# Patient Record
Sex: FEMALE | Race: WHITE | NOT HISPANIC OR LATINO | Employment: UNEMPLOYED | URBAN - METROPOLITAN AREA
[De-identification: names, ages, dates, MRNs, and addresses within clinical notes are randomized per-mention and may not be internally consistent; named-entity substitution may affect disease eponyms.]

---

## 2017-01-12 ENCOUNTER — INFUSION THERAPY VISIT (OUTPATIENT)
Dept: INFUSION THERAPY | Facility: CLINIC | Age: 47
End: 2017-01-12
Attending: ALLERGY & IMMUNOLOGY
Payer: COMMERCIAL

## 2017-01-12 VITALS
HEART RATE: 94 BPM | SYSTOLIC BLOOD PRESSURE: 143 MMHG | OXYGEN SATURATION: 98 % | DIASTOLIC BLOOD PRESSURE: 81 MMHG | RESPIRATION RATE: 16 BRPM | TEMPERATURE: 97.7 F

## 2017-01-12 DIAGNOSIS — J45.50 SEVERE PERSISTENT ASTHMA (H): Primary | ICD-10-CM

## 2017-01-12 PROCEDURE — 25000128 H RX IP 250 OP 636: Mod: JW | Performed by: ALLERGY & IMMUNOLOGY

## 2017-01-12 PROCEDURE — 96401 CHEMO ANTI-NEOPL SQ/IM: CPT

## 2017-01-12 RX ADMIN — OMALIZUMAB 375 MG: 202.5 INJECTION, SOLUTION SUBCUTANEOUS at 14:31

## 2017-01-12 NOTE — PROGRESS NOTES
Infusion Nursing Note:  Alba Varela presents today for a Xolair SQ injection (2 injections given into left arm, 1 given into right arm).    Patient seen by provider today: No    Note: N/A.    Intravenous Access:  No Intravenous access/labs at this visit.    Treatment Conditions:  Not Applicable.      Post Infusion Assessment:  Patient tolerated injection without incident.  Patient observed for 30 minutes post Xolair per protocol.    Discharge Plan:   Discharge instructions reviewed with: Patient.  Patient and/or family verbalized understanding of discharge instructions and all questions answered.  Copy of AVS reviewed with patient and/or family.  Patient will return 1/26/17 for next appointment.  Patient discharged in stable condition accompanied by: self.  Departure Mode: Ambulatory.    Viri Whiting RN

## 2017-01-26 ENCOUNTER — INFUSION THERAPY VISIT (OUTPATIENT)
Dept: INFUSION THERAPY | Facility: CLINIC | Age: 47
End: 2017-01-26
Attending: ALLERGY & IMMUNOLOGY
Payer: COMMERCIAL

## 2017-01-26 VITALS
HEART RATE: 74 BPM | TEMPERATURE: 98 F | OXYGEN SATURATION: 98 % | SYSTOLIC BLOOD PRESSURE: 123 MMHG | RESPIRATION RATE: 16 BRPM | DIASTOLIC BLOOD PRESSURE: 78 MMHG

## 2017-01-26 DIAGNOSIS — J45.50 SEVERE PERSISTENT ASTHMA (H): Primary | ICD-10-CM

## 2017-01-26 PROCEDURE — 96401 CHEMO ANTI-NEOPL SQ/IM: CPT

## 2017-01-26 PROCEDURE — 25000128 H RX IP 250 OP 636: Performed by: ALLERGY & IMMUNOLOGY

## 2017-01-26 RX ADMIN — OMALIZUMAB 375 MG: 202.5 INJECTION, SOLUTION SUBCUTANEOUS at 14:08

## 2017-01-26 NOTE — PROGRESS NOTES
Infusion Nursing Note:  Alba Varela presents today for xolair.    Patient seen by provider today: No   present during visit today: Not Applicable.    Note: N/A.    Intravenous Access:  N/A.    Treatment Conditions:  Not Applicable.      Post Infusion Assessment:  Patient tolerated injection without incident.  Patient observed for 30 minutes post xolair per protocol.    Discharge Plan:   Patient declined prescription refills.  Discharge instructions reviewed with: Patient.  Patient and/or family verbalized understanding of discharge instructions and all questions answered.  Copy of AVS reviewed with patient and/or family.  Patient will return 2/9/17 for next appointment.  Patient discharged in stable condition accompanied by: self.  Departure Mode: Ambulatory.    Lucía Yu RN

## 2017-02-09 ENCOUNTER — INFUSION THERAPY VISIT (OUTPATIENT)
Dept: INFUSION THERAPY | Facility: CLINIC | Age: 47
End: 2017-02-09
Attending: ALLERGY & IMMUNOLOGY
Payer: COMMERCIAL

## 2017-02-09 VITALS
RESPIRATION RATE: 16 BRPM | SYSTOLIC BLOOD PRESSURE: 110 MMHG | TEMPERATURE: 98.2 F | HEART RATE: 84 BPM | DIASTOLIC BLOOD PRESSURE: 60 MMHG

## 2017-02-09 DIAGNOSIS — J45.50 SEVERE PERSISTENT ASTHMA (H): Primary | ICD-10-CM

## 2017-02-09 PROCEDURE — 96401 CHEMO ANTI-NEOPL SQ/IM: CPT

## 2017-02-09 PROCEDURE — 25000128 H RX IP 250 OP 636: Mod: JW | Performed by: ALLERGY & IMMUNOLOGY

## 2017-02-09 RX ADMIN — OMALIZUMAB 375 MG: 202.5 INJECTION, SOLUTION SUBCUTANEOUS at 14:08

## 2017-02-09 NOTE — PROGRESS NOTES
Infusion Nursing Note:  Alba Varela presents today for Xolair.    Patient seen by provider today: No   present during visit today: Not Applicable.    Note: N/A.    Intravenous Access:  No Intravenous access/labs at this visit.    Treatment Conditions:  Not Applicable.      Post Infusion Assessment:  Patient tolerated injection without incident.  Patient observed for 30 minutes post Xolair per protocol.    Discharge Plan:   Discharge instructions reviewed with: Patient.  Patient and/or family verbalized understanding of discharge instructions and all questions answered.  AVS to patient via Hom[employer].  Patient will return 2/23/17 for next appointment.   Patient discharged in stable condition accompanied by: self.  Departure Mode: Ambulatory.    Toma Camargo RN

## 2017-02-23 ENCOUNTER — INFUSION THERAPY VISIT (OUTPATIENT)
Dept: INFUSION THERAPY | Facility: CLINIC | Age: 47
End: 2017-02-23
Attending: ALLERGY & IMMUNOLOGY
Payer: COMMERCIAL

## 2017-02-23 VITALS
DIASTOLIC BLOOD PRESSURE: 76 MMHG | HEART RATE: 82 BPM | SYSTOLIC BLOOD PRESSURE: 119 MMHG | BODY MASS INDEX: 27.62 KG/M2 | OXYGEN SATURATION: 97 % | WEIGHT: 160.9 LBS | TEMPERATURE: 96.7 F | RESPIRATION RATE: 16 BRPM

## 2017-02-23 DIAGNOSIS — J45.50 SEVERE PERSISTENT ASTHMA (H): Primary | ICD-10-CM

## 2017-02-23 PROCEDURE — 96401 CHEMO ANTI-NEOPL SQ/IM: CPT

## 2017-02-23 PROCEDURE — 25000128 H RX IP 250 OP 636: Mod: JW | Performed by: ALLERGY & IMMUNOLOGY

## 2017-02-23 RX ADMIN — OMALIZUMAB 375 MG: 202.5 INJECTION, SOLUTION SUBCUTANEOUS at 13:53

## 2017-02-23 NOTE — MR AVS SNAPSHOT
After Visit Summary   2/23/2017    Alba Varela    MRN: [id number]           Patient Information     Date Of Birth          1970        Visit Information        Provider Department      2/23/2017 1:30 PM RH INFUSION CHAIR 10 [facility]        Today's Diagnoses     Severe persistent asthma    -  1       Follow-ups after your visit        Your next 10 appointments already scheduled     Mar 09, 2017 10:15 AM CST   New Visit with Osmar Byrd MD   [facility] [place] ([email or web address])    [address]  [facility] [address] [phone]            Mar 09, 2017  1:30 PM CST   Level 1 with RH INFUSION CHAIR 12   [facility] ([facility])    [facility]  [address]  [facility] [id number] [phone]            Mar 23, 2017  1:30 PM CDT   Level 1 with RH INFUSION CHAIR 5   [facility] ([facility])    [facility]  [address]  [facility] [address] [phone]              Who to contact     If you have questions or need follow up information about today's clinic visit or your schedule please contact [facility] directly at [phone].  Normal or non-critical lab and imaging results will be communicated to you by MyChart, letter or phone within 4 business days after the clinic has received the results. If you do not hear from us within 7 days, please contact the clinic through MyChart or phone. If you have a critical or abnormal lab result, we will notify you by phone as soon as possible.  Submit refill requests through MedPlasts or call your pharmacy and they will forward the refill request to us. Please allow 3 business days for your refill to be completed.          Additional Information About Your  Visit        AllazoHealthhart Information     [employer] gives you secure access to your electronic health record. If you see a primary care provider, you can also send messages to your care team and make appointments. If you have questions, please call your primary care clinic.  If you do not have a primary care provider, please call [phone] and they will assist you.        Care EveryWhere ID     This is your Care EveryWhere ID. This could be used by other organizations to access your [place] medical records  [id number]        Your Vitals Were     Pulse Temperature Respirations Last Period Pulse Oximetry BMI (Body Mass Index)    82 96.7  F (35.9  C) (Tympanic) 16 07/11/2014 97% 27.62 kg/m2       Blood Pressure from Last 3 Encounters:   02/23/17 119/76   02/09/17 110/60   01/26/17 123/78    Weight from Last 3 Encounters:   02/23/17 73 kg (160 lb 14.4 oz)   06/22/16 56.7 kg (125 lb)   08/26/15 63.3 kg (139 lb 8 oz)              We Performed the Following     Nursing observation          Today's Medication Changes          These changes are accurate as of: 2/23/17  2:49 PM.  If you have any questions, ask your nurse or doctor.               These medicines have changed or have updated prescriptions.        Dose/Directions    gabapentin 100 MG capsule   Commonly known as:  NEURONTIN   This may have changed:  additional instructions   Used for:  Cervicalgia        Take 100 mg in the morning and 400 mg at bedtime for one week, then increase to 100 mg in the morning and 600 mg at bedtime.   Quantity:  210 capsule   Refills:  1                Primary Care Provider Office Phone # Fax #    Bright [facility]elo [phone]       [facility] [address] [place] [id number]        Thank you!     Thank you for choosing [facility]  for your care. Our goal is always to provide you with excellent care. Hearing back from our patients is one way we can continue to  improve our services. Please take a few minutes to complete the written survey that you may receive in the mail after your visit with us. Thank you!             Your Updated Medication List - Protect others around you: Learn how to safely use, store and throw away your medicines at www.disposemymeds.org.          This list is accurate as of: 2/23/17  2:49 PM.  Always use your most recent med list.                   Brand Name Dispense Instructions for use    diltiazem 120 MG 24 hr capsule    CARDIZEM CD    90 capsule    Take 1 capsule (120 mg) by mouth daily You are due to see the cardiologist- please call [phone] to schedule.       gabapentin 100 MG capsule    NEURONTIN    210 capsule    Take 100 mg in the morning and 400 mg at bedtime for one week, then increase to 100 mg in the morning and 600 mg at bedtime.       NORTRIPTYLINE HCL PO      Take 200 mg by mouth daily       PAROXETINE HCL PO      Take 25 mg by mouth daily

## 2017-02-23 NOTE — PROGRESS NOTES
Infusion Nursing Note:  Alba Varela presents today for Xolair.    Patient seen by provider today: No   present during visit today: Not Applicable.    Note: N/A.    Intravenous Access:  No Intravenous access/labs at this visit.    Treatment Conditions:  Not Applicable.      Post Infusion Assessment:  Patient tolerated injection without incident.  Patient observed for 30 minutes post Xolair per protocol.    Discharge Plan:   Discharge instructions reviewed with: Patient.  Patient and/or family verbalized understanding of discharge instructions and all questions answered.  AVS to patient via [employer].  Patient will return 3/9/2017 for next appointment.   Patient discharged in stable condition accompanied by: self.  Departure Mode: Ambulatory.    Lyn Bunn RN

## 2017-03-01 ENCOUNTER — MEDICAL CORRESPONDENCE (OUTPATIENT)
Dept: HEALTH INFORMATION MANAGEMENT | Facility: CLINIC | Age: 47
End: 2017-03-01

## 2017-03-03 ENCOUNTER — PRE VISIT (OUTPATIENT)
Dept: CARDIOLOGY | Facility: CLINIC | Age: 47
End: 2017-03-03

## 2017-03-09 ENCOUNTER — INFUSION THERAPY VISIT (OUTPATIENT)
Dept: INFUSION THERAPY | Facility: CLINIC | Age: 47
End: 2017-03-09
Attending: ALLERGY & IMMUNOLOGY
Payer: COMMERCIAL

## 2017-03-09 ENCOUNTER — OFFICE VISIT (OUTPATIENT)
Dept: CARDIOLOGY | Facility: CLINIC | Age: 47
End: 2017-03-09
Payer: COMMERCIAL

## 2017-03-09 VITALS
DIASTOLIC BLOOD PRESSURE: 70 MMHG | HEIGHT: 64 IN | HEART RATE: 82 BPM | WEIGHT: 161 LBS | BODY MASS INDEX: 27.49 KG/M2 | SYSTOLIC BLOOD PRESSURE: 118 MMHG

## 2017-03-09 VITALS
SYSTOLIC BLOOD PRESSURE: 124 MMHG | TEMPERATURE: 96.3 F | DIASTOLIC BLOOD PRESSURE: 77 MMHG | RESPIRATION RATE: 16 BRPM | OXYGEN SATURATION: 98 % | WEIGHT: 159 LBS | BODY MASS INDEX: 27.29 KG/M2 | HEART RATE: 78 BPM

## 2017-03-09 DIAGNOSIS — R07.9 CHEST PAIN: ICD-10-CM

## 2017-03-09 DIAGNOSIS — R07.89 ATYPICAL CHEST PAIN: ICD-10-CM

## 2017-03-09 DIAGNOSIS — E78.00 HIGH CHOLESTEROL: Primary | ICD-10-CM

## 2017-03-09 DIAGNOSIS — E78.00 HIGH CHOLESTEROL: ICD-10-CM

## 2017-03-09 DIAGNOSIS — J45.50 SEVERE PERSISTENT ASTHMA (H): Primary | ICD-10-CM

## 2017-03-09 DIAGNOSIS — R07.9 CHEST PAIN: Primary | ICD-10-CM

## 2017-03-09 LAB
ANION GAP SERPL CALCULATED.3IONS-SCNC: 9 MMOL/L (ref 3–14)
APTT PPP: 28 SEC (ref 22–37)
BUN SERPL-MCNC: 22 MG/DL (ref 7–30)
CALCIUM SERPL-MCNC: 8.4 MG/DL (ref 8.5–10.1)
CHLORIDE SERPL-SCNC: 112 MMOL/L (ref 94–109)
CHOLEST SERPL-MCNC: 236 MG/DL
CO2 SERPL-SCNC: 23 MMOL/L (ref 20–32)
CREAT SERPL-MCNC: 0.66 MG/DL (ref 0.52–1.04)
ERYTHROCYTE [DISTWIDTH] IN BLOOD BY AUTOMATED COUNT: 11.9 % (ref 10–15)
GFR SERPL CREATININE-BSD FRML MDRD: ABNORMAL ML/MIN/1.7M2
GLUCOSE SERPL-MCNC: 97 MG/DL (ref 70–99)
HCT VFR BLD AUTO: 39 % (ref 35–47)
HDLC SERPL-MCNC: 76 MG/DL
HGB BLD-MCNC: 13.1 G/DL (ref 11.7–15.7)
INR PPP: 0.88 (ref 0.86–1.14)
LDLC SERPL CALC-MCNC: 138 MG/DL
MCH RBC QN AUTO: 31.2 PG (ref 26.5–33)
MCHC RBC AUTO-ENTMCNC: 33.6 G/DL (ref 31.5–36.5)
MCV RBC AUTO: 93 FL (ref 78–100)
NONHDLC SERPL-MCNC: 160 MG/DL
PLATELET # BLD AUTO: 232 10E9/L (ref 150–450)
POTASSIUM SERPL-SCNC: 4.4 MMOL/L (ref 3.4–5.3)
RBC # BLD AUTO: 4.2 10E12/L (ref 3.8–5.2)
SODIUM SERPL-SCNC: 144 MMOL/L (ref 133–144)
TRIGL SERPL-MCNC: 111 MG/DL
WBC # BLD AUTO: 5.8 10E9/L (ref 4–11)

## 2017-03-09 PROCEDURE — 80061 LIPID PANEL: CPT | Performed by: INTERNAL MEDICINE

## 2017-03-09 PROCEDURE — 85730 THROMBOPLASTIN TIME PARTIAL: CPT | Performed by: INTERNAL MEDICINE

## 2017-03-09 PROCEDURE — 99203 OFFICE O/P NEW LOW 30 MIN: CPT | Mod: 25 | Performed by: INTERNAL MEDICINE

## 2017-03-09 PROCEDURE — 36415 COLL VENOUS BLD VENIPUNCTURE: CPT | Performed by: INTERNAL MEDICINE

## 2017-03-09 PROCEDURE — 80048 BASIC METABOLIC PNL TOTAL CA: CPT | Performed by: INTERNAL MEDICINE

## 2017-03-09 PROCEDURE — 93000 ELECTROCARDIOGRAM COMPLETE: CPT | Performed by: INTERNAL MEDICINE

## 2017-03-09 PROCEDURE — 96401 CHEMO ANTI-NEOPL SQ/IM: CPT

## 2017-03-09 PROCEDURE — 85610 PROTHROMBIN TIME: CPT | Performed by: INTERNAL MEDICINE

## 2017-03-09 PROCEDURE — 85027 COMPLETE CBC AUTOMATED: CPT | Performed by: INTERNAL MEDICINE

## 2017-03-09 PROCEDURE — 25000128 H RX IP 250 OP 636: Performed by: ALLERGY & IMMUNOLOGY

## 2017-03-09 RX ORDER — LORATADINE 10 MG/1
10 TABLET ORAL DAILY
COMMUNITY

## 2017-03-09 RX ADMIN — OMALIZUMAB 375 MG: 202.5 INJECTION, SOLUTION SUBCUTANEOUS at 13:52

## 2017-03-09 NOTE — MR AVS SNAPSHOT
After Visit Summary   3/9/2017    Alba Varela    MRN: [id number]           Patient Information     Date Of Birth          1970        Visit Information        Provider Department      3/9/2017 10:15 AM Ip, Osmar Lizarraga MD [facility] [place]        Today's Diagnoses     High cholesterol    -  1    Atypical chest pain           Follow-ups after your visit        Your next 10 appointments already scheduled     Mar 16, 2017 11:00 AM CDT   Cath 90 Minute with [id number], CATHIRTEAM   [facility] ([facility])    [address]  [facility] [address]   [phone]            Mar 23, 2017  1:30 PM CDT   Level 1 with RH INFUSION CHAIR 4   [facility] ([facility])    [facility]  [address] [place] [address] [address]  [facility] [id number] [phone]            Mar 24, 2017  8:10 AM CDT   Return Visit with WALESKA Tellez [facility]ART AT [place] ([facility] PSA Clin[facility])    [address]  [facility] [address] [phone]            Apr 06, 2017  1:30 PM CDT   Level 1 with RH INFUSION CHAIR 9   [facility] ([facility])    [facility]  [address]  [facility] [address] [phone]            Apr 20, 2017  1:30 PM CDT   Level 1 with RH INFUSION CHAIR 7   [facility] ([facility])    [facility]  [address]  [facility] [address] [phone]            May 04, 2017  1:30 PM CDT   Level 1 with RH INFUSION CHAIR 8   [facility] ([facility])    [facility]  [address]  [facility] [address] [phone]              Future tests that were  "ordered for you today     Open Future Orders        Priority Expected Expires Ordered    Cardiac Cath: Coronary Angiography Adult Order Routine 3/16/2017 3/4/2018 3/9/2017            Who to contact     If you have questions or need follow up information about today's clinic visit or your schedule please contact [facility] AT [place] directly at [phone].  Normal or non-critical lab and imaging results will be communicated to you by MyChart, letter or phone within 4 business days after the clinic has received the results. If you do not hear from us within 7 days, please contact the clinic through InstantLuxehart or phone. If you have a critical or abnormal lab result, we will notify you by phone as soon as possible.  Submit refill requests through Vanksen or call your pharmacy and they will forward the refill request to us. Please allow 3 business days for your refill to be completed.          Additional Information About Your Visit        [email or web address] Information     [employer]anksen gives you secure access to your electronic health record. If you see a primary care provider, you can also send messages to your care team and make appointments. If you have questions, please call your primary care clinic.  If you do not have a primary care provider, please call [phone] and they will assist you.        Care EveryWhere ID     This is your Care EveryWhere ID. This could be used by other organizations to access your [place] medical records  [id number]        Your Vitals Were     Pulse Height Last Period BMI (Body Mass Index)          82 1.626 m (5' 4\") 07/11/2014 27.64 kg/m2         Blood Pressure from Last 3 Encounters:   03/09/17 124/77   03/09/17 118/70   02/23/17 119/76    Weight from Last 3 Encounters:   03/09/17 72.1 kg (159 lb)   03/09/17 73 kg (161 lb)   02/23/17 73 kg (160 lb 14.4 oz)              We Performed the Following     EKG 12-lead complete w/read - Clinics (performed today)        "   Today's Medication Changes          These changes are accurate as of: 3/9/17  2:51 PM.  If you have any questions, ask your nurse or doctor.               These medicines have changed or have updated prescriptions.        Dose/Directions    gabapentin 100 MG capsule   Commonly known as:  NEURONTIN   This may have changed:  additional instructions   Used for:  Cervicalgia        Take 100 mg in the morning and 400 mg at bedtime for one week, then increase to 100 mg in the morning and 600 mg at bedtime.   Quantity:  210 capsule   Refills:  1                Primary Care Provider Office Phone # Fax #    Bright [facility]elo [phone]       [facility] [address] [address]        Thank you!     Thank you for choosing [facility] AT [place]  for your care. Our goal is always to provide you with excellent care. Hearing back from our patients is one way we can continue to improve our services. Please take a few minutes to complete the written survey that you may receive in the mail after your visit with us. Thank you!             Your Updated Medication List - Protect others around you: Learn how to safely use, store and throw away your medicines at www.disposemymeds.org.          This list is accurate as of: 3/9/17  2:51 PM.  Always use your most recent med list.                   Brand Name Dispense Instructions for use    BOTOX IJ          diltiazem 120 MG 24 hr capsule    CARDIZEM CD    90 capsule    Take 1 capsule (120 mg) by mouth daily You are due to see the cardiologist- please call [phone] to schedule.       gabapentin 100 MG capsule    NEURONTIN    210 capsule    Take 100 mg in the morning and 400 mg at bedtime for one week, then increase to 100 mg in the morning and 600 mg at bedtime.       loratadine 10 MG tablet    CLARITIN     Take 10 mg by mouth daily       NORTRIPTYLINE HCL PO      Take 200 mg by mouth daily       PAROXETINE  HCL PO      Take 25 mg by mouth daily

## 2017-03-09 NOTE — LETTER
3/9/2017    Bright Sotelo MD  [facility]   [address] [address]    RE: Alba Varela       Dear Colleague,    It is a pleasure for me to see Mrs. Varela today for evaluation of chest discomfort.      She is a very delightful 46-year-old lady who is originally from [place].  She was adopted at a young age and her family history for heart disease is unknown.  She has been seen by Sherman Wyatt and Harvey Lee previously.  They saw her in 2007 for evaluation of chest discomfort and an abnormal EKG.  It was felt that her EKG was unremarkable and her chest discomfort was noncardiac in origin.  When she saw Dr. Lee, the possibility of coronary artery spasm was raised, though this diagnosis has not been substantiated.  She has had a normal stress echocardiogram in 2015 and ST segment changes have never been documented.       This lady is a smoker but does not have diabetes.  She is not hypertensive.  She has been labeled as hyperlipidemic.  I do see levels of 273 in 2010.  However, the last level in 2011 was 182, and she denies ever having been on any cholesterol-lowering medications.      She does have a very interesting past medical history.  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  She also has severe allergies for which she receives Solera infusion.  She is allergic to contrast in that she gets facial swelling and severe itching.  However, with premedication she tolerates a contrast load just fine.      Her chest pains have been substernal in location.  She has been getting more frequent episodes.  She had 3 episodes within the past month.  One of these episodes occurred when she was in [facility].  She was feeling just fine, and within a minute she had severe crushing chest discomfort which dropped her to the floor.  It lasted for about an hour and was associated with dysphagia.  A second similar episode occurred at home, and she blacked out.  She does have a  history of migraines.  She tells me that she has had upper GI workup, and she says causes such as gastroesophageal reflux disease and esophageal spasm have been ruled out.      There is no history of drug use or excessive caffeine use.  She denies excessive use of alcohol.      PHYSICAL EXAMINATION:  Cardiovascular system examination is normal.  EKG today shows nonspecific ST segment changes and nonspecific intraventricular conduction delay only.      I should also add that back in 2000 when she was residing in [place], she received a diagnosis of possible mitral valve prolapse and she has been put on diltiazem.  This medication has been somewhat effective in preventing her chest discomfort but not recently.     Outpatient Encounter Prescriptions as of 3/9/2017   Medication Sig Dispense Refill     loratadine (CLARITIN) 10 MG tablet Take 10 mg by mouth daily       OnabotulinumtoxinA (BOTOX IJ)        NORTRIPTYLINE HCL PO Take 200 mg by mouth daily       PAROXETINE HCL PO Take 25 mg by mouth daily       diltiazem (CARDIZEM CD) 120 MG 24 hr CD capsule Take 1 capsule (120 mg) by mouth daily You are due to see the cardiologist- please call [phone] to schedule. 90 capsule 0     gabapentin (NEURONTIN) 100 MG capsule Take 100 mg in the morning and 400 mg at bedtime for one week, then increase to 100 mg in the morning and 600 mg at bedtime. (Patient taking differently: Take 400 mg in the morning and 600 mg at bedtime.) 210 capsule 1     No facility-administered encounter medications on file as of 3/9/2017.       IMPRESSION:   1.  Chest pain, uncertain etiology.  Previous normal stress test.  Typical and atypical features of coronary artery disease.  I think the only way we may be able to fully evaluate her would be to do invasive angiography with the appropriate technical challenges for coronary artery spasm.  Her history of severe allergy is noted, and we will premedicate her.   2.  Common variable immune deficiency.    3.  Severe allergies.   4.  History of migraine headaches.  There is an association between migraine headaches and coronary artery spasm.   5.  Hyperlipidemia.      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  We talked about possible risks of coronary angiography including but not limited to MI, CVA, death, peripheral vascular injury, allergic reaction.  The patient understands and is agreeable to proceed.  Further followup and recommendations will be made once we know the results of her coronary angiogram.     Again, thank you for allowing me to participate in the care of your patient.      Sincerely,    DR AYE SAM MD     [facility]

## 2017-03-09 NOTE — MR AVS SNAPSHOT
After Visit Summary   3/9/2017    Alba Varela    MRN: [id number]           Patient Information     Date Of Birth          1970        Visit Information        Provider Department      3/9/2017 1:30 PM RH INFUSION CHAIR 8 [facility] Infusion [facility]        Today's Diagnoses     Severe persistent asthma    -  1       Follow-ups after your visit        Your next 10 appointments already scheduled     Mar 16, 2017 11:00 AM CDT   Cath 90 Minute with [id number], CATHIRTEAM   [facility] Cardiac Cath Lab ([facility])    [address]  [facility] [address]   [phone]            Mar 23, 2017  1:30 PM CDT   Level 1 with RH INFUSION CHAIR 4   [facility] Infusion [facility] ([facility])    [facility]  [address] [place] [address] [address]  [facility] [address]   [phone]            Mar 24, 2017  8:10 AM CDT   Return Visit with WALESKA Tellez [facility]YSICIANS HE[facility] AT [place] ([facility])    [address]  [facility] [address]   [phone]            Apr 06, 2017  1:30 PM CDT   Level 1 with RH INFUSION CHAIR 9   [facility] Infusion [facility] ([facility])    [facility]  [address]  [facility] [address] [phone]            Apr 20, 2017  1:30 PM CDT   Level 1 with RH INFUSION CHAIR 7   [facility] Infusion [facility] ([facility])    [facility]  [address]  [facility] [address]   [phone]            May 04, 2017  1:30 PM CDT   Level 1 with RH INFUSION CHAIR 8   [facility] Infusion [facility] ([facility])    [facility]  [address]  [facility] [address] [phone]              Future tests that were ordered for you today     Open  Future Orders        Priority Expected Expires Ordered    Cardiac Cath: Coronary Angiography Adult Order Routine 3/16/2017 3/4/2018 3/9/2017            Who to contact     If you have questions or need follow up information about today's clinic visit or your schedule please contact [facility] INFUSION [facility] directly at [phone].  Normal or non-critical lab and imaging results will be communicated to you by MyChart, letter or phone within 4 business days after the clinic has received the results. If you do not hear from us within 7 days, please contact the clinic through Washiohart or phone. If you have a critical or abnormal lab result, we will notify you by phone as soon as possible.  Submit refill requests through Mark43 or call your pharmacy and they will forward the refill request to us. Please allow 3 business days for your refill to be completed.          Additional Information About Your Visit        MyChart Information     Mark43 gives you secure access to your electronic health record. If you see a primary care provider, you can also send messages to your care team and make appointments. If you have questions, please call your primary care clinic.  If you do not have a primary care provider, please call [phone] and they will assist you.        Care EveryWhere ID     This is your Care EveryWhere ID. This could be used by other organizations to access your [place] medical records  [id number]        Your Vitals Were     Pulse Temperature Respirations Last Period Pulse Oximetry BMI (Body Mass Index)    78 96.3  F (35.7  C) (Tympanic) 16 07/11/2014 98% 27.29 kg/m2       Blood Pressure from Last 3 Encounters:   03/09/17 124/77   03/09/17 118/70   02/23/17 119/76    Weight from Last 3 Encounters:   03/09/17 72.1 kg (159 lb)   03/09/17 73 kg (161 lb)   02/23/17 73 kg (160 lb 14.4 oz)              We Performed the Following     Nursing observation          Today's Medication Changes           These changes are accurate as of: 3/9/17  2:28 PM.  If you have any questions, ask your nurse or doctor.               These medicines have changed or have updated prescriptions.        Dose/Directions    gabapentin 100 MG capsule   Commonly known as:  NEURONTIN   This may have changed:  additional instructions   Used for:  Cervicalgia        Take 100 mg in the morning and 400 mg at bedtime for one week, then increase to 100 mg in the morning and 600 mg at bedtime.   Quantity:  210 capsule   Refills:  1                Primary Care Provider Office Phone # Fax #    Bright [facility]elo [phone]       [facility] [address] [facility] [address]        Thank you!     Thank you for choosing [facility] INFUSION [facility]  for your care. Our goal is always to provide you with excellent care. Hearing back from our patients is one way we can continue to improve our services. Please take a few minutes to complete the written survey that you may receive in the mail after your visit with us. Thank you!             Your Updated Medication List - Protect others around you: Learn how to safely use, store and throw away your medicines at www.disposemymeds.org.          This list is accurate as of: 3/9/17  2:28 PM.  Always use your most recent med list.                   Brand Name Dispense Instructions for use    BOTOX IJ          diltiazem 120 MG 24 hr capsule    CARDIZEM CD    90 capsule    Take 1 capsule (120 mg) by mouth daily You are due to see the cardiologist- please call [phone] to schedule.       gabapentin 100 MG capsule    NEURONTIN    210 capsule    Take 100 mg in the morning and 400 mg at bedtime for one week, then increase to 100 mg in the morning and 600 mg at bedtime.       loratadine 10 MG tablet    CLARITIN     Take 10 mg by mouth daily       NORTRIPTYLINE HCL PO      Take 200 mg by mouth daily       PAROXETINE HCL PO      Take 25 mg by mouth daily

## 2017-03-09 NOTE — PROGRESS NOTES
Infusion Nursing Note:  Alba Varela presents today for Xolair.    Patient seen by provider today: No   present during visit today: Not Applicable.    Note: N/A.    Intravenous Access:  No Intravenous access/labs at this visit.    Treatment Conditions:  Not Applicable.      Post Infusion Assessment:  Patient tolerated injection without incident.    Discharge Plan:   Discharge instructions reviewed with: Patient.  Patient and/or family verbalized understanding of discharge instructions and all questions answered.  AVS to patient via Interactive Project.  Patient will return 3/23/2017 for next appointment.   Patient discharged in stable condition accompanied by: self.  Departure Mode: Ambulatory.    Lyn Bunn RN

## 2017-03-10 NOTE — PROGRESS NOTES
HISTORY OF PRESENT ILLNESS:  It is a pleasure for me to see Mrs. Varela today for evaluation of chest discomfort.      She is a very delightful 46-year-old lady who is originally from [place].  She was adopted at a young age and her family history for heart disease is unknown.  She has been seen by Sherman Wyatt and Harvey Lee previously.  They saw her in 2007 for evaluation of chest discomfort and an abnormal EKG.  It was felt that her EKG was unremarkable and her chest discomfort was noncardiac in origin.  When she saw Dr. Lee, the possibility of coronary artery spasm was raised, though this diagnosis has not been substantiated.  She has had a normal stress echocardiogram in 2015 and ST segment changes have never been documented.       This lady is a smoker but does not have diabetes.  She is not hypertensive.  She has been labeled as hyperlipidemic.  I do see levels of 273 in 2010.  However, the last level in 2011 was 182, and she denies ever having been on any cholesterol-lowering medications.      She does have a very interesting past medical history.  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  She also has severe allergies for which she receives Solera infusion.  She is allergic to contrast in that she gets facial swelling and severe itching.  However, with premedication she tolerates a contrast load just fine.      Her chest pains have been substernal in location.  She has been getting more frequent episodes.  She had 3 episodes within the past month.  One of these episodes occurred when she was in [facility].  She was feeling just fine, and within a minute she had severe crushing chest discomfort which dropped her to the floor.  It lasted for about an hour and was associated with dysphagia.  A second similar episode occurred at home, and she blacked out.  She does have a history of migraines.  She tells me that she has had upper GI workup, and she says causes such as gastroesophageal  reflux disease and esophageal spasm have been ruled out.      There is no history of drug use or excessive caffeine use.  She denies excessive use of alcohol.      PHYSICAL EXAMINATION:  Cardiovascular system examination is normal.  EKG today shows nonspecific ST segment changes and nonspecific intraventricular conduction delay only.      I should also add that back in  when she was residing in [place], she received a diagnosis of possible mitral valve prolapse and she has been put on diltiazem.  This medication has been somewhat effective in preventing her chest discomfort but not recently.      IMPRESSION:   1.  Chest pain, uncertain etiology.  Previous normal stress test.  Typical and atypical features of coronary artery disease.  I think the only way we may be able to fully evaluate her would be to do invasive angiography with the appropriate technical challenges for coronary artery spasm.  Her history of severe allergy is noted, and we will premedicate her.   2.  Common variable immune deficiency.   3.  Severe allergies.   4.  History of migraine headaches.  There is an association between migraine headaches and coronary artery spasm.   5.  Hyperlipidemia.      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  We talked about possible risks of coronary angiography including but not limited to MI, CVA, death, peripheral vascular injury, allergic reaction.  The patient understands and is agreeable to proceed.  Further followup and recommendations will be made once we know the results of her coronary angiogram.         AYE SAM MD, [facility]             D: 2017 11:11   T: 2017 20:13   MT: KITA      Name:     KEISHA WEIR   MRN:      [id number]        Account:      [id number]   :      1970           Service Date: 2017      Document: [id number]

## 2017-03-14 ENCOUNTER — TELEPHONE (OUTPATIENT)
Dept: CARDIOLOGY | Facility: CLINIC | Age: 47
End: 2017-03-14

## 2017-03-14 DIAGNOSIS — R07.9 CHEST PAIN, UNSPECIFIED TYPE: Primary | ICD-10-CM

## 2017-03-14 RX ORDER — ASPIRIN 81 MG/1
325 TABLET ORAL DAILY
Status: CANCELLED | OUTPATIENT
Start: 2017-03-15

## 2017-03-14 RX ORDER — SODIUM CHLORIDE 9 MG/ML
INJECTION, SOLUTION INTRAVENOUS CONTINUOUS
Status: CANCELLED | OUTPATIENT
Start: 2017-03-16

## 2017-03-14 RX ORDER — LIDOCAINE 40 MG/G
CREAM TOPICAL
Status: CANCELLED | OUTPATIENT
Start: 2017-03-16

## 2017-03-14 RX ORDER — POTASSIUM CHLORIDE 750 MG/1
20 TABLET, EXTENDED RELEASE ORAL
Status: CANCELLED | OUTPATIENT
Start: 2017-03-15

## 2017-03-15 NOTE — TELEPHONE ENCOUNTER
Reviewed prep instructions with patient over the phone. Patient had no questions.    Temo SIDHU  [facility]

## 2017-03-16 ENCOUNTER — APPOINTMENT (OUTPATIENT)
Dept: CARDIOLOGY | Facility: CLINIC | Age: 47
End: 2017-03-16
Attending: INTERNAL MEDICINE
Payer: COMMERCIAL

## 2017-03-16 ENCOUNTER — HOSPITAL ENCOUNTER (OUTPATIENT)
Facility: CLINIC | Age: 47
Discharge: HOME | End: 2017-03-16
Attending: INTERNAL MEDICINE | Admitting: INTERNAL MEDICINE
Payer: COMMERCIAL

## 2017-03-16 VITALS
OXYGEN SATURATION: 94 % | TEMPERATURE: 97.8 F | RESPIRATION RATE: 18 BRPM | SYSTOLIC BLOOD PRESSURE: 116 MMHG | DIASTOLIC BLOOD PRESSURE: 79 MMHG | HEART RATE: 78 BPM

## 2017-03-16 DIAGNOSIS — Z98.890 STATUS POST CORONARY ANGIOGRAM: Primary | ICD-10-CM

## 2017-03-16 DIAGNOSIS — R07.89 ATYPICAL CHEST PAIN: ICD-10-CM

## 2017-03-16 PROCEDURE — 99153 MOD SED SAME PHYS/QHP EA: CPT | Performed by: INTERNAL MEDICINE

## 2017-03-16 PROCEDURE — 93458 L HRT ARTERY/VENTRICLE ANGIO: CPT

## 2017-03-16 PROCEDURE — 25000128 H RX IP 250 OP 636: Performed by: INTERNAL MEDICINE

## 2017-03-16 PROCEDURE — 25000128 H RX IP 250 OP 636

## 2017-03-16 PROCEDURE — 99152 MOD SED SAME PHYS/QHP 5/>YRS: CPT | Performed by: INTERNAL MEDICINE

## 2017-03-16 PROCEDURE — B2111ZZ FLUOROSCOPY OF MULTIPLE CORONARY ARTERIES USING LOW OSMOLAR CONTRAST: ICD-10-PCS | Performed by: INTERNAL MEDICINE

## 2017-03-16 PROCEDURE — 33210 INSERT ELECTRD/PM CATH SNGL: CPT

## 2017-03-16 PROCEDURE — 93463 DRUG ADMIN & HEMODYNMIC MEAS: CPT

## 2017-03-16 PROCEDURE — 27210757 ZZH CATH TEMP PACING HEART CR8

## 2017-03-16 PROCEDURE — 99153 MOD SED SAME PHYS/QHP EA: CPT

## 2017-03-16 PROCEDURE — 93463 DRUG ADMIN & HEMODYNMIC MEAS: CPT | Performed by: INTERNAL MEDICINE

## 2017-03-16 PROCEDURE — 27210946 ZZH KIT HC TOTES DISP CR8

## 2017-03-16 PROCEDURE — 02HK3JZ INSERTION OF PACEMAKER LEAD INTO RIGHT VENTRICLE, PERCUTANEOUS APPROACH: ICD-10-PCS | Performed by: INTERNAL MEDICINE

## 2017-03-16 PROCEDURE — B2151ZZ FLUOROSCOPY OF LEFT HEART USING LOW OSMOLAR CONTRAST: ICD-10-PCS | Performed by: INTERNAL MEDICINE

## 2017-03-16 PROCEDURE — 93458 L HRT ARTERY/VENTRICLE ANGIO: CPT | Mod: 26 | Performed by: INTERNAL MEDICINE

## 2017-03-16 PROCEDURE — 99152 MOD SED SAME PHYS/QHP 5/>YRS: CPT

## 2017-03-16 PROCEDURE — 4A023N7 MEASUREMENT OF CARDIAC SAMPLING AND PRESSURE, LEFT HEART, PERCUTANEOUS APPROACH: ICD-10-PCS | Performed by: INTERNAL MEDICINE

## 2017-03-16 PROCEDURE — 27210742 ZZH CATH CR1

## 2017-03-16 PROCEDURE — 25000125 ZZHC RX 250: Performed by: INTERNAL MEDICINE

## 2017-03-16 PROCEDURE — 27210827 ZZH KIT ACIST INJECTOR CR6

## 2017-03-16 RX ORDER — LIDOCAINE HYDROCHLORIDE 10 MG/ML
INJECTION, SOLUTION INFILTRATION; PERINEURAL
Status: DISCONTINUED
Start: 2017-03-16 | End: 2017-03-16 | Stop reason: HOSPADM

## 2017-03-16 RX ORDER — DIPHENHYDRAMINE HYDROCHLORIDE 50 MG/ML
25-50 INJECTION INTRAMUSCULAR; INTRAVENOUS
Status: DISCONTINUED | OUTPATIENT
Start: 2017-03-16 | End: 2017-03-16 | Stop reason: HOSPADM

## 2017-03-16 RX ORDER — PHENYLEPHRINE HCL IN 0.9% NACL 1 MG/10 ML
20-100 SYRINGE (ML) INTRAVENOUS
Status: DISCONTINUED | OUTPATIENT
Start: 2017-03-16 | End: 2017-03-16 | Stop reason: HOSPADM

## 2017-03-16 RX ORDER — DEXTROSE MONOHYDRATE 25 G/50ML
12.5-5 INJECTION, SOLUTION INTRAVENOUS EVERY 30 MIN PRN
Status: DISCONTINUED | OUTPATIENT
Start: 2017-03-16 | End: 2017-03-16 | Stop reason: HOSPADM

## 2017-03-16 RX ORDER — ADENOSINE 3 MG/ML
12-12000 INJECTION, SOLUTION INTRAVENOUS
Status: DISCONTINUED | OUTPATIENT
Start: 2017-03-16 | End: 2017-03-16 | Stop reason: HOSPADM

## 2017-03-16 RX ORDER — LIDOCAINE HYDROCHLORIDE 10 MG/ML
1-10 INJECTION, SOLUTION INFILTRATION; PERINEURAL
Status: DISCONTINUED | OUTPATIENT
Start: 2017-03-16 | End: 2017-03-16 | Stop reason: HOSPADM

## 2017-03-16 RX ORDER — NITROGLYCERIN 0.4 MG/1
0.4 TABLET SUBLINGUAL EVERY 5 MIN PRN
Status: DISCONTINUED | OUTPATIENT
Start: 2017-03-16 | End: 2017-03-16 | Stop reason: HOSPADM

## 2017-03-16 RX ORDER — EPTIFIBATIDE 2 MG/ML
2 INJECTION, SOLUTION INTRAVENOUS CONTINUOUS PRN
Status: DISCONTINUED | OUTPATIENT
Start: 2017-03-16 | End: 2017-03-16 | Stop reason: HOSPADM

## 2017-03-16 RX ORDER — DOPAMINE HYDROCHLORIDE 160 MG/100ML
2-20 INJECTION, SOLUTION INTRAVENOUS CONTINUOUS PRN
Status: DISCONTINUED | OUTPATIENT
Start: 2017-03-16 | End: 2017-03-16 | Stop reason: HOSPADM

## 2017-03-16 RX ORDER — BUPIVACAINE HYDROCHLORIDE 2.5 MG/ML
1-10 INJECTION, SOLUTION EPIDURAL; INFILTRATION; INTRACAUDAL
Status: DISCONTINUED | OUTPATIENT
Start: 2017-03-16 | End: 2017-03-16 | Stop reason: HOSPADM

## 2017-03-16 RX ORDER — HEPARIN SODIUM 1000 [USP'U]/ML
1000-10000 INJECTION, SOLUTION INTRAVENOUS; SUBCUTANEOUS EVERY 5 MIN PRN
Status: DISCONTINUED | OUTPATIENT
Start: 2017-03-16 | End: 2017-03-16 | Stop reason: HOSPADM

## 2017-03-16 RX ORDER — NICARDIPINE HYDROCHLORIDE 2.5 MG/ML
100 INJECTION INTRAVENOUS
Status: DISCONTINUED | OUTPATIENT
Start: 2017-03-16 | End: 2017-03-16 | Stop reason: HOSPADM

## 2017-03-16 RX ORDER — NIFEDIPINE 10 MG/1
10 CAPSULE ORAL
Status: DISCONTINUED | OUTPATIENT
Start: 2017-03-16 | End: 2017-03-16 | Stop reason: HOSPADM

## 2017-03-16 RX ORDER — PROMETHAZINE HYDROCHLORIDE 25 MG/ML
6.25-25 INJECTION, SOLUTION INTRAMUSCULAR; INTRAVENOUS EVERY 4 HOURS PRN
Status: DISCONTINUED | OUTPATIENT
Start: 2017-03-16 | End: 2017-03-16 | Stop reason: HOSPADM

## 2017-03-16 RX ORDER — FENTANYL CITRATE 50 UG/ML
INJECTION, SOLUTION INTRAMUSCULAR; INTRAVENOUS
Status: DISCONTINUED
Start: 2017-03-16 | End: 2017-03-16 | Stop reason: HOSPADM

## 2017-03-16 RX ORDER — DOBUTAMINE HYDROCHLORIDE 200 MG/100ML
2-20 INJECTION INTRAVENOUS CONTINUOUS PRN
Status: DISCONTINUED | OUTPATIENT
Start: 2017-03-16 | End: 2017-03-16 | Stop reason: HOSPADM

## 2017-03-16 RX ORDER — NITROGLYCERIN 5 MG/ML
100-500 VIAL (ML) INTRAVENOUS
Status: COMPLETED | OUTPATIENT
Start: 2017-03-16 | End: 2017-03-16

## 2017-03-16 RX ORDER — METHYLPREDNISOLONE SODIUM SUCCINATE 125 MG/2ML
INJECTION, POWDER, LYOPHILIZED, FOR SOLUTION INTRAMUSCULAR; INTRAVENOUS
Status: COMPLETED
Start: 2017-03-16 | End: 2017-03-16

## 2017-03-16 RX ORDER — LIDOCAINE 40 MG/G
CREAM TOPICAL
Status: DISCONTINUED | OUTPATIENT
Start: 2017-03-16 | End: 2017-03-16 | Stop reason: HOSPADM

## 2017-03-16 RX ORDER — DIPHENHYDRAMINE HYDROCHLORIDE 50 MG/ML
25 INJECTION INTRAMUSCULAR; INTRAVENOUS ONCE
Status: COMPLETED | OUTPATIENT
Start: 2017-03-16 | End: 2017-03-16

## 2017-03-16 RX ORDER — FUROSEMIDE 10 MG/ML
20-100 INJECTION INTRAMUSCULAR; INTRAVENOUS
Status: DISCONTINUED | OUTPATIENT
Start: 2017-03-16 | End: 2017-03-16 | Stop reason: HOSPADM

## 2017-03-16 RX ORDER — LORAZEPAM 2 MG/ML
.5-2 INJECTION INTRAMUSCULAR EVERY 4 HOURS PRN
Status: DISCONTINUED | OUTPATIENT
Start: 2017-03-16 | End: 2017-03-16 | Stop reason: HOSPADM

## 2017-03-16 RX ORDER — HYDROCODONE BITARTRATE AND ACETAMINOPHEN 5; 325 MG/1; MG/1
1-2 TABLET ORAL EVERY 4 HOURS PRN
Status: CANCELLED | OUTPATIENT
Start: 2017-03-16

## 2017-03-16 RX ORDER — LIDOCAINE HYDROCHLORIDE 10 MG/ML
30 INJECTION, SOLUTION EPIDURAL; INFILTRATION; INTRACAUDAL; PERINEURAL
Status: DISCONTINUED | OUTPATIENT
Start: 2017-03-16 | End: 2017-03-16 | Stop reason: HOSPADM

## 2017-03-16 RX ORDER — FLUMAZENIL 0.1 MG/ML
0.2 INJECTION, SOLUTION INTRAVENOUS
Status: CANCELLED | OUTPATIENT
Start: 2017-03-16 | End: 2017-03-17

## 2017-03-16 RX ORDER — PRASUGREL 10 MG/1
10-60 TABLET, FILM COATED ORAL
Status: DISCONTINUED | OUTPATIENT
Start: 2017-03-16 | End: 2017-03-16 | Stop reason: HOSPADM

## 2017-03-16 RX ORDER — SODIUM NITROPRUSSIDE 25 MG/ML
100-200 INJECTION INTRAVENOUS
Status: DISCONTINUED | OUTPATIENT
Start: 2017-03-16 | End: 2017-03-16 | Stop reason: HOSPADM

## 2017-03-16 RX ORDER — ONDANSETRON 2 MG/ML
4 INJECTION INTRAMUSCULAR; INTRAVENOUS EVERY 4 HOURS PRN
Status: DISCONTINUED | OUTPATIENT
Start: 2017-03-16 | End: 2017-03-16 | Stop reason: HOSPADM

## 2017-03-16 RX ORDER — HYDRALAZINE HYDROCHLORIDE 20 MG/ML
10-20 INJECTION INTRAMUSCULAR; INTRAVENOUS
Status: DISCONTINUED | OUTPATIENT
Start: 2017-03-16 | End: 2017-03-16 | Stop reason: HOSPADM

## 2017-03-16 RX ORDER — VERAPAMIL HYDROCHLORIDE 2.5 MG/ML
1-2.5 INJECTION, SOLUTION INTRAVENOUS
Status: COMPLETED | OUTPATIENT
Start: 2017-03-16 | End: 2017-03-16

## 2017-03-16 RX ORDER — DILTIAZEM HYDROCHLORIDE 120 MG/1
120 CAPSULE, COATED, EXTENDED RELEASE ORAL DAILY
Status: CANCELLED | OUTPATIENT
Start: 2017-03-16

## 2017-03-16 RX ORDER — ENALAPRILAT 1.25 MG/ML
1.25-2.5 INJECTION INTRAVENOUS
Status: DISCONTINUED | OUTPATIENT
Start: 2017-03-16 | End: 2017-03-16 | Stop reason: HOSPADM

## 2017-03-16 RX ORDER — NALOXONE HYDROCHLORIDE 0.4 MG/ML
.1-.4 INJECTION, SOLUTION INTRAMUSCULAR; INTRAVENOUS; SUBCUTANEOUS
Status: CANCELLED | OUTPATIENT
Start: 2017-03-16

## 2017-03-16 RX ORDER — EPTIFIBATIDE 2 MG/ML
180 INJECTION, SOLUTION INTRAVENOUS EVERY 10 MIN PRN
Status: DISCONTINUED | OUTPATIENT
Start: 2017-03-16 | End: 2017-03-16 | Stop reason: HOSPADM

## 2017-03-16 RX ORDER — HEPARIN SODIUM 1000 [USP'U]/ML
INJECTION, SOLUTION INTRAVENOUS; SUBCUTANEOUS
Status: COMPLETED
Start: 2017-03-16 | End: 2017-03-16

## 2017-03-16 RX ORDER — CLOPIDOGREL BISULFATE 75 MG/1
75 TABLET ORAL
Status: DISCONTINUED | OUTPATIENT
Start: 2017-03-16 | End: 2017-03-16 | Stop reason: HOSPADM

## 2017-03-16 RX ORDER — VERAPAMIL HYDROCHLORIDE 2.5 MG/ML
INJECTION, SOLUTION INTRAVENOUS
Status: DISCONTINUED
Start: 2017-03-16 | End: 2017-03-16 | Stop reason: HOSPADM

## 2017-03-16 RX ORDER — SODIUM CHLORIDE 9 MG/ML
INJECTION, SOLUTION INTRAVENOUS CONTINUOUS
Status: DISCONTINUED | OUTPATIENT
Start: 2017-03-16 | End: 2017-03-16 | Stop reason: HOSPADM

## 2017-03-16 RX ORDER — ACETAMINOPHEN 325 MG/1
325-650 TABLET ORAL EVERY 4 HOURS PRN
Status: CANCELLED | OUTPATIENT
Start: 2017-03-16

## 2017-03-16 RX ORDER — FENTANYL CITRATE 50 UG/ML
25-50 INJECTION, SOLUTION INTRAMUSCULAR; INTRAVENOUS
Status: DISCONTINUED | OUTPATIENT
Start: 2017-03-16 | End: 2017-03-16 | Stop reason: HOSPADM

## 2017-03-16 RX ORDER — METHYLPREDNISOLONE SODIUM SUCCINATE 125 MG/2ML
125 INJECTION, POWDER, LYOPHILIZED, FOR SOLUTION INTRAMUSCULAR; INTRAVENOUS
Status: DISCONTINUED | OUTPATIENT
Start: 2017-03-16 | End: 2017-03-16 | Stop reason: HOSPADM

## 2017-03-16 RX ORDER — IOPAMIDOL 755 MG/ML
100 INJECTION, SOLUTION INTRAVASCULAR ONCE
Status: COMPLETED | OUTPATIENT
Start: 2017-03-16 | End: 2017-03-16

## 2017-03-16 RX ORDER — FLUMAZENIL 0.1 MG/ML
0.2 INJECTION, SOLUTION INTRAVENOUS
Status: DISCONTINUED | OUTPATIENT
Start: 2017-03-16 | End: 2017-03-16 | Stop reason: HOSPADM

## 2017-03-16 RX ORDER — NALOXONE HYDROCHLORIDE 0.4 MG/ML
.2-.4 INJECTION, SOLUTION INTRAMUSCULAR; INTRAVENOUS; SUBCUTANEOUS
Status: CANCELLED | OUTPATIENT
Start: 2017-03-16 | End: 2017-03-17

## 2017-03-16 RX ORDER — DIPHENHYDRAMINE HYDROCHLORIDE 50 MG/ML
INJECTION INTRAMUSCULAR; INTRAVENOUS
Status: COMPLETED
Start: 2017-03-16 | End: 2017-03-16

## 2017-03-16 RX ORDER — PROTAMINE SULFATE 10 MG/ML
1-5 INJECTION, SOLUTION INTRAVENOUS
Status: DISCONTINUED | OUTPATIENT
Start: 2017-03-16 | End: 2017-03-16 | Stop reason: HOSPADM

## 2017-03-16 RX ORDER — POTASSIUM CHLORIDE 29.8 MG/ML
20 INJECTION INTRAVENOUS
Status: DISCONTINUED | OUTPATIENT
Start: 2017-03-16 | End: 2017-03-16 | Stop reason: HOSPADM

## 2017-03-16 RX ORDER — POTASSIUM CHLORIDE 7.45 MG/ML
10 INJECTION INTRAVENOUS
Status: DISCONTINUED | OUTPATIENT
Start: 2017-03-16 | End: 2017-03-16 | Stop reason: HOSPADM

## 2017-03-16 RX ORDER — ASPIRIN 325 MG
325 TABLET ORAL
Status: DISCONTINUED | OUTPATIENT
Start: 2017-03-16 | End: 2017-03-16 | Stop reason: HOSPADM

## 2017-03-16 RX ORDER — POTASSIUM CHLORIDE 1500 MG/1
20 TABLET, EXTENDED RELEASE ORAL
Status: DISCONTINUED | OUTPATIENT
Start: 2017-03-16 | End: 2017-03-16 | Stop reason: HOSPADM

## 2017-03-16 RX ORDER — NITROGLYCERIN 5 MG/ML
100-200 VIAL (ML) INTRAVENOUS
Status: DISCONTINUED | OUTPATIENT
Start: 2017-03-16 | End: 2017-03-16 | Stop reason: HOSPADM

## 2017-03-16 RX ORDER — ASPIRIN 81 MG/1
81-324 TABLET, CHEWABLE ORAL
Status: DISCONTINUED | OUTPATIENT
Start: 2017-03-16 | End: 2017-03-16 | Stop reason: HOSPADM

## 2017-03-16 RX ORDER — NITROGLYCERIN 20 MG/100ML
.07-2 INJECTION INTRAVENOUS CONTINUOUS PRN
Status: DISCONTINUED | OUTPATIENT
Start: 2017-03-16 | End: 2017-03-16 | Stop reason: HOSPADM

## 2017-03-16 RX ORDER — PROTAMINE SULFATE 10 MG/ML
25-100 INJECTION, SOLUTION INTRAVENOUS EVERY 5 MIN PRN
Status: DISCONTINUED | OUTPATIENT
Start: 2017-03-16 | End: 2017-03-16 | Stop reason: HOSPADM

## 2017-03-16 RX ORDER — METHYLPREDNISOLONE SODIUM SUCCINATE 125 MG/2ML
100 INJECTION, POWDER, LYOPHILIZED, FOR SOLUTION INTRAMUSCULAR; INTRAVENOUS ONCE
Status: COMPLETED | OUTPATIENT
Start: 2017-03-16 | End: 2017-03-16

## 2017-03-16 RX ORDER — FENTANYL CITRATE 50 UG/ML
25-50 INJECTION, SOLUTION INTRAMUSCULAR; INTRAVENOUS
Status: CANCELLED | OUTPATIENT
Start: 2017-03-16 | End: 2017-03-17

## 2017-03-16 RX ORDER — CLOPIDOGREL BISULFATE 75 MG/1
300-600 TABLET ORAL
Status: DISCONTINUED | OUTPATIENT
Start: 2017-03-16 | End: 2017-03-16 | Stop reason: HOSPADM

## 2017-03-16 RX ORDER — SODIUM CHLORIDE 9 MG/ML
INJECTION, SOLUTION INTRAVENOUS CONTINUOUS
Status: CANCELLED | OUTPATIENT
Start: 2017-03-16

## 2017-03-16 RX ORDER — LIDOCAINE 40 MG/G
CREAM TOPICAL
Status: CANCELLED | OUTPATIENT
Start: 2017-03-16

## 2017-03-16 RX ORDER — NALOXONE HYDROCHLORIDE 0.4 MG/ML
0.4 INJECTION, SOLUTION INTRAMUSCULAR; INTRAVENOUS; SUBCUTANEOUS EVERY 5 MIN PRN
Status: DISCONTINUED | OUTPATIENT
Start: 2017-03-16 | End: 2017-03-16 | Stop reason: HOSPADM

## 2017-03-16 RX ORDER — MORPHINE SULFATE 4 MG/ML
1-2 INJECTION, SOLUTION INTRAMUSCULAR; INTRAVENOUS EVERY 5 MIN PRN
Status: DISCONTINUED | OUTPATIENT
Start: 2017-03-16 | End: 2017-03-16 | Stop reason: HOSPADM

## 2017-03-16 RX ADMIN — VERAPAMIL HYDROCHLORIDE 2.5 MG: 2.5 INJECTION, SOLUTION INTRAVENOUS at 12:30

## 2017-03-16 RX ADMIN — HEPARIN SODIUM 10000 UNITS: 1000 INJECTION, SOLUTION INTRAVENOUS; SUBCUTANEOUS at 12:30

## 2017-03-16 RX ADMIN — MIDAZOLAM 1 MG: 1 INJECTION INTRAMUSCULAR; INTRAVENOUS at 13:10

## 2017-03-16 RX ADMIN — FENTANYL CITRATE 75 MCG: 50 INJECTION INTRAMUSCULAR; INTRAVENOUS at 13:11

## 2017-03-16 RX ADMIN — ACETYLCHOLINE CHLORIDE 190 MCG: KIT at 13:14

## 2017-03-16 RX ADMIN — IOPAMIDOL 90 ML: 755 INJECTION, SOLUTION INTRAVASCULAR at 10:45

## 2017-03-16 RX ADMIN — DIPHENHYDRAMINE HYDROCHLORIDE 25 MG: 50 INJECTION INTRAMUSCULAR; INTRAVENOUS at 11:12

## 2017-03-16 RX ADMIN — NITROGLYCERIN 2000 MCG: 10 INJECTION INTRAVENOUS at 12:29

## 2017-03-16 RX ADMIN — METHYLPREDNISOLONE SODIUM SUCCINATE 100 MG: 125 INJECTION, POWDER, FOR SOLUTION INTRAMUSCULAR; INTRAVENOUS at 11:13

## 2017-03-16 RX ADMIN — METHYLPREDNISOLONE SODIUM SUCCINATE 100 MG: 125 INJECTION INTRAMUSCULAR; INTRAVENOUS at 11:13

## 2017-03-16 NOTE — IP AVS SNAPSHOT
MRN:[id number]                      After Visit Summary   3/16/2017    Alba Varela    MRN: [id number]           Visit Information        Department      3/16/2017  9:38 AM [facility]          Review of your medicines      CONTINUE these medicines which may have CHANGED, or have new prescriptions. If we are uncertain of the size of tablets/capsules you have at home, strength may be listed as something that might have changed.        Dose / Directions    gabapentin 100 MG capsule   Commonly known as:  NEURONTIN   This may have changed:  additional instructions   Used for:  Cervicalgia        Take 100 mg in the morning and 400 mg at bedtime for one week, then increase to 100 mg in the morning and 600 mg at bedtime.   Quantity:  210 capsule   Refills:  1         CONTINUE these medicines which have NOT CHANGED        Dose / Directions    BOTOX IJ        Refills:  0       diltiazem 120 MG 24 hr capsule   Commonly known as:  CARDIZEM CD   Used for:  Chest pressure        Dose:  120 mg   Take 1 capsule (120 mg) by mouth daily You are due to see the cardiologist- please call [phone] to schedule.   Quantity:  90 capsule   Refills:  0       loratadine 10 MG tablet   Commonly known as:  CLARITIN        Dose:  10 mg   Take 10 mg by mouth daily   Refills:  0       NORTRIPTYLINE HCL PO        Dose:  200 mg   Take 200 mg by mouth daily   Refills:  0       PAROXETINE HCL PO        Dose:  25 mg   Take 25 mg by mouth daily   Refills:  0                Protect others around you: Learn how to safely use, store and throw away your medicines at www.disposemymeds.org.         Follow-ups after your visit        Your next 10 appointments already scheduled     Mar 23, 2017  1:30 PM CDT   Level 1 with  INFUSION CHAIR 4   [facility] Infusion [facility] ([facility])    [facility]  [address]  [facility] [address]   [phone]             Mar 24, 2017  8:10 AM CDT   Return Visit with WALESKA Tellez [facility] AT [place] ([facility])    [address]  [facility] [address]   [phone]            Apr 06, 2017  1:30 PM CDT   Level 1 with RH INFUSION CHAIR 9   [facility] Infusion [facility] ([facility])    [facility]  [address]  [facility] [address]   [phone]            Apr 20, 2017  1:30 PM CDT   Level 1 with RH INFUSION CHAIR 7   [facility] Infusion [facility] ([facility])    [facility]  [address]  [facility] [address]   [phone]            May 04, 2017  1:30 PM CDT   Level 1 with RH INFUSION CHAIR 8   [facility] Infusion [facility] ([facility])    [facility]  [address]  [facility] [address]   [phone]               Care Instructions        After Care Instructions     Discharge Instructions - IF on Metformin (Glucophage or Glucovance) or Metformin containing medications       IF on Metformin (Glucophage or Glucovance) or Metformin containing medications , schedule a Basic Metabolic Panel at [facility] or Primary Clinic in 48 - 72 hours post procedure and PRIOR TO resuming the Metformin or Metformin containing medications.  Hold Metformin (Glucophage or Glucovance) or Metformin containing medications until after the Basic Metabolic Panel on the 2nd or 3rd day following the procedure.  May resume after blood draw is complete.                  Further instructions from your care team         Discharge Instructions for Cardiac Catheterization  Cardiac catheterization is a procedure to look for blocked areas in the blood vessels that send blood to the heart. A thin, flexible tube (catheter) is put in a blood vessel in your groin or arm. Contrast fluid  is injected into your blood, which then flows to your heart. Finally, X-rays pictures are taken of your heart. Your doctor will review the results with you. Be sure to ask any questions you have before you leave. This sheet will help you take care of yourself at home.  Home care    Only do light and easy activities for the next 2 to 3 days. Ask for help with chores and errands while you recover. Have someone drive you to your appointments.    Avoid heavy lifting for a while. Your doctor will provide a specific time frame for you.    Ask your doctor when you can expect to return to work. Unless your job involves lifting, you may be able to return to your normal activities within a couple of days.    Take your medicines as directed. Do not skip doses.    Drink 6 to 8 glasses of water a day. This is to help flush the contrast dye out of your body.    Take your temperature each day for 7 days.    Check your incisions daily for signs of infection. These include redness, swelling, and drainage. It is normal to have a small bruise or bump where the catheter was inserted. A bruise that is getting larger is not normal and should be reported to your doctor.    Eat a healthy diet. Make sure it is low in fat, salt, and cholesterol. Ask your doctor for diet information.    Stop smoking. Enroll in a stop-smoking program or ask your doctor for help.    Exercise as advised by your doctor. Depending on your situation, your doctor may recommend you start a cardiac rehabilitation program.    Do not swim or take baths until the doctor says it s OK. You can shower the day after the procedure.    Follow-up care  Make a follow-up appointment as advised by our staff.  When to seek medical care  Call your doctor immediately if you have any of the following:    Chest pain    Constant or increasing pain or numbness in your leg    Fever of 100.4 F (38.0 C) or higher    Symptoms of infection (redness, swelling, drainage, or warmth at the  incision site)    Shortness of breath    A leg that feels cold or appears blue    Bleeding, bruising, or a lot of swelling where the catheter was inserted    Blood in your urine    Black or tarry stools    Any unusual bleeding     [id number] [employer]. [address] [place] [address], [place] [address]. All rights reserved. This information is not intended as a substitute for professional medical care. Always follow your healthcare professional's instructions.           Additional Information About Your Visit        Daegishart Information     [employer] gives you secure access to your electronic health record. If you see a primary care provider, you can also send messages to your care team and make appointments. If you have questions, please call your primary care clinic.  If you do not have a primary care provider, please call [phone] and they will assist you.        Care EveryWhere ID     This is your Care EveryWhere ID. This could be used by other organizations to access your [place] medical records  [id number]        Your Vitals Were     Blood Pressure Pulse Temperature Respirations Last Period Pulse Oximetry    105/71 78 97.8  F (36.6  C) (Temporal) 16 07/11/2014 94%       Primary Care Provider Office Phone # Fax #    Bright [facility]elo [phone]      Thank you!     Thank you for choosing [facility] for your care. Our goal is always to provide you with excellent care. Hearing back from our patients is one way we can continue to improve our services. Please take a few minutes to complete the written survey that you may receive in the mail after you visit. If you would like to speak to someone directly about your visit please contact Patient Relations at [phone]. Thank you!               Medication List: This is a list of all your medications and when to take them. Check marks below indicate your daily home schedule. Keep this list as a reference.      Medications            Morning Afternoon Evening Bedtime As Needed    BOTOX IJ                                diltiazem 120 MG 24 hr capsule   Commonly known as:  CARDIZEM CD   Take 1 capsule (120 mg) by mouth daily You are due to see the cardiologist- please call [phone] to schedule.                                gabapentin 100 MG capsule   Commonly known as:  NEURONTIN   Take 100 mg in the morning and 400 mg at bedtime for one week, then increase to 100 mg in the morning and 600 mg at bedtime.                                loratadine 10 MG tablet   Commonly known as:  CLARITIN   Take 10 mg by mouth daily                                NORTRIPTYLINE HCL PO   Take 200 mg by mouth daily                                PAROXETINE HCL PO   Take 25 mg by mouth daily

## 2017-03-16 NOTE — IP AVS SNAPSHOT
[facility]    [address]    [facility] [address]    Phone:  [phone]                                       After Visit Summary   3/16/2017    Alba Varela    MRN: [id number]           After Visit Summary Signature Page     I have received my discharge instructions, and my questions have been answered. I have discussed any challenges I see with this plan with the nurse or doctor.    ..........................................................................................................................................  Patient/Patient Representative Signature      ..........................................................................................................................................  Patient Representative Print Name and Relationship to Patient    ..................................................               ................................................  Date                                            Time    ..........................................................................................................................................  Reviewed by Signature/Title    ...................................................              ..............................................  Date                                                            Time

## 2017-03-16 NOTE — DISCHARGE INSTRUCTIONS
Discharge Instructions for Cardiac Catheterization  Cardiac catheterization is a procedure to look for blocked areas in the blood vessels that send blood to the heart. A thin, flexible tube (catheter) is put in a blood vessel in your groin or arm. Contrast fluid is injected into your blood, which then flows to your heart. Finally, X-rays pictures are taken of your heart. Your doctor will review the results with you. Be sure to ask any questions you have before you leave. This sheet will help you take care of yourself at home.  Home care    Only do light and easy activities for the next 2 to 3 days. Ask for help with chores and errands while you recover. Have someone drive you to your appointments.    Avoid heavy lifting for a while. Your doctor will provide a specific time frame for you.    Ask your doctor when you can expect to return to work. Unless your job involves lifting, you may be able to return to your normal activities within a couple of days.    Take your medicines as directed. Do not skip doses.    Drink 6 to 8 glasses of water a day. This is to help flush the contrast dye out of your body.    Take your temperature each day for 7 days.    Check your incisions daily for signs of infection. These include redness, swelling, and drainage. It is normal to have a small bruise or bump where the catheter was inserted. A bruise that is getting larger is not normal and should be reported to your doctor.    Eat a healthy diet. Make sure it is low in fat, salt, and cholesterol. Ask your doctor for diet information.    Stop smoking. Enroll in a stop-smoking program or ask your doctor for help.    Exercise as advised by your doctor. Depending on your situation, your doctor may recommend you start a cardiac rehabilitation program.    Do not swim or take baths until the doctor says it s OK. You can shower the day after the procedure.    Follow-up care  Make a follow-up appointment as advised by our staff.  When to  seek medical care  Call your doctor immediately if you have any of the following:    Chest pain    Constant or increasing pain or numbness in your leg    Fever of 100.4 F (38.0 C) or higher    Symptoms of infection (redness, swelling, drainage, or warmth at the incision site)    Shortness of breath    A leg that feels cold or appears blue    Bleeding, bruising, or a lot of swelling where the catheter was inserted    Blood in your urine    Black or tarry stools    Any unusual bleeding     2379-9320 [employer]. [address] [place], [place] [address]. All rights reserved. This information is not intended as a substitute for professional medical care. Always follow your healthcare professional's instructions.

## 2017-03-23 ENCOUNTER — INFUSION THERAPY VISIT (OUTPATIENT)
Dept: INFUSION THERAPY | Facility: CLINIC | Age: 47
End: 2017-03-23
Attending: ALLERGY & IMMUNOLOGY
Payer: COMMERCIAL

## 2017-03-23 VITALS
RESPIRATION RATE: 18 BRPM | DIASTOLIC BLOOD PRESSURE: 75 MMHG | TEMPERATURE: 97.1 F | OXYGEN SATURATION: 100 % | SYSTOLIC BLOOD PRESSURE: 132 MMHG

## 2017-03-23 DIAGNOSIS — J45.50 SEVERE PERSISTENT ASTHMA (H): Primary | ICD-10-CM

## 2017-03-23 PROCEDURE — 96401 CHEMO ANTI-NEOPL SQ/IM: CPT

## 2017-03-23 PROCEDURE — 25000128 H RX IP 250 OP 636: Mod: JW | Performed by: ALLERGY & IMMUNOLOGY

## 2017-03-23 RX ADMIN — OMALIZUMAB 375 MG: 202.5 INJECTION, SOLUTION SUBCUTANEOUS at 14:02

## 2017-03-23 NOTE — PROGRESS NOTES
Infusion Nursing Note:  Alba Varela presents today for xolair.    Patient seen by provider today: No   present during visit today: Not Applicable.    Note: N/A.    Intravenous Access:  No Intravenous access/labs at this visit.    Treatment Conditions:  Not Applicable.      Post Infusion Assessment:  Patient tolerated injection without incident.    Discharge Plan:   AVS to patient via MYCHART.  Patient will return 4/6 for xolair for next appointment.   Patient discharged in stable condition accompanied by: self.  Departure Mode: Ambulatory.    Sangita Edwards RN

## 2017-03-23 NOTE — MR AVS SNAPSHOT
After Visit Summary   3/23/2017    Alba Varela    MRN: [id number]           Patient Information     Date Of Birth          1970        Visit Information        Provider Department      3/23/2017 1:30 PM RH INFUSION CHAIR 4 [facility] Infusion [facility]        Today's Diagnoses     Severe persistent asthma    -  1       Follow-ups after your visit        Your next 10 appointments already scheduled     Mar 24, 2017  8:10 AM CDT   Return Visit with WALESKA Tellez [facility] [place] ([facility])    [address]  [facility] [address]   [phone]            Apr 06, 2017  1:30 PM CDT   Level 1 with RH INFUSION CHAIR 9   [facility] Infusion [facility] ([facility])    [facility]  [address]  [facility] [address]   [phone]            Apr 20, 2017  1:30 PM CDT   Level 1 with RH INFUSION CHAIR 7   [facility] Infusion [facility] ([facility])    [facility]  [address]  [facility] [id number] [phone]            May 04, 2017  1:30 PM CDT   Level 1 with RH INFUSION CHAIR 8   [facility] Infusion [facility] ([facility])    [facility]  [address]  [facility] [id number] [phone]              Who to contact     If you have questions or need follow up information about today's clinic visit or your schedule please contact [facility] INFUSION [facility] directly at [phone].  Normal or non-critical lab and imaging results will be communicated to you by MyChart, letter or phone within 4 business days after the clinic has received the results. If you do not hear from us within 7 days, please contact the clinic through MyChart or phone. If you have a critical or abnormal lab  result, we will notify you by phone as soon as possible.  Submit refill requests through [employer] or call your pharmacy and they will forward the refill request to us. Please allow 3 business days for your refill to be completed.          Additional Information About Your Visit        [employer] Information     [employer] gives you secure access to your electronic health record. If you see a primary care provider, you can also send messages to your care team and make appointments. If you have questions, please call your primary care clinic.  If you do not have a primary care provider, please call [phone] and they will assist you.        Care EveryWhere ID     This is your Care EveryWhere ID. This could be used by other organizations to access your [place] medical records  [id number]        Your Vitals Were     Temperature Respirations Last Period Pulse Oximetry          97.1  F (36.2  C) (Tympanic) 18 07/11/2014 100%         Blood Pressure from Last 3 Encounters:   03/23/17 132/75   03/16/17 116/79   03/09/17 124/77    Weight from Last 3 Encounters:   03/09/17 72.1 kg (159 lb)   03/09/17 73 kg (161 lb)   02/23/17 73 kg (160 lb 14.4 oz)              We Performed the Following     Nursing observation          Today's Medication Changes          These changes are accurate as of: 3/23/17  2:39 PM.  If you have any questions, ask your nurse or doctor.               These medicines have changed or have updated prescriptions.        Dose/Directions    gabapentin 100 MG capsule   Commonly known as:  NEURONTIN   This may have changed:  additional instructions   Used for:  Cervicalgia        Take 100 mg in the morning and 400 mg at bedtime for one week, then increase to 100 mg in the morning and 600 mg at bedtime.   Quantity:  210 capsule   Refills:  1                Primary Care Provider Office Phone # Fax #    Bright [facility]elo [phone]       [address]  [address]        Thank you!     Thank you for choosing [facility] INF[facility]  for your care. Our goal is always to provide you with excellent care. Hearing back from our patients is one way we can continue to improve our services. Please take a few minutes to complete the written survey that you may receive in the mail after your visit with us. Thank you!             Your Updated Medication List - Protect others around you: Learn how to safely use, store and throw away your medicines at www.disposemymeds.org.          This list is accurate as of: 3/23/17  2:39 PM.  Always use your most recent med list.                   Brand Name Dispense Instructions for use    BOTOX IJ          diltiazem 120 MG 24 hr capsule    CARDIZEM CD    90 capsule    Take 1 capsule (120 mg) by mouth daily You are due to see the cardiologist- please call [phone] to schedule.       gabapentin 100 MG capsule    NEURONTIN    210 capsule    Take 100 mg in the morning and 400 mg at bedtime for one week, then increase to 100 mg in the morning and 600 mg at bedtime.       loratadine 10 MG tablet    CLARITIN     Take 10 mg by mouth daily       NORTRIPTYLINE HCL PO      Take 200 mg by mouth daily       PAROXETINE HCL PO      Take 25 mg by mouth daily

## 2017-03-24 ENCOUNTER — OFFICE VISIT (OUTPATIENT)
Dept: CARDIOLOGY | Facility: CLINIC | Age: 47
End: 2017-03-24
Payer: COMMERCIAL

## 2017-03-24 VITALS
WEIGHT: 164.9 LBS | HEART RATE: 84 BPM | HEIGHT: 64 IN | SYSTOLIC BLOOD PRESSURE: 110 MMHG | DIASTOLIC BLOOD PRESSURE: 74 MMHG | BODY MASS INDEX: 28.15 KG/M2

## 2017-03-24 DIAGNOSIS — R55 SYNCOPE, UNSPECIFIED SYNCOPE TYPE: Primary | ICD-10-CM

## 2017-03-24 PROCEDURE — 99214 OFFICE O/P EST MOD 30 MIN: CPT | Performed by: NURSE PRACTITIONER

## 2017-03-24 NOTE — MR AVS SNAPSHOT
After Visit Summary   3/24/2017    Alba Varela    MRN: [id number]           Patient Information     Date Of Birth          1970        Visit Information        Provider Department      3/24/2017 8:10 AM Juliette Carrasco APRN CNP [facility] AT [place]        Today's Diagnoses     Syncope, unspecified syncope type    -  1       Follow-ups after your visit        Additional [facility]     Follow-Up with Cardiac Advanced Practice Provider                 Your next 10 appointments already scheduled     Apr 06, 2017  1:30 PM CDT   Level 1 with RH INFUSION CHAIR 9   [facility] Infusion [facility] ([facility])    [facility]  [address] [place] [address] [address]  [facility] [address]   [phone]            Apr 06, 2017  3:15 PM CDT   Event Monitor with RSCC DEVICE [facility] ([facility])    [address]  [facility] [address]   [phone]           LOCATION - [address] [facility], [address] [place] [address] **Please check-in at the [place] Registration Office, [address], in the [facility] building. When you are finished registering, please go to [address] and have a seat.            Apr 20, 2017  1:30 PM CDT   Level 1 with RH INFUSION CHAIR 7   [facility] Infusion [facility] ([facility])    [facility]  [address] [place] [address] [address]  [facility] [address]   [phone]            Apr 28, 2017  7:30 AM CDT   Return Visit with WALESKA Tellez CNP   [facility] [place] ([facility])    [address] [place] [address]  [facility] [address]   [phone]            May 04, 2017  1:30 PM CDT   Level 1 with RH INFUSION CHAIR 8   [facility] Infusion [facility] ([facility])    [facility]  "[place]  [address] [place]  [address]  [facility] [id number] [phone]              Future tests that were ordered for you today     Open Future Orders        Priority Expected Expires Ordered    Cardiac Event Monitor - Peds/Adult Routine 3/31/2017 3/24/2018 3/24/2017    Follow-Up with Cardiac Advanced Practice Provider Routine 3/31/2017 3/24/2018 3/24/2017            Who to contact     If you have questions or need follow up information about today's clinic visit or your schedule please contact [facility]YS[facility] AT [place] directly at [phone].  Normal or non-critical lab and imaging results will be communicated to you by [facility], letter or phone within 4 business days after the clinic has received the results. If you do not hear from us within 7 days, please contact the clinic through [employer] or phone. If you have a critical or abnormal lab result, we will notify you by phone as soon as possible.  Submit refill requests through [employer] or call your pharmacy and they will forward the refill request to us. Please allow 3 business days for your refill to be completed.          Additional Information About Your Visit        [facility] Information     [employer] gives you secure access to your electronic health record. If you see a primary care provider, you can also send messages to your care team and make appointments. If you have questions, please call your primary care clinic.  If you do not have a primary care provider, please call [phone] and they will assist you.        Care EveryWhere ID     This is your Care EveryWhere ID. This could be used by other organizations to access your [place] medical records  [id number]        Your Vitals Were     Pulse Height Last Period BMI (Body Mass Index)          84 1.626 m (5' 4\") 07/11/2014 28.31 kg/m2         Blood Pressure from Last 3 Encounters:   03/24/17 110/74   03/23/17 132/75   03/16/17 116/79    Weight from Last 3 Encounters: "   03/24/17 74.8 kg (164 lb 14.4 oz)   03/09/17 72.1 kg (159 lb)   03/09/17 73 kg (161 lb)                 Today's Medication Changes          These changes are accurate as of: 3/24/17  8:41 AM.  If you have any questions, ask your nurse or doctor.               These medicines have changed or have updated prescriptions.        Dose/Directions    gabapentin 100 MG capsule   Commonly known as:  NEURONTIN   This may have changed:  additional instructions   Used for:  Cervicalgia        Take 100 mg in the morning and 400 mg at bedtime for one week, then increase to 100 mg in the morning and 600 mg at bedtime.   Quantity:  210 capsule   Refills:  1                Primary Care Provider Office Phone # Fax #    Bright [facility]elo [phone]       [address] [address]        Thank you!     Thank you for choosing [facility] PHYSIC[facility] AT [place]  for your care. Our goal is always to provide you with excellent care. Hearing back from our patients is one way we can continue to improve our services. Please take a few minutes to complete the written survey that you may receive in the mail after your visit with us. Thank you!             Your Updated Medication List - Protect others around you: Learn how to safely use, store and throw away your medicines at www.disposemymeds.org.          This list is accurate as of: 3/24/17  8:41 AM.  Always use your most recent med list.                   Brand Name Dispense Instructions for use    BOTOX IJ          diltiazem 120 MG 24 hr capsule    CARDIZEM CD    90 capsule    Take 1 capsule (120 mg) by mouth daily You are due to see the cardiologist- please call [phone] to schedule.       gabapentin 100 MG capsule    NEURONTIN    210 capsule    Take 100 mg in the morning and 400 mg at bedtime for one week, then increase to 100 mg in the morning and 600 mg at bedtime.       loratadine 10 MG tablet    CLARITIN      Take 10 mg by mouth daily       NORTRIPTYLINE HCL PO      Take 200 mg by mouth daily       PAROXETINE HCL PO      Take 25 mg by mouth daily

## 2017-03-24 NOTE — PROGRESS NOTES
HPI and Plan:   See dictation    Orders Placed This Encounter   Procedures     Follow-Up with Cardiac Advanced Practice Provider     Cardiac Event Monitor - Peds/Adult       No orders of the defined types were placed in this encounter.      There are no discontinued medications.      Encounter Diagnosis   Name Primary?     Syncope, unspecified syncope type Yes       CURRENT MEDICATIONS:  Current Outpatient Prescriptions   Medication Sig Dispense Refill     loratadine (CLARITIN) 10 MG tablet Take 10 mg by mouth daily       OnabotulinumtoxinA (BOTOX IJ)        NORTRIPTYLINE HCL PO Take 200 mg by mouth daily       PAROXETINE HCL PO Take 25 mg by mouth daily       diltiazem (CARDIZEM CD) 120 MG 24 hr CD capsule Take 1 capsule (120 mg) by mouth daily You are due to see the cardiologist- please call [phone] to schedule. 90 capsule 0     gabapentin (NEURONTIN) 100 MG capsule Take 100 mg in the morning and 400 mg at bedtime for one week, then increase to 100 mg in the morning and 600 mg at bedtime. (Patient taking differently: Take 400 mg in the morning and 600 mg at bedtime.) 210 capsule 1       ALLERGIES     Allergies   Allergen Reactions     Codeine Sulfate Nausea and Vomiting     Contrast Dye Hives     Patient is allergic to CT iodine contrast.       Scopolamine Visual Disturbance     Imitrex [Sumatriptan] Rash     Naproxen Rash     Penicillins Rash     Per patient     Sulfa Drugs Rash       PAST MEDICAL HISTORY:  Past Medical History:   Diagnosis Date     Abdominal pain      Anxiety      Asthma      C. difficile colitis      Headache(784.0) 12/10/2014     High cholesterol      Hyperlipidemia      Liver disease     Being evalted for fatty liver disease. ABnormal LFT.     Migraines      PONV (postoperative nausea and vomiting)      Sleep apnea     CPAP will bring.     Tremor        PAST SURGICAL HISTORY:  Past Surgical History:   Procedure Laterality Date      SECTION      pablo NINO     ENDOSCOPIC ULTRASOUND,  ESOPHAGOSCOPY, GASTROSCOPY, DUODENOSCOPY (EGD), COMBINED  11/21/13     ESOPHAGOSCOPY, GASTROSCOPY, DUODENOSCOPY (EGD), COMBINED  10/21/2013    Procedure: COMBINED ESOPHAGOSCOPY, GASTROSCOPY, DUODENOSCOPY (EGD), BIOPSY SINGLE OR MULTIPLE;;  Surgeon: Rito Gupta MD;  Location:  GI     FOOT SURGERY      Bilateral foot reconstruction.     [facility] UGI ENDOSCOPY W EUS  11/21/2013    Procedure: COMBINED ENDOSCOPIC ULTRASOUND, ESOPHAGOSCOPY, GASTROSCOPY, DUODENOSCOPY (EGD);  Surgeon: Emre Campbell MD;  Location:  GI     HYSTERECTOMY       LAPAROSCOPIC HYSTERECTOMY SUPRACERVICAL, BILATERAL SALPINGO-OOPHORECTOMY, COMBINED  3/6/2013    Procedure: COMBINED LAPAROSCOPIC HYSTERECTOMY SUPRACERVICAL, SALPINGO-OOPHORECTOMY;  Laparoscopic Supracervical Hysterectomy, Right salpingo-oopherectomy;  Surgeon: Tanika Jones MD;  Location: [facility] OR     UVULOPALATOPHARYNGOPLASTY      Plus Septoplasy.       FAMILY HISTORY:  Family History   Problem Relation Age of Onset     Family history unknown: Yes       SOCIAL HISTORY:  Social History     Social History     Marital status:      Spouse name: N/A     Number of children: N/A     Years of education: N/A     Social History Main Topics     Smoking status: Current Every Day Smoker     Packs/day: 0.50     Years: 20.00     Types: Cigarettes     Smokeless tobacco: Never Used     Alcohol use 0.0 oz/week     0 Standard drinks or equivalent per week      Comment:  3 drinks weekly     Drug use: No     Sexual activity: Yes     Partners: Male     Other Topics Concern     None     Social History Narrative       Review of Systems:  Skin:  Negative       Eyes:    glasses;visual blurring    ENT:  Positive for sinus trouble allergies  Respiratory:  Positive for shortness of breath;cough;wheezing;sleep apnea asthma; not using CPAP   Cardiovascular:    Positive for;palpitations;chest pain;fatigue;edema    Gastroenterology: Positive for nausea with chest pain  Genitourinary:  not assessed     "  Musculoskeletal:  Positive for neck pain;joint pain;muscular weakness    Neurologic:  Positive for headaches    Psychiatric:  Positive for anxiety;depression;excessive stress    Heme/Lymph/Imm:  Positive for allergies;hay fever    Endocrine:  Negative        Physical Exam:  Vitals: /74 (BP Location: Right arm, Patient Position: Chair, Cuff Size: Adult Regular)  Pulse 84  Ht 1.626 m (5' 4\")  Wt 74.8 kg (164 lb 14.4 oz)  LMP 07/11/2014  BMI 28.31 kg/m2    Constitutional:           Skin:           Head:           Eyes:           ENT:           Neck:           Chest:             Cardiac:                    Abdomen:           Vascular:                                          Extremities and Back:                 Neurological:                 CC  Osmar Byrd MD   [facility]  [address]  [place] [address]              "

## 2017-03-24 NOTE — PROGRESS NOTES
HISTORY OF PRESENT ILLNESS:  I had the pleasure of meeting Alba Varela today in Cardiology Clinic following her recent coronary angiogram looking for spasm as a cause of her symptoms of chest discomfort despite a normal stress echocardiogram.  She has a history of smoking and hyperlipidemia.  She is not a diabetic and denies excessive use of caffeine.  In 2000 when she lived in [place], she was diagnosed with a possible mitral valve prolapse and it was therefore put on diltiazem, which helped in some ways with her chest discomfort until more recently.      Today Alba tells me that she has had this chest discomfort ongoing, but in the last month or so it has been more severe.  Once she was standing in her kitchen and it came on.  She describes it as a sharp chest pain which caused her to pass out.  She had no symptoms of presyncope.  She did not feel that she was going to pass out, but she says that her fiance came around the corner and found her on the floor.  The second time it happened more recently was when she was at [facility], she had a sudden onset of stabbing-type chest pain that brought her to the floor, she does not think she passed out.  Her fiance and her daughter wanted her to go to the ER, but she refused at that time and eventually the discomfort after at least an hour subsided.  She denies any symptoms of palpitations.  She did have a hysterectomy a year ago and has been having menopausal symptoms including lightheadedness and diaphoresis since then.  Her last EKG showed nonspecific ST segment changes and nonspecific intraventricular conduction delay.      Her coronary angiogram with acetylcholine challenge was negative for spasm, though she did take her diltiazem the evening before.  She had no significant coronary disease and no stenosis greater than 25% and a right dominant circulation.  She did with acetylcholine go into a high-grade heart block, successfully treated with temporary pacemaker.      Today,  Alba is here for followup.  She has not had any recurrent symptoms.  She says her groin site is a little tender, but otherwise nothing has changed significantly.      PHYSICAL EXAMINATION:   GENERAL:  A 47-year-old woman appears comfortable.   VITAL SIGNS:  Blood pressure 110/74, heart rate 84, weight 164 pounds, BMI of 28.   LUNGS:  Clear throughout auscultation.   CARDIOVASCULAR:  Rate regular, normal S1, S2.   EXTREMITIES:  No lower extremity edema.  Her right femoral site appears healed.  There is a small pea-sized hematoma and circumferential ecchymosis, no bruit.      ASSESSMENT AND PLAN:   1.  Atypical chest pain, at least once associated with syncope.  Dr. Byrd wondered if this was due to vasospasm, and she did have a coronary angiogram with acetylcholine challenge.  Unfortunately, she had taken diltiazem the night prior, and so no spasm was induced but the interventionalist wondered if she should increase her diltiazem regardless.  Today I recommended a 30-day event monitor to look for an arrhythmia causing syncope and chest discomfort.  I will have her wear a 30-day event monitor, though I am not sure that in 30 days she will have symptoms again if they can happen further apart than that.   I will follow up with her after that, at which time we may increase her diltiazem if she has had symptoms but no significant arrhythmia; however, if she has a significant arrhythmia, then that would be addressed in a different manner.      Thank you for allowing me to see Alba today.         WALESKA DE OLIVEIRA, CHANCE             D: 2017 08:48   T: 2017 15:34   MT: KITA      Name:     ALBA WEIR   MRN:      [id number]        Account:      [id number]   :      1970           Service Date: 2017      Document: [id number]

## 2017-03-24 NOTE — LETTER
3/24/2017    Bright Sotelo MD  [facility] [address]   [address] [address]      RE: Alba Varela       Dear Colleague,     I had the pleasure of meeting Alba Varela today in Cardiology Clinic following her recent coronary angiogram looking for spasm as a cause of her symptoms of chest discomfort despite a normal stress echocardiogram.  She has a history of smoking and hyperlipidemia.  She is not a diabetic and denies excessive use of caffeine.  In 2000 when she lived in [place], she was diagnosed with a possible mitral valve prolapse and it was therefore put on diltiazem, which helped in some ways with her chest discomfort until more recently.      Today Alba tells me that she has had this chest discomfort ongoing, but in the last month or so it has been more severe.  Once she was standing in her kitchen and it came on.  She describes it as a sharp chest pain which caused her to pass out.  She had no symptoms of presyncope.  She did not feel that she was going to pass out, but she says that her fiance came around the corner and found her on the floor.  The second time it happened more recently was when she was at [facility], she had a sudden onset of stabbing-type chest pain that brought her to the floor, she does not think she passed out.  Her fiance and her daughter wanted her to go to the ER, but she refused at that time and eventually the discomfort after at least an hour subsided.  She denies any symptoms of palpitations.  She did have a hysterectomy a year ago and has been having menopausal symptoms including lightheadedness and diaphoresis since then.  Her last EKG showed nonspecific ST segment changes and nonspecific intraventricular conduction delay.      Her coronary angiogram with acetylcholine challenge was negative for spasm, though she did take her diltiazem the evening before.  She had no significant coronary disease and no stenosis greater than 25% and a right dominant circulation.   She did with acetylcholine go into a high-grade heart block, successfully treated with temporary pacemaker.      Today, Alba is here for followup.  She has not had any recurrent symptoms.  She says her groin site is a little tender, but otherwise nothing has changed significantly.      PHYSICAL EXAMINATION:   GENERAL:  A 47-year-old woman appears comfortable.   VITAL SIGNS:  Blood pressure 110/74, heart rate 84, weight 164 pounds, BMI of 28.   LUNGS:  Clear throughout auscultation.   CARDIOVASCULAR:  Rate regular, normal S1, S2.   EXTREMITIES:  No lower extremity edema.  Her right femoral site appears healed.  There is a small pea-sized hematoma and circumferential ecchymosis, no bruit.      Outpatient Encounter Prescriptions as of 3/24/2017   Medication Sig Dispense Refill     loratadine (CLARITIN) 10 MG tablet Take 10 mg by mouth daily       OnabotulinumtoxinA (BOTOX IJ)        NORTRIPTYLINE HCL PO Take 200 mg by mouth daily       PAROXETINE HCL PO Take 25 mg by mouth daily       diltiazem (CARDIZEM CD) 120 MG 24 hr CD capsule Take 1 capsule (120 mg) by mouth daily You are due to see the cardiologist- please call [phone] to schedule. 90 capsule 0     gabapentin (NEURONTIN) 100 MG capsule Take 100 mg in the morning and 400 mg at bedtime for one week, then increase to 100 mg in the morning and 600 mg at bedtime. (Patient taking differently: Take 400 mg in the morning and 600 mg at bedtime.) 210 capsule 1     No facility-administered encounter medications on file as of 3/24/2017.        ASSESSMENT AND PLAN:   1.  Atypical chest pain, at least once associated with syncope.  Dr. Byrd wondered if this was due to vasospasm, and she did have a coronary angiogram with acetylcholine challenge.  Unfortunately, she had taken diltiazem the night prior, and so no spasm was induced but the interventionalist wondered if she should increase her diltiazem regardless.  Today I recommended a 30-day event monitor to look for an  arrhythmia causing syncope and chest discomfort.  I will have her wear a 30-day event monitor, though I am not sure that in 30 days she will have symptoms again if they can happen further apart than that.   I will follow up with her after that, at which time we may increase her diltiazem if she has had symptoms but no significant arrhythmia; however, if she has a significant arrhythmia, then that would be addressed in a different manner.      Thank you for allowing me to see Alba today.     Again, thank you for allowing me to participate in the care of your patient.      Sincerely,    WALESKA Sifuentes [facility]

## 2017-04-06 ENCOUNTER — HOSPITAL ENCOUNTER (OUTPATIENT)
Dept: CARDIOLOGY | Facility: CLINIC | Age: 47
Discharge: HOME | End: 2017-04-06
Attending: NURSE PRACTITIONER | Admitting: NURSE PRACTITIONER
Payer: COMMERCIAL

## 2017-04-06 ENCOUNTER — INFUSION THERAPY VISIT (OUTPATIENT)
Dept: INFUSION THERAPY | Facility: CLINIC | Age: 47
End: 2017-04-06
Attending: ALLERGY & IMMUNOLOGY
Payer: COMMERCIAL

## 2017-04-06 VITALS
OXYGEN SATURATION: 100 % | DIASTOLIC BLOOD PRESSURE: 72 MMHG | RESPIRATION RATE: 16 BRPM | TEMPERATURE: 97.2 F | HEART RATE: 82 BPM | SYSTOLIC BLOOD PRESSURE: 113 MMHG

## 2017-04-06 DIAGNOSIS — R55 SYNCOPE, UNSPECIFIED SYNCOPE TYPE: ICD-10-CM

## 2017-04-06 DIAGNOSIS — J45.50 SEVERE PERSISTENT ASTHMA (H): Primary | ICD-10-CM

## 2017-04-06 PROCEDURE — 25000128 H RX IP 250 OP 636: Mod: JW | Performed by: ALLERGY & IMMUNOLOGY

## 2017-04-06 PROCEDURE — 96401 CHEMO ANTI-NEOPL SQ/IM: CPT

## 2017-04-06 PROCEDURE — 93272 ECG/REVIEW INTERPRET ONLY: CPT | Performed by: INTERNAL MEDICINE

## 2017-04-06 PROCEDURE — 93270 REMOTE 30 DAY ECG REV/REPORT: CPT

## 2017-04-06 RX ADMIN — OMALIZUMAB 375 MG: 202.5 INJECTION, SOLUTION SUBCUTANEOUS at 13:53

## 2017-04-06 NOTE — MR AVS SNAPSHOT
After Visit Summary   4/6/2017    Alba Varela    MRN: [id number]           Patient Information     Date Of Birth          1970        Visit Information        Provider Department      4/6/2017 1:30 PM RH INFUSION CHAIR 1 [facility] Infusion [facility]        Today's Diagnoses     Severe persistent asthma    -  1       Follow-ups after your visit        Your next 10 appointments already scheduled     Apr 06, 2017  3:15 PM CDT   Event Monitor with [facility] ([facility])    [address]  [facility] [id number] [phone]           LOCATION - [address] [place] [address] **Please check-in at the [place] Registration Office, [address], in the [facility] building. When you are finished registering, please go to [address] and have a seat.            Apr 20, 2017  1:30 PM CDT   Level 1 with RH INFUSION CHAIR 11   [facility] Infusion [facility] ([facility])    [facility]  [address]  [facility] [id number] [phone]            Apr 28, 2017  7:30 AM CDT   Return Visit with WALESKA Tellez [facility] [place] ([facility])    [address]  [facility] [id number] [phone]            May 04, 2017  1:30 PM CDT   Level 1 with RH INFUSION CHAIR 8   [facility] Infusion [facility] ([facility])    [facility]  [address]  [facility] [id number] [phone]              Who to contact     If you have questions or need follow up information about today's clinic visit or your schedule please contact [facility] INFUSION [facility] directly at [phone].  Normal or non-critical lab and imaging results will be communicated to you by  MyChart, letter or phone within 4 business days after the clinic has received the results. If you do not hear from us within 7 days, please contact the clinic through [employer]lynx or phone. If you have a critical or abnormal lab result, we will notify you by phone as soon as possible.  Submit refill requests through Ablynx or call your pharmacy and they will forward the refill request to us. Please allow 3 business days for your refill to be completed.          Additional Information About Your Visit        [email or web address] Information     Ab[employer]x gives you secure access to your electronic health record. If you see a primary care provider, you can also send messages to your care team and make appointments. If you have questions, please call your primary care clinic.  If you do not have a primary care provider, please call [phone] and they will assist you.        Care EveryWhere ID     This is your Care EveryWhere ID. This could be used by other organizations to access your [place] medical records  [id number]        Your Vitals Were     Pulse Temperature Respirations Last Period Pulse Oximetry       82 97.2  F (36.2  C) (Tympanic) 16 07/11/2014 100%        Blood Pressure from Last 3 Encounters:   04/06/17 113/72   03/24/17 110/74   03/23/17 132/75    Weight from Last 3 Encounters:   03/24/17 74.8 kg (164 lb 14.4 oz)   03/09/17 72.1 kg (159 lb)   03/09/17 73 kg (161 lb)              Today, you had the following     No orders found for display         Today's Medication Changes          These changes are accurate as of: 4/6/17  3:07 PM.  If you have any questions, ask your nurse or doctor.               These medicines have changed or have updated prescriptions.        Dose/Directions    gabapentin 100 MG capsule   Commonly known as:  NEURONTIN   This may have changed:  additional instructions   Used for:  Cervicalgia        Take 100 mg in the morning and 400 mg at bedtime for one week, then increase to 100 mg in the  morning and 600 mg at bedtime.   Quantity:  210 capsule   Refills:  1                Primary Care Provider Office Phone # Fax #    Bright [facility]elo [phone]       [address] [address]        Thank you!     Thank you for choosing [facility] INFUSION [facility]  for your care. Our goal is always to provide you with excellent care. Hearing back from our patients is one way we can continue to improve our services. Please take a few minutes to complete the written survey that you may receive in the mail after your visit with us. Thank you!             Your Updated Medication List - Protect others around you: Learn how to safely use, store and throw away your medicines at www.disposemymeds.org.          This list is accurate as of: 4/6/17  3:07 PM.  Always use your most recent med list.                   Brand Name Dispense Instructions for use    BOTOX IJ          diltiazem 120 MG 24 hr capsule    CARDIZEM CD    90 capsule    Take 1 capsule (120 mg) by mouth daily You are due to see the cardiologist- please call [phone] to schedule.       gabapentin 100 MG capsule    NEURONTIN    210 capsule    Take 100 mg in the morning and 400 mg at bedtime for one week, then increase to 100 mg in the morning and 600 mg at bedtime.       loratadine 10 MG tablet    CLARITIN     Take 10 mg by mouth daily       NORTRIPTYLINE HCL PO      Take 200 mg by mouth daily       PAROXETINE HCL PO      Take 25 mg by mouth daily

## 2017-04-06 NOTE — PROGRESS NOTES
Infusion Nursing Note:  Alba Varela presents today for xolair.    Patient seen by provider today: No   present during visit today: Not Applicable.    Note: N/A.    Intravenous Access:  No Intravenous access/labs at this visit.    Treatment Conditions:  Not Applicable.      Post Infusion Assessment:  Patient tolerated injection without incident.  Patient observed for 30 minutes post xolair per protocol.    Discharge Plan:   Patient declined prescription refills.  Discharge instructions reviewed with: Patient.  Patient and/or family verbalized understanding of discharge instructions and all questions answered.  Copy of AVS reviewed with patient and/or family.  Patient will return 4/20/17 for next appointment.  Patient discharged in stable condition accompanied by: self.  Departure Mode: Ambulatory.    Lucía Yu RN

## 2017-04-10 ENCOUNTER — TELEPHONE (OUTPATIENT)
Dept: CARDIOLOGY | Facility: CLINIC | Age: 47
End: 2017-04-10

## 2017-04-10 NOTE — TELEPHONE ENCOUNTER
ROSEMARIE 3/24/17 with Candace TORRES   ASSESSMENT AND PLAN:   1. Atypical chest pain, at least once associated with syncope. Dr. Byrd wondered if this was due to vasospasm, and she did have a coronary angiogram with acetylcholine challenge. Unfortunately, she had taken diltiazem the night prior, and so no spasm was induced but the interventionalist wondered if she should increase her diltiazem regardless. Today I recommended a 30-day event monitor to look for an arrhythmia causing syncope and chest discomfort. I will have her wear a 30-day event monitor, though I am not sure that in 30 days she will have symptoms again if they can happen further apart than that. I will follow up with her after that, at which time we may increase her diltiazem if she has had symptoms but no significant arrhythmia; however, if she has a significant arrhythmia, then that would be addressed in a different manner.         Received Cardionet strips from 4/6/17 - 4/7/17 4/6/17  Symptomatic event, Baseline- SR with PVC, HR 90, Activties: baseline recording, Symptom other than listed     4/7/17 Automatic event, SR with blocked PAC, HR 77    Filed in cabinet, continue to monitor.

## 2017-04-20 ENCOUNTER — INFUSION THERAPY VISIT (OUTPATIENT)
Dept: INFUSION THERAPY | Facility: CLINIC | Age: 47
End: 2017-04-20
Attending: ALLERGY & IMMUNOLOGY
Payer: COMMERCIAL

## 2017-04-20 VITALS
HEART RATE: 92 BPM | TEMPERATURE: 98.2 F | SYSTOLIC BLOOD PRESSURE: 128 MMHG | RESPIRATION RATE: 16 BRPM | DIASTOLIC BLOOD PRESSURE: 73 MMHG | OXYGEN SATURATION: 99 %

## 2017-04-20 DIAGNOSIS — J45.50 SEVERE PERSISTENT ASTHMA (H): Primary | ICD-10-CM

## 2017-04-20 PROCEDURE — 25000128 H RX IP 250 OP 636: Performed by: ALLERGY & IMMUNOLOGY

## 2017-04-20 PROCEDURE — 96401 CHEMO ANTI-NEOPL SQ/IM: CPT

## 2017-04-20 RX ADMIN — OMALIZUMAB 375 MG: 202.5 INJECTION, SOLUTION SUBCUTANEOUS at 15:04

## 2017-04-20 NOTE — MR AVS SNAPSHOT
After Visit Summary   4/20/2017    Alba Varela    MRN: [id number]           Patient Information     Date Of Birth          1970        Visit Information        Provider Department      4/20/2017 2:30 PM RH INFUSION CHAIR 1 [facility] Infusion [facility]        Today's Diagnoses     Severe persistent asthma    -  1       Follow-ups after your visit        Your next 10 appointments already scheduled     Apr 28, 2017  7:30 AM CDT   Return Visit with WALESKA Tellez CNP   [facility]YSICIANS [facility] ([facility])    [address]  [facility] [id number] [phone]            May 04, 2017  1:30 PM CDT   Level 1 with RH INFUSION CHAIR 8   [facility] Infusion [facility] ([facility])    [facility]  [address]  [facility] [address] [phone]              Who to contact     If you have questions or need follow up information about today's clinic visit or your schedule please contact [facility] INFUSION [facility] directly at [phone].  Normal or non-critical lab and imaging results will be communicated to you by Conkwesthart, letter or phone within 4 business days after the clinic has received the results. If you do not hear from us within 7 days, please contact the clinic through Conkwesthart or phone. If you have a critical or abnormal lab result, we will notify you by phone as soon as possible.  Submit refill requests through [email or web address] or call your pharmacy and they will forward the refill request to us. Please allow 3 business days for your refill to be completed.          Additional Information About Your Visit        MyChart Information     [email or web address] gives you secure access to your electronic health record. If you see a primary care provider, you can also send messages to your care team and make appointments. If you have questions,  please call your primary care clinic.  If you do not have a primary care provider, please call [phone] and they will assist you.        Care EveryWhere ID     This is your Care EveryWhere ID. This could be used by other organizations to access your [place] medical records  [id number]        Your Vitals Were     Pulse Temperature Respirations Last Period Pulse Oximetry       92 98.2  F (36.8  C) (Tympanic) 16 07/11/2014 99%        Blood Pressure from Last 3 Encounters:   04/20/17 128/73   04/06/17 113/72   03/24/17 110/74    Weight from Last 3 Encounters:   03/24/17 74.8 kg (164 lb 14.4 oz)   03/09/17 72.1 kg (159 lb)   03/09/17 73 kg (161 lb)              Today, you had the following     No orders found for display         Today's Medication Changes          These changes are accurate as of: 4/20/17  3:41 PM.  If you have any questions, ask your nurse or doctor.               These medicines have changed or have updated prescriptions.        Dose/Directions    gabapentin 100 MG capsule   Commonly known as:  NEURONTIN   This may have changed:  additional instructions   Used for:  Cervicalgia        Take 100 mg in the morning and 400 mg at bedtime for one week, then increase to 100 mg in the morning and 600 mg at bedtime.   Quantity:  210 capsule   Refills:  1                Primary Care Provider Office Phone # Fax #    Bright [facility]elo [phone]       [address] [address]        Thank you!     Thank you for choosing [facility] INFUSION [facility]  for your care. Our goal is always to provide you with excellent care. Hearing back from our patients is one way we can continue to improve our services. Please take a few minutes to complete the written survey that you may receive in the mail after your visit with us. Thank you!             Your Updated Medication List - Protect others around you: Learn how to safely use, store and throw  away your medicines at www.disposemymeds.org.          This list is accurate as of: 4/20/17  3:41 PM.  Always use your most recent med list.                   Brand Name Dispense Instructions for use    BOTOX IJ          diltiazem 120 MG 24 hr capsule    CARDIZEM CD    90 capsule    Take 1 capsule (120 mg) by mouth daily You are due to see the cardiologist- please call [phone] to schedule.       gabapentin 100 MG capsule    NEURONTIN    210 capsule    Take 100 mg in the morning and 400 mg at bedtime for one week, then increase to 100 mg in the morning and 600 mg at bedtime.       loratadine 10 MG tablet    CLARITIN     Take 10 mg by mouth daily       NORTRIPTYLINE HCL PO      Take 200 mg by mouth daily       PAROXETINE HCL PO      Take 25 mg by mouth daily

## 2017-04-20 NOTE — PROGRESS NOTES
Infusion Nursing Note:  Alba Varela presents today for xolair.    Patient seen by provider today: No   present during visit today: Not Applicable.    Note: N/A.    Intravenous Access:  No Intravenous access/labs at this visit.    Treatment Conditions:  Not Applicable.      Post Infusion Assessment:  Patient tolerated injection without incident.    Discharge Plan:   Copy of AVS reviewed with patient and/or family.  Patient will return 5/4 for xolair for next appointment.  Patient discharged in stable condition accompanied by: self.  Departure Mode: Ambulatory.    Sangita Edwards RN

## 2017-04-20 NOTE — TELEPHONE ENCOUNTER
Received strip from 4/19/17   08:55 Symptomatic event, SR, HR 85, activities: baseline recording, no symptoms noted     Filed in cabinet, continue to monitor.

## 2017-04-25 ENCOUNTER — TELEPHONE (OUTPATIENT)
Dept: CARDIOLOGY | Facility: CLINIC | Age: 47
End: 2017-04-25

## 2017-04-25 NOTE — TELEPHONE ENCOUNTER
Received a vm from pt stating that she has a follow up scheduled with Candace on April 28 at 730. Pt was wondering if she should postpone this appt until after her holter monitor is complete.     Chart reviewed and pt is currently wearing holter monitor until 5/5/17. Writer called pt back. No answer left non urgent vm to call team 4 back directly.     Chart reviewed: ROSEMARIE Maria NP 3/24/17:    ASSESSMENT AND PLAN:   1. Atypical chest pain, at least once associated with syncope. Dr. Byrd wondered if this was due to vasospasm, and she did have a coronary angiogram with acetylcholine challenge. Unfortunately, she had taken diltiazem the night prior, and so no spasm was induced but the interventionalist wondered if she should increase her diltiazem regardless. Today I recommended a 30-day event monitor to look for an arrhythmia causing syncope and chest discomfort. I will have her wear a 30-day event monitor, though I am not sure that in 30 days she will have symptoms again if they can happen further apart than that. I will follow up with her after that, at which time we may increase her diltiazem if she has had symptoms but no significant arrhythmia; however, if she has a significant arrhythmia, then that would be addressed in a different manner.

## 2017-04-25 NOTE — TELEPHONE ENCOUNTER
Pt called back and left a vm to call back.     Writer attempted to call pt back. No answer left non urgent vm to call team 4 back directly.

## 2017-04-26 NOTE — TELEPHONE ENCOUNTER
Writer spoke to patient about rescheduling OV with Candace NP until after the event monitor is completed. Pt agreed. Pt rescheduled to OV 5/12/17. Pt aware it is in [place]

## 2017-04-28 NOTE — TELEPHONE ENCOUNTER
Strip received from 4/26/17     15:56 Automatic event, SR with PVC, HR 78    Filed in cabinet, continue to monitor

## 2017-05-04 ENCOUNTER — INFUSION THERAPY VISIT (OUTPATIENT)
Dept: INFUSION THERAPY | Facility: CLINIC | Age: 47
End: 2017-05-04
Attending: ALLERGY & IMMUNOLOGY
Payer: COMMERCIAL

## 2017-05-04 VITALS
TEMPERATURE: 97.6 F | WEIGHT: 171 LBS | BODY MASS INDEX: 29.35 KG/M2 | DIASTOLIC BLOOD PRESSURE: 77 MMHG | SYSTOLIC BLOOD PRESSURE: 123 MMHG | HEART RATE: 110 BPM | OXYGEN SATURATION: 98 % | RESPIRATION RATE: 16 BRPM

## 2017-05-04 DIAGNOSIS — J45.50 SEVERE PERSISTENT ASTHMA (H): Primary | ICD-10-CM

## 2017-05-04 PROCEDURE — 25000128 H RX IP 250 OP 636: Performed by: ALLERGY & IMMUNOLOGY

## 2017-05-04 PROCEDURE — 96401 CHEMO ANTI-NEOPL SQ/IM: CPT

## 2017-05-04 RX ADMIN — OMALIZUMAB 375 MG: 202.5 INJECTION, SOLUTION SUBCUTANEOUS at 14:18

## 2017-05-04 NOTE — MR AVS SNAPSHOT
After Visit Summary   5/4/2017    Alba Varela    MRN: [id number]           Patient Information     Date Of Birth          1970        Visit Information        Provider Department      5/4/2017 1:30 PM [facility] INFUSION CHAIR 8 [facility]        Today's Diagnoses     Severe persistent asthma    -  1       Follow-ups after your visit        Your next 10 appointments already scheduled     May 12, 2017  7:30 AM CDT   Return Visit with WALESKA Tellez CNP   [facility])    [address] [place] [address] [phone]              Who to contact     If you have questions or need follow up information about today's clinic visit or your schedule please contact [facility] directly at [phone].  Normal or non-critical lab and imaging results will be communicated to you by KargoCardhart, letter or phone within 4 business days after the clinic has received the results. If you do not hear from us within 7 days, please contact the clinic through KargoCardhart or phone. If you have a critical or abnormal lab result, we will notify you by phone as soon as possible.  Submit refill requests through Humouno or call your pharmacy and they will forward the refill request to us. Please allow 3 business days for your refill to be completed.          Additional Information About Your Visit        MyChart Information     Humouno gives you secure access to your electronic health record. If you see a primary care provider, you can also send messages to your care team and make appointments. If you have questions, please call your primary care clinic.  If you do not have a primary care provider, please call [phone] and they will assist you.        Care EveryWhere ID     This is your Care EveryWhere ID. This could be used by other organizations to access your  [place] medical records  [id number]        Your Vitals Were     Pulse Temperature Respirations Last Period Pulse Oximetry BMI (Body Mass Index)    110 97.6  F (36.4  C) (Tympanic) 16 07/11/2014 98% 29.35 kg/m2       Blood Pressure from Last 3 Encounters:   05/04/17 123/77   04/20/17 128/73   04/06/17 113/72    Weight from Last 3 Encounters:   05/04/17 77.6 kg (171 lb)   03/24/17 74.8 kg (164 lb 14.4 oz)   03/09/17 72.1 kg (159 lb)              Today, you had the following     No orders found for display         Today's Medication Changes          These changes are accurate as of: 5/4/17  2:56 PM.  If you have any questions, ask your nurse or doctor.               These medicines have changed or have updated prescriptions.        Dose/Directions    gabapentin 100 MG capsule   Commonly known as:  NEURONTIN   This may have changed:  additional instructions   Used for:  Cervicalgia        Take 100 mg in the morning and 400 mg at bedtime for one week, then increase to 100 mg in the morning and 600 mg at bedtime.   Quantity:  210 capsule   Refills:  1                Primary Care Provider Office Phone # Fax #    Bright [facility]elo [phone]       [address] [address]        Thank you!     Thank you for choosing [facility] INFUSION [facility]  for your care. Our goal is always to provide you with excellent care. Hearing back from our patients is one way we can continue to improve our services. Please take a few minutes to complete the written survey that you may receive in the mail after your visit with us. Thank you!             Your Updated Medication List - Protect others around you: Learn how to safely use, store and throw away your medicines at www.disposemymeds.org.          This list is accurate as of: 5/4/17  2:56 PM.  Always use your most recent med list.                   Brand Name Dispense Instructions for use    BOTOX IJ           diltiazem 120 MG 24 hr capsule    CARDIZEM CD    90 capsule    Take 1 capsule (120 mg) by mouth daily You are due to see the cardiologist- please call [phone] to schedule.       gabapentin 100 MG capsule    NEURONTIN    210 capsule    Take 100 mg in the morning and 400 mg at bedtime for one week, then increase to 100 mg in the morning and 600 mg at bedtime.       loratadine 10 MG tablet    CLARITIN     Take 10 mg by mouth daily       NORTRIPTYLINE HCL PO      Take 200 mg by mouth daily       PAROXETINE HCL PO      Take 25 mg by mouth daily

## 2017-05-04 NOTE — PROGRESS NOTES
Infusion Nursing Note:  Alba Varela presents today for Xolair.    Patient seen by provider today: No   present during visit today: Not Applicable.    Note: N/A.    Intravenous Access:  No Intravenous access/labs at this visit.    Treatment Conditions:  Not Applicable.      Post Infusion Assessment:  Patient tolerated injection without incident.  Patient observed for 30 minutes post Xolair per protocol.    Discharge Plan:   Patient will return stop by scheduling office to make next appointment due in 2 weeks.    Mame Pruitt RN

## 2017-05-04 NOTE — TELEPHONE ENCOUNTER
Strip received from 5/1/17     9:15 Symptomatic event, SR, HR 82, No activities or symptoms noted  9:20 Symptomatic event, SR with PAC, HR 77, no activities or symptoms noted   11:35 Symptomatic event, SR, HR 81, Activities: Baseline recording, no symptoms noted     Filed in cabinet, continue to monitor.

## 2017-05-08 NOTE — TELEPHONE ENCOUNTER
Strip received from 5/3/17     20:29 Automatic event, ST,      Filed in cabinet, continue to monitor

## 2017-05-12 ENCOUNTER — OFFICE VISIT (OUTPATIENT)
Dept: CARDIOLOGY | Facility: CLINIC | Age: 47
End: 2017-05-12
Payer: COMMERCIAL

## 2017-05-12 VITALS
HEART RATE: 80 BPM | WEIGHT: 166 LBS | HEIGHT: 64 IN | BODY MASS INDEX: 28.34 KG/M2 | SYSTOLIC BLOOD PRESSURE: 118 MMHG | DIASTOLIC BLOOD PRESSURE: 72 MMHG

## 2017-05-12 DIAGNOSIS — R07.89 CHEST PRESSURE: ICD-10-CM

## 2017-05-12 PROCEDURE — 99213 OFFICE O/P EST LOW 20 MIN: CPT | Performed by: NURSE PRACTITIONER

## 2017-05-12 RX ORDER — DILTIAZEM HYDROCHLORIDE 120 MG/1
120 CAPSULE, COATED, EXTENDED RELEASE ORAL DAILY
Qty: 30 CAPSULE | Refills: 11 | Status: SHIPPED | OUTPATIENT
Start: 2017-05-12 | End: 2024-05-20

## 2017-05-12 NOTE — LETTER
5/12/2017    Bright Sotelo MD  [facility]   [address] [address]    RE: Alba Varela       Dear Colleague,    I had the pleasure of seeing Alba Varela and her  today in Cardiology Clinic to follow up the results of her CardioNet, which I placed for symptoms of syncope and presyncope and during which she had none of these symptoms and her CardioNet was pretty unremarkable.      She is a pleasant 47-year-old patient Dr. Byrd recently evaluated with coronary angiography, looking for spasm as a cause of her symptoms of chest discomfort despite her normal stress echo.  She has a history of smoking and hyperlipidemia.  Her angiogram with acetylcholine challenge was negative for spasm, though she had taken her diltiazem the previous evening.  She had no lesions greater than 25%.  With acetylcholine challenge she did go into high-grade heart block successfully treated with a temporary pacemaker.      She describes in the last year 3 episodes of presyncope or syncope.  Once, she was driving her daughter to school, after she dropped off her daughter she was driving and she had to pull over to the side of the road because she felt like she might pass out.  Another time, she was at [employer] and had a stabbing-type chest pain that brought her to the floor to her knees, but she did not pass out, according to her fiance, who is with her now and saw her.  Finally, she had an unwitnessed episode of syncope where she was in the kitchen standing at the sink.  Her fiance or  came around the corner and found her passed out on the floor.  After about an hour, her symptoms subsided, but she was quite weak during that time.  She does have a history of hysterectomy a year ago and is having menopausal symptoms including lightheadedness and diaphoresis since then.  As I said above, her CardioNet was unremarkable.  She was in sinus rhythm on all of the documented strips that I have.  She had 1  episode where her heart rate was 111; otherwise, there was nothing remarkable about it.      PHYSICAL EXAMINATION:   GENERAL:  This is a 47-year-old woman who appears comfortable.   VITAL SIGNS:  Blood pressure 118/72, heart rate 80, weight 166 pounds, BMI 28.   LUNGS:  Clear to auscultation.   CARDIAC:  Rate regular, normal S1, S2.   EXTREMITIES:  No lower extremity edema.     Outpatient Encounter Prescriptions as of 5/12/2017   Medication Sig Dispense Refill     diltiazem (CARDIZEM CD) 120 MG 24 hr capsule Take 1 capsule (120 mg) by mouth daily You are due to see the cardiologist- please call [phone] to schedule. 30 capsule 11     loratadine (CLARITIN) 10 MG tablet Take 10 mg by mouth daily       OnabotulinumtoxinA (BOTOX IJ)        NORTRIPTYLINE HCL PO Take 200 mg by mouth daily       PAROXETINE HCL PO Take 25 mg by mouth daily       gabapentin (NEURONTIN) 100 MG capsule Take 100 mg in the morning and 400 mg at bedtime for one week, then increase to 100 mg in the morning and 600 mg at bedtime. (Patient taking differently: Take 400 mg in the morning and 600 mg at bedtime.) 210 capsule 1     [DISCONTINUED] diltiazem (CARDIZEM CD) 120 MG 24 hr CD capsule Take 1 capsule (120 mg) by mouth daily You are due to see the cardiologist- please call [phone] to schedule. 90 capsule 0     No facility-administered encounter medications on file as of 5/12/2017.       ASSESSMENT AND PLAN:   1.  Atypical chest pain.  The acetylcholine challenge was negative, but she had taken diltiazem the night before.  She did have a high-grade block during the challenge requiring temporary pacing.   2.  Three episodes in the last year of presyncope or syncope.  We discussed today that her CardioNet was unremarkable.  Other things that could be causing her symptoms could be dehydration or vasovagal.  At this point, we do not plan to follow up.  I did discuss that if she has more frequent episodes down the road, perhaps an implantable  loop recorder would be something that she would benefit from, but at this point, I am not convinced that it would be beneficial.      Thank you for allowing me to see Alba today.     Sincerely,    WALESKA Sifuentes [facility]

## 2017-05-12 NOTE — PROGRESS NOTES
HPI and Plan:   See dictation    No orders of the defined types were placed in this encounter.      Orders Placed This Encounter   Medications     diltiazem (CARDIZEM CD) 120 MG 24 hr capsule     Sig: Take 1 capsule (120 mg) by mouth daily You are due to see the cardiologist- please call [phone] to schedule.     Dispense:  30 capsule     Refill:  11       Medications Discontinued During This Encounter   Medication Reason     diltiazem (CARDIZEM CD) 120 MG 24 hr CD capsule Reorder         Encounter Diagnosis   Name Primary?     Chest pressure        CURRENT MEDICATIONS:  Current Outpatient Prescriptions   Medication Sig Dispense Refill     diltiazem (CARDIZEM CD) 120 MG 24 hr capsule Take 1 capsule (120 mg) by mouth daily You are due to see the cardiologist- please call [phone] to schedule. 30 capsule 11     loratadine (CLARITIN) 10 MG tablet Take 10 mg by mouth daily       OnabotulinumtoxinA (BOTOX IJ)        NORTRIPTYLINE HCL PO Take 200 mg by mouth daily       PAROXETINE HCL PO Take 25 mg by mouth daily       gabapentin (NEURONTIN) 100 MG capsule Take 100 mg in the morning and 400 mg at bedtime for one week, then increase to 100 mg in the morning and 600 mg at bedtime. (Patient taking differently: Take 400 mg in the morning and 600 mg at bedtime.) 210 capsule 1     [DISCONTINUED] diltiazem (CARDIZEM CD) 120 MG 24 hr CD capsule Take 1 capsule (120 mg) by mouth daily You are due to see the cardiologist- please call [phone] to schedule. 90 capsule 0       ALLERGIES     Allergies   Allergen Reactions     Codeine Sulfate Nausea and Vomiting     Contrast Dye Hives     Patient is allergic to CT iodine contrast.       Scopolamine Visual Disturbance     Imitrex [Sumatriptan] Rash     Naproxen Rash     Penicillins Rash     Per patient     Sulfa Drugs Rash       PAST MEDICAL HISTORY:  Past Medical History:   Diagnosis Date     Abdominal pain      Anxiety      Asthma      C. difficile colitis       Headache(784.0) 12/10/2014     High cholesterol      Hyperlipidemia      Liver disease     Being evalted for fatty liver disease. ABnormal LFT.     Migraines      PONV (postoperative nausea and vomiting)      Sleep apnea     CPAP will bring.     Tremor        PAST SURGICAL HISTORY:  Past Surgical History:   Procedure Laterality Date      SECTION      witth TL     ENDOSCOPIC ULTRASOUND, ESOPHAGOSCOPY, GASTROSCOPY, DUODENOSCOPY (EGD), COMBINED  13     ESOPHAGOSCOPY, GASTROSCOPY, DUODENOSCOPY (EGD), COMBINED  10/21/2013    Procedure: COMBINED ESOPHAGOSCOPY, GASTROSCOPY, DUODENOSCOPY (EGD), BIOPSY SINGLE OR MULTIPLE;;  Surgeon: Rito Gupta MD;  Location:  GI     FOOT SURGERY      Bilateral foot reconstruction.     [facility] UGI ENDOSCOPY W EUS  2013    Procedure: COMBINED ENDOSCOPIC ULTRASOUND, ESOPHAGOSCOPY, GASTROSCOPY, DUODENOSCOPY (EGD);  Surgeon: Emre Campbell MD;  Location:  GI     HYSTERECTOMY       LAPAROSCOPIC HYSTERECTOMY SUPRACERVICAL, BILATERAL SALPINGO-OOPHORECTOMY, COMBINED  3/6/2013    Procedure: COMBINED LAPAROSCOPIC HYSTERECTOMY SUPRACERVICAL, SALPINGO-OOPHORECTOMY;  Laparoscopic Supracervical Hysterectomy, Right salpingo-oopherectomy;  Surgeon: Tanika Jones MD;  Location: [facility] OR     UVULOPALATOPHARYNGOPLASTY      Plus Septoplasy.       FAMILY HISTORY:  Family History   Problem Relation Age of Onset     Unknown/Adopted Mother      Unknown/Adopted Father        SOCIAL HISTORY:  Social History     Social History     Marital status:      Spouse name: N/A     Number of children: N/A     Years of education: N/A     Social History Main Topics     Smoking status: Current Every Day Smoker     Packs/day: 0.50     Years: 20.00     Types: Cigarettes     Smokeless tobacco: Never Used     Alcohol use 0.0 oz/week     0 Standard drinks or equivalent per week      Comment:  3 drinks weekly     Drug use: No     Sexual activity: Yes     Partners: Male     Other Topics Concern     Not  "on file     Social History Narrative       Review of Systems:  Skin:  Negative       Eyes:    glasses;visual blurring    ENT:  Positive for sinus trouble allergies  Respiratory:  Positive for shortness of breath;cough;wheezing;sleep apnea asthma; not using CPAP   Cardiovascular:    Positive for;palpitations;chest pain;fatigue    Gastroenterology: Positive for nausea with chest pain  Genitourinary:  not assessed      Musculoskeletal:  Positive for neck pain;joint pain;muscular weakness    Neurologic:  Positive for headaches    Psychiatric:  Positive for anxiety;depression;excessive stress    Heme/Lymph/Imm:  Positive for allergies;hay fever    Endocrine:  Negative        Physical Exam:  Vitals: /72  Pulse 80  Ht 1.626 m (5' 4\")  Wt 75.3 kg (166 lb)  LMP 07/11/2014  BMI 28.49 kg/m2    Constitutional:  cooperative, alert and oriented, well developed, well nourished, in no acute distress        Skin:  warm and dry to the touch, no apparent skin lesions or masses noted        Head:      atraumatic    Eyes:  sclera white        ENT:  no pallor or cyanosis        Neck:  JVP normal        Chest:  normal breath sounds, clear to auscultation, normal A-P diameter, normal symmetry, normal respiratory excursion, no use of accessory muscles          Cardiac: regular rhythm;normal S1 and S2                  Abdomen:           Vascular: pulses full and equal                                        Extremities and Back:  no edema              Neurological:  affect appropriate, oriented to time, person and place              CC  No referring provider defined for this encounter.              "

## 2017-05-12 NOTE — MR AVS SNAPSHOT
After Visit Summary   5/12/2017    Alba Varela    MRN: [id number]           Patient Information     Date Of Birth          1970        Visit Information        Provider Department      5/12/2017 7:30 AM Juliette Carrasco APRN CNP [facility] [place]        Today's Diagnoses     Chest pressure           Follow-ups after your visit        Your next 10 appointments already scheduled     May 18, 2017  2:00 PM CDT   Level 1 with RH INFUSION CHAIR 7   [facility] ([facility])    [facility] [place] [id number]  [facility] [address]   [phone]            Jun 01, 2017  2:00 PM CDT   Level 1 with RH INFUSION CHAIR 1   [facility] ([facility])    [facility]  [address]  [facility] [address]   [phone]            Jun 13, 2017  8:00 AM CDT   Level 1 with RH INFUSION CHAIR 10   [facility]fusion [facility] ([facility])    [facility]  [address]  [facility] [address]   [phone]            Jun 27, 2017  8:00 AM CDT   Level 1 with RH INFUSION CHAIR 9   [facility] ([facility])    [facility]  [address]  [facility] [address]   [phone]              Who to contact     If you have questions or need follow up information about today's clinic visit or your schedule please contact [facility] [place] directly at [phone].  Normal or non-critical lab and imaging results will be communicated to you by MyChart, letter or phone within 4 business days after the clinic has received the results. If you do not hear from us within 7 days, please contact the clinic through MyChart or phone. If you have a  "critical or abnormal lab result, we will notify you by phone as soon as possible.  Submit refill requests through RocketBolt or call your pharmacy and they will forward the refill request to us. Please allow 3 business days for your refill to be completed.          Additional Information About Your Visit        HuoBihart Information     RocketBolt gives you secure access to your electronic health record. If you see a primary care provider, you can also send messages to your care team and make appointments. If you have questions, please call your primary care clinic.  If you do not have a primary care provider, please call [phone] and they will assist you.        Care EveryWhere ID     This is your Care EveryWhere ID. This could be used by other organizations to access your [place] medical records  [id number]        Your Vitals Were     Pulse Height Last Period BMI (Body Mass Index)          80 1.626 m (5' 4\") 07/11/2014 28.49 kg/m2         Blood Pressure from Last 3 Encounters:   05/12/17 118/72   05/04/17 123/77   04/20/17 128/73    Weight from Last 3 Encounters:   05/12/17 75.3 kg (166 lb)   05/04/17 77.6 kg (171 lb)   03/24/17 74.8 kg (164 lb 14.4 oz)              Today, you had the following     No orders found for display         Today's Medication Changes          These changes are accurate as of: 5/12/17  8:06 AM.  If you have any questions, ask your nurse or doctor.               These medicines have changed or have updated prescriptions.        Dose/Directions    gabapentin 100 MG capsule   Commonly known as:  NEURONTIN   This may have changed:  additional instructions   Used for:  Cervicalgia        Take 100 mg in the morning and 400 mg at bedtime for one week, then increase to 100 mg in the morning and 600 mg at bedtime.   Quantity:  210 capsule   Refills:  1            Where to get your medicines      These medications were sent to [id number] - [place], [place] - [address]  [address]  " [address] [place], [facility] [address]     Phone:  [phone]     diltiazem 120 MG 24 hr capsule                Primary Care Provider Office Phone # Fax #    Bright [facility]elo [phone]       [facility] [address] [address]        Thank you!     Thank you for choosing [facility] [place]  for your care. Our goal is always to provide you with excellent care. Hearing back from our patients is one way we can continue to improve our services. Please take a few minutes to complete the written survey that you may receive in the mail after your visit with us. Thank you!             Your Updated Medication List - Protect others around you: Learn how to safely use, store and throw away your medicines at www.disposemymeds.org.          This list is accurate as of: 5/12/17  8:06 AM.  Always use your most recent med list.                   Brand Name Dispense Instructions for use    BOTOX IJ          diltiazem 120 MG 24 hr capsule    CARDIZEM CD    30 capsule    Take 1 capsule (120 mg) by mouth daily You are due to see the cardiologist- please call [phone] to schedule.       gabapentin 100 MG capsule    NEURONTIN    210 capsule    Take 100 mg in the morning and 400 mg at bedtime for one week, then increase to 100 mg in the morning and 600 mg at bedtime.       loratadine 10 MG tablet    CLARITIN     Take 10 mg by mouth daily       NORTRIPTYLINE HCL PO      Take 200 mg by mouth daily       PAROXETINE HCL PO      Take 25 mg by mouth daily

## 2017-05-13 NOTE — PROGRESS NOTES
HISTORY OF PRESENT ILLNESS:  I had the pleasure of seeing Alba Varela and her  today in [facility]iology Clinic to follow up the results of her CardioNet, which I placed for symptoms of syncope and presyncope and during which she had none of these symptoms and her CardioNet was pretty unremarkable.      She is a pleasant 47-year-old patient Dr. Byrd recently evaluated with coronary angiography, looking for spasm as a cause of her symptoms of chest discomfort despite her normal stress echo.  She has a history of smoking and hyperlipidemia.  Her angiogram with acetylcholine challenge was negative for spasm, though she had taken her diltiazem the previous evening.  She had no lesions greater than 25%.  With acetylcholine challenge she did go into high-grade heart block successfully treated with a temporary pacemaker.      She describes in the last year 3 episodes of presyncope or syncope.  Once, she was driving her daughter to school, after she dropped off her daughter she was driving and she had to pull over to the side of the road because she felt like she might pass out.  Another time, she was at [employer] and had a stabbing-type chest pain that brought her to the floor to her knees, but she did not pass out, according to her fiance, who is with her now and saw her.  Finally, she had an unwitnessed episode of syncope where she was in the kitchen standing at the sink.  Her fiance or  came around the corner and found her passed out on the floor.  After about an hour, her symptoms subsided, but she was quite weak during that time.  She does have a history of hysterectomy a year ago and is having menopausal symptoms including lightheadedness and diaphoresis since then.  As I said above, her CardioNet was unremarkable.  She was in sinus rhythm on all of the documented strips that I have.  She had 1 episode where her heart rate was 111; otherwise, there was nothing remarkable about it.      PHYSICAL EXAMINATION:    GENERAL:  This is a 47-year-old woman who appears comfortable.   VITAL SIGNS:  Blood pressure 118/72, heart rate 80, weight 166 pounds, BMI 28.   LUNGS:  Clear to auscultation.   CARDIAC:  Rate regular, normal S1, S2.   EXTREMITIES:  No lower extremity edema.      ASSESSMENT AND PLAN:   1.  Atypical chest pain.  The acetylcholine challenge was negative, but she had taken diltiazem the night before.  She did have a high-grade block during the challenge requiring temporary pacing.   2.  Three episodes in the last year of presyncope or syncope.  We discussed today that her CardioNet was unremarkable.  Other things that could be causing her symptoms could be dehydration or vasovagal.  At this point, we do not plan to follow up.  I did discuss that if she has more frequent episodes down the road, perhaps an implantable loop recorder would be something that she would benefit from, but at this point, I am not convinced that it would be beneficial.      Thank you for allowing me to see Alba today.      WALESKA DE OLIVEIRA, CHANCE             D: 2017 08:19   T: 2017 19:18   MT: KITA      Name:     ALBA WEIR   MRN:      [id number]        Account:      [id number]   :      1970           Service Date: 2017      Document: [id number]

## 2017-05-18 ENCOUNTER — INFUSION THERAPY VISIT (OUTPATIENT)
Dept: INFUSION THERAPY | Facility: CLINIC | Age: 47
End: 2017-05-18
Attending: ALLERGY & IMMUNOLOGY
Payer: COMMERCIAL

## 2017-05-18 VITALS
SYSTOLIC BLOOD PRESSURE: 138 MMHG | TEMPERATURE: 97.6 F | HEART RATE: 80 BPM | DIASTOLIC BLOOD PRESSURE: 86 MMHG | RESPIRATION RATE: 16 BRPM

## 2017-05-18 DIAGNOSIS — J45.50 SEVERE PERSISTENT ASTHMA (H): Primary | ICD-10-CM

## 2017-05-18 PROCEDURE — 25000128 H RX IP 250 OP 636: Performed by: ALLERGY & IMMUNOLOGY

## 2017-05-18 PROCEDURE — 96401 CHEMO ANTI-NEOPL SQ/IM: CPT

## 2017-05-18 RX ADMIN — OMALIZUMAB 375 MG: 202.5 INJECTION, SOLUTION SUBCUTANEOUS at 14:01

## 2017-05-18 NOTE — PROGRESS NOTES
Infusion Nursing Note:  Alba Varela presents today for Xolair.    Patient seen by provider today: No   present during visit today: Not Applicable.    Note: N/A.    Intravenous Access:  No Intravenous access/labs at this visit.    Treatment Conditions:  Not Applicable.    Post Infusion Assessment:  Patient tolerated injection without incident.  Patient observed for 30 minutes post xolair per protocol.    Discharge Plan:   Discharge instructions reviewed with: Patient.  Patient and/or family verbalized understanding of discharge instructions and all questions answered.  AVS to patient via FightMe.  Patient will return 6/1/17 for next appointment.   Patient discharged in stable condition accompanied by: self.  Departure Mode: Ambulatory.    Toma Camargo RN

## 2017-05-18 NOTE — MR AVS SNAPSHOT
After Visit Summary   5/18/2017    Alba Varela    MRN: [id number]           Patient Information     Date Of Birth          1970        Visit Information        Provider Department      5/18/2017 2:00 PM RH INFUSION CHAIR 7 [facility]        Today's Diagnoses     Severe persistent asthma    -  1       Follow-ups after your visit        Your next 10 appointments already scheduled     Jun 01, 2017  2:00 PM CDT   Level 1 with RH INFUSION CHAIR 1   [facility] ([facility])    [facility]  [address]  [facility] [address]   [phone]            Jun 13, 2017  8:00 AM CDT   Level 1 with RH INFUSION CHAIR 10   [facility] ([facility])    [facility]  [address]  [facility] [address]   [phone]            Jun 27, 2017  8:00 AM CDT   Level 1 with RH INFUSION CHAIR 9   [facility] ([facility])    [facility]  [address]  [facility] [address]   [phone]              Who to contact     If you have questions or need follow up information about today's clinic visit or your schedule please contact [facility] directly at [phone].  Normal or non-critical lab and imaging results will be communicated to you by MyChart, letter or phone within 4 business days after the clinic has received the results. If you do not hear from us within 7 days, please contact the clinic through MyChart or phone. If you have a critical or abnormal lab result, we will notify you by phone as soon as possible.  Submit refill requests through [employer] or call your pharmacy and they will forward the refill request to us. Please allow 3 business days for your refill to be completed.           Additional Information About Your Visit        MyChart Information     [employer] gives you secure access to your electronic health record. If you see a primary care provider, you can also send messages to your care team and make appointments. If you have questions, please call your primary care clinic.  If you do not have a primary care provider, please call [phone] and they will assist you.        Care EveryWhere ID     This is your Care EveryWhere ID. This could be used by other organizations to access your [place] medical records  [id number]        Your Vitals Were     Pulse Temperature Respirations Last Period          80 97.6  F (36.4  C) (Tympanic) 16 07/11/2014         Blood Pressure from Last 3 Encounters:   05/18/17 138/86   05/12/17 118/72   05/04/17 123/77    Weight from Last 3 Encounters:   05/12/17 75.3 kg (166 lb)   05/04/17 77.6 kg (171 lb)   03/24/17 74.8 kg (164 lb 14.4 oz)              Today, you had the following     No orders found for display         Today's Medication Changes          These changes are accurate as of: 5/18/17  2:38 PM.  If you have any questions, ask your nurse or doctor.               These medicines have changed or have updated prescriptions.        Dose/Directions    gabapentin 100 MG capsule   Commonly known as:  NEURONTIN   This may have changed:  additional instructions   Used for:  Cervicalgia        Take 100 mg in the morning and 400 mg at bedtime for one week, then increase to 100 mg in the morning and 600 mg at bedtime.   Quantity:  210 capsule   Refills:  1                Primary Care Provider Office Phone # Fax #    Bright [facility]elo [phone]       [address] [address]        Thank you!     Thank you for choosing [facility]  for your care. Our goal is always to provide you with excellent care. Hearing back from our patients is one way we can continue to improve  our services. Please take a few minutes to complete the written survey that you may receive in the mail after your visit with us. Thank you!             Your Updated Medication List - Protect others around you: Learn how to safely use, store and throw away your medicines at www.disposemymeds.org.          This list is accurate as of: 5/18/17  2:38 PM.  Always use your most recent med list.                   Brand Name Dispense Instructions for use    BOTOX IJ          diltiazem 120 MG 24 hr capsule    CARDIZEM CD    30 capsule    Take 1 capsule (120 mg) by mouth daily You are due to see the cardiologist- please call [phone] to schedule.       gabapentin 100 MG capsule    NEURONTIN    210 capsule    Take 100 mg in the morning and 400 mg at bedtime for one week, then increase to 100 mg in the morning and 600 mg at bedtime.       loratadine 10 MG tablet    CLARITIN     Take 10 mg by mouth daily       NORTRIPTYLINE HCL PO      Take 200 mg by mouth daily       PAROXETINE HCL PO      Take 25 mg by mouth daily

## 2017-05-30 ENCOUNTER — INFUSION THERAPY VISIT (OUTPATIENT)
Dept: INFUSION THERAPY | Facility: CLINIC | Age: 47
End: 2017-05-30
Attending: ALLERGY & IMMUNOLOGY
Payer: COMMERCIAL

## 2017-05-30 VITALS
TEMPERATURE: 97.3 F | SYSTOLIC BLOOD PRESSURE: 103 MMHG | RESPIRATION RATE: 16 BRPM | HEART RATE: 82 BPM | DIASTOLIC BLOOD PRESSURE: 78 MMHG

## 2017-05-30 DIAGNOSIS — J45.50 SEVERE PERSISTENT ASTHMA (H): Primary | ICD-10-CM

## 2017-05-30 PROCEDURE — 25000128 H RX IP 250 OP 636: Mod: JW | Performed by: ALLERGY & IMMUNOLOGY

## 2017-05-30 PROCEDURE — 96401 CHEMO ANTI-NEOPL SQ/IM: CPT

## 2017-05-30 RX ADMIN — OMALIZUMAB 375 MG: 202.5 INJECTION, SOLUTION SUBCUTANEOUS at 08:41

## 2017-05-30 NOTE — PROGRESS NOTES
Infusion Nursing Note:  Alba Varela presents today for Xolair.    Patient seen by provider today: No   present during visit today: Not Applicable.    Note: N/A.    Intravenous Access:  No Intravenous access/labs at this visit.    Treatment Conditions:  Not Applicable.      Post Infusion Assessment:  Patient tolerated injection without incident.  Patient observed for 30 minutes post Xolair per protocol.    Discharge Plan:   Discharge instructions reviewed with: Patient.  Patient and/or family verbalized understanding of discharge instructions and all questions answered.  AVS to patient via [employer].  Patient will return 6/13/17 for next appointment.   Patient discharged in stable condition accompanied by: self.  Departure Mode: Ambulatory.    Toma Camargo RN

## 2017-05-30 NOTE — MR AVS SNAPSHOT
After Visit Summary   5/30/2017    Alba Varela    MRN: [id number]           Patient Information     Date Of Birth          1970        Visit Information        Provider Department      5/30/2017 9:30 AM RH INFUSION CHAIR 2 [facility]        Today's Diagnoses     Severe persistent asthma    -  1       Follow-ups after your visit        Your next 10 appointments already scheduled     May 30, 2017  9:30 AM CDT   Level 1 with RH INFUSION CHAIR 2   [facility] ([facility])    [place] [id number]  [facility] [address]   [phone]            Jun 13, 2017  8:00 AM CDT   Level 1 with RH INFUSION CHAIR 10   [facility] ([facility])    [facility]  [address]  [facility] [address]   [phone]            Jun 27, 2017  8:00 AM CDT   Level 1 with RH INFUSION CHAIR 9   [facility] ([facility])    [facility]  [address]  [facility] [address]   [phone]              Who to contact     If you have questions or need follow up information about today's clinic visit or your schedule please contact [facility] directly at [phone].  Normal or non-critical lab and imaging results will be communicated to you by MyChart, letter or phone within 4 business days after the clinic has received the results. If you do not hear from us within 7 days, please contact the clinic through MyChart or phone. If you have a critical or abnormal lab result, we will notify you by phone as soon as possible.  Submit refill requests through [employer] or call your pharmacy and they will forward the refill request to us. Please allow 3 business days for your refill to be completed.           Additional Information About Your Visit        MyChart Information     [employer] gives you secure access to your electronic health record. If you see a primary care provider, you can also send messages to your care team and make appointments. If you have questions, please call your primary care clinic.  If you do not have a primary care provider, please call [phone] and they will assist you.        Care EveryWhere ID     This is your Care EveryWhere ID. This could be used by other organizations to access your [place] medical records  [id number]        Your Vitals Were     Pulse Temperature Respirations Last Period          82 97.3  F (36.3  C) (Tympanic) 16 07/11/2014         Blood Pressure from Last 3 Encounters:   05/30/17 103/78   05/18/17 138/86   05/12/17 118/72    Weight from Last 3 Encounters:   05/12/17 75.3 kg (166 lb)   05/04/17 77.6 kg (171 lb)   03/24/17 74.8 kg (164 lb 14.4 oz)              Today, you had the following     No orders found for display         Today's Medication Changes          These changes are accurate as of: 5/30/17  9:27 AM.  If you have any questions, ask your nurse or doctor.               These medicines have changed or have updated prescriptions.        Dose/Directions    gabapentin 100 MG capsule   Commonly known as:  NEURONTIN   This may have changed:  additional instructions   Used for:  Cervicalgia        Take 100 mg in the morning and 400 mg at bedtime for one week, then increase to 100 mg in the morning and 600 mg at bedtime.   Quantity:  210 capsule   Refills:  1                Primary Care Provider Office Phone # Fax #    Bright [facility]elo [phone]       [address] [address]        Thank you!     Thank you for choosing [facility] INF[facility]  for your care. Our goal is always to provide you with excellent care. Hearing back from our patients is one way we can continue to improve  our services. Please take a few minutes to complete the written survey that you may receive in the mail after your visit with us. Thank you!             Your Updated Medication List - Protect others around you: Learn how to safely use, store and throw away your medicines at www.disposemymeds.org.          This list is accurate as of: 5/30/17  9:27 AM.  Always use your most recent med list.                   Brand Name Dispense Instructions for use    BOTOX IJ          diltiazem 120 MG 24 hr capsule    CARDIZEM CD    30 capsule    Take 1 capsule (120 mg) by mouth daily You are due to see the cardiologist- please call [phone] to schedule.       gabapentin 100 MG capsule    NEURONTIN    210 capsule    Take 100 mg in the morning and 400 mg at bedtime for one week, then increase to 100 mg in the morning and 600 mg at bedtime.       loratadine 10 MG tablet    CLARITIN     Take 10 mg by mouth daily       NORTRIPTYLINE HCL PO      Take 200 mg by mouth daily       PAROXETINE HCL PO      Take 25 mg by mouth daily

## 2017-06-13 ENCOUNTER — INFUSION THERAPY VISIT (OUTPATIENT)
Dept: INFUSION THERAPY | Facility: CLINIC | Age: 47
End: 2017-06-13
Attending: ALLERGY & IMMUNOLOGY
Payer: COMMERCIAL

## 2017-06-13 VITALS
RESPIRATION RATE: 16 BRPM | DIASTOLIC BLOOD PRESSURE: 78 MMHG | OXYGEN SATURATION: 98 % | TEMPERATURE: 98.4 F | SYSTOLIC BLOOD PRESSURE: 114 MMHG | HEART RATE: 79 BPM

## 2017-06-13 DIAGNOSIS — J45.50 SEVERE PERSISTENT ASTHMA (H): Primary | ICD-10-CM

## 2017-06-13 PROCEDURE — 96401 CHEMO ANTI-NEOPL SQ/IM: CPT

## 2017-06-13 PROCEDURE — 25000128 H RX IP 250 OP 636: Mod: JW | Performed by: ALLERGY & IMMUNOLOGY

## 2017-06-13 RX ADMIN — OMALIZUMAB 375 MG: 202.5 INJECTION, SOLUTION SUBCUTANEOUS at 08:43

## 2017-06-13 NOTE — PROGRESS NOTES
Infusion Nursing Note:  Alba Varela presents today for Xolair.    Patient seen by provider today: No   present during visit today: Not Applicable.    Note: Continues to state good effect.    Intravenous Access:  No Intravenous access/labs at this visit.    Treatment Conditions:  Not Applicable.      Post Infusion Assessment:  Patient tolerated injection without incident.  Patient observed for 30 minutes post Xolair per protocol.  Site patent and intact, free from redness, edema or discomfort.    Discharge Plan:   Discharge instructions reviewed with: Patient.  Patient and/or family verbalized understanding of discharge instructions and all questions answered.  Patient will return 2 weeks June 27th, pt gets MY CHART for next appointment.  Patient discharged in stable condition accompanied by: self.  Departure Mode: Ambulatory.    Ashley Yen RN

## 2017-06-13 NOTE — MR AVS SNAPSHOT
After Visit Summary   6/13/2017    Alba Varela    MRN: [id number]           Patient Information     Date Of Birth          1970        Visit Information        Provider Department      6/13/2017 8:00 AM RH INFUSION CHAIR 10 [facility]        Today's Diagnoses     Severe persistent asthma    -  1       Follow-ups after your visit        Your next 10 appointments already scheduled     Jun 27, 2017  9:00 AM CDT   Level 1 with RH INFUSION CHAIR 10   [facility] ([facility])    [place] [id number]  [facility] [address]   [phone]            Jul 11, 2017  9:00 AM CDT   Level 1 with RH INFUSION CHAIR 12   [facility] ([facility])    [facility]  [address]  [facility] [address]   [phone]            Jul 25, 2017  9:00 AM CDT   Level 1 with RH INFUSION CHAIR 11   [facility] ([facility])    [facility]  [address]  [facility] [address]   [phone]              Who to contact     If you have questions or need follow up information about today's clinic visit or your schedule please contact [facility] IN[facility] directly at [phone].  Normal or non-critical lab and imaging results will be communicated to you by MyChart, letter or phone within 4 business days after the clinic has received the results. If you do not hear from us within 7 days, please contact the clinic through MyChart or phone. If you have a critical or abnormal lab result, we will notify you by phone as soon as possible.  Submit refill requests through [employer] or call your pharmacy and they will forward the refill request to us. Please allow 3 business days for your refill to be completed.           Additional Information About Your Visit        MyChart Information     [employer] gives you secure access to your electronic health record. If you see a primary care provider, you can also send messages to your care team and make appointments. If you have questions, please call your primary care clinic.  If you do not have a primary care provider, please call [phone] and they will assist you.        Care EveryWhere ID     This is your Care EveryWhere ID. This could be used by other organizations to access your [place] medical records  [id number]        Your Vitals Were     Pulse Temperature Respirations Last Period Pulse Oximetry       79 98.4  F (36.9  C) (Tympanic) 16 07/11/2014 98%        Blood Pressure from Last 3 Encounters:   06/13/17 114/78   05/30/17 103/78   05/18/17 138/86    Weight from Last 3 Encounters:   05/12/17 75.3 kg (166 lb)   05/04/17 77.6 kg (171 lb)   03/24/17 74.8 kg (164 lb 14.4 oz)              Today, you had the following     No orders found for display         Today's Medication Changes          These changes are accurate as of: 6/13/17 10:25 AM.  If you have any questions, ask your nurse or doctor.               These medicines have changed or have updated prescriptions.        Dose/Directions    gabapentin 100 MG capsule   Commonly known as:  NEURONTIN   This may have changed:  additional instructions   Used for:  Cervicalgia        Take 100 mg in the morning and 400 mg at bedtime for one week, then increase to 100 mg in the morning and 600 mg at bedtime.   Quantity:  210 capsule   Refills:  1                Primary Care Provider Office Phone # Fax #    Bright [facility]elo [phone]       [facility] [address] [place] [id number]        Thank you!     Thank you for choosing [facility]  for your care. Our goal is always to provide you with excellent care. Hearing back from our patients is one way we can  continue to improve our services. Please take a few minutes to complete the written survey that you may receive in the mail after your visit with us. Thank you!             Your Updated Medication List - Protect others around you: Learn how to safely use, store and throw away your medicines at www.disposemymeds.org.          This list is accurate as of: 6/13/17 10:25 AM.  Always use your most recent med list.                   Brand Name Dispense Instructions for use    BOTOX IJ          diltiazem 120 MG 24 hr capsule    CARDIZEM CD    30 capsule    Take 1 capsule (120 mg) by mouth daily You are due to see the cardiologist- please call [phone] to schedule.       gabapentin 100 MG capsule    NEURONTIN    210 capsule    Take 100 mg in the morning and 400 mg at bedtime for one week, then increase to 100 mg in the morning and 600 mg at bedtime.       loratadine 10 MG tablet    CLARITIN     Take 10 mg by mouth daily       NORTRIPTYLINE HCL PO      Take 200 mg by mouth daily       PAROXETINE HCL PO      Take 25 mg by mouth daily

## 2017-06-27 ENCOUNTER — INFUSION THERAPY VISIT (OUTPATIENT)
Dept: INFUSION THERAPY | Facility: CLINIC | Age: 47
End: 2017-06-27
Attending: ALLERGY & IMMUNOLOGY
Payer: COMMERCIAL

## 2017-06-27 VITALS
TEMPERATURE: 98.1 F | DIASTOLIC BLOOD PRESSURE: 71 MMHG | RESPIRATION RATE: 16 BRPM | SYSTOLIC BLOOD PRESSURE: 119 MMHG | HEART RATE: 100 BPM

## 2017-06-27 DIAGNOSIS — J45.50 SEVERE PERSISTENT ASTHMA (H): Primary | ICD-10-CM

## 2017-06-27 PROCEDURE — 25000128 H RX IP 250 OP 636: Mod: JW | Performed by: ALLERGY & IMMUNOLOGY

## 2017-06-27 PROCEDURE — 96401 CHEMO ANTI-NEOPL SQ/IM: CPT

## 2017-06-27 RX ADMIN — OMALIZUMAB 375 MG: 202.5 INJECTION, SOLUTION SUBCUTANEOUS at 09:20

## 2017-06-27 NOTE — MR AVS SNAPSHOT
After Visit Summary   6/27/2017    Alba Varela    MRN: [id number]           Patient Information     Date Of Birth          1970        Visit Information        Provider Department      6/27/2017 9:00 AM RH INFUSION CHAIR 2 [facility] Infusion [facility]        Today's Diagnoses     Severe persistent asthma    -  1       Follow-ups after your visit        Your next 10 appointments already scheduled     Jul 11, 2017  9:00 AM CDT   Level 1 with RH INFUSION CHAIR 12   [facility] Infusion [facility] ([facility])    [facility]  [address]  [facility] [address]   [phone]            Jul 25, 2017  9:00 AM CDT   Level 1 with RH INFUSION CHAIR 11   [facility] Infusion [facility] ([facility])    [facility]  [address]  [facility] [id number] [phone]              Who to contact     If you have questions or need follow up information about today's clinic visit or your schedule please contact [facility] INFUSION [facility] directly at [phone].  Normal or non-critical lab and imaging results will be communicated to you by Tivixhart, letter or phone within 4 business days after the clinic has received the results. If you do not hear from us within 7 days, please contact the clinic through Tivixhart or phone. If you have a critical or abnormal lab result, we will notify you by phone as soon as possible.  Submit refill requests through Mobypark or call your pharmacy and they will forward the refill request to us. Please allow 3 business days for your refill to be completed.          Additional Information About Your Visit        MyChart Information     Mobypark gives you secure access to your electronic health record. If you see a primary care provider, you can also send messages to your care team and make appointments. If you have  questions, please call your primary care clinic.  If you do not have a primary care provider, please call [phone] and they will assist you.        Care EveryWhere ID     This is your Care EveryWhere ID. This could be used by other organizations to access your [place] medical records  [id number]        Your Vitals Were     Pulse Temperature Respirations Last Period          100 98.1  F (36.7  C) (Tympanic) 16 07/11/2014         Blood Pressure from Last 3 Encounters:   06/27/17 119/71   06/13/17 114/78   05/30/17 103/78    Weight from Last 3 Encounters:   05/12/17 75.3 kg (166 lb)   05/04/17 77.6 kg (171 lb)   03/24/17 74.8 kg (164 lb 14.4 oz)              Today, you had the following     No orders found for display         Today's Medication Changes          These changes are accurate as of: 6/27/17  9:57 AM.  If you have any questions, ask your nurse or doctor.               These medicines have changed or have updated prescriptions.        Dose/Directions    gabapentin 100 MG capsule   Commonly known as:  NEURONTIN   This may have changed:  additional instructions   Used for:  Cervicalgia        Take 100 mg in the morning and 400 mg at bedtime for one week, then increase to 100 mg in the morning and 600 mg at bedtime.   Quantity:  210 capsule   Refills:  1                Primary Care Provider Office Phone # Fax #    Bright [facility]elo [phone]       [place] [address] [address] [address]        Equal Access to [facility]     REINA VILLA : Hadii vale gamboao Sogian, waaxda luqadaha, qaybta kaalmada jace, dominic estrella. So [facility] [phone].    ATENCIÓN: Si habla español, tiene a rojas disposición servicios gratuitos de asistencia lingüística. Llame al [phone].    We comply with applicable federal civil rights laws and [place] laws. We do not discriminate on the basis of race, color, national origin, age, disability sex, sexual  orientation or gender identity.            Thank you!     Thank you for choosing [facility] INFUSION [facility]  for your care. Our goal is always to provide you with excellent care. Hearing back from our patients is one way we can continue to improve our services. Please take a few minutes to complete the written survey that you may receive in the mail after your visit with us. Thank you!             Your Updated Medication List - Protect others around you: Learn how to safely use, store and throw away your medicines at www.disposemymeds.org.          This list is accurate as of: 6/27/17  9:57 AM.  Always use your most recent med list.                   Brand Name Dispense Instructions for use Diagnosis    BOTOX IJ           diltiazem 120 MG 24 hr capsule    CARDIZEM CD    30 capsule    Take 1 capsule (120 mg) by mouth daily You are due to see the cardiologist- please call [phone] to schedule.    Chest pressure       gabapentin 100 MG capsule    NEURONTIN    210 capsule    Take 100 mg in the morning and 400 mg at bedtime for one week, then increase to 100 mg in the morning and 600 mg at bedtime.    Cervicalgia       loratadine 10 MG tablet    CLARITIN     Take 10 mg by mouth daily        NORTRIPTYLINE HCL PO      Take 200 mg by mouth daily        PAROXETINE HCL PO      Take 25 mg by mouth daily

## 2017-06-27 NOTE — PROGRESS NOTES
Infusion Nursing Note:  Alba Varela presents today for Xolair.    Patient seen by provider today: No   present during visit today: Not Applicable.    Note: N/A.    Intravenous Access:  No Intravenous access/labs at this visit.    Treatment Conditions:  Not Applicable.      Post Infusion Assessment:  Patient tolerated injection without incident.  Patient observed for 30 minutes post Xolair per protocol.    Discharge Plan:   Discharge instructions reviewed with: Patient.  Patient and/or family verbalized understanding of discharge instructions and all questions answered.  AVS to patient via Aquicore.  Patient will return 7/11/17 for next appointment.   Patient discharged in stable condition accompanied by: self.  Departure Mode: Ambulatory.    Toma Camargo RN

## 2017-07-11 ENCOUNTER — INFUSION THERAPY VISIT (OUTPATIENT)
Dept: INFUSION THERAPY | Facility: CLINIC | Age: 47
End: 2017-07-11
Attending: ALLERGY & IMMUNOLOGY
Payer: COMMERCIAL

## 2017-07-11 VITALS — HEART RATE: 87 BPM | SYSTOLIC BLOOD PRESSURE: 117 MMHG | RESPIRATION RATE: 16 BRPM | DIASTOLIC BLOOD PRESSURE: 72 MMHG

## 2017-07-11 DIAGNOSIS — J45.50 SEVERE PERSISTENT ASTHMA (H): Primary | ICD-10-CM

## 2017-07-11 PROCEDURE — 96401 CHEMO ANTI-NEOPL SQ/IM: CPT

## 2017-07-11 PROCEDURE — 25000128 H RX IP 250 OP 636: Performed by: ALLERGY & IMMUNOLOGY

## 2017-07-11 RX ADMIN — OMALIZUMAB 375 MG: 202.5 INJECTION, SOLUTION SUBCUTANEOUS at 09:05

## 2017-07-11 NOTE — MR AVS SNAPSHOT
After Visit Summary   7/11/2017    Alba Varela    MRN: [id number]           Patient Information     Date Of Birth          1970        Visit Information        Provider Department      7/11/2017 9:00 AM RH INFUSION CHAIR 12 [facility] Infusion [facility]        Today's Diagnoses     Severe persistent asthma    -  1       Follow-ups after your visit        Your next 10 appointments already scheduled     Jul 25, 2017  9:00 AM CDT   Level 1 with RH INFUSION CHAIR 11   [facility] Infusion [facility] ([facility])    [facility]  [address]  [place] [address]   [phone]            Aug 08, 2017  9:00 AM CDT   Level 1 with RH INFUSION CHAIR 6   [facility] Infusion [facility] ([facility])    [facility]  [address]  [place] [address]   [phone]            Aug 22, 2017  9:00 AM CDT   Level 1 with RH INFUSION CHAIR 6   [facility] Infusion [facility] ([facility])    [facility]  [address]  [place] [address]   [phone]            Sep 05, 2017  9:00 AM CDT   Level 1 with RH INFUSION CHAIR 8   [facility] Infusion [facility] ([facility])    [facility]  [address]  [place] [address]   [phone]            Sep 19, 2017  9:00 AM CDT   Level 1 with RH INFUSION CHAIR 8   [facility] Infusion [facility] ([facility])    [facility]  [address]  [place] [address]   [phone]              Who to contact     If you have questions or need follow up information about today's clinic visit or your schedule please contact  INFUSION [facility] directly at [phone].  Normal or non-critical lab and imaging  results will be communicated to you by [employer] Material[employer]art, letter or phone within 4 business days after the clinic has received the results. If you do not hear from us within 7 days, please contact the clinic through Proxly or phone. If you have a critical or abnormal lab result, we will notify you by phone as soon as possible.  Submit refill requests through Proxly or call your pharmacy and they will forward the refill request to us. Please allow 3 business days for your refill to be completed.          Additional Information About Your Visit        Proxly Information     Proxly gives you secure access to your electronic health record. If you see a primary care provider, you can also send messages to your care team and make appointments. If you have questions, please call your primary care clinic.  If you do not have a primary care provider, please call [phone] and they will assist you.        Care EveryWhere ID     This is your Care EveryWhere ID. This could be used by other organizations to access your [place] medical records  [id number]        Your Vitals Were     Pulse Respirations Last Period             87 16 07/11/2014          Blood Pressure from Last 3 Encounters:   07/11/17 117/72   06/27/17 119/71   06/13/17 114/78    Weight from Last 3 Encounters:   05/12/17 75.3 kg (166 lb)   05/04/17 77.6 kg (171 lb)   03/24/17 74.8 kg (164 lb 14.4 oz)              Today, you had the following     No orders found for display         Today's Medication Changes          These changes are accurate as of: 7/11/17  9:34 AM.  If you have any questions, ask your nurse or doctor.               These medicines have changed or have updated prescriptions.        Dose/Directions    gabapentin 100 MG capsule   Commonly known as:  NEURONTIN   This may have changed:  additional instructions   Used for:  Cervicalgia        Take 100 mg in the morning and 400 mg at bedtime for one week, then increase to 100 mg in the morning and 600  mg at bedtime.   Quantity:  210 capsule   Refills:  1                Primary Care Provider Office Phone # Fax #    Bright [facility]elo [phone]       [address] [address]        Equal Access to [facility]     SUJEYCHARLENE DAISY : Hadii aad ku hadasho Soomaali, waaxda luqadaha, qaybta kaalmada adeegyada, waxay patin hayjakobn adematty shipley la'jakobdee estrella. So [facility] [phone].    ATENCIÓN: Si habla español, tiene a rojas disposición servicios gratuitos de asistencia lingüística. Llame al [phone].    We comply with applicable federal civil rights laws and [place] laws. We do not discriminate on the basis of race, color, national origin, age, disability sex, sexual orientation or gender identity.            Thank you!     Thank you for choosing [facility] INFUSION [facility]  for your care. Our goal is always to provide you with excellent care. Hearing back from our patients is one way we can continue to improve our services. Please take a few minutes to complete the written survey that you may receive in the mail after your visit with us. Thank you!             Your Updated Medication List - Protect others around you: Learn how to safely use, store and throw away your medicines at www.disposemymeds.org.          This list is accurate as of: 7/11/17  9:34 AM.  Always use your most recent med list.                   Brand Name Dispense Instructions for use Diagnosis    BOTOX IJ           diltiazem 120 MG 24 hr capsule    CARDIZEM CD    30 capsule    Take 1 capsule (120 mg) by mouth daily You are due to see the cardiologist- please call [phone] to schedule.    Chest pressure       gabapentin 100 MG capsule    NEURONTIN    210 capsule    Take 100 mg in the morning and 400 mg at bedtime for one week, then increase to 100 mg in the morning and 600 mg at bedtime.    Cervicalgia       loratadine 10 MG tablet    CLARITIN     Take 10 mg by mouth daily         NORTRIPTYLINE HCL PO      Take 200 mg by mouth daily        PAROXETINE HCL PO      Take 25 mg by mouth daily

## 2017-07-11 NOTE — PROGRESS NOTES
Infusion Nursing Note:  Alba Varela presents today for Xolair.    Patient seen by provider today: No   present during visit today: Not Applicable.    Note: N/A.    Intravenous Access:  No Intravenous access/labs at this visit.    Treatment Conditions:  Not Applicable.    Post Infusion Assessment:  Patient observed for 30 minutes post Xolair per protocol.    Discharge Plan:   Discharge instructions reviewed with: Patient.  Patient and/or family verbalized understanding of discharge instructions and all questions answered.  AVS to patient via FlavoursT.  Patient will return 7/25/17 for next appointment.   Patient discharged in stable condition accompanied by: self.  Departure Mode: Ambulatory.    Toma Camargo RN

## 2017-07-12 ENCOUNTER — TRANSFERRED RECORDS (OUTPATIENT)
Dept: HEALTH INFORMATION MANAGEMENT | Facility: CLINIC | Age: 47
End: 2017-07-12

## 2017-07-25 ENCOUNTER — INFUSION THERAPY VISIT (OUTPATIENT)
Dept: INFUSION THERAPY | Facility: CLINIC | Age: 47
End: 2017-07-25
Attending: ALLERGY & IMMUNOLOGY
Payer: COMMERCIAL

## 2017-07-25 VITALS
SYSTOLIC BLOOD PRESSURE: 108 MMHG | DIASTOLIC BLOOD PRESSURE: 73 MMHG | TEMPERATURE: 97.7 F | HEART RATE: 87 BPM | RESPIRATION RATE: 16 BRPM

## 2017-07-25 DIAGNOSIS — J45.50 SEVERE PERSISTENT ASTHMA (H): Primary | ICD-10-CM

## 2017-07-25 PROCEDURE — 25000128 H RX IP 250 OP 636: Mod: JW | Performed by: ALLERGY & IMMUNOLOGY

## 2017-07-25 PROCEDURE — 96401 CHEMO ANTI-NEOPL SQ/IM: CPT

## 2017-07-25 RX ADMIN — OMALIZUMAB 375 MG: 202.5 INJECTION, SOLUTION SUBCUTANEOUS at 09:18

## 2017-07-25 NOTE — MR AVS SNAPSHOT
After Visit Summary   7/25/2017    Alba Varela    MRN: [id number]           Patient Information     Date Of Birth          1970        Visit Information        Provider Department      7/25/2017 9:00 AM RH INFUSION CHAIR 11 [facility] Infusion [facility]        Today's Diagnoses     Severe persistent asthma    -  1       Follow-ups after your visit        Your next 10 appointments already scheduled     Aug 08, 2017  9:00 AM CDT   Level 1 with RH INFUSION CHAIR 6   [facility] Infusion [facility] ([facility])    [facility]  [address]  [facility] [address]   [phone]            Aug 22, 2017  9:00 AM CDT   Level 1 with RH INFUSION CHAIR 6   [facility] Infusion [facility] ([facility])    [facility]  [address]  [facility] [address]   [phone]            Sep 05, 2017  9:00 AM CDT   Level 1 with RH INFUSION CHAIR 8   [facility] Infusion [facility] ([facility])    [facility]  [address]  [facility] [address]   [phone]            Sep 19, 2017  9:00 AM CDT   Level 1 with RH INFUSION CHAIR 8   [facility] Infusion [facility] ([facility])    [facility]  [address]  [facility] [address]   [phone]              Who to contact     If you have questions or need follow up information about today's clinic visit or your schedule please contact [facility] INFUSION [facility] directly at [phone].  Normal or non-critical lab and imaging results will be communicated to you by MyChart, letter or phone within 4 business days after the clinic has received the results. If you do not hear from us within 7 days, please contact the clinic through MyChart or phone. If you have a critical or abnormal  lab result, we will notify you by phone as soon as possible.  Submit refill requests through Nettle or call your pharmacy and they will forward the refill request to us. Please allow 3 business days for your refill to be completed.          Additional Information About Your Visit        Visiarchart Information     Nettle gives you secure access to your electronic health record. If you see a primary care provider, you can also send messages to your care team and make appointments. If you have questions, please call your primary care clinic.  If you do not have a primary care provider, please call [phone] and they will assist you.        Care EveryWhere ID     This is your Care EveryWhere ID. This could be used by other organizations to access your [place] medical records  [id number]        Your Vitals Were     Pulse Temperature Respirations Last Period          87 97.7  F (36.5  C) (Tympanic) 16 07/11/2014         Blood Pressure from Last 3 Encounters:   07/25/17 108/73   07/11/17 117/72   06/27/17 119/71    Weight from Last 3 Encounters:   05/12/17 75.3 kg (166 lb)   05/04/17 77.6 kg (171 lb)   03/24/17 74.8 kg (164 lb 14.4 oz)              Today, you had the following     No orders found for display         Today's Medication Changes          These changes are accurate as of: 7/25/17 10:29 AM.  If you have any questions, ask your nurse or doctor.               These medicines have changed or have updated prescriptions.        Dose/Directions    gabapentin 100 MG capsule   Commonly known as:  NEURONTIN   This may have changed:  additional instructions   Used for:  Cervicalgia        Take 100 mg in the morning and 400 mg at bedtime for one week, then increase to 100 mg in the morning and 600 mg at bedtime.   Quantity:  210 capsule   Refills:  1                Primary Care Provider Office Phone # Fax #    Bright [facility]elo [phone]       [address]  [place] [address]        Equal Access to [facility]     [facility] : Hadii vale miguel maru Pacheco, waelliottda luqjus, qakarineta karemaelena mccormick, dominic castrourvashioly estrella. So [facility] [phone].    ATENCIÓN: Si habla español, tiene a rojas disposición servicios gratuitos de asistencia lingüística. Llame al [phone].    We comply with applicable federal civil rights laws and [place] laws. We do not discriminate on the basis of race, color, national origin, age, disability sex, sexual orientation or gender identity.            Thank you!     Thank you for choosing [facility] INFUSION [facility]  for your care. Our goal is always to provide you with excellent care. Hearing back from our patients is one way we can continue to improve our services. Please take a few minutes to complete the written survey that you may receive in the mail after your visit with us. Thank you!             Your Updated Medication List - Protect others around you: Learn how to safely use, store and throw away your medicines at www.disposemymeds.org.          This list is accurate as of: 7/25/17 10:29 AM.  Always use your most recent med list.                   Brand Name Dispense Instructions for use Diagnosis    BOTOX IJ           diltiazem 120 MG 24 hr capsule    CARDIZEM CD    30 capsule    Take 1 capsule (120 mg) by mouth daily You are due to see the cardiologist- please call [phone] to schedule.    Chest pressure       gabapentin 100 MG capsule    NEURONTIN    210 capsule    Take 100 mg in the morning and 400 mg at bedtime for one week, then increase to 100 mg in the morning and 600 mg at bedtime.    Cervicalgia       loratadine 10 MG tablet    CLARITIN     Take 10 mg by mouth daily        NORTRIPTYLINE HCL PO      Take 200 mg by mouth daily        PAROXETINE HCL PO      Take 25 mg by mouth daily

## 2017-07-25 NOTE — PROGRESS NOTES
Infusion Nursing Note:  Alba Varela presents today for xolair.    Patient seen by provider today: No   present during visit today: Not Applicable.    Note: N/A.    Intravenous Access:  No Intravenous access/labs at this visit.    Treatment Conditions:  Not Applicable.      Post Infusion Assessment:  Patient tolerated injection without incident.  Patient observed for 30 minutes post xolair per protocol.    Discharge Plan:   AVS to patient via MYCHART.  Patient will return 8/8/17 for xolair for next appointment.   Patient discharged in stable condition accompanied by: self.  Departure Mode: Ambulatory.    Sangita Edwards RN

## 2017-08-08 ENCOUNTER — INFUSION THERAPY VISIT (OUTPATIENT)
Dept: INFUSION THERAPY | Facility: CLINIC | Age: 47
End: 2017-08-08
Attending: ALLERGY & IMMUNOLOGY
Payer: COMMERCIAL

## 2017-08-08 VITALS
OXYGEN SATURATION: 98 % | HEART RATE: 87 BPM | DIASTOLIC BLOOD PRESSURE: 74 MMHG | RESPIRATION RATE: 16 BRPM | SYSTOLIC BLOOD PRESSURE: 127 MMHG | TEMPERATURE: 98.2 F

## 2017-08-08 DIAGNOSIS — J45.50 SEVERE PERSISTENT ASTHMA (H): Primary | ICD-10-CM

## 2017-08-08 PROCEDURE — 25000128 H RX IP 250 OP 636: Mod: JW | Performed by: ALLERGY & IMMUNOLOGY

## 2017-08-08 PROCEDURE — 96401 CHEMO ANTI-NEOPL SQ/IM: CPT

## 2017-08-08 RX ADMIN — OMALIZUMAB 375 MG: 202.5 INJECTION, SOLUTION SUBCUTANEOUS at 09:49

## 2017-08-08 NOTE — MR AVS SNAPSHOT
After Visit Summary   8/8/2017    Alba Varela    MRN: [id number]           Patient Information     Date Of Birth          1970        Visit Information        Provider Department      8/8/2017 9:00 AM RH INFUSION CHAIR 6 [facility]        Today's Diagnoses     Severe persistent asthma    -  1       Follow-ups after your visit        Your next 10 appointments already scheduled     Aug 22, 2017  9:00 AM CDT   Level 1 with RH INFUSION CHAIR 6   [facility] ([facility])    [place] [id number]  [facility] [address]   [phone]            Sep 05, 2017  9:00 AM CDT   Level 1 with RH INFUSION CHAIR 8   [facility] ([facility])    [facility]  [address]  [facility] [address]   [phone]            Sep 19, 2017  9:00 AM CDT   Level 1 with RH INFUSION CHAIR 8   [facility] ([facility])    [facility]  [address]  [facility] [address]   [phone]              Who to contact     If you have questions or need follow up information about today's clinic visit or your schedule please contact [facility] directly at [phone].  Normal or non-critical lab and imaging results will be communicated to you by MyChart, letter or phone within 4 business days after the clinic has received the results. If you do not hear from us within 7 days, please contact the clinic through MyChart or phone. If you have a critical or abnormal lab result, we will notify you by phone as soon as possible.  Submit refill requests through [employer] or call your pharmacy and they will forward the refill request to us. Please allow 3 business days for your refill to be completed.          Additional  Information About Your Visit        Triple[employer] Information     [employer] gives you secure access to your electronic health record. If you see a primary care provider, you can also send messages to your care team and make appointments. If you have questions, please call your primary care clinic.  If you do not have a primary care provider, please call [phone] and they will assist you.        Care EveryWhere ID     This is your Care EveryWhere ID. This could be used by other organizations to access your [facility] medical records  [id number]        Your Vitals Were     Pulse Temperature Respirations Last Period Pulse Oximetry       87 98.2  F (36.8  C) (Tympanic) 16 07/11/2014 98%        Blood Pressure from Last 3 Encounters:   08/08/17 127/74   07/25/17 108/73   07/11/17 117/72    Weight from Last 3 Encounters:   05/12/17 75.3 kg (166 lb)   05/04/17 77.6 kg (171 lb)   03/24/17 74.8 kg (164 lb 14.4 oz)              Today, you had the following     No orders found for display         Today's Medication Changes          These changes are accurate as of: 8/8/17 10:16 AM.  If you have any questions, ask your nurse or doctor.               These medicines have changed or have updated prescriptions.        Dose/Directions    gabapentin 100 MG capsule   Commonly known as:  NEURONTIN   This may have changed:  additional instructions   Used for:  Cervicalgia        Take 100 mg in the morning and 400 mg at bedtime for one week, then increase to 100 mg in the morning and 600 mg at bedtime.   Quantity:  210 capsule   Refills:  1                Primary Care Provider Office Phone # Fax #    Bright [facility]elo [phone]       [address] [address]        Equal Access to [facility]     [facility] : Yonny Pacheco, bailee nassar, dominic weeks. So [facility] [phone].    ATENCIÓN: Si alec crawford  rojas disposición servicios gratuitos de asistencia lingüística. Brissa sanabria [phone].    We comply with applicable federal civil rights laws and [place] laws. We do not discriminate on the basis of race, color, national origin, age, disability sex, sexual orientation or gender identity.            Thank you!     Thank you for choosing [facility]  for your care. Our goal is always to provide you with excellent care. Hearing back from our patients is one way we can continue to improve our services. Please take a few minutes to complete the written survey that you may receive in the mail after your visit with us. Thank you!             Your Updated Medication List - Protect others around you: Learn how to safely use, store and throw away your medicines at www.disposemymeds.org.          This list is accurate as of: 8/8/17 10:16 AM.  Always use your most recent med list.                   Brand Name Dispense Instructions for use Diagnosis    BOTOX IJ           diltiazem 120 MG 24 hr capsule    CARDIZEM CD    30 capsule    Take 1 capsule (120 mg) by mouth daily You are due to see the cardiologist- please call [phone] to schedule.    Chest pressure       gabapentin 100 MG capsule    NEURONTIN    210 capsule    Take 100 mg in the morning and 400 mg at bedtime for one week, then increase to 100 mg in the morning and 600 mg at bedtime.    Cervicalgia       loratadine 10 MG tablet    CLARITIN     Take 10 mg by mouth daily        NORTRIPTYLINE HCL PO      Take 200 mg by mouth daily        PAROXETINE HCL PO      Take 25 mg by mouth daily

## 2017-08-08 NOTE — PROGRESS NOTES
Infusion Nursing Note:  Alba Varela presents today for Xolair.    Patient seen by provider today: No   present during visit today: Not Applicable.    Note: N/A.    Intravenous Access:  No Intravenous access/labs at this visit.    Treatment Conditions:  Not Applicable.      Post Infusion Assessment:  Patient tolerated injection without incident.  Patient observed for 30 minutes post Xolair per protocol.    Discharge Plan:   Discharge instructions reviewed with: Patient.  Patient and/or family verbalized understanding of discharge instructions and all questions answered.  AVS to patient via Novel.  Patient will return 8/22/2017 for next appointment.   Patient discharged in stable condition accompanied by: self.  Departure Mode: Ambulatory.    Lyn Bunn RN

## 2017-08-22 ENCOUNTER — INFUSION THERAPY VISIT (OUTPATIENT)
Dept: INFUSION THERAPY | Facility: CLINIC | Age: 47
End: 2017-08-22
Attending: ALLERGY & IMMUNOLOGY
Payer: COMMERCIAL

## 2017-08-22 VITALS
DIASTOLIC BLOOD PRESSURE: 83 MMHG | SYSTOLIC BLOOD PRESSURE: 129 MMHG | RESPIRATION RATE: 16 BRPM | TEMPERATURE: 97.2 F | HEART RATE: 86 BPM

## 2017-08-22 DIAGNOSIS — J45.50 SEVERE PERSISTENT ASTHMA (H): Primary | ICD-10-CM

## 2017-08-22 PROCEDURE — 25000128 H RX IP 250 OP 636: Performed by: ALLERGY & IMMUNOLOGY

## 2017-08-22 PROCEDURE — 96401 CHEMO ANTI-NEOPL SQ/IM: CPT

## 2017-08-22 RX ADMIN — OMALIZUMAB 375 MG: 202.5 INJECTION, SOLUTION SUBCUTANEOUS at 09:32

## 2017-08-22 NOTE — PROGRESS NOTES
Infusion Nursing Note:  Alba Varela presents today for Xolair.    Patient seen by provider today: No   present during visit today: Not Applicable.    Note: N/A.    Intravenous Access:  No Intravenous access/labs at this visit.    Treatment Conditions:  Not Applicable.    Post Infusion Assessment:  Patient tolerated injection without incident.  Patient observed for 30 minutes post Xolair per protocol.    Discharge Plan:   Discharge instructions reviewed with: Patient.  Patient and/or family verbalized understanding of discharge instructions and all questions answered.  AVS to patient via iSpecimen.  Patient will return 9/5/17 for next appointment.   Patient discharged in stable condition accompanied by: self.  Departure Mode: Ambulatory.    Toma Camargo RN                      \[id number]\

## 2017-08-22 NOTE — MR AVS SNAPSHOT
After Visit Summary   8/22/2017    Alba Varela    MRN: [id number]           Patient Information     Date Of Birth          1970        Visit Information        Provider Department      8/22/2017 9:00 AM RH INFUSION CHAIR 6 [facility]        Today's Diagnoses     Severe persistent asthma    -  1       Follow-ups after your visit        Your next 10 appointments already scheduled     Sep 05, 2017  9:00 AM CDT   Level 1 with RH INFUSION CHAIR 8   [facility] ([facility])    [facility]  [address]  [facility] [address]   [phone]            Sep 19, 2017  9:00 AM CDT   Level 1 with RH INFUSION CHAIR 8   [facility] ([facility])    [facility]  [address]  [facility] [address] [phone]              Who to contact     If you have questions or need follow up information about today's clinic visit or your schedule please contact [facility] directly at [phone].  Normal or non-critical lab and imaging results will be communicated to you by Rena[employer] Biologicshart, letter or phone within 4 business days after the clinic has received the results. If you do not hear from us within 7 days, please contact the clinic through [employer]olog[employer] or phone. If you have a critical or abnormal lab result, we will notify you by phone as soon as possible.  Submit refill requests through [employer] or call your pharmacy and they will forward the refill request to us. Please allow 3 business days for your refill to be completed.          Additional Information About Your Visit        MyChart Information     [employer] gives you secure access to your electronic health record. If you see a primary care provider, you can also send messages to your care team and make appointments. If you have  questions, please call your primary care clinic.  If you do not have a primary care provider, please call [phone] and they will assist you.        Care EveryWhere ID     This is your Care EveryWhere ID. This could be used by other organizations to access your [facility] medical records  [id number]        Your Vitals Were     Pulse Temperature Respirations Last Period          86 97.2  F (36.2  C) (Tympanic) 16 07/11/2014         Blood Pressure from Last 3 Encounters:   08/22/17 129/83   08/08/17 127/74   07/25/17 108/73    Weight from Last 3 Encounters:   05/12/17 75.3 kg (166 lb)   05/04/17 77.6 kg (171 lb)   03/24/17 74.8 kg (164 lb 14.4 oz)              Today, you had the following     No orders found for display         Today's Medication Changes          These changes are accurate as of: 8/22/17 10:07 AM.  If you have any questions, ask your nurse or doctor.               These medicines have changed or have updated prescriptions.        Dose/Directions    gabapentin 100 MG capsule   Commonly known as:  NEURONTIN   This may have changed:  additional instructions   Used for:  Cervicalgia        Take 100 mg in the morning and 400 mg at bedtime for one week, then increase to 100 mg in the morning and 600 mg at bedtime.   Quantity:  210 capsule   Refills:  1                Primary Care Provider Office Phone # Fax #    Bright [facility]elo [phone]       [address] [address]        Equal Access to [facility]     [place] : Yonny gamboao Sogian, waaxda luqadaha, qaybta kaalmada dominic mccormick. So [facility] [phone].    ATENCIÓN: Si habla español, tiene a rojas disposición servicios gratuitos de asistencia lingüística. Llame al [phone].    We comply with applicable federal civil rights laws and [place] laws. We do not discriminate on the basis of race, color, national origin, age, disability sex, sexual orientation or  gender identity.            Thank you!     Thank you for choosing [facility]  for your care. Our goal is always to provide you with excellent care. Hearing back from our patients is one way we can continue to improve our services. Please take a few minutes to complete the written survey that you may receive in the mail after your visit with us. Thank you!             Your Updated Medication List - Protect others around you: Learn how to safely use, store and throw away your medicines at www.disposemymeds.org.          This list is accurate as of: 8/22/17 10:07 AM.  Always use your most recent med list.                   Brand Name Dispense Instructions for use Diagnosis    BOTOX IJ           diltiazem 120 MG 24 hr capsule    CARDIZEM CD    30 capsule    Take 1 capsule (120 mg) by mouth daily You are due to see the cardiologist- please call [phone] to schedule.    Chest pressure       gabapentin 100 MG capsule    NEURONTIN    210 capsule    Take 100 mg in the morning and 400 mg at bedtime for one week, then increase to 100 mg in the morning and 600 mg at bedtime.    Cervicalgia       loratadine 10 MG tablet    CLARITIN     Take 10 mg by mouth daily        NORTRIPTYLINE HCL PO      Take 200 mg by mouth daily        PAROXETINE HCL PO      Take 25 mg by mouth daily

## 2017-09-08 ENCOUNTER — INFUSION THERAPY VISIT (OUTPATIENT)
Dept: INFUSION THERAPY | Facility: CLINIC | Age: 47
End: 2017-09-08
Attending: ALLERGY & IMMUNOLOGY
Payer: COMMERCIAL

## 2017-09-08 VITALS
TEMPERATURE: 97.1 F | HEART RATE: 85 BPM | DIASTOLIC BLOOD PRESSURE: 79 MMHG | OXYGEN SATURATION: 98 % | RESPIRATION RATE: 16 BRPM | SYSTOLIC BLOOD PRESSURE: 120 MMHG

## 2017-09-08 DIAGNOSIS — J45.50 SEVERE PERSISTENT ASTHMA (H): Primary | ICD-10-CM

## 2017-09-08 PROCEDURE — 25000128 H RX IP 250 OP 636: Performed by: ALLERGY & IMMUNOLOGY

## 2017-09-08 PROCEDURE — 96401 CHEMO ANTI-NEOPL SQ/IM: CPT

## 2017-09-08 RX ADMIN — OMALIZUMAB 375 MG: 202.5 INJECTION, SOLUTION SUBCUTANEOUS at 08:45

## 2017-09-08 NOTE — MR AVS SNAPSHOT
After Visit Summary   9/8/2017    Alba Varela    MRN: [id number]           Patient Information     Date Of Birth          1970        Visit Information        Provider Department      9/8/2017 8:30 AM RH INFUSION CHAIR 10 [facility]        Today's Diagnoses     Severe persistent asthma    -  1       Follow-ups after your visit        Your next 10 appointments already scheduled     Sep 19, 2017  9:00 AM CDT   Level 1 with RH INFUSION CHAIR 10   [facility] ([facility])    [facility]  [address]  [place] [address]   [phone]            Oct 03, 2017  9:00 AM CDT   Level 1 with RH INFUSION CHAIR 10   [facility] ([facility])    [facility]  [address]  [place] [address]   [phone]            Oct 17, 2017  9:00 AM CDT   Level 1 with RH INFUSION CHAIR 11   [facility] ([facility])    [facility] [place]  [address]  [place] [address]   [phone]            Oct 31, 2017  9:00 AM CDT   Level 1 with RH INFUSION CHAIR 4   [facility] ([facility])    [facility]  [address]  [place] [address]   [phone]            Nov 14, 2017  9:00 AM CST   Level 1 with RH INFUSION CHAIR 5   [facility] ([facility])    [facility]  [address]  [place] [address]   [phone]            Nov 28, 2017  9:00 AM CST   Level 1 with RH INFUSION CHAIR 1   [facility] ([facility])    [facility] [facility]  [address]  [place] [address]   [phone]             Dec 12, 2017  9:00 AM CST   Level 1 with RH INFUSION CHAIR 1   [facility] ([facility])    [facility]  [address]  [facility] [id number] [phone]            Dec 26, 2017  9:00 AM CST   Level 1 with RH INFUSION CHAIR 1   [facility] ([facility])    [facility]  [address]  [facility] [address] [phone]              Who to contact     If you have questions or need follow up information about today's clinic visit or your schedule please contact [facility] directly at [phone].  Normal or non-critical lab and imaging results will be communicated to you by Wann[employer], letter or phone within 4 business days after the clinic has received the results. If you do not hear from us within 7 days, please contact the clinic through Wann[employer] or phone. If you have a critical or abnormal lab result, we will notify you by phone as soon as possible.  Submit refill requests through [employer] or call your pharmacy and they will forward the refill request to us. Please allow 3 business days for your refill to be completed.          Additional Information About Your Visit        [employer] Information     [employer] gives you secure access to your electronic health record. If you see a primary care provider, you can also send messages to your care team and make appointments. If you have questions, please call your primary care clinic.  If you do not have a primary care provider, please call [phone] and they will assist you.        Care EveryWhere ID     This is your Care EveryWhere ID. This could be used by other organizations to access your [place] medical records  [id number]        Your Vitals Were     Pulse Temperature Respirations Last Period Pulse Oximetry       85 97.1  F (36.2  C) (Tympanic) 16 07/11/2014 98%         Blood Pressure from Last 3 Encounters:   09/08/17 120/79   08/22/17 129/83   08/08/17 127/74    Weight from Last 3 Encounters:   05/12/17 75.3 kg (166 lb)   05/04/17 77.6 kg (171 lb)   03/24/17 74.8 kg (164 lb 14.4 oz)              Today, you had the following     No orders found for display         Today's Medication Changes          These changes are accurate as of: 9/8/17  9:17 AM.  If you have any questions, ask your nurse or doctor.               These medicines have changed or have updated prescriptions.        Dose/Directions    gabapentin 100 MG capsule   Commonly known as:  NEURONTIN   This may have changed:  additional instructions   Used for:  Cervicalgia        Take 100 mg in the morning and 400 mg at bedtime for one week, then increase to 100 mg in the morning and 600 mg at bedtime.   Quantity:  210 capsule   Refills:  1                Primary Care Provider Office Phone # Fax #    Bright [facility]elo [phone]        [address] [address]        Equal Access to [facility]     [facility]: Hadii aad ku hadasho Sogain, waaxda luqadaha, qaybta kaalmada adeegyaelena, dominic sullivan . So [facility] [phone].    ATENCIÓN: Si habla español, tiene a rojas disposición servicios gratuitos de asistencia lingüística. Llame al [phone].    We comply with applicable federal civil rights laws and [place] laws. We do not discriminate on the basis of race, color, national origin, age, disability sex, sexual orientation or gender identity.            Thank you!     Thank you for choosing [facility]  for your care. Our goal is always to provide you with excellent care. Hearing back from our patients is one way we can continue to improve our services. Please take a few minutes to complete the written survey that you may receive in the mail after your visit with us. Thank you!             Your Updated Medication List  - Protect others around you: Learn how to safely use, store and throw away your medicines at www.disposemymeds.org.          This list is accurate as of: 9/8/17  9:17 AM.  Always use your most recent med list.                   Brand Name Dispense Instructions for use Diagnosis    BOTOX IJ           diltiazem 120 MG 24 hr capsule    CARDIZEM CD    30 capsule    Take 1 capsule (120 mg) by mouth daily You are due to see the cardiologist- please call [phone] to schedule.    Chest pressure       gabapentin 100 MG capsule    NEURONTIN    210 capsule    Take 100 mg in the morning and 400 mg at bedtime for one week, then increase to 100 mg in the morning and 600 mg at bedtime.    Cervicalgia       loratadine 10 MG tablet    CLARITIN     Take 10 mg by mouth daily        NORTRIPTYLINE HCL PO      Take 200 mg by mouth daily        PAROXETINE HCL PO      Take 25 mg by mouth daily

## 2017-09-08 NOTE — PROGRESS NOTES
Infusion Nursing Note:  Alba Varela presents today for Xolair.    Patient seen by provider today: No   present during visit today: Not Applicable.    Note: N/A.    Intravenous Access:  No Intravenous access/labs at this visit.    Treatment Conditions:  Not Applicable.    Post Infusion Assessment:  Patient tolerated injection without incident.  Patient observed for 30 minutes post Xolair per protocol.    Discharge Plan:   Patient declined prescription refills.  Discharge instructions reviewed with: Patient.  Patient and/or family verbalized understanding of discharge instructions and all questions answered.  AVS to patient via Letyano.  Patient will return 9/19/17 for next appointment.   Patient discharged in stable condition accompanied by: self.  Departure Mode: Ambulatory.    Toma Camargo RN

## 2017-09-10 ENCOUNTER — HEALTH MAINTENANCE LETTER (OUTPATIENT)
Age: 47
End: 2017-09-10

## 2017-09-19 ENCOUNTER — INFUSION THERAPY VISIT (OUTPATIENT)
Dept: INFUSION THERAPY | Facility: CLINIC | Age: 47
End: 2017-09-19
Attending: ALLERGY & IMMUNOLOGY
Payer: COMMERCIAL

## 2017-09-19 VITALS
RESPIRATION RATE: 16 BRPM | DIASTOLIC BLOOD PRESSURE: 75 MMHG | HEART RATE: 88 BPM | TEMPERATURE: 99 F | SYSTOLIC BLOOD PRESSURE: 131 MMHG

## 2017-09-19 DIAGNOSIS — J45.50 SEVERE PERSISTENT ASTHMA (H): Primary | ICD-10-CM

## 2017-09-19 PROCEDURE — 96372 THER/PROPH/DIAG INJ SC/IM: CPT

## 2017-09-19 PROCEDURE — 25000128 H RX IP 250 OP 636: Performed by: ALLERGY & IMMUNOLOGY

## 2017-09-19 RX ADMIN — OMALIZUMAB 375 MG: 202.5 INJECTION, SOLUTION SUBCUTANEOUS at 09:22

## 2017-09-19 NOTE — MR AVS SNAPSHOT
After Visit Summary   9/19/2017    Alba Varela    MRN: [id number]           Patient Information     Date Of Birth          1970        Visit Information        Provider Department      9/19/2017 9:00 AM RH INFUSION CHAIR 9 [facility]        Today's Diagnoses     Severe persistent asthma    -  1       Follow-ups after your visit        Your next 10 appointments already scheduled     Oct 03, 2017  9:00 AM CDT   Level 1 with RH INFUSION CHAIR 10   [facility] ([facility])    [facility]  [address]  [place] [address]   [phone]            Oct 17, 2017  9:00 AM CDT   Level 1 with RH INFUSION CHAIR 11   [facility] ([facility])    [facility]  [address]  [place] [address]   [phone]            Oct 31, 2017  9:00 AM CDT   Level 1 with RH INFUSION CHAIR 4   [facility] ([facility])    [facility] [facility]  [address]  [place] [address]   [phone]            Nov 14, 2017  9:00 AM CST   Level 1 with RH INFUSION CHAIR 5   [facility] ([facility])    [facility] [place] [place]  [address]  [place] [address]   [phone]            Nov 28, 2017  9:00 AM CST   Level 1 with RH INFUSION CHAIR 1   [facility] ([facility])    [facility] [facility]  [address]  [place] [address]   [phone]            Dec 12, 2017  9:00 AM CST   Level 1 with RH INFUSION CHAIR 1   [facility] ([facility])    [facility] [facility]  [address]  [place] [address]   [phone]             Dec 26, 2017  9:00 AM CST   Level 1 with RH INFUSION CHAIR 1   [facility] ([facility])    [facility]  [address] [place]  [facility] [id number] [phone]              Who to contact     If you have questions or need follow up information about today's clinic visit or your schedule please contact [facility] directly at [phone].  Normal or non-critical lab and imaging results will be communicated to you by Wheelrighthart, letter or phone within 4 business days after the clinic has received the results. If you do not hear from us within 7 days, please contact the clinic through Penn[employer] or phone. If you have a critical or abnormal lab result, we will notify you by phone as soon as possible.  Submit refill requests through [employer] or call your pharmacy and they will forward the refill request to us. Please allow 3 business days for your refill to be completed.          Additional Information About Your Visit        [employer] Information     [employer] gives you secure access to your electronic health record. If you see a primary care provider, you can also send messages to your care team and make appointments. If you have questions, please call your primary care clinic.  If you do not have a primary care provider, please call [phone] and they will assist you.        Care EveryWhere ID     This is your Care EveryWhere ID. This could be used by other organizations to access your [place] medical records  [id number]        Your Vitals Were     Pulse Temperature Respirations Last Period          88 99  F (37.2  C) (Tympanic) 16 07/11/2014         Blood Pressure from Last 3 Encounters:   09/19/17 131/75   09/08/17 120/79   08/22/17 129/83    Weight from Last 3 Encounters:   05/12/17 75.3 kg (166 lb)   05/04/17 77.6 kg (171 lb)   03/24/17 74.8 kg (164 lb 14.4 oz)              Today, you had the following      No orders found for display         Today's Medication Changes          These changes are accurate as of: 9/19/17 10:00 AM.  If you have any questions, ask your nurse or doctor.               These medicines have changed or have updated prescriptions.        Dose/Directions    gabapentin 100 MG capsule   Commonly known as:  NEURONTIN   This may have changed:  additional instructions   Used for:  Cervicalgia        Take 100 mg in the morning and 400 mg at bedtime for one week, then increase to 100 mg in the morning and 600 mg at bedtime.   Quantity:  210 capsule   Refills:  1                Primary Care Provider Office Phone # Fax #    Bright [facility]elo [phone]       [address] [address]        Equal Access to [facility]     REINA VILLA : Yonny Pacheco, bailee nassar, conner mabryalmaelena mccormick, dominic estrella. So [facility] [phone].    ATENCIÓN: Si habla español, tiene a rojas disposición servicios gratuitos de asistencia lingüística. Llame al [phone].    We comply with applicable federal civil rights laws and [place] laws. We do not discriminate on the basis of race, color, national origin, age, disability sex, sexual orientation or gender identity.            Thank you!     Thank you for choosing [facility]  for your care. Our goal is always to provide you with excellent care. Hearing back from our patients is one way we can continue to improve our services. Please take a few minutes to complete the written survey that you may receive in the mail after your visit with us. Thank you!             Your Updated Medication List - Protect others around you: Learn how to safely use, store and throw away your medicines at www.disposemymeds.org.          This list is accurate as of: 9/19/17 10:00 AM.  Always use your most recent med list.                   Brand Name Dispense Instructions for  use Diagnosis    BOTOX IJ           diltiazem 120 MG 24 hr capsule    CARDIZEM CD    30 capsule    Take 1 capsule (120 mg) by mouth daily You are due to see the cardiologist- please call [phone] to schedule.    Chest pressure       gabapentin 100 MG capsule    NEURONTIN    210 capsule    Take 100 mg in the morning and 400 mg at bedtime for one week, then increase to 100 mg in the morning and 600 mg at bedtime.    Cervicalgia       loratadine 10 MG tablet    CLARITIN     Take 10 mg by mouth daily        NORTRIPTYLINE HCL PO      Take 200 mg by mouth daily        PAROXETINE HCL PO      Take 25 mg by mouth daily

## 2017-09-19 NOTE — PROGRESS NOTES
Infusion Nursing Note:  Alba Varela presents today for Xolair.    Patient seen by provider today: No   present during visit today: Not Applicable.    Note: N/A.    Intravenous Access:  No Intravenous access/labs at this visit.    Treatment Conditions:  Not Applicable.      Post Infusion Assessment:  Patient tolerated injection without incident.  Patient observed for 30 minutes post Xolair per protocol.    Discharge Plan:   Discharge instructions reviewed with: Patient.  Patient and/or family verbalized understanding of discharge instructions and all questions answered.  AVS to patient via [employer].  Patient will return 10/3/17 for next appointment.   Patient discharged in stable condition accompanied by: self.  Departure Mode: Ambulatory.    Toma Camargo RN

## 2017-09-24 ENCOUNTER — TRANSFERRED RECORDS (OUTPATIENT)
Dept: HEALTH INFORMATION MANAGEMENT | Facility: CLINIC | Age: 47
End: 2017-09-24

## 2017-10-03 ENCOUNTER — INFUSION THERAPY VISIT (OUTPATIENT)
Dept: INFUSION THERAPY | Facility: CLINIC | Age: 47
End: 2017-10-03
Attending: ALLERGY & IMMUNOLOGY
Payer: COMMERCIAL

## 2017-10-03 VITALS
DIASTOLIC BLOOD PRESSURE: 74 MMHG | HEART RATE: 85 BPM | TEMPERATURE: 97.7 F | RESPIRATION RATE: 16 BRPM | OXYGEN SATURATION: 97 % | SYSTOLIC BLOOD PRESSURE: 125 MMHG

## 2017-10-03 DIAGNOSIS — J45.50 SEVERE PERSISTENT ASTHMA (H): Primary | ICD-10-CM

## 2017-10-03 PROCEDURE — 25000128 H RX IP 250 OP 636: Performed by: ALLERGY & IMMUNOLOGY

## 2017-10-03 PROCEDURE — 96372 THER/PROPH/DIAG INJ SC/IM: CPT

## 2017-10-03 RX ADMIN — OMALIZUMAB 375 MG: 202.5 INJECTION, SOLUTION SUBCUTANEOUS at 09:52

## 2017-10-03 NOTE — PROGRESS NOTES
Infusion Nursing Note:  Alba Varela presents today for Xolair.    Patient seen by provider today: No   present during visit today: Not Applicable.    Note: N/A.    Intravenous Access:  No Intravenous access/labs at this visit.    Treatment Conditions:  Not Applicable.      Post Infusion Assessment:  Patient tolerated injection without incident.  Patient observed for 30 minutes post Xolair per protocol.    Discharge Plan:   Discharge instructions reviewed with: Patient.  Patient and/or family verbalized understanding of discharge instructions and all questions answered.  AVS to patient via [employer].  Patient will return 10/17/2017 for next appointment.   Patient discharged in stable condition accompanied by: self.  Departure Mode: Ambulatory.    Lyn Bunn RN

## 2017-10-03 NOTE — MR AVS SNAPSHOT
After Visit Summary   10/3/2017    Alba Varela    MRN: [id number]           Patient Information     Date Of Birth          1970        Visit Information        Provider Department      10/3/2017 9:00 AM RH INFUSION CHAIR 2 [facility]        Today's Diagnoses     Severe persistent asthma    -  1       Follow-ups after your visit        Your next 10 appointments already scheduled     Oct 17, 2017  9:00 AM CDT   Level 1 with RH INFUSION CHAIR 10   [facility] ([facility])    [facility] [facility]  [address]  [place] [address]   [phone]            Oct 31, 2017  9:00 AM CDT   Level 1 with RH INFUSION CHAIR 4   [facility] ([facility])    [facility] [facility]  [address]  [place] [address]   [phone]            Nov 14, 2017  9:00 AM CST   Level 1 with RH INFUSION CHAIR 5   [facility] ([facility])    [facility] [facility]  [address]  [place] [address]   [phone]            Nov 28, 2017  9:00 AM CST   Level 1 with RH INFUSION CHAIR 1   [facility] ([facility])    [facility] [facility]  [address]  [place] [address]   [phone]            Dec 12, 2017  9:00 AM CST   Level 1 with RH INFUSION CHAIR 1   [facility] ([facility])    [facility] [facility]  [address]  [place] [address]   [phone]            Dec 26, 2017  9:00 AM CST   Level 1 with RH INFUSION CHAIR 1   [facility] ([facility])    [facility] [facility]  [address]  [place] [address]   [phone]               Who to contact     If you have questions or need follow up information about today's clinic visit or your schedule please contact [facility] INFUSION [facility] directly at [phone].  Normal or non-critical lab and imaging results will be communicated to you by Univisionhart, letter or phone within 4 business days after the clinic has received the results. If you do not hear from us within 7 days, please contact the clinic through Univision[employer] or phone. If you have a critical or abnormal lab result, we will notify you by phone as soon as possible.  Submit refill requests through [employer] or call your pharmacy and they will forward the refill request to us. Please allow 3 business days for your refill to be completed.          Additional Information About Your Visit        [employer] Information     [employer] gives you secure access to your electronic health record. If you see a primary care provider, you can also send messages to your care team and make appointments. If you have questions, please call your primary care clinic.  If you do not have a primary care provider, please call [phone] and they will assist you.        Care EveryWhere ID     This is your Care EveryWhere ID. This could be used by other organizations to access your [place] medical records  [id number]        Your Vitals Were     Pulse Temperature Respirations Last Period Pulse Oximetry       85 97.7  F (36.5  C) (Tympanic) 16 07/11/2014 97%        Blood Pressure from Last 3 Encounters:   10/03/17 125/74   09/19/17 131/75   09/08/17 120/79    Weight from Last 3 Encounters:   05/12/17 75.3 kg (166 lb)   05/04/17 77.6 kg (171 lb)   03/24/17 74.8 kg (164 lb 14.4 oz)              Today, you had the following     No orders found for display         Today's Medication Changes          These changes are accurate as of: 10/3/17 11:44 AM.  If you have any questions, ask your nurse or doctor.               These medicines have changed or have  updated prescriptions.        Dose/Directions    gabapentin 100 MG capsule   Commonly known as:  NEURONTIN   This may have changed:  additional instructions   Used for:  Cervicalgia        Take 100 mg in the morning and 400 mg at bedtime for one week, then increase to 100 mg in the morning and 600 mg at bedtime.   Quantity:  210 capsule   Refills:  1                Primary Care Provider Office Phone # Fax #    Bright [facility]elo [phone]       [address] [address]        Equal Access to [facility]     REINA VILLA : Hadii aad ku hadasho Soomaali, waaxda luqadaha, qaybta kaalmada adeegyada, waxay idiin hayaan adeeg gloriaaraoly sullivan . So [facility] [phone].    ATENCIÓN: Si habla español, tiene a rojas disposición servicios gratuitos de asistencia lingüística. Llame al [phone].    We comply with applicable federal civil rights laws and [place] laws. We do not discriminate on the basis of race, color, national origin, age, disability, sex, sexual orientation, or gender identity.            Thank you!     Thank you for choosing [facility] INFUSION [facility]  for your care. Our goal is always to provide you with excellent care. Hearing back from our patients is one way we can continue to improve our services. Please take a few minutes to complete the written survey that you may receive in the mail after your visit with us. Thank you!             Your Updated Medication List - Protect others around you: Learn how to safely use, store and throw away your medicines at www.disposemymeds.org.          This list is accurate as of: 10/3/17 11:44 AM.  Always use your most recent med list.                   Brand Name Dispense Instructions for use Diagnosis    BOTOX IJ           diltiazem 120 MG 24 hr capsule    CARDIZEM CD    30 capsule    Take 1 capsule (120 mg) by mouth daily You are due to see the cardiologist- please call [phone] to schedule.    Chest pressure        gabapentin 100 MG capsule    NEURONTIN    210 capsule    Take 100 mg in the morning and 400 mg at bedtime for one week, then increase to 100 mg in the morning and 600 mg at bedtime.    Cervicalgia       loratadine 10 MG tablet    CLARITIN     Take 10 mg by mouth daily        NORTRIPTYLINE HCL PO      Take 200 mg by mouth daily        PAROXETINE HCL PO      Take 25 mg by mouth daily

## 2017-10-12 ENCOUNTER — TRANSFERRED RECORDS (OUTPATIENT)
Dept: HEALTH INFORMATION MANAGEMENT | Facility: CLINIC | Age: 47
End: 2017-10-12

## 2017-10-17 ENCOUNTER — INFUSION THERAPY VISIT (OUTPATIENT)
Dept: INFUSION THERAPY | Facility: CLINIC | Age: 47
End: 2017-10-17
Attending: ALLERGY & IMMUNOLOGY
Payer: COMMERCIAL

## 2017-10-17 VITALS
SYSTOLIC BLOOD PRESSURE: 125 MMHG | RESPIRATION RATE: 16 BRPM | HEART RATE: 98 BPM | TEMPERATURE: 98.5 F | DIASTOLIC BLOOD PRESSURE: 76 MMHG | OXYGEN SATURATION: 98 %

## 2017-10-17 DIAGNOSIS — J45.50 SEVERE PERSISTENT ASTHMA (H): Primary | ICD-10-CM

## 2017-10-17 PROCEDURE — 96372 THER/PROPH/DIAG INJ SC/IM: CPT

## 2017-10-17 PROCEDURE — 25000128 H RX IP 250 OP 636: Performed by: ALLERGY & IMMUNOLOGY

## 2017-10-17 RX ADMIN — OMALIZUMAB 375 MG: 202.5 INJECTION, SOLUTION SUBCUTANEOUS at 09:44

## 2017-10-17 NOTE — MR AVS SNAPSHOT
After Visit Summary   10/17/2017    Alba Varela    MRN: [id number]           Patient Information     Date Of Birth          1970        Visit Information        Provider Department      10/17/2017 9:00 AM RH INFUSION CHAIR 5 [facility]        Today's Diagnoses     Severe persistent asthma    -  1       Follow-ups after your visit        Your next 10 appointments already scheduled     Oct 31, 2017  9:00 AM CDT   Level 1 with RH INFUSION CHAIR 4   [facility] ([facility])    [facility]  [address]  [place] [address]   [phone]            Nov 14, 2017  9:00 AM CST   Level 1 with RH INFUSION CHAIR 5   [facility] ([facility])    [facility] [place]  [address]  [place] [address]   [phone]            Nov 28, 2017  9:00 AM CST   Level 1 with RH INFUSION CHAIR 1   [facility] ([facility])    [facility] [place]  [address]  [place] [address]   [phone]            Dec 12, 2017  9:00 AM CST   Level 1 with RH INFUSION CHAIR 1   [facility] ([facility])    [facility] [place]  [address]  [place] [address]   [phone]            Dec 26, 2017  9:00 AM CST   Level 1 with RH INFUSION CHAIR 1   [facility] ([facility])    [facility]  [address]  [place] [address]   [phone]              Who to contact     If you have questions or need follow up information about today's clinic visit or your schedule please contact [facility] INFUSION [facility] directly at [phone].  Normal or non-critical lab and imaging  results will be communicated to you by [employer], letter or phone within 4 business days after the clinic has received the results. If you do not hear from us within 7 days, please contact the clinic through [employer] or phone. If you have a critical or abnormal lab result, we will notify you by phone as soon as possible.  Submit refill requests through [employer] or call your pharmacy and they will forward the refill request to us. Please allow 3 business days for your refill to be completed.          Additional Information About Your Visit        [employer] Information     [employer] gives you secure access to your electronic health record. If you see a primary care provider, you can also send messages to your care team and make appointments. If you have questions, please call your primary care clinic.  If you do not have a primary care provider, please call [phone] and they will assist you.        Care EveryWhere ID     This is your Care EveryWhere ID. This could be used by other organizations to access your [place] medical records  [id number]        Your Vitals Were     Pulse Temperature Respirations Last Period Pulse Oximetry       98 98.5  F (36.9  C) (Tympanic) 16 07/11/2014 98%        Blood Pressure from Last 3 Encounters:   10/17/17 125/76   10/03/17 125/74   09/19/17 131/75    Weight from Last 3 Encounters:   05/12/17 75.3 kg (166 lb)   05/04/17 77.6 kg (171 lb)   03/24/17 74.8 kg (164 lb 14.4 oz)              Today, you had the following     No orders found for display         Today's Medication Changes          These changes are accurate as of: 10/17/17 10:15 AM.  If you have any questions, ask your nurse or doctor.               These medicines have changed or have updated prescriptions.        Dose/Directions    gabapentin 100 MG capsule   Commonly known as:  NEURONTIN   This may have changed:  additional instructions   Used for:  Cervicalgia        Take 100 mg in the morning and 400 mg at bedtime for one  week, then increase to 100 mg in the morning and 600 mg at bedtime.   Quantity:  210 capsule   Refills:  1                Primary Care Provider Office Phone # Fax #    Bright [facility]elo [phone] [address] [address]        Equal Access to [facility]     SUJEYCHARLENE DAISY : Hadii aad ku hadasho Soomaali, waaxda luqadaha, qaybta kaalmada adeegyada, waxay idiin hayaan adeeg quinten larhett . So [facility] [phone].    ATENCIÓN: Si habla español, tiene a rojas disposición servicios gratuitos de asistencia lingüística. Llame al [phone].    We comply with applicable federal civil rights laws and [place] laws. We do not discriminate on the basis of race, color, national origin, age, disability, sex, sexual orientation, or gender identity.            Thank you!     Thank you for choosing [facility] INFUSION [facility]  for your care. Our goal is always to provide you with excellent care. Hearing back from our patients is one way we can continue to improve our services. Please take a few minutes to complete the written survey that you may receive in the mail after your visit with us. Thank you!             Your Updated Medication List - Protect others around you: Learn how to safely use, store and throw away your medicines at www.disposemymeds.org.          This list is accurate as of: 10/17/17 10:15 AM.  Always use your most recent med list.                   Brand Name Dispense Instructions for use Diagnosis    BOTOX IJ           diltiazem 120 MG 24 hr capsule    CARDIZEM CD    30 capsule    Take 1 capsule (120 mg) by mouth daily You are due to see the cardiologist- please call [phone] to schedule.    Chest pressure       gabapentin 100 MG capsule    NEURONTIN    210 capsule    Take 100 mg in the morning and 400 mg at bedtime for one week, then increase to 100 mg in the morning and 600 mg at bedtime.    Cervicalgia       loratadine 10 MG tablet    CLARITIN      Take 10 mg by mouth daily        NORTRIPTYLINE HCL PO      Take 200 mg by mouth daily        PAROXETINE HCL PO      Take 25 mg by mouth daily

## 2017-10-17 NOTE — PROGRESS NOTES
Infusion Nursing Note:  Alba Varela presents today for Xolair.    Patient seen by provider today: No   present during visit today: Not Applicable.    Note: N/A.    Intravenous Access:  No Intravenous access/labs at this visit.    Treatment Conditions:  Not Applicable.      Post Infusion Assessment:  Patient tolerated injection without incident.  Patient observed for 30 minutes post Xolair per protocol.    Discharge Plan:   Discharge instructions reviewed with: Patient.  Patient and/or family verbalized understanding of discharge instructions and all questions answered.  AVS to patient via [employer].  Patient will return 10/31/2017 for next appointment.   Patient discharged in stable condition accompanied by: self.  Departure Mode: Ambulatory.    Lyn Bunn RN

## 2017-10-18 ENCOUNTER — TRANSFERRED RECORDS (OUTPATIENT)
Dept: HEALTH INFORMATION MANAGEMENT | Facility: CLINIC | Age: 47
End: 2017-10-18

## 2017-10-24 ENCOUNTER — OFFICE VISIT (OUTPATIENT)
Dept: INTERNAL MEDICINE | Facility: CLINIC | Age: 47
End: 2017-10-24
Payer: COMMERCIAL

## 2017-10-24 VITALS
HEIGHT: 64 IN | TEMPERATURE: 98.6 F | OXYGEN SATURATION: 98 % | BODY MASS INDEX: 28.38 KG/M2 | DIASTOLIC BLOOD PRESSURE: 76 MMHG | SYSTOLIC BLOOD PRESSURE: 111 MMHG | HEART RATE: 91 BPM | WEIGHT: 166.2 LBS

## 2017-10-24 DIAGNOSIS — R07.89 CHEST PRESSURE: ICD-10-CM

## 2017-10-24 DIAGNOSIS — Z01.818 PREOP GENERAL PHYSICAL EXAM: Primary | ICD-10-CM

## 2017-10-24 DIAGNOSIS — N95.1 MENOPAUSAL SYNDROME (HOT FLASHES): ICD-10-CM

## 2017-10-24 DIAGNOSIS — J45.50 SEVERE PERSISTENT ASTHMA WITHOUT COMPLICATION (H): ICD-10-CM

## 2017-10-24 DIAGNOSIS — F41.9 ANXIETY: ICD-10-CM

## 2017-10-24 DIAGNOSIS — M25.551 HIP PAIN, RIGHT: ICD-10-CM

## 2017-10-24 DIAGNOSIS — R25.1 TREMOR: ICD-10-CM

## 2017-10-24 LAB
ANION GAP SERPL CALCULATED.3IONS-SCNC: 8 MMOL/L (ref 3–14)
BASOPHILS # BLD AUTO: 0 10E9/L (ref 0–0.2)
BASOPHILS NFR BLD AUTO: 0.4 %
BUN SERPL-MCNC: 13 MG/DL (ref 7–30)
CALCIUM SERPL-MCNC: 9.3 MG/DL (ref 8.5–10.1)
CHLORIDE SERPL-SCNC: 110 MMOL/L (ref 94–109)
CO2 SERPL-SCNC: 24 MMOL/L (ref 20–32)
CREAT SERPL-MCNC: 0.77 MG/DL (ref 0.52–1.04)
DIFFERENTIAL METHOD BLD: NORMAL
EOSINOPHIL # BLD AUTO: 0.2 10E9/L (ref 0–0.7)
EOSINOPHIL NFR BLD AUTO: 3.3 %
ERYTHROCYTE [DISTWIDTH] IN BLOOD BY AUTOMATED COUNT: 12.6 % (ref 10–15)
GFR SERPL CREATININE-BSD FRML MDRD: 81 ML/MIN/1.7M2
GLUCOSE SERPL-MCNC: 85 MG/DL (ref 70–99)
HCT VFR BLD AUTO: 39.7 % (ref 35–47)
HGB BLD-MCNC: 13.2 G/DL (ref 11.7–15.7)
IRON SATN MFR SERPL: 53 % (ref 15–46)
IRON SERPL-MCNC: 147 UG/DL (ref 35–180)
LYMPHOCYTES # BLD AUTO: 1.5 10E9/L (ref 0.8–5.3)
LYMPHOCYTES NFR BLD AUTO: 33.9 %
MCH RBC QN AUTO: 31 PG (ref 26.5–33)
MCHC RBC AUTO-ENTMCNC: 33.2 G/DL (ref 31.5–36.5)
MCV RBC AUTO: 93 FL (ref 78–100)
MONOCYTES # BLD AUTO: 0.3 10E9/L (ref 0–1.3)
MONOCYTES NFR BLD AUTO: 7.1 %
MRSA DNA SPEC QL NAA+PROBE: NEGATIVE
NEUTROPHILS # BLD AUTO: 2.5 10E9/L (ref 1.6–8.3)
NEUTROPHILS NFR BLD AUTO: 55.3 %
PLATELET # BLD AUTO: 263 10E9/L (ref 150–450)
POTASSIUM SERPL-SCNC: 3.7 MMOL/L (ref 3.4–5.3)
RBC # BLD AUTO: 4.26 10E12/L (ref 3.8–5.2)
RETICS # AUTO: 97.2 10E9/L (ref 25–95)
RETICS/RBC NFR AUTO: 2.2 % (ref 0.5–2)
SODIUM SERPL-SCNC: 142 MMOL/L (ref 133–144)
SPECIMEN SOURCE: NORMAL
TIBC SERPL-MCNC: 277 UG/DL (ref 240–430)
WBC # BLD AUTO: 4.5 10E9/L (ref 4–11)

## 2017-10-24 PROCEDURE — 93000 ELECTROCARDIOGRAM COMPLETE: CPT | Performed by: NURSE PRACTITIONER

## 2017-10-24 PROCEDURE — 87640 STAPH A DNA AMP PROBE: CPT | Performed by: NURSE PRACTITIONER

## 2017-10-24 PROCEDURE — 80048 BASIC METABOLIC PNL TOTAL CA: CPT | Performed by: NURSE PRACTITIONER

## 2017-10-24 PROCEDURE — 85025 COMPLETE CBC W/AUTO DIFF WBC: CPT | Performed by: NURSE PRACTITIONER

## 2017-10-24 PROCEDURE — 99214 OFFICE O/P EST MOD 30 MIN: CPT | Performed by: NURSE PRACTITIONER

## 2017-10-24 PROCEDURE — 85045 AUTOMATED RETICULOCYTE COUNT: CPT | Performed by: NURSE PRACTITIONER

## 2017-10-24 PROCEDURE — 83550 IRON BINDING TEST: CPT | Performed by: NURSE PRACTITIONER

## 2017-10-24 PROCEDURE — 83540 ASSAY OF IRON: CPT | Performed by: NURSE PRACTITIONER

## 2017-10-24 PROCEDURE — 36415 COLL VENOUS BLD VENIPUNCTURE: CPT | Performed by: NURSE PRACTITIONER

## 2017-10-24 PROCEDURE — 87641 MR-STAPH DNA AMP PROBE: CPT | Performed by: NURSE PRACTITIONER

## 2017-10-24 NOTE — PATIENT INSTRUCTIONS
Lab in [address]    May take your morning medication prior to surgery     Consider EFFEXOR / venlafaxine for hot flashes in place of paxil       Before Your Surgery      Call your surgeon if there is any change in your health. This includes signs of a cold or flu (such as a sore throat, runny nose, cough, rash or fever).    Do not smoke, drink alcohol or take over the counter medicine (unless your surgeon or primary care doctor tells you to) for the 24 hours before and after surgery.    If you take prescribed drugs: Follow your doctor s orders about which medicines to take and which to stop until after surgery.    Eating and drinking prior to surgery: follow the instructions from your surgeon    Take a shower or bath the night before surgery. Use the soap your surgeon gave you to gently clean your skin. If you do not have soap from your surgeon, use your regular soap. Do not shave or scrub the surgery site.  Wear clean pajamas and have clean sheets on your bed.

## 2017-10-24 NOTE — NURSING NOTE
"Chief Complaint   Patient presents with     Pre-Op Exam       Initial /76 (BP Location: Left arm, Patient Position: Sitting, Cuff Size: Adult Large)  Pulse 91  Temp 98.6  F (37  C) (Oral)  Ht 5' 4\" (1.626 m)  Wt 166 lb 3.2 oz (75.4 kg)  LMP 07/11/2014  SpO2 98%  BMI 28.53 kg/m2 Estimated body mass index is 28.53 kg/(m^2) as calculated from the following:    Height as of this encounter: 5' 4\" (1.626 m).    Weight as of this encounter: 166 lb 3.2 oz (75.4 kg).  Medication Reconciliation: complete    "

## 2017-10-24 NOTE — MR AVS SNAPSHOT
After Visit Summary   10/24/2017    Alba Varela    MRN: [id number]           Patient Information     Date Of Birth          1970        Visit Information        Provider Department      10/24/2017 7:20 AM Selene Barnes APRN [facility]        Today's Diagnoses     Preop general physical exam    -  1    Hip pain, right        Anxiety        Severe persistent asthma without complication        Tremor        Menopausal syndrome (hot flashes)        Chest pressure          Care Instructions    Lab in [address]    May take your morning medication prior to surgery     Consider EFFEXOR / venlafaxine for hot flashes in place of paxil       Before Your Surgery      Call your surgeon if there is any change in your health. This includes signs of a cold or flu (such as a sore throat, runny nose, cough, rash or fever).    Do not smoke, drink alcohol or take over the counter medicine (unless your surgeon or primary care doctor tells you to) for the 24 hours before and after surgery.    If you take prescribed drugs: Follow your doctor s orders about which medicines to take and which to stop until after surgery.    Eating and drinking prior to surgery: follow the instructions from your surgeon    Take a shower or bath the night before surgery. Use the soap your surgeon gave you to gently clean your skin. If you do not have soap from your surgeon, use your regular soap. Do not shave or scrub the surgery site.  Wear clean pajamas and have clean sheets on your bed.           Follow-ups after your visit        Your next 10 appointments already scheduled     Oct 31, 2017  9:00 AM CDT   Level 1 with  INFUSION CHAIR 12   [facility] ([facility])    [facility]  [address]  [facility] [address]   [phone]            Nov 03, 2017   Procedure with Shahriar Gamble MD   [facility]iOp [facility]  (--)    [address]  [facility] [address]   [phone]            Nov 14, 2017  9:00 AM CST   Level 1 with RH INFUSION CHAIR 5   [facility] ([facility])    [facility]  [address]  [facility] [address]   [phone]            Nov 28, 2017  9:00 AM CST   Level 1 with RH INFUSION CHAIR 1   [facility] ([facility])    [facility]  [address]  [facility] [address]   [phone]            Dec 12, 2017  9:00 AM CST   Level 1 with RH INFUSION CHAIR 1   [facility] ([facility])    [facility]  [address]  [facility] [address]   [phone]            Dec 26, 2017  9:00 AM CST   Level 1 with RH INFUSION CHAIR 1   [facility] ([facility])    [facility]  [address]  [facility] [address]   [phone]              Who to contact     If you have questions or need follow up information about today's clinic visit or your schedule please contact [facility] directly at [phone].  Normal or non-critical lab and imaging results will be communicated to you by [employer], letter or phone within 4 business days after the clinic has received the results. If you do not hear from us within 7 days, please contact the clinic through [employer] or phone. If you have a critical or abnormal lab result, we will notify you by phone as soon as possible.  Submit refill requests through [employer] or call your pharmacy and they will forward the refill request to us. Please allow 3 business days for your refill to be completed.          Additional Information About Your Visit        [employer] Information     [employer] gives you secure access to your electronic health record.  "If you see a primary care provider, you can also send messages to your care team and make appointments. If you have questions, please call your primary care clinic.  If you do not have a primary care provider, please call [phone] and they will assist you.        Care EveryWhere ID     This is your Care EveryWhere ID. This could be used by other organizations to access your [place] medical records  [id number]        Your Vitals Were     Pulse Temperature Height Last Period Pulse Oximetry BMI (Body Mass Index)    91 98.6  F (37  C) (Oral) 5' 4\" (1.626 m) 07/11/2014 98% 28.53 kg/m2       Blood Pressure from Last 3 Encounters:   10/24/17 111/76   10/17/17 125/76   10/03/17 125/74    Weight from Last 3 Encounters:   10/24/17 166 lb 3.2 oz (75.4 kg)   05/12/17 166 lb (75.3 kg)   05/04/17 171 lb (77.6 kg)              We Performed the Following     Basic metabolic panel     EKG 12-lead complete w/read - Clinics          Today's Medication Changes          These changes are accurate as of: 10/24/17  8:16 AM.  If you have any questions, ask your nurse or doctor.               These medicines have changed or have updated prescriptions.        Dose/Directions    gabapentin 100 MG capsule   Commonly known as:  NEURONTIN   This may have changed:  additional instructions   Used for:  Cervicalgia        Take 100 mg in the morning and 400 mg at bedtime for one week, then increase to 100 mg in the morning and 600 mg at bedtime.   Quantity:  210 capsule   Refills:  1                Primary Care Provider Office Phone # Fax #    Bright [facility]elo [phone] [address] [address]        Equal Access to [facility]     [facility] : Yonny Pacheco, bailee nassar, conner mabryalmadominic bateman. So [facility] [phone].    ATENCIÓN: Si habla español, tiene a rojas disposición servicios gratuitos de asistencia lingüística. Llame al " [phone].    We comply with applicable federal civil rights laws and [place] laws. We do not discriminate on the basis of race, color, national origin, age, disability, sex, sexual orientation, or gender identity.            Thank you!     Thank you for choosing [facility]  for your care. Our goal is always to provide you with excellent care. Hearing back from our patients is one way we can continue to improve our services. Please take a few minutes to complete the written survey that you may receive in the mail after your visit with us. Thank you!             Your Updated Medication List - Protect others around you: Learn how to safely use, store and throw away your medicines at www.disposemymeds.org.          This list is accurate as of: 10/24/17  8:16 AM.  Always use your most recent med list.                   Brand Name Dispense Instructions for use Diagnosis    BOTOX IJ           diltiazem 120 MG 24 hr capsule    CARDIZEM CD    30 capsule    Take 1 capsule (120 mg) by mouth daily You are due to see the cardiologist- please call [phone] to schedule.    Chest pressure       gabapentin 100 MG capsule    NEURONTIN    210 capsule    Take 100 mg in the morning and 400 mg at bedtime for one week, then increase to 100 mg in the morning and 600 mg at bedtime.    Cervicalgia       loratadine 10 MG tablet    CLARITIN     Take 10 mg by mouth daily        NORTRIPTYLINE HCL PO      Take 200 mg by mouth daily        PAROXETINE HCL PO      Take 25 mg by mouth daily        XOLAIR 150 MG injection   Generic drug:  omalizumab      Inject Subcutaneous every 14 days    Severe persistent asthma without complication

## 2017-10-24 NOTE — PROGRESS NOTES
[place] [address]  [facility] [address]  [phone]  Dept: [phone]    PRE-OP EVALUATION:  Today's date: 10/24/2017    Alba Varela (: 1970) presents for pre-operative evaluation assessment as requested by Shahriar Mckeon.  She requires evaluation and anesthesia risk assessment prior to undergoing surgery/procedure for treatment of right hip .  Proposed procedure: Right total hip arthroplasty     Date of Surgery/ Procedure: 17  Time of Surgery/ Procedure:   Hospital/Surgical Facility: [facility]    Primary Physician: Bright Sotelo  Type of Anesthesia Anticipated: General    Patient has a Health Care Directive or Living Will:  NO    Preop Questions 10/24/2017   1.  Do you have a history of heart attack, stroke, stent, bypass or surgery on an artery in the head, neck, heart or legs? No   2.  Do you ever have any pain or discomfort in your chest? YES - chest pain not related to heart - comes and goes ; has seen cardiology and had stress test and angiogram and also has had stomach testing    3.  Do you have a history of  Heart Failure? No   4.   Are you troubled by shortness of breath when:  walking on a level surface, or up a slight hill, or at night? No   5.  Do you currently have a cold, bronchitis or other respiratory infection? No   6.  Do you have a cough, shortness of breath, or wheezing? No   7.  Do you sometimes get pains in the calves of your legs when you walk? No   8. Do you or anyone in your family have previous history of blood clots? No   9.  Do you or does anyone in your family have a serious bleeding problem such as prolonged bleeding following surgeries or cuts? No   10. Have you ever had problems with anemia or been told to take iron pills? YES - did in past - not currently    11. Have you had any abnormal blood loss such as black, tarry or bloody stools, or abnormal vaginal bleeding? No   12. Have you ever had a blood transfusion? No   13.  Have you or any of your relatives ever had problems with anesthesia? YES - makes her very sick and throws up    14. Do you have sleep apnea, excessive snoring or daytime drowsiness? YES - CPAP - does not use    15. Do you have any prosthetic heart valves? No   16. Do you have prosthetic joints? No   17. Is there any chance that you may be pregnant? No           HPI:                                                      Brief HPI related to upcoming procedure: need to have right hip replacement   Hip pain        See problem list for active medical problems.  Problems all longstanding and stable, except as noted/documented.  See ROS for pertinent symptoms related to these conditions.                                                                                                  .    MEDICAL HISTORY:                                                    Patient Active Problem List    Diagnosis Date Noted     Chest pressure 10/24/2017     Priority: Medium     High cholesterol      Priority: Medium     Severe persistent asthma 10/21/2016     Priority: Medium     Anxiety      Priority: Medium     C. difficile colitis      Priority: Medium     Migraines      Priority: Medium     Headache 12/10/2014     Priority: Medium     Class: Chronic     Problem list name updated by automated process. Provider to review       Colitis 2013     Priority: Medium     Asthma 2011     Priority: Medium     Problem list name updated by automated process. Provider to review        Past Medical History:   Diagnosis Date     Abdominal pain      Anxiety      Asthma      C. difficile colitis      Headache(784.0) 12/10/2014     High cholesterol      Hyperlipidemia      Liver disease     Being evalted for fatty liver disease. ABnormal LFT.     Migraines      PONV (postoperative nausea and vomiting)      Sleep apnea     CPAP will bring.     Tremor      Past Surgical History:   Procedure Laterality Date      SECTION      pablo NINO      ENDOSCOPIC ULTRASOUND, ESOPHAGOSCOPY, GASTROSCOPY, DUODENOSCOPY (EGD), COMBINED  11/21/13     ESOPHAGOSCOPY, GASTROSCOPY, DUODENOSCOPY (EGD), COMBINED  10/21/2013    Procedure: COMBINED ESOPHAGOSCOPY, GASTROSCOPY, DUODENOSCOPY (EGD), BIOPSY SINGLE OR MULTIPLE;;  Surgeon: Rito Gupta MD;  Location:  GI     FOOT SURGERY      Bilateral foot reconstruction.     [facility] UGI ENDOSCOPY W EUS  11/21/2013    Procedure: COMBINED ENDOSCOPIC ULTRASOUND, ESOPHAGOSCOPY, GASTROSCOPY, DUODENOSCOPY (EGD);  Surgeon: Emre Campbell MD;  Location:  GI     HYSTERECTOMY       LAPAROSCOPIC HYSTERECTOMY SUPRACERVICAL, BILATERAL SALPINGO-OOPHORECTOMY, COMBINED  3/6/2013    Procedure: COMBINED LAPAROSCOPIC HYSTERECTOMY SUPRACERVICAL, SALPINGO-OOPHORECTOMY;  Laparoscopic Supracervical Hysterectomy, Right salpingo-oopherectomy;  Surgeon: Tanika Jones MD;  Location: [facility] OR     UVULOPALATOPHARYNGOPLASTY      Plus Septoplasy.     Current Outpatient Prescriptions   Medication Sig Dispense Refill     omalizumab (XOLAIR) 150 MG injection Inject Subcutaneous every 14 days       diltiazem (CARDIZEM CD) 120 MG 24 hr capsule Take 1 capsule (120 mg) by mouth daily You are due to see the cardiologist- please call [phone] to schedule. 30 capsule 11     loratadine (CLARITIN) 10 MG tablet Take 10 mg by mouth daily       OnabotulinumtoxinA (BOTOX IJ)        NORTRIPTYLINE HCL PO Take 200 mg by mouth daily       PAROXETINE HCL PO Take 25 mg by mouth daily       gabapentin (NEURONTIN) 100 MG capsule Take 100 mg in the morning and 400 mg at bedtime for one week, then increase to 100 mg in the morning and 600 mg at bedtime. (Patient taking differently: Take 400 mg in the morning and 600 mg at bedtime.) 210 capsule 1     OTC products: None, except as noted above    Allergies   Allergen Reactions     Codeine Sulfate Nausea and Vomiting     Contrast Dye Hives     Patient is allergic to CT iodine contrast.       Scopolamine Visual Disturbance     Imitrex  "[Sumatriptan] Rash     Naproxen Rash     Penicillins Rash     Per patient     Sulfa Drugs Rash      Latex Allergy: NO    Social History   Substance Use Topics     Smoking status: Current Every Day Smoker     Packs/day: 0.50     Years: 20.00     Types: Cigarettes     Smokeless tobacco: Never Used     Alcohol use 0.0 oz/week     0 Standard drinks or equivalent per week      Comment:  3 drinks weekly     History   Drug Use No       REVIEW OF SYSTEMS:                                                    C: NEGATIVE for fever, chills, change in weight  I: NEGATIVE for worrisome rashes, moles or lesions  E: NEGATIVE for vision changes or irritation  E/M: NEGATIVE for ear, mouth and throat problems  R: NEGATIVE for significant cough or SOB  CV: NEGATIVE for chest pain, palpitations or peripheral edema  GI: NEGATIVE for nausea, abdominal pain, heartburn, or change in bowel habits  : NEGATIVE for frequency, dysuria, or hematuria  M:right hip pain   N: NEGATIVE for weakness, dizziness or paresthesias  E: NEGATIVE for temperature intolerance, skin/hair changes  H: NEGATIVE for bleeding problems  P: NEGATIVE for changes in mood or affect    EXAM:                                                    /76 (BP Location: Left arm, Patient Position: Sitting, Cuff Size: Adult Large)  Pulse 91  Temp 98.6  F (37  C) (Oral)  Ht 5' 4\" (1.626 m)  Wt 166 lb 3.2 oz (75.4 kg)  LMP 07/11/2014  SpO2 98%  BMI 28.53 kg/m2    GENERAL APPEARANCE:  alert and no distress     HENT: ear canals and TM's normal and nose and mouth without ulcers or lesions     RESP: lungs clear to auscultation - no rales, rhonchi or wheezes     CV: regular rates and rhythm, normal S1 S2, no S3 or S4 and no murmur, click or rub     ABDOMEN:  soft, nontender, no HSM or masses and bowel sounds normal     MS: extremities normal- no gross deformities noted, no evidence of inflammation in joints, pain in right hip       SKIN: no suspicious lesions or rashes     " NEURO: Normal strength and tone, sensory exam grossly normal, mentation intact and speech normal     PSYCH: mentation appears normal. and affect normal/bright    DIAGNOSTICS:                                                    EKG: appears normal, NSR, unchanged from previous tracings  Lab pending   Recent Labs   Lab Test  03/09/17   1111  10/05/16   1440   03/22/13   1830   HGB  13.1  14.4   < >  11.5*   PLT  232  308   < >  196   INR  0.88   --    --   1.13   NA  144  138   < >  137   POTASSIUM  4.4  4.2   < >  3.6   CR  0.66  0.98   < >  0.63    < > = values in this interval not displayed.        IMPRESSION:                                                    Reason for surgery/procedure: needs to have hip replacement right hip       Diagnosis/reason for consult: pre op     The proposed surgical procedure is considered INTERMEDIATE risk.    REVISED CARDIAC RISK INDEX  The patient has the following serious cardiovascular risks for perioperative complications such as (MI, PE, VFib and 3  AV Block):  No serious cardiac risks  INTERPRETATION: 2 risks: Class III (moderate risk - 6.6% complication rate)    The patient has the following additional risks for perioperative complications:  History of intermittent self limiting chest pain with unknown cause - all cardiac testing has been fairly normal to this point       ICD-10-CM    1. Preop general physical exam Z01.818 EKG 12-lead complete w/read - Clinics     Basic metabolic panel     Methicillin Resistant Staph Aureus PCR     CBC with platelets differential     Reticulocyte count     Iron and iron binding capacity   2. Hip pain, right M25.551 EKG 12-lead complete w/read - Clinics     Basic metabolic panel     Methicillin Resistant Staph Aureus PCR     CBC with platelets differential     Reticulocyte count     Iron and iron binding capacity   3. Anxiety F41.9    4. Severe persistent asthma without complication J45.50 omalizumab (XOLAIR) 150 MG injection   5. Tremor R25.1     6. Menopausal syndrome (hot flashes) N95.1    7. Chest pressure R07.89 Basic metabolic panel       RECOMMENDATIONS:                                                      --Consult hospital rounder / IM to assist post-op medical management    --Patient is to take all scheduled medications on the day of surgery EXCEPT for modifications listed below.    APPROVAL GIVEN to proceed with proposed procedure, without further diagnostic evaluation as long as labs are in reasonable ranges        Signed Electronically by: WALESKA Kendall CNP    Copy of this evaluation report is provided to requesting physician.    [facility] Preop Guidelines

## 2017-10-25 ASSESSMENT — ASTHMA QUESTIONNAIRES: ACT_TOTALSCORE: 24

## 2017-10-31 ENCOUNTER — INFUSION THERAPY VISIT (OUTPATIENT)
Dept: INFUSION THERAPY | Facility: CLINIC | Age: 47
End: 2017-10-31
Attending: ALLERGY & IMMUNOLOGY
Payer: COMMERCIAL

## 2017-10-31 VITALS — HEART RATE: 90 BPM | SYSTOLIC BLOOD PRESSURE: 127 MMHG | DIASTOLIC BLOOD PRESSURE: 80 MMHG

## 2017-10-31 DIAGNOSIS — J45.50 SEVERE PERSISTENT ASTHMA WITHOUT COMPLICATION (H): Primary | ICD-10-CM

## 2017-10-31 PROCEDURE — 25000128 H RX IP 250 OP 636: Performed by: ALLERGY & IMMUNOLOGY

## 2017-10-31 PROCEDURE — 96372 THER/PROPH/DIAG INJ SC/IM: CPT

## 2017-10-31 RX ADMIN — OMALIZUMAB 375 MG: 202.5 INJECTION, SOLUTION SUBCUTANEOUS at 09:23

## 2017-10-31 NOTE — H&P (VIEW-ONLY)
[place] [address]  [facility] [address]  [phone]  Dept: [phone]    PRE-OP EVALUATION:  Today's date: 10/24/2017    Alba Varela (: 1970) presents for pre-operative evaluation assessment as requested by Shahriar Mckeon.  She requires evaluation and anesthesia risk assessment prior to undergoing surgery/procedure for treatment of right hip .  Proposed procedure: Right total hip arthroplasty     Date of Surgery/ Procedure: 17  Time of Surgery/ Procedure:   Hospital/Surgical Facility: [facility]    Primary Physician: Bright Sotelo  Type of Anesthesia Anticipated: General    Patient has a Health Care Directive or Living Will:  NO    Preop Questions 10/24/2017   1.  Do you have a history of heart attack, stroke, stent, bypass or surgery on an artery in the head, neck, heart or legs? No   2.  Do you ever have any pain or discomfort in your chest? YES - chest pain not related to heart - comes and goes ; has seen cardiology and had stress test and angiogram and also has had stomach testing    3.  Do you have a history of  Heart Failure? No   4.   Are you troubled by shortness of breath when:  walking on a level surface, or up a slight hill, or at night? No   5.  Do you currently have a cold, bronchitis or other respiratory infection? No   6.  Do you have a cough, shortness of breath, or wheezing? No   7.  Do you sometimes get pains in the calves of your legs when you walk? No   8. Do you or anyone in your family have previous history of blood clots? No   9.  Do you or does anyone in your family have a serious bleeding problem such as prolonged bleeding following surgeries or cuts? No   10. Have you ever had problems with anemia or been told to take iron pills? YES - did in past - not currently    11. Have you had any abnormal blood loss such as black, tarry or bloody stools, or abnormal vaginal bleeding? No   12. Have you ever had a blood transfusion? No   13.  Have you or any of your relatives ever had problems with anesthesia? YES - makes her very sick and throws up    14. Do you have sleep apnea, excessive snoring or daytime drowsiness? YES - CPAP - does not use    15. Do you have any prosthetic heart valves? No   16. Do you have prosthetic joints? No   17. Is there any chance that you may be pregnant? No           HPI:                                                      Brief HPI related to upcoming procedure: need to have right hip replacement   Hip pain        See problem list for active medical problems.  Problems all longstanding and stable, except as noted/documented.  See ROS for pertinent symptoms related to these conditions.                                                                                                  .    MEDICAL HISTORY:                                                    Patient Active Problem List    Diagnosis Date Noted     Chest pressure 10/24/2017     Priority: Medium     High cholesterol      Priority: Medium     Severe persistent asthma 10/21/2016     Priority: Medium     Anxiety      Priority: Medium     C. difficile colitis      Priority: Medium     Migraines      Priority: Medium     Headache 12/10/2014     Priority: Medium     Class: Chronic     Problem list name updated by automated process. Provider to review       Colitis 2013     Priority: Medium     Asthma 2011     Priority: Medium     Problem list name updated by automated process. Provider to review        Past Medical History:   Diagnosis Date     Abdominal pain      Anxiety      Asthma      C. difficile colitis      Headache(784.0) 12/10/2014     High cholesterol      Hyperlipidemia      Liver disease     Being evalted for fatty liver disease. ABnormal LFT.     Migraines      PONV (postoperative nausea and vomiting)      Sleep apnea     CPAP will bring.     Tremor      Past Surgical History:   Procedure Laterality Date      SECTION      pablo NINO      ENDOSCOPIC ULTRASOUND, ESOPHAGOSCOPY, GASTROSCOPY, DUODENOSCOPY (EGD), COMBINED  11/21/13     ESOPHAGOSCOPY, GASTROSCOPY, DUODENOSCOPY (EGD), COMBINED  10/21/2013    Procedure: COMBINED ESOPHAGOSCOPY, GASTROSCOPY, DUODENOSCOPY (EGD), BIOPSY SINGLE OR MULTIPLE;;  Surgeon: Rito Gupta MD;  Location:  GI     FOOT SURGERY      Bilateral foot reconstruction.     [facility] UGI ENDOSCOPY W EUS  11/21/2013    Procedure: COMBINED ENDOSCOPIC ULTRASOUND, ESOPHAGOSCOPY, GASTROSCOPY, DUODENOSCOPY (EGD);  Surgeon: Emre Campbell MD;  Location:  GI     HYSTERECTOMY       LAPAROSCOPIC HYSTERECTOMY SUPRACERVICAL, BILATERAL SALPINGO-OOPHORECTOMY, COMBINED  3/6/2013    Procedure: COMBINED LAPAROSCOPIC HYSTERECTOMY SUPRACERVICAL, SALPINGO-OOPHORECTOMY;  Laparoscopic Supracervical Hysterectomy, Right salpingo-oopherectomy;  Surgeon: Taniak Jones MD;  Location: [facility] OR     UVULOPALATOPHARYNGOPLASTY      Plus Septoplasy.     Current Outpatient Prescriptions   Medication Sig Dispense Refill     omalizumab (XOLAIR) 150 MG injection Inject Subcutaneous every 14 days       diltiazem (CARDIZEM CD) 120 MG 24 hr capsule Take 1 capsule (120 mg) by mouth daily You are due to see the cardiologist- please call [phone] to schedule. 30 capsule 11     loratadine (CLARITIN) 10 MG tablet Take 10 mg by mouth daily       OnabotulinumtoxinA (BOTOX IJ)        NORTRIPTYLINE HCL PO Take 200 mg by mouth daily       PAROXETINE HCL PO Take 25 mg by mouth daily       gabapentin (NEURONTIN) 100 MG capsule Take 100 mg in the morning and 400 mg at bedtime for one week, then increase to 100 mg in the morning and 600 mg at bedtime. (Patient taking differently: Take 400 mg in the morning and 600 mg at bedtime.) 210 capsule 1     OTC products: None, except as noted above    Allergies   Allergen Reactions     Codeine Sulfate Nausea and Vomiting     Contrast Dye Hives     Patient is allergic to CT iodine contrast.       Scopolamine Visual Disturbance     Imitrex  "[Sumatriptan] Rash     Naproxen Rash     Penicillins Rash     Per patient     Sulfa Drugs Rash      Latex Allergy: NO    Social History   Substance Use Topics     Smoking status: Current Every Day Smoker     Packs/day: 0.50     Years: 20.00     Types: Cigarettes     Smokeless tobacco: Never Used     Alcohol use 0.0 oz/week     0 Standard drinks or equivalent per week      Comment:  3 drinks weekly     History   Drug Use No       REVIEW OF SYSTEMS:                                                    C: NEGATIVE for fever, chills, change in weight  I: NEGATIVE for worrisome rashes, moles or lesions  E: NEGATIVE for vision changes or irritation  E/M: NEGATIVE for ear, mouth and throat problems  R: NEGATIVE for significant cough or SOB  CV: NEGATIVE for chest pain, palpitations or peripheral edema  GI: NEGATIVE for nausea, abdominal pain, heartburn, or change in bowel habits  : NEGATIVE for frequency, dysuria, or hematuria  M:right hip pain   N: NEGATIVE for weakness, dizziness or paresthesias  E: NEGATIVE for temperature intolerance, skin/hair changes  H: NEGATIVE for bleeding problems  P: NEGATIVE for changes in mood or affect    EXAM:                                                    /76 (BP Location: Left arm, Patient Position: Sitting, Cuff Size: Adult Large)  Pulse 91  Temp 98.6  F (37  C) (Oral)  Ht 5' 4\" (1.626 m)  Wt 166 lb 3.2 oz (75.4 kg)  LMP 07/11/2014  SpO2 98%  BMI 28.53 kg/m2    GENERAL APPEARANCE:  alert and no distress     HENT: ear canals and TM's normal and nose and mouth without ulcers or lesions     RESP: lungs clear to auscultation - no rales, rhonchi or wheezes     CV: regular rates and rhythm, normal S1 S2, no S3 or S4 and no murmur, click or rub     ABDOMEN:  soft, nontender, no HSM or masses and bowel sounds normal     MS: extremities normal- no gross deformities noted, no evidence of inflammation in joints, pain in right hip       SKIN: no suspicious lesions or rashes     " NEURO: Normal strength and tone, sensory exam grossly normal, mentation intact and speech normal     PSYCH: mentation appears normal. and affect normal/bright    DIAGNOSTICS:                                                    EKG: appears normal, NSR, unchanged from previous tracings  Lab pending   Recent Labs   Lab Test  03/09/17   1111  10/05/16   1440   03/22/13   1830   HGB  13.1  14.4   < >  11.5*   PLT  232  308   < >  196   INR  0.88   --    --   1.13   NA  144  138   < >  137   POTASSIUM  4.4  4.2   < >  3.6   CR  0.66  0.98   < >  0.63    < > = values in this interval not displayed.        IMPRESSION:                                                    Reason for surgery/procedure: needs to have hip replacement right hip       Diagnosis/reason for consult: pre op     The proposed surgical procedure is considered INTERMEDIATE risk.    REVISED CARDIAC RISK INDEX  The patient has the following serious cardiovascular risks for perioperative complications such as (MI, PE, VFib and 3  AV Block):  No serious cardiac risks  INTERPRETATION: 2 risks: Class III (moderate risk - 6.6% complication rate)    The patient has the following additional risks for perioperative complications:  History of intermittent self limiting chest pain with unknown cause - all cardiac testing has been fairly normal to this point       ICD-10-CM    1. Preop general physical exam Z01.818 EKG 12-lead complete w/read - Clinics     Basic metabolic panel     Methicillin Resistant Staph Aureus PCR     CBC with platelets differential     Reticulocyte count     Iron and iron binding capacity   2. Hip pain, right M25.551 EKG 12-lead complete w/read - Clinics     Basic metabolic panel     Methicillin Resistant Staph Aureus PCR     CBC with platelets differential     Reticulocyte count     Iron and iron binding capacity   3. Anxiety F41.9    4. Severe persistent asthma without complication J45.50 omalizumab (XOLAIR) 150 MG injection   5. Tremor R25.1     6. Menopausal syndrome (hot flashes) N95.1    7. Chest pressure R07.89 Basic metabolic panel       RECOMMENDATIONS:                                                      --Consult hospital rounder / IM to assist post-op medical management    --Patient is to take all scheduled medications on the day of surgery EXCEPT for modifications listed below.    APPROVAL GIVEN to proceed with proposed procedure, without further diagnostic evaluation as long as labs are in reasonable ranges        Signed Electronically by: WALESKA Kendall CNP    Copy of this evaluation report is provided to requesting physician.    [facility] Preop Guidelines

## 2017-10-31 NOTE — MR AVS SNAPSHOT
After Visit Summary   10/31/2017    Alba Varela    MRN: [id number]           Patient Information     Date Of Birth          1970        Visit Information        Provider Department      10/31/2017 9:00 AM [facility] INFUSION CHAIR 6 [facility]        Today's Diagnoses     Severe persistent asthma without complication    -  1       Follow-ups after your visit        Your next 10 appointments already scheduled     Nov 03, 2017   Procedure with Shahriar Gamble MD   [facility] (--)    [address]  [facility] [address]   [phone]            Nov 14, 2017  9:00 AM CST   Level 1 with [facility] INFUSION CHAIR 5   [facility] ([facility])    [facility]  [address]  [facility] [address] [phone]            Nov 28, 2017  9:00 AM CST   Level 1 with [facility] INFUSION CHAIR 1   [facility] ([facility])    [facility]  [address]  [facility] [address] [phone]            Dec 12, 2017  9:00 AM CST   Level 1 with [facility] INFUSION CHAIR 1   [facility] ([facility])    [facility]  [address]  [facility] [id number] [phone]            Dec 26, 2017  9:00 AM CST   Level 1 with [facility] INFUSION CHAIR 1   [facility] ([facility])    [facility]  [address]  [facility] [id number] [phone]              Who to contact     If you have questions or need follow up information about today's clinic visit or your schedule please contact [facility] directly at [phone].  Normal or non-critical lab and imaging results will be communicated to you by MyChart, letter or phone within  4 business days after the clinic has received the results. If you do not hear from us within 7 days, please contact the clinic through [employer] or phone. If you have a critical or abnormal lab result, we will notify you by phone as soon as possible.  Submit refill requests through [employer] or call your pharmacy and they will forward the refill request to us. Please allow 3 business days for your refill to be completed.          Additional Information About Your Visit        [employer] Information     [employer] gives you secure access to your electronic health record. If you see a primary care provider, you can also send messages to your care team and make appointments. If you have questions, please call your primary care clinic.  If you do not have a primary care provider, please call [phone] and they will assist you.        Care EveryWhere ID     This is your Care EveryWhere ID. This could be used by other organizations to access your [facility] medical records  [id number]        Your Vitals Were     Pulse Last Period                90 07/11/2014           Blood Pressure from Last 3 Encounters:   10/31/17 127/80   10/24/17 111/76   10/17/17 125/76    Weight from Last 3 Encounters:   10/24/17 75.4 kg (166 lb 3.2 oz)   05/12/17 75.3 kg (166 lb)   05/04/17 77.6 kg (171 lb)              Today, you had the following     No orders found for display         Today's Medication Changes          These changes are accurate as of: 10/31/17 10:01 AM.  If you have any questions, ask your nurse or doctor.               These medicines have changed or have updated prescriptions.        Dose/Directions    gabapentin 100 MG capsule   Commonly known as:  NEURONTIN   This may have changed:  additional instructions   Used for:  Cervicalgia        Take 100 mg in the morning and 400 mg at bedtime for one week, then increase to 100 mg in the morning and 600 mg at bedtime.   Quantity:  210 capsule   Refills:  1                Primary Care  Provider Office Phone # Fax #    Bright [facility]elo [phone] [address] [address]        Equal Access to [facility]     SUJEYCHARLENE DAISY : Hadaktlyn vale ku maru Pacheco, waaxda luqadaha, qaybta kaalmada jace, dominic shipley laJoseaddie delores. So [facility] [phone].    ATENCIÓN: Si habla español, tiene a rojas disposición servicios gratuitos de asistencia lingüística. Llame al [phone].    We comply with applicable federal civil rights laws and [place] laws. We do not discriminate on the basis of race, color, national origin, age, disability, sex, sexual orientation, or gender identity.            Thank you!     Thank you for choosing [facility]  for your care. Our goal is always to provide you with excellent care. Hearing back from our patients is one way we can continue to improve our services. Please take a few minutes to complete the written survey that you may receive in the mail after your visit with us. Thank you!             Your Updated Medication List - Protect others around you: Learn how to safely use, store and throw away your medicines at www.disposemymeds.org.          This list is accurate as of: 10/31/17 10:01 AM.  Always use your most recent med list.                   Brand Name Dispense Instructions for use Diagnosis    BOTOX IJ           diltiazem 120 MG 24 hr capsule    CARDIZEM CD    30 capsule    Take 1 capsule (120 mg) by mouth daily You are due to see the cardiologist- please call [phone] to schedule.    Chest pressure       gabapentin 100 MG capsule    NEURONTIN    210 capsule    Take 100 mg in the morning and 400 mg at bedtime for one week, then increase to 100 mg in the morning and 600 mg at bedtime.    Cervicalgia       loratadine 10 MG tablet    CLARITIN     Take 10 mg by mouth daily        NORTRIPTYLINE HCL PO      Take 200 mg by mouth daily        PAROXETINE HCL PO      Take 25 mg by  mouth daily        XOLAIR 150 MG injection   Generic drug:  omalizumab      Inject Subcutaneous every 14 days    Severe persistent asthma without complication

## 2017-10-31 NOTE — PROGRESS NOTES
"Infusion Nursing Note:  Alba Varela presents today for Xolair.    Patient seen by provider today: No   present during visit today: Not Applicable.    Note: Pt states no asthma exacerbations since starting Xolair and only uses inhalers \"very minimally.\"    Upcoming right hip replacement on 11/3/17 for dysplasia..    Intravenous Access:  No Intravenous access/labs at this visit.    Treatment Conditions:  Not Applicable.      Post Infusion Assessment:  Patient tolerated injection without incident.  Patient observed for 30 minutes post Xolair per protocol.  Site patent and intact, free from redness, edema or discomfort.    Discharge Plan:   Discharge instructions reviewed with: Patient.  Patient and/or family verbalized understanding of discharge instructions and all questions answered.  AVS to patient via [employer].  Patient will return 11/14/17 for Xolair for next appointment.   Patient discharged in stable condition accompanied by: self.  Departure Mode: Ambulatory.    Mame Condon RN                    '  "

## 2017-11-02 NOTE — PHARMACY-ADMISSION MEDICATION HISTORY
Medication reconciliation interview completed by pre-admitting nurse, reviewed by pharmacy. No further clarifications needed.     Prior to Admission medications    Medication Sig Last Dose Taking? Auth Provider   omalizumab (XOLAIR) 150 MG injection Inject Subcutaneous every 14 days  Yes Reported, Patient   diltiazem (CARDIZEM CD) 120 MG 24 hr capsule Take 1 capsule (120 mg) by mouth daily You are due to see the cardiologist- please call [phone] to schedule.  Yes Juliette Carrasco APRN CNP   loratadine (CLARITIN) 10 MG tablet Take 10 mg by mouth daily  Yes Reported, Patient   OnabotulinumtoxinA (BOTOX IJ) As directed  Yes Reported, Patient   NORTRIPTYLINE HCL PO Take 200 mg by mouth daily  Yes Reported, Patient   PAROXETINE HCL PO Take 25 mg by mouth daily  Yes Reported, Patient   gabapentin (NEURONTIN) 100 MG capsule Take 100 mg in the morning and 400 mg at bedtime for one week, then increase to 100 mg in the morning and 600 mg at bedtime.  Patient taking differently: Take 400 mg in the morning and 600 mg at bedtime.  Yes Rito Dooley MD

## 2017-11-03 ENCOUNTER — HOSPITAL ENCOUNTER (INPATIENT)
Facility: CLINIC | Age: 47
LOS: 2 days | Discharge: HOME | End: 2017-11-05
Attending: ORTHOPAEDIC SURGERY | Admitting: ORTHOPAEDIC SURGERY
Payer: COMMERCIAL

## 2017-11-03 ENCOUNTER — ANESTHESIA EVENT (OUTPATIENT)
Dept: SURGERY | Facility: CLINIC | Age: 47
End: 2017-11-03
Payer: COMMERCIAL

## 2017-11-03 ENCOUNTER — ANESTHESIA (OUTPATIENT)
Dept: SURGERY | Facility: CLINIC | Age: 47
End: 2017-11-03
Payer: COMMERCIAL

## 2017-11-03 ENCOUNTER — APPOINTMENT (OUTPATIENT)
Dept: GENERAL RADIOLOGY | Facility: CLINIC | Age: 47
End: 2017-11-03
Attending: ORTHOPAEDIC SURGERY
Payer: COMMERCIAL

## 2017-11-03 DIAGNOSIS — Z96.641 STATUS POST TOTAL REPLACEMENT OF RIGHT HIP: Primary | ICD-10-CM

## 2017-11-03 LAB — GLUCOSE BLDC GLUCOMTR-MCNC: 139 MG/DL (ref 70–99)

## 2017-11-03 PROCEDURE — 71000014 ZZH RECOVERY PHASE 1 LEVEL 2 FIRST HR: Performed by: ORTHOPAEDIC SURGERY

## 2017-11-03 PROCEDURE — 12000000 ZZH R&B MED SURG/OB

## 2017-11-03 PROCEDURE — 25800025 ZZH RX 258: Performed by: ORTHOPAEDIC SURGERY

## 2017-11-03 PROCEDURE — 0SR904A REPLACEMENT OF RIGHT HIP JOINT WITH CERAMIC ON POLYETHYLENE SYNTHETIC SUBSTITUTE, UNCEMENTED, OPEN APPROACH: ICD-10-PCS | Performed by: ORTHOPAEDIC SURGERY

## 2017-11-03 PROCEDURE — 25000128 H RX IP 250 OP 636: Performed by: NURSE ANESTHETIST, CERTIFIED REGISTERED

## 2017-11-03 PROCEDURE — 00000146 ZZHCL STATISTIC GLUCOSE BY METER IP

## 2017-11-03 PROCEDURE — 37000008 ZZH ANESTHESIA TECHNICAL FEE, 1ST 30 MIN: Performed by: ORTHOPAEDIC SURGERY

## 2017-11-03 PROCEDURE — 25000128 H RX IP 250 OP 636: Performed by: ORTHOPAEDIC SURGERY

## 2017-11-03 PROCEDURE — S0020 INJECTION, BUPIVICAINE HYDRO: HCPCS | Performed by: ORTHOPAEDIC SURGERY

## 2017-11-03 PROCEDURE — 25000128 H RX IP 250 OP 636: Performed by: ANESTHESIOLOGY

## 2017-11-03 PROCEDURE — 40000306 ZZH STATISTIC PRE PROC ASSESS II: Performed by: ORTHOPAEDIC SURGERY

## 2017-11-03 PROCEDURE — 25000132 ZZH RX MED GY IP 250 OP 250 PS 637: Performed by: ORTHOPAEDIC SURGERY

## 2017-11-03 PROCEDURE — 40000985 XR PELVIS AD HIP PORTABLE RIGHT 1 VIEW

## 2017-11-03 PROCEDURE — 25000125 ZZHC RX 250: Performed by: ANESTHESIOLOGY

## 2017-11-03 PROCEDURE — 37000009 ZZH ANESTHESIA TECHNICAL FEE, EACH ADDTL 15 MIN: Performed by: ORTHOPAEDIC SURGERY

## 2017-11-03 PROCEDURE — 25000125 ZZHC RX 250: Performed by: ORTHOPAEDIC SURGERY

## 2017-11-03 PROCEDURE — 71000015 ZZH RECOVERY PHASE 1 LEVEL 2 EA ADDTL HR: Performed by: ORTHOPAEDIC SURGERY

## 2017-11-03 PROCEDURE — C1776 JOINT DEVICE (IMPLANTABLE): HCPCS | Performed by: ORTHOPAEDIC SURGERY

## 2017-11-03 PROCEDURE — 36000093 ZZH SURGERY LEVEL 4 1ST 30 MIN: Performed by: ORTHOPAEDIC SURGERY

## 2017-11-03 PROCEDURE — 25000125 ZZHC RX 250: Performed by: NURSE ANESTHETIST, CERTIFIED REGISTERED

## 2017-11-03 PROCEDURE — 25000566 ZZH SEVOFLURANE, EA 15 MIN: Performed by: ORTHOPAEDIC SURGERY

## 2017-11-03 PROCEDURE — 36000063 ZZH SURGERY LEVEL 4 EA 15 ADDTL MIN: Performed by: ORTHOPAEDIC SURGERY

## 2017-11-03 PROCEDURE — 27210794 ZZH OR GENERAL SUPPLY STERILE: Performed by: ORTHOPAEDIC SURGERY

## 2017-11-03 PROCEDURE — 27210995 ZZH RX 272: Performed by: ORTHOPAEDIC SURGERY

## 2017-11-03 PROCEDURE — 40000985 XR PELVIS PORT 1/2 VW: Mod: TC

## 2017-11-03 DEVICE — IMP HEAD FEMORAL STRK BIOLOX DELTA CERAMIC 36MM +2.5MM: Type: IMPLANTABLE DEVICE | Site: HIP | Status: FUNCTIONAL

## 2017-11-03 DEVICE — IMP INSERT STRK TRIDENT X3 POLY 36MM 0DEG SZ D 623-00-36D: Type: IMPLANTABLE DEVICE | Site: HIP | Status: FUNCTIONAL

## 2017-11-03 DEVICE — IMP SCR BONE STRK TORX 6.5X30MM CAN 2030-6530-1: Type: IMPLANTABLE DEVICE | Site: HIP | Status: FUNCTIONAL

## 2017-11-03 DEVICE — IMP SHELL ACET STRK CLUSTER HOLE 52MM SZ D 502-03-52D: Type: IMPLANTABLE DEVICE | Site: HIP | Status: FUNCTIONAL

## 2017-11-03 DEVICE — IMP PLUG HIP SECUR-FIT APICAL 2060-0000-1: Type: IMPLANTABLE DEVICE | Site: HIP | Status: FUNCTIONAL

## 2017-11-03 DEVICE — IMPLANTABLE DEVICE: Type: IMPLANTABLE DEVICE | Site: HIP | Status: FUNCTIONAL

## 2017-11-03 RX ORDER — OXYCODONE HYDROCHLORIDE 5 MG/1
5-10 TABLET ORAL
Status: DISCONTINUED | OUTPATIENT
Start: 2017-11-03 | End: 2017-11-05 | Stop reason: HOSPADM

## 2017-11-03 RX ORDER — ONDANSETRON 2 MG/ML
INJECTION INTRAMUSCULAR; INTRAVENOUS PRN
Status: DISCONTINUED | OUTPATIENT
Start: 2017-11-03 | End: 2017-11-03

## 2017-11-03 RX ORDER — LORATADINE 10 MG/1
10 TABLET ORAL DAILY
Status: DISCONTINUED | OUTPATIENT
Start: 2017-11-04 | End: 2017-11-05 | Stop reason: HOSPADM

## 2017-11-03 RX ORDER — LIDOCAINE 40 MG/G
CREAM TOPICAL
Status: DISCONTINUED | OUTPATIENT
Start: 2017-11-03 | End: 2017-11-03 | Stop reason: HOSPADM

## 2017-11-03 RX ORDER — NORTRIPTYLINE HYDROCHLORIDE 50 MG/1
200 CAPSULE ORAL AT BEDTIME
Status: DISCONTINUED | OUTPATIENT
Start: 2017-11-03 | End: 2017-11-05 | Stop reason: HOSPADM

## 2017-11-03 RX ORDER — ASPIRIN 81 MG/1
162 TABLET ORAL 2 TIMES DAILY WITH MEALS
Status: DISCONTINUED | OUTPATIENT
Start: 2017-11-04 | End: 2017-11-05 | Stop reason: HOSPADM

## 2017-11-03 RX ORDER — LIDOCAINE HYDROCHLORIDE ANHYDROUS AND EPINEPHRINE 10; 10 MG/ML; UG/ML
30 INJECTION, SOLUTION INFILTRATION ONCE
Status: DISCONTINUED | OUTPATIENT
Start: 2017-11-03 | End: 2017-11-03 | Stop reason: HOSPADM

## 2017-11-03 RX ORDER — NALOXONE HYDROCHLORIDE 0.4 MG/ML
.1-.4 INJECTION, SOLUTION INTRAMUSCULAR; INTRAVENOUS; SUBCUTANEOUS
Status: DISCONTINUED | OUTPATIENT
Start: 2017-11-03 | End: 2017-11-05 | Stop reason: HOSPADM

## 2017-11-03 RX ORDER — ONDANSETRON 4 MG/1
4 TABLET, ORALLY DISINTEGRATING ORAL EVERY 6 HOURS PRN
Status: DISCONTINUED | OUTPATIENT
Start: 2017-11-03 | End: 2017-11-05 | Stop reason: HOSPADM

## 2017-11-03 RX ORDER — CEFAZOLIN SODIUM 1 G/3ML
1 INJECTION, POWDER, FOR SOLUTION INTRAMUSCULAR; INTRAVENOUS SEE ADMIN INSTRUCTIONS
Status: DISCONTINUED | OUTPATIENT
Start: 2017-11-03 | End: 2017-11-03 | Stop reason: HOSPADM

## 2017-11-03 RX ORDER — OXYCODONE HCL 10 MG/1
10 TABLET, FILM COATED, EXTENDED RELEASE ORAL ONCE
Status: COMPLETED | OUTPATIENT
Start: 2017-11-03 | End: 2017-11-03

## 2017-11-03 RX ORDER — KETOROLAC TROMETHAMINE 30 MG/ML
30 INJECTION, SOLUTION INTRAMUSCULAR; INTRAVENOUS EVERY 6 HOURS PRN
Status: DISCONTINUED | OUTPATIENT
Start: 2017-11-03 | End: 2017-11-03 | Stop reason: HOSPADM

## 2017-11-03 RX ORDER — PROCHLORPERAZINE MALEATE 5 MG
5-10 TABLET ORAL EVERY 6 HOURS PRN
Status: DISCONTINUED | OUTPATIENT
Start: 2017-11-03 | End: 2017-11-05 | Stop reason: HOSPADM

## 2017-11-03 RX ORDER — SODIUM CHLORIDE, SODIUM LACTATE, POTASSIUM CHLORIDE, CALCIUM CHLORIDE 600; 310; 30; 20 MG/100ML; MG/100ML; MG/100ML; MG/100ML
INJECTION, SOLUTION INTRAVENOUS CONTINUOUS
Status: DISCONTINUED | OUTPATIENT
Start: 2017-11-03 | End: 2017-11-03 | Stop reason: HOSPADM

## 2017-11-03 RX ORDER — PAROXETINE 20 MG/1
20 TABLET, FILM COATED ORAL AT BEDTIME
Status: DISCONTINUED | OUTPATIENT
Start: 2017-11-03 | End: 2017-11-05 | Stop reason: HOSPADM

## 2017-11-03 RX ORDER — DEXTROSE MONOHYDRATE, SODIUM CHLORIDE, AND POTASSIUM CHLORIDE 50; 1.49; 4.5 G/1000ML; G/1000ML; G/1000ML
INJECTION, SOLUTION INTRAVENOUS CONTINUOUS
Status: DISCONTINUED | OUTPATIENT
Start: 2017-11-03 | End: 2017-11-04

## 2017-11-03 RX ORDER — ONDANSETRON 2 MG/ML
4 INJECTION INTRAMUSCULAR; INTRAVENOUS EVERY 6 HOURS PRN
Status: DISCONTINUED | OUTPATIENT
Start: 2017-11-03 | End: 2017-11-05 | Stop reason: HOSPADM

## 2017-11-03 RX ORDER — OXYCODONE HCL 10 MG/1
10 TABLET, FILM COATED, EXTENDED RELEASE ORAL EVERY 12 HOURS
Status: DISPENSED | OUTPATIENT
Start: 2017-11-03 | End: 2017-11-05

## 2017-11-03 RX ORDER — DILTIAZEM HYDROCHLORIDE 120 MG/1
120 CAPSULE, COATED, EXTENDED RELEASE ORAL DAILY
Status: DISCONTINUED | OUTPATIENT
Start: 2017-11-04 | End: 2017-11-05 | Stop reason: HOSPADM

## 2017-11-03 RX ORDER — CEFAZOLIN SODIUM 2 G/100ML
2 INJECTION, SOLUTION INTRAVENOUS
Status: COMPLETED | OUTPATIENT
Start: 2017-11-03 | End: 2017-11-03

## 2017-11-03 RX ORDER — HYDROMORPHONE HYDROCHLORIDE 1 MG/ML
.3-.5 INJECTION, SOLUTION INTRAMUSCULAR; INTRAVENOUS; SUBCUTANEOUS EVERY 10 MIN PRN
Status: DISCONTINUED | OUTPATIENT
Start: 2017-11-03 | End: 2017-11-03 | Stop reason: HOSPADM

## 2017-11-03 RX ORDER — NEOSTIGMINE METHYLSULFATE 1 MG/ML
VIAL (ML) INJECTION PRN
Status: DISCONTINUED | OUTPATIENT
Start: 2017-11-03 | End: 2017-11-03

## 2017-11-03 RX ORDER — GLYCOPYRROLATE 0.2 MG/ML
INJECTION, SOLUTION INTRAMUSCULAR; INTRAVENOUS PRN
Status: DISCONTINUED | OUTPATIENT
Start: 2017-11-03 | End: 2017-11-03

## 2017-11-03 RX ORDER — ONDANSETRON 4 MG/1
4 TABLET, ORALLY DISINTEGRATING ORAL EVERY 30 MIN PRN
Status: DISCONTINUED | OUTPATIENT
Start: 2017-11-03 | End: 2017-11-03 | Stop reason: HOSPADM

## 2017-11-03 RX ORDER — HYDRALAZINE HYDROCHLORIDE 20 MG/ML
2.5-5 INJECTION INTRAMUSCULAR; INTRAVENOUS EVERY 10 MIN PRN
Status: DISCONTINUED | OUTPATIENT
Start: 2017-11-03 | End: 2017-11-03 | Stop reason: HOSPADM

## 2017-11-03 RX ORDER — KETOROLAC TROMETHAMINE 30 MG/ML
30 INJECTION, SOLUTION INTRAMUSCULAR; INTRAVENOUS EVERY 6 HOURS
Status: DISPENSED | OUTPATIENT
Start: 2017-11-03 | End: 2017-11-04

## 2017-11-03 RX ORDER — GABAPENTIN 400 MG/1
400 CAPSULE ORAL 2 TIMES DAILY
Status: DISCONTINUED | OUTPATIENT
Start: 2017-11-03 | End: 2017-11-05 | Stop reason: HOSPADM

## 2017-11-03 RX ORDER — ACETAMINOPHEN 325 MG/1
975 TABLET ORAL EVERY 8 HOURS
Status: DISCONTINUED | OUTPATIENT
Start: 2017-11-03 | End: 2017-11-05 | Stop reason: HOSPADM

## 2017-11-03 RX ORDER — ALBUTEROL SULFATE 0.83 MG/ML
2.5 SOLUTION RESPIRATORY (INHALATION) EVERY 4 HOURS PRN
Status: DISCONTINUED | OUTPATIENT
Start: 2017-11-03 | End: 2017-11-03 | Stop reason: HOSPADM

## 2017-11-03 RX ORDER — LIDOCAINE HYDROCHLORIDE 10 MG/ML
INJECTION, SOLUTION INFILTRATION; PERINEURAL PRN
Status: DISCONTINUED | OUTPATIENT
Start: 2017-11-03 | End: 2017-11-03

## 2017-11-03 RX ORDER — PROPOFOL 10 MG/ML
INJECTION, EMULSION INTRAVENOUS PRN
Status: DISCONTINUED | OUTPATIENT
Start: 2017-11-03 | End: 2017-11-03

## 2017-11-03 RX ORDER — FENTANYL CITRATE 50 UG/ML
25-50 INJECTION, SOLUTION INTRAMUSCULAR; INTRAVENOUS EVERY 5 MIN PRN
Status: DISCONTINUED | OUTPATIENT
Start: 2017-11-03 | End: 2017-11-03 | Stop reason: HOSPADM

## 2017-11-03 RX ORDER — HYDROXYZINE HYDROCHLORIDE 25 MG/1
25 TABLET, FILM COATED ORAL EVERY 6 HOURS PRN
Status: DISCONTINUED | OUTPATIENT
Start: 2017-11-03 | End: 2017-11-05 | Stop reason: HOSPADM

## 2017-11-03 RX ORDER — FENTANYL CITRATE 50 UG/ML
25-50 INJECTION, SOLUTION INTRAMUSCULAR; INTRAVENOUS
Status: DISCONTINUED | OUTPATIENT
Start: 2017-11-03 | End: 2017-11-03 | Stop reason: HOSPADM

## 2017-11-03 RX ORDER — ACETAMINOPHEN 325 MG/1
650 TABLET ORAL EVERY 4 HOURS PRN
Status: DISCONTINUED | OUTPATIENT
Start: 2017-11-06 | End: 2017-11-05 | Stop reason: HOSPADM

## 2017-11-03 RX ORDER — FENTANYL CITRATE 50 UG/ML
INJECTION, SOLUTION INTRAMUSCULAR; INTRAVENOUS PRN
Status: DISCONTINUED | OUTPATIENT
Start: 2017-11-03 | End: 2017-11-03

## 2017-11-03 RX ORDER — MEPERIDINE HYDROCHLORIDE 25 MG/ML
12.5 INJECTION INTRAMUSCULAR; INTRAVENOUS; SUBCUTANEOUS
Status: DISCONTINUED | OUTPATIENT
Start: 2017-11-03 | End: 2017-11-03 | Stop reason: HOSPADM

## 2017-11-03 RX ORDER — SCOLOPAMINE TRANSDERMAL SYSTEM 1 MG/1
1 PATCH, EXTENDED RELEASE TRANSDERMAL ONCE
Status: COMPLETED | OUTPATIENT
Start: 2017-11-03 | End: 2017-11-03

## 2017-11-03 RX ORDER — LIDOCAINE 40 MG/G
CREAM TOPICAL
Status: DISCONTINUED | OUTPATIENT
Start: 2017-11-03 | End: 2017-11-05 | Stop reason: HOSPADM

## 2017-11-03 RX ORDER — ONDANSETRON 2 MG/ML
4 INJECTION INTRAMUSCULAR; INTRAVENOUS EVERY 30 MIN PRN
Status: DISCONTINUED | OUTPATIENT
Start: 2017-11-03 | End: 2017-11-03 | Stop reason: HOSPADM

## 2017-11-03 RX ORDER — ACETAMINOPHEN 500 MG
1000 TABLET ORAL ONCE
Status: COMPLETED | OUTPATIENT
Start: 2017-11-03 | End: 2017-11-03

## 2017-11-03 RX ORDER — PROPOFOL 10 MG/ML
INJECTION, EMULSION INTRAVENOUS CONTINUOUS PRN
Status: DISCONTINUED | OUTPATIENT
Start: 2017-11-03 | End: 2017-11-03

## 2017-11-03 RX ORDER — METOPROLOL TARTRATE 1 MG/ML
1-2 INJECTION, SOLUTION INTRAVENOUS EVERY 5 MIN PRN
Status: DISCONTINUED | OUTPATIENT
Start: 2017-11-03 | End: 2017-11-03 | Stop reason: HOSPADM

## 2017-11-03 RX ORDER — AMOXICILLIN 250 MG
1-2 CAPSULE ORAL 2 TIMES DAILY
Status: DISCONTINUED | OUTPATIENT
Start: 2017-11-03 | End: 2017-11-05 | Stop reason: HOSPADM

## 2017-11-03 RX ORDER — HYDROMORPHONE HYDROCHLORIDE 1 MG/ML
.4-.8 INJECTION, SOLUTION INTRAMUSCULAR; INTRAVENOUS; SUBCUTANEOUS
Status: DISCONTINUED | OUTPATIENT
Start: 2017-11-03 | End: 2017-11-05 | Stop reason: HOSPADM

## 2017-11-03 RX ORDER — NALOXONE HYDROCHLORIDE 0.4 MG/ML
.1-.4 INJECTION, SOLUTION INTRAMUSCULAR; INTRAVENOUS; SUBCUTANEOUS
Status: DISCONTINUED | OUTPATIENT
Start: 2017-11-03 | End: 2017-11-03 | Stop reason: HOSPADM

## 2017-11-03 RX ORDER — DEXAMETHASONE SODIUM PHOSPHATE 4 MG/ML
INJECTION, SOLUTION INTRA-ARTICULAR; INTRALESIONAL; INTRAMUSCULAR; INTRAVENOUS; SOFT TISSUE PRN
Status: DISCONTINUED | OUTPATIENT
Start: 2017-11-03 | End: 2017-11-03

## 2017-11-03 RX ADMIN — POTASSIUM CHLORIDE, DEXTROSE MONOHYDRATE AND SODIUM CHLORIDE: 150; 5; 450 INJECTION, SOLUTION INTRAVENOUS at 19:33

## 2017-11-03 RX ADMIN — PAROXETINE HYDROCHLORIDE 20 MG: 20 TABLET, FILM COATED ORAL at 21:12

## 2017-11-03 RX ADMIN — OXYCODONE HYDROCHLORIDE 5 MG: 5 TABLET ORAL at 22:21

## 2017-11-03 RX ADMIN — Medication 2 MG: at 11:47

## 2017-11-03 RX ADMIN — CEFAZOLIN SODIUM 2 G: 2 INJECTION, SOLUTION INTRAVENOUS at 10:22

## 2017-11-03 RX ADMIN — SODIUM CHLORIDE, POTASSIUM CHLORIDE, SODIUM LACTATE AND CALCIUM CHLORIDE: 600; 310; 30; 20 INJECTION, SOLUTION INTRAVENOUS at 10:22

## 2017-11-03 RX ADMIN — FENTANYL CITRATE 100 MCG: 50 INJECTION, SOLUTION INTRAMUSCULAR; INTRAVENOUS at 10:28

## 2017-11-03 RX ADMIN — Medication 1 G: at 11:30

## 2017-11-03 RX ADMIN — SODIUM CHLORIDE, POTASSIUM CHLORIDE, SODIUM LACTATE AND CALCIUM CHLORIDE: 600; 310; 30; 20 INJECTION, SOLUTION INTRAVENOUS at 11:25

## 2017-11-03 RX ADMIN — ACETAMINOPHEN 1000 MG: 500 TABLET, FILM COATED ORAL at 09:08

## 2017-11-03 RX ADMIN — GABAPENTIN 400 MG: 400 CAPSULE ORAL at 20:23

## 2017-11-03 RX ADMIN — GLYCOPYRROLATE 0.4 MG: 0.2 INJECTION, SOLUTION INTRAMUSCULAR; INTRAVENOUS at 11:47

## 2017-11-03 RX ADMIN — CEFAZOLIN SODIUM 1 G: 1 INJECTION, SOLUTION INTRAVENOUS at 18:50

## 2017-11-03 RX ADMIN — KETOROLAC TROMETHAMINE 30 MG: 30 INJECTION, SOLUTION INTRAMUSCULAR at 20:23

## 2017-11-03 RX ADMIN — HYDROMORPHONE HYDROCHLORIDE 1 MG: 1 INJECTION, SOLUTION INTRAMUSCULAR; INTRAVENOUS; SUBCUTANEOUS at 10:28

## 2017-11-03 RX ADMIN — SCOLOPAMINE TRANSDERMAL SYSTEM 1 PATCH: 1 PATCH, EXTENDED RELEASE TRANSDERMAL at 09:45

## 2017-11-03 RX ADMIN — PROPOFOL 200 MG: 10 INJECTION, EMULSION INTRAVENOUS at 10:28

## 2017-11-03 RX ADMIN — OXYCODONE HYDROCHLORIDE 10 MG: 10 TABLET, FILM COATED, EXTENDED RELEASE ORAL at 09:08

## 2017-11-03 RX ADMIN — DEXAMETHASONE SODIUM PHOSPHATE 4 MG: 4 INJECTION, SOLUTION INTRA-ARTICULAR; INTRALESIONAL; INTRAMUSCULAR; INTRAVENOUS; SOFT TISSUE at 10:28

## 2017-11-03 RX ADMIN — NORTRIPTYLINE HYDROCHLORIDE 200 MG: 50 CAPSULE ORAL at 21:12

## 2017-11-03 RX ADMIN — HYDROMORPHONE HYDROCHLORIDE 1 MG: 1 INJECTION, SOLUTION INTRAMUSCULAR; INTRAVENOUS; SUBCUTANEOUS at 10:55

## 2017-11-03 RX ADMIN — ONDANSETRON 4 MG: 2 INJECTION INTRAMUSCULAR; INTRAVENOUS at 10:45

## 2017-11-03 RX ADMIN — MIDAZOLAM HYDROCHLORIDE 2 MG: 1 INJECTION, SOLUTION INTRAMUSCULAR; INTRAVENOUS at 10:22

## 2017-11-03 RX ADMIN — DEXAMETHASONE SODIUM PHOSPHATE 6 MG: 4 INJECTION, SOLUTION INTRA-ARTICULAR; INTRALESIONAL; INTRAMUSCULAR; INTRAVENOUS; SOFT TISSUE at 10:45

## 2017-11-03 RX ADMIN — PROPOFOL 150 MCG/KG/MIN: 10 INJECTION, EMULSION INTRAVENOUS at 10:35

## 2017-11-03 RX ADMIN — ACETAMINOPHEN 975 MG: 325 TABLET, FILM COATED ORAL at 20:23

## 2017-11-03 RX ADMIN — ROCURONIUM BROMIDE 40 MG: 10 INJECTION INTRAVENOUS at 10:28

## 2017-11-03 RX ADMIN — Medication 1 G: at 10:30

## 2017-11-03 RX ADMIN — LIDOCAINE HYDROCHLORIDE 50 MG: 10 INJECTION, SOLUTION INFILTRATION; PERINEURAL at 10:28

## 2017-11-03 ASSESSMENT — LIFESTYLE VARIABLES: TOBACCO_USE: 1

## 2017-11-03 NOTE — BRIEF OP NOTE
[facility] Brief Operative Note    Pre-operative diagnosis: Right hip osteoarthritis and in the setting of perthes   Post-operative diagnosis Same   Procedure: Procedure(s):  Right total hip arthroplasty     - Wound Class: I-Clean   Surgeon(s): Surgeon(s) and Role:     * Shahriar Gamble MD - Primary     * Segun Jacobson PA-C - Assisting   Estimated blood loss: 250 mL    Specimens: * No specimens in log *   Findings: See dictated op note    Shahriar Gamble MD  [phone]

## 2017-11-03 NOTE — ANESTHESIA CARE TRANSFER NOTE
Patient: Alba Varela    Procedure(s):  Right total hip arthroplasty     - Wound Class: I-Clean    Diagnosis: Right hip osteoarthritis and in the setting of dysplasia  Diagnosis Additional Information: No value filed.    Anesthesia Type:   Spinal     Note:  Airway :ETT  Patient transferred to:PACU  Handoff Report: Identifed the Patient, Identified the Reponsible Provider, Reviewed the pertinent medical history, Discussed the surgical course, Reviewed Intra-OP anesthesia mangement and issues during anesthesia, Set expectations for post-procedure period and Allowed opportunity for questions and acknowledgement of understanding      Vitals: (Last set prior to Anesthesia Care Transfer)    CRNA VITALS  11/3/2017 1137 - 11/3/2017 1213      11/3/2017             NIBP: 115/65    NIBP Mean: 82                Electronically Signed By: WALESKA Jaeger CRNA  November 3, 2017  12:13 PM

## 2017-11-03 NOTE — IP AVS SNAPSHOT
[facility]    [address]    [facility] [address]    Phone:  [phone]    Fax:  [phone]                                       After Visit Summary   11/3/2017    Alba Varela    MRN: [id number]           After Visit Summary Signature Page     I have received my discharge instructions, and my questions have been answered. I have discussed any challenges I see with this plan with the nurse or doctor.    ..........................................................................................................................................  Patient/Patient Representative Signature      ..........................................................................................................................................  Patient Representative Print Name and Relationship to Patient    ..................................................               ................................................  Date                                            Time    ..........................................................................................................................................  Reviewed by Signature/Title    ...................................................              ..............................................  Date                                                            Time

## 2017-11-03 NOTE — ANESTHESIA POSTPROCEDURE EVALUATION
Patient: Alba Varela    Procedure(s):  Right total hip arthroplasty     - Wound Class: I-Clean    Diagnosis:Right hip osteoarthritis and in the setting of dysplasia  Diagnosis Additional Information: Pre-operative diagnosis: Right hip osteoarthritis and in the setting of perthes  Post-operative diagnosis Same  Procedure: Procedure(s):  Right total hip arthroplasty        Anesthesia Type:  General, LMA    Note:  Anesthesia Post Evaluation    Patient location during evaluation: PACU  Patient participation: Able to fully participate in evaluation  Level of consciousness: awake  Pain management: adequate  Airway patency: patent  Cardiovascular status: acceptable  Respiratory status: acceptable  Hydration status: acceptable  PONV: controlled     Anesthetic complications: None    Comments: Extubated in pacu due to narcotization        Last vitals:  Vitals:    11/03/17 1325 11/03/17 1330 11/03/17 1340   BP: 114/75 105/74 115/82   Pulse:      Resp: (!) 6 8 8   Temp:      SpO2: 100% 100% 100%         Electronically Signed By: Cotl Wu MD  November 3, 2017  1:47 PM

## 2017-11-03 NOTE — IP AVS SNAPSHOT
MRN:[id number]                      After Visit Summary   11/3/2017    Alba Varela    MRN: [id number]           Thank you!     Thank you for choosing [facility] for your care. Our goal is always to provide you with excellent care. Hearing back from our patients is one way we can continue to improve our services. Please take a few minutes to complete the written survey that you may receive in the mail after you visit. If you would like to speak to someone directly about your visit please contact Patient Relations at [phone]. Thank you!          Patient Information     Date Of Birth          1970        Designated Caregiver       Most Recent Value    Caregiver    Will someone help with your care after discharge? yes    Name of designated caregiver Yanci guevara)    Phone number of caregiver [phone] cell    Caregiver address same as pt      About your hospital stay     You were admitted on:  November 3, 2017 You last received care in the:  [facility]    You were discharged on:  November 5, 2017       Who to Call     For medical emergencies, please call 911.  For non-urgent questions about your medical care, please call your primary care provider or clinic, [phone]  For questions related to your surgery, please call your surgery clinic        Attending Provider     Provider Specialty    Shahriar Gamble MD Orthopedics       Primary Care Provider Office Phone # Fax #    Bright [facility]elo [phone]       When to contact your care team       Call if uncontrolled pain, fevers>102, increasing redness/drainage from incision                  After Care Instructions     Activity       Your activity upon discharge: progressive further daily walking with assistive devices as needed.            Wound care and dressings       Instructions to care for your wound at home: Leave aquacel dressing in place.  Okay to get this wet in the shower.   No bathing/soaking for 3 weeks.                  Follow-up Appointments     Follow-up and recommended labs and tests        Follow-up as scheduled                  Your next 10 appointments already scheduled     Nov 14, 2017  9:00 AM CST   Level 1 with RH INFUSION CHAIR 5   [facility] ([facility])    [facility]  [address]  [place] [address]   [phone]            Nov 28, 2017  9:00 AM CST   Level 1 with RH INFUSION CHAIR 1   [facility] ([facility])    [facility]  [address]  [facility] [address]   [phone]            Dec 12, 2017  9:00 AM CST   Level 1 with RH INFUSION CHAIR 1   [facility] ([facility])    [facility]  [address]  [facility] [address]   [phone]            Dec 26, 2017  9:00 AM CST   Level 1 with RH INFUSION CHAIR 1   [facility] ([facility])    [facility]  [address]  [facility] [address]   [phone]              Further instructions from your care team                 While on narcotic pain medication:  You have been drowsy in the hospital. Take oxy minimally after you try tylenol. OK per Dr Funk with [facility] to use over the counter ibuprofen in small amounts but monitor dressing for bleeding and stop ibuprofen and call surgeon if bleeding noted.   Monitor for sedation stop taking narcotics if drowsiness continues.     To prevent constipation  1. Drink plenty of water to keep well hydrated   2. May take over the counter Colace or Senna (follow instructions on label)          Transderm Scop (Scopolamine) Patch    The Transderm Scop patch placed behind your ear helps to prevent nausea and vomiting associated with the use of anesthesia  and certain analgesics used during or after many types surgery.  You will need to remove this patch after 3 days.  However, it may be removed sooner if you are no longer concerned about nausea or vomiting.      The most common side effects:  ?  dryness of the mouth  ?  drowsiness  ?  blurred vision  ?  dilation of pupils  ?  disorientation, memory disturbances and/or confusion  ?  dizziness  ?  restlessness  ?  hallucinations  ?  difficulty urinating  ?  skin rash  ?  red or painful eyes    Avoid drinking alcohol while using Transderm Scop patch.  Be careful about driving or operating any machinery while using this medication since it may make you drowsy.  In the unlikely event that you experience pain in the eye, which may be accompanied by widening of the pupil, eye redness and blurred vision, remove the Transderm Scop Patch immediately and consult your doctor.    Removal of Transderm Scop Patch:  To remove the patch simply peel it off your skin.  Be sure to wash your hands and the area behind your ear thoroughly with soap and water.  The Transderm Scop Patch will continue to have some active ingredient after use.  Therefore, to avoid accidental contact or ingestion by children or pets, fold the used patch in half with the sticky side together and dispose in the trash out of reach of children and pets.    Call surgeon or primary care md before or after your follow appointment if any of these issues occur:  1.Uncontrolled/persistant temperature, chills, sweats. Temp >101  2. Uncontrolled pain despite pain medication  3. Numbness or tingling in operative leg, weakness, falls  4. Increased/persistant bleeding,redness,swelling, bruising, drainage (yellow/odourous), or bleeding from or around incision or incision pulling apart.  5. Dizziness, lightheaded (could be pain meds)  6. Calf pain, hard/swollen/warm area, chest pain or shortness of breath   7. Constipation despite taking over the counter stool softner and  laxative for constipation   8. Trouble voiding or signs or symptoms of urinary tract infection  9. Uncontrolled bleeding (nose bleed, incision)  10.  if unable to wake call 911    Please have all family and caregivers review this information.    Other instructions:  See general discharge instruction sheet for hips.  1. Do exercises as instructed by therapist-slowly increased activity as pain tolerates  2. Observe your hip precautions.  3. Walk daily increasing distance and time each day by a little.  4. Ice hip for swelling and discomfort 3-4 times daily for 20 minutes  5.Notify your dentist of your implant so you can get antibiotics before any dental work or for any other invasive procedure.  6. Take over the counter stool softener (mirilax, senna, colace) while on narcotics to prevent constipation so bowel movements are regular., take laxative (milk of magnesia) or a suppository if no bm in 2-3 days (take as directed)  7.  8.Keep track of when you take all medication, especially pain medication. See bottles for instructions and side effects  9. Incentive spirometer hourly to help prevent pneumonia  10. See medication handouts for medication information and side effects  11. DO NOT DRINK alcohol or DRIVE on narcotic pain medication.    Friends and family please read and review all discharge information and medication sheet    IF unable to wake call 911    Dressing information:Do not change dressing  Can shower with aquacell dressing, it is waterproof-do not remove the dressing will be changed at the follow up apt. with your suregon  Inspect surrounding skin daily for s/s of infections.   Do not soak in tub or pool.   If dressing  Loosens wash hands and tape edge down then call surgeon for direction-do not touch incision   If dressing is 1/2 or more full of drainage or leaking call your suregon  If dressing is half full of drainage call your surgeon      Follow up Appointment:  Make follow up apt. 10-14 days post  "surgery,call to make apt. [phone]. Call the same number with any questions or issues prior to or after apt.        Pending Results     No orders found from 11/1/2017 to 11/4/2017.            Statement of Approval     Ordered          11/04/17 1355  I have reviewed and agree with all the recommendations and orders detailed in this document.  EFFECTIVE NOW     Approved and electronically signed by:  Shahriar Gamble MD             Admission Information     Date & Time Provider Department Dept. Phone    11/3/2017 Shahriar Gamble MD [facility] [phone]      Your Vitals Were     Blood Pressure Pulse Temperature Respirations Height Weight    102/70 (BP Location: Right arm) 88 98.1  F (36.7  C) (Oral) 14 1.626 m (5' 4\") 75.8 kg (167 lb)    Last Period Pulse Oximetry BMI (Body Mass Index)             07/11/2014 92% 28.67 kg/m2         Grid2020 Information     [employer]2020 gives you secure access to your electronic health record. If you see a primary care provider, you can also send messages to your care team and make appointments. If you have questions, please call your primary care clinic.  If you do not have a primary care provider, please call [phone] and they will assist you.        Care EveryWhere ID     This is your Care EveryWhere ID. This could be used by other organizations to access your [place] medical records  [id number]        Equal Access to [facility]     REINA VILLA : Hadii vale Pacheco, bailee nassar, qaybta dominic samson. So [facility] [phone].    ATENCIÓN: Si habla español, tiene a rojas disposición servicios gratuitos de asistencia lingüística. Llame al [phone].    We comply with applicable federal civil rights laws and [place] laws. We do not discriminate on the basis of race, color, national origin, age, disability, sex, sexual orientation, or gender identity.               Review of your medicines    "   START taking        Dose / Directions    acetaminophen 325 MG tablet   Commonly known as:  TYLENOL        Dose:  650 mg   Start taking on:  11/6/2017   Take 2 tablets (650 mg) by mouth every 4 hours as needed for other (surgical pain)   Quantity:  100 tablet   Refills:  0       aspirin 81 MG EC tablet        Dose:  162 mg   Take 2 tablets (162 mg) by mouth 2 times daily (with meals)   Quantity:  120 tablet   Refills:  0       hydrOXYzine 25 MG tablet   Commonly known as:  ATARAX        Dose:  25 mg   Take 1 tablet (25 mg) by mouth every 6 hours as needed for itching (pain)   Quantity:  60 tablet   Refills:  0       oxyCODONE IR 5 MG tablet   Commonly known as:  ROXICODONE   Notes to Patient:  Take minimally to prevent sleepiness. Monitor for sedation. You have only been taking 5 mg every 4-6 hours.         Dose:  5-10 mg   Take 1-2 tablets (5-10 mg) by mouth every 3 hours as needed for moderate to severe pain   Quantity:  80 tablet   Refills:  0       senna-docusate 8.6-50 MG per tablet   Commonly known as:  SENOKOT-S;PERICOLACE   Notes to Patient:  Take so that you are regular.  Hold for more than one bm in 24 hours or loose stools        Dose:  1-2 tablet   Take 1-2 tablets by mouth 2 times daily   Quantity:  30 tablet   Refills:  1         CONTINUE these medicines which may have CHANGED, or have new prescriptions. If we are uncertain of the size of tablets/capsules you have at home, strength may be listed as something that might have changed.        Dose / Directions    gabapentin 100 MG capsule   Commonly known as:  NEURONTIN   This may have changed:  additional instructions   Used for:  Cervicalgia        Take 100 mg in the morning and 400 mg at bedtime for one week, then increase to 100 mg in the morning and 600 mg at bedtime.   Quantity:  210 capsule   Refills:  1         CONTINUE these medicines which have NOT CHANGED        Dose / Directions    BOTOX IJ        As directed   Refills:  0       diltiazem 120  MG 24 hr capsule   Commonly known as:  CARDIZEM CD   Used for:  Chest pressure        Dose:  120 mg   Take 1 capsule (120 mg) by mouth daily You are due to see the cardiologist- please call [phone] to schedule.   Quantity:  30 capsule   Refills:  11       loratadine 10 MG tablet   Commonly known as:  CLARITIN        Dose:  10 mg   Take 10 mg by mouth daily   Refills:  0       NORTRIPTYLINE HCL PO        Dose:  200 mg   Take 200 mg by mouth At Bedtime   Refills:  0       PAROXETINE HCL PO        Dose:  20 mg   Take 20 mg by mouth At Bedtime   Refills:  0       XOLAIR 150 MG injection   Used for:  Severe persistent asthma without complication   Generic drug:  omalizumab        Inject Subcutaneous every 14 days   Refills:  0            Where to get your medicines      [place] [facility] [id number] [facility] [address]     Phone:  [phone]     acetaminophen 325 MG tablet    aspirin 81 MG EC tablet    hydrOXYzine 25 MG tablet    senna-docusate 8.6-50 MG per tablet         Some of these will need a paper prescription and others can be bought over the counter. Ask your nurse if you have questions.     Bring a paper prescription for each of these medications     oxyCODONE IR 5 MG tablet                Protect others around you: Learn how to safely use, store and throw away your medicines at www.disposemymeds.org.             Medication List: This is a list of all your medications and when to take them. Check marks below indicate your daily home schedule. Keep this list as a reference.      Medications           Morning Afternoon Evening Bedtime As Needed    acetaminophen 325 MG tablet   Commonly known as:  TYLENOL   Take 2 tablets (650 mg) by mouth every 4 hours as needed for other (surgical pain)   Start taking on:  11/6/2017   Last time this was given:  975 mg on 11/5/2017  6:17 AM   Next Dose Due:  Available now if  needed. If you do use over counter ibuprofen do not take  At same time as tylenol.                                    aspirin 81 MG EC tablet   Take 2 tablets (162 mg) by mouth 2 times daily (with meals)   Last time this was given:  162 mg on 11/5/2017  9:45 AM   Next Dose Due:  Sunday keely  Take 2 times daily 8am and 8pm                                        BOTOX IJ   As directed                                diltiazem 120 MG 24 hr capsule   Commonly known as:  CARDIZEM CD   Take 1 capsule (120 mg) by mouth daily You are due to see the cardiologist- please call [phone] to schedule.                                   gabapentin 100 MG capsule   Commonly known as:  NEURONTIN   Take 100 mg in the morning and 400 mg at bedtime for one week, then increase to 100 mg in the morning and 600 mg at bedtime.   Last time this was given:  400 mg on 11/5/2017  9:48 AM   Next Dose Due:  11/5/17 bedtime                                      hydrOXYzine 25 MG tablet   Commonly known as:  ATARAX   Take 1 tablet (25 mg) by mouth every 6 hours as needed for itching (pain)                                   loratadine 10 MG tablet   Commonly known as:  CLARITIN   Take 10 mg by mouth daily   Last time this was given:  10 mg on 11/4/2017 10:39 AM                                   NORTRIPTYLINE HCL PO   Take 200 mg by mouth At Bedtime   Last time this was given:  200 mg on 11/4/2017  9:36 PM   Next Dose Due:  11/5/17 bedtime                                   oxyCODONE IR 5 MG tablet   Commonly known as:  ROXICODONE   Take 1-2 tablets (5-10 mg) by mouth every 3 hours as needed for moderate to severe pain   Last time this was given:  5 mg on 11/5/2017  9:45 AM   Notes to Patient:  Take minimally to prevent sleepiness. Monitor for sedation. You have only been taking 5 mg every 4-6 hours.                                    PAROXETINE HCL PO   Take 20 mg by mouth At Bedtime   Last time this was given:  20 mg on 11/4/2017  9:36 PM                                    senna-docusate 8.6-50 MG per tablet   Commonly known as:  SENOKOT-S;PERICOLACE   Take 1-2 tablets by mouth 2 times daily   Last time this was given:  2 tablets on 11/5/2017  9:44 AM   Next Dose Due:  11/5/17 pm   Notes to Patient:  Take so that you are regular.  Hold for more than one bm in 24 hours or loose stools                                      XOLAIR 150 MG injection   Inject Subcutaneous every 14 days   Generic drug:  omalizumab

## 2017-11-03 NOTE — PLAN OF CARE
Problem: Patient Care Overview  Goal: Plan of Care/Patient Progress Review  PT: Pt not up to floor at scheduled PT time. Will initiate tomorrow AM as appropriate.

## 2017-11-03 NOTE — ANESTHESIA PREPROCEDURE EVALUATION
Anesthesia Evaluation     . Pt has had prior anesthetic. Type: General    History of anesthetic complications   - PONV        ROS/MED HX    ENT/Pulmonary:     (+)sleep apnea, tobacco use, Current use asthma uses CPAP , . .    Neurologic:       Cardiovascular:     (+) Dyslipidemia, ----. : . . . :. .       METS/Exercise Tolerance:     Hematologic:         Musculoskeletal:         GI/Hepatic:     (+) liver disease,       Renal/Genitourinary:         Endo:         Psychiatric:     (+) psychiatric history anxiety      Infectious Disease:         Malignancy:         Other:                     Physical Exam      Airway   Mallampati: II  TM distance: >3 FB  Neck ROM: full    Dental     Cardiovascular   Rhythm and rate: regular and normal      Pulmonary    breath sounds clear to auscultation                    Anesthesia Plan      History & Physical Review  History and physical reviewed and following examination; no interval change.    ASA Status:  3 .    NPO Status:  > 8 hours    Plan for Spinal   PONV prophylaxis:  Ondansetron (or other 5HT-3) and Dexamethasone or Solumedrol       Postoperative Care  Postoperative pain management:  IV analgesics, Oral pain medications, Neuraxial analgesia and Multi-modal analgesia.      Consents  Anesthetic plan, risks, benefits and alternatives discussed with:  Patient..                          .

## 2017-11-04 ENCOUNTER — APPOINTMENT (OUTPATIENT)
Dept: PHYSICAL THERAPY | Facility: CLINIC | Age: 47
End: 2017-11-04
Attending: ORTHOPAEDIC SURGERY
Payer: COMMERCIAL

## 2017-11-04 ENCOUNTER — APPOINTMENT (OUTPATIENT)
Dept: OCCUPATIONAL THERAPY | Facility: CLINIC | Age: 47
End: 2017-11-04
Attending: ORTHOPAEDIC SURGERY
Payer: COMMERCIAL

## 2017-11-04 LAB — HGB BLD-MCNC: 10.4 G/DL (ref 11.7–15.7)

## 2017-11-04 PROCEDURE — 40000133 ZZH STATISTIC OT WARD VISIT: Performed by: REHABILITATION PRACTITIONER

## 2017-11-04 PROCEDURE — 97530 THERAPEUTIC ACTIVITIES: CPT | Mod: GP | Performed by: PHYSICAL THERAPIST

## 2017-11-04 PROCEDURE — 97116 GAIT TRAINING THERAPY: CPT | Mod: GP | Performed by: PHYSICAL THERAPIST

## 2017-11-04 PROCEDURE — 25000128 H RX IP 250 OP 636: Performed by: ORTHOPAEDIC SURGERY

## 2017-11-04 PROCEDURE — 97165 OT EVAL LOW COMPLEX 30 MIN: CPT | Mod: GO | Performed by: REHABILITATION PRACTITIONER

## 2017-11-04 PROCEDURE — 36415 COLL VENOUS BLD VENIPUNCTURE: CPT | Performed by: ORTHOPAEDIC SURGERY

## 2017-11-04 PROCEDURE — 97110 THERAPEUTIC EXERCISES: CPT | Mod: GP | Performed by: PHYSICAL THERAPIST

## 2017-11-04 PROCEDURE — 85018 HEMOGLOBIN: CPT | Performed by: ORTHOPAEDIC SURGERY

## 2017-11-04 PROCEDURE — 97535 SELF CARE MNGMENT TRAINING: CPT | Mod: GO | Performed by: REHABILITATION PRACTITIONER

## 2017-11-04 PROCEDURE — 25000132 ZZH RX MED GY IP 250 OP 250 PS 637: Performed by: ORTHOPAEDIC SURGERY

## 2017-11-04 PROCEDURE — 40000193 ZZH STATISTIC PT WARD VISIT: Performed by: PHYSICAL THERAPIST

## 2017-11-04 PROCEDURE — 12000000 ZZH R&B MED SURG/OB

## 2017-11-04 RX ORDER — HYDROXYZINE HYDROCHLORIDE 25 MG/1
25 TABLET, FILM COATED ORAL EVERY 6 HOURS PRN
Qty: 60 TABLET | Refills: 0 | Status: SHIPPED | OUTPATIENT
Start: 2017-11-04 | End: 2018-08-06

## 2017-11-04 RX ORDER — OXYCODONE HYDROCHLORIDE 5 MG/1
5-10 TABLET ORAL
Qty: 80 TABLET | Refills: 0 | Status: SHIPPED | OUTPATIENT
Start: 2017-11-04 | End: 2018-08-06

## 2017-11-04 RX ORDER — ACETAMINOPHEN 325 MG/1
650 TABLET ORAL EVERY 4 HOURS PRN
Qty: 100 TABLET | Refills: 0 | Status: SHIPPED | OUTPATIENT
Start: 2017-11-06 | End: 2018-08-06

## 2017-11-04 RX ORDER — AMOXICILLIN 250 MG
1-2 CAPSULE ORAL 2 TIMES DAILY
Qty: 30 TABLET | Refills: 1 | Status: SHIPPED | OUTPATIENT
Start: 2017-11-04 | End: 2018-08-06

## 2017-11-04 RX ADMIN — OXYCODONE HYDROCHLORIDE 5 MG: 5 TABLET ORAL at 05:45

## 2017-11-04 RX ADMIN — OXYCODONE HYDROCHLORIDE 10 MG: 10 TABLET, FILM COATED, EXTENDED RELEASE ORAL at 18:15

## 2017-11-04 RX ADMIN — GABAPENTIN 400 MG: 400 CAPSULE ORAL at 20:09

## 2017-11-04 RX ADMIN — KETOROLAC TROMETHAMINE 30 MG: 30 INJECTION, SOLUTION INTRAMUSCULAR at 02:36

## 2017-11-04 RX ADMIN — GABAPENTIN 400 MG: 400 CAPSULE ORAL at 10:38

## 2017-11-04 RX ADMIN — ACETAMINOPHEN 975 MG: 325 TABLET, FILM COATED ORAL at 04:05

## 2017-11-04 RX ADMIN — PAROXETINE HYDROCHLORIDE 20 MG: 20 TABLET, FILM COATED ORAL at 21:36

## 2017-11-04 RX ADMIN — ASPIRIN 162 MG: 81 TABLET, COATED ORAL at 18:15

## 2017-11-04 RX ADMIN — ACETAMINOPHEN 975 MG: 325 TABLET, FILM COATED ORAL at 13:12

## 2017-11-04 RX ADMIN — NORTRIPTYLINE HYDROCHLORIDE 200 MG: 50 CAPSULE ORAL at 21:36

## 2017-11-04 RX ADMIN — OXYCODONE HYDROCHLORIDE 5 MG: 5 TABLET ORAL at 01:35

## 2017-11-04 RX ADMIN — LORATADINE 10 MG: 10 TABLET ORAL at 10:39

## 2017-11-04 RX ADMIN — SENNOSIDES AND DOCUSATE SODIUM 1 TABLET: 8.6; 5 TABLET ORAL at 10:38

## 2017-11-04 RX ADMIN — OXYCODONE HYDROCHLORIDE 5 MG: 5 TABLET ORAL at 20:10

## 2017-11-04 RX ADMIN — ASPIRIN 162 MG: 81 TABLET, COATED ORAL at 10:38

## 2017-11-04 RX ADMIN — CEFAZOLIN SODIUM 1 G: 1 INJECTION, SOLUTION INTRAVENOUS at 02:36

## 2017-11-04 RX ADMIN — SENNOSIDES AND DOCUSATE SODIUM 1 TABLET: 8.6; 5 TABLET ORAL at 20:09

## 2017-11-04 RX ADMIN — ACETAMINOPHEN 975 MG: 325 TABLET, FILM COATED ORAL at 20:09

## 2017-11-04 RX ADMIN — OXYCODONE HYDROCHLORIDE 10 MG: 10 TABLET, FILM COATED, EXTENDED RELEASE ORAL at 06:00

## 2017-11-04 RX ADMIN — KETOROLAC TROMETHAMINE 30 MG: 30 INJECTION, SOLUTION INTRAMUSCULAR at 09:39

## 2017-11-04 NOTE — PROGRESS NOTES
"[facility]  Orthopedics Progress Note          Assessment and Plan:   S/P r LIAT  --PT per Dr Gamble  -DVT prophyl per Tye             Interval History:   No specific complaint--slept well                  Physical Exam:   Vitals were reviewed  Blood pressure 99/61, pulse 88, temperature 96.6  F (35.9  C), temperature source Oral, resp. rate 16, height 1.626 m (5' 4\"), weight 75.8 kg (167 lb), last menstrual period 07/11/2014, SpO2 93 %, not currently breastfeeding.  Dressing CDI  CMS Gi           Data:     Hemoglobin   Date Value Ref Range Status   10/24/2017 13.2 11.7 - 15.7 g/dL Final   03/09/2017 13.1 11.7 - 15.7 g/dL Final     Lab Results   Component Value Date    INR 0.88 03/09/2017    INR 1.13 03/22/2013       Bright Funk MD, MD  11/4/2017        "

## 2017-11-04 NOTE — OP NOTE
DATE OF PROCEDURE:  11/03/2017      PREOPERATIVE DIAGNOSIS:  Right hip osteoarthritis with underlying Perthes disease.      POSTOPERATIVE DIAGNOSIS:  Right hip osteoarthritis with underlying Perthes disease.      PROCEDURE:  Right total hip arthroplasty.      SURGEON:  Shahriar Gamble MD      ASSISTANT:  Segun Jacobson PA-C      ANESTHESIA:  General.      ESTIMATED BLOOD LOSS:  250 mL.      INTRAOPERATIVE COMPLICATIONS:  None apparent.      OPERATIVE INDICATIONS:  Alba Varela is a 47-year-old female with a history of progressive right hip pain in the setting of underlying Perthes disease.  An MRI confirmed advanced chondral wear.  She failed a prolonged trial of conservative management including therapy, injections and therefore elected to proceed with a total hip arthroplasty.      IMPLANTS USED:  [place] Accolade II size 2 stem.  Tritanium hemispherical cluster hole shell with a 52 diameter.  A 36 mm Biolox Delta ceramic head.      DESCRIPTION OF PROCEDURE:  Patient was identified in the preoperative holding area and the operative site was marked.  Consent was again reviewed and all questions were answered.  She was taken to the operating room, placed under general anesthesia and then positioned in the left lateral decubitus position with all bony prominences well padded with the right lower extremity prepped and draped in the usual sterile fashion.  Preoperative antibiotics administered and a timeout was called to ensure the correct operative site and procedure.  A longitudinal incision was made centered along the posterior border of the greater trochanter with sharp dissection down through the skin and subcutaneous tissues and blunt dissection onto the iliotibial band.  This was split sharply in line with its fibers and the gluteal fascia was split bluntly.  An [place] retractor was placed with care made to protect the sciatic nerve.  The short external rotators and posterior capsule were taken down as a flap and  tagged for later repair.  The hip was dislocated.  The neck cut was marked and the cut was made based on preoperative templating.  The head was removed.  There was noted to be full thickness chondral wear along the posterior aspect of the femoral head.  Acetabular retractors were then placed and the remnant labrum was excised.  Sequential reaming commenced up to size 51 at which time there was excellent bleeding cancellous bone throughout.  The final Tritanium hemispheric cluster hole shell was placed with appropriate abduction and anteversion, referencing off the transverse acetabular ligament.  This had good purchase and was fixed with 1 screw.  The final polyethylene liner was placed.  The proximal femur was then exposed and the medial wall of the greater trochanter was debrided of soft tissue.  The cookie cutter guide and canal finder were used to enter the canal.  Sequential broaching commenced up to size 2 at which time there was excellent fit along the calcar.  With trial implant in place, an AP view of the pelvis was obtained.  This showed appropriate position of the cup and stem.  Leg lengths were felt to be appropriate.  The size 2 stem was opened and impacted in place.  After again trialing between the 2.5 and 5 mm length stem the 2.5 was felt to be most appropriate.  She was just over a cm short on this right side preoperatively based on her Perthes.  The +2.5 was felt to lengthen her slightly without necessarily bringing her out to full length where she would likely have tight tissues.  The hip was very stable in the sleeper position and internally rotated to 65 degrees prior to subluxation with the hip flexed at 90 degrees.  The final 2.5 mm ceramic head was then impacted on the trunnion and the hip was reduced.  The short external rotators and capsule were repaired through drill holes in the greater trochanter.  The wound was soaked in a diluted Betadine solution followed by irrigation with pulsatile  lavage.  A pericapsular anesthetic cocktail was infiltrated throughout.  The iliotibial band was closed with interrupted #1 Vicryl and the gluteal fascia with a running 0 Vicryl suture.  The remainder of the wound was closed in layers.  Sterile dressings and an abduction pillow were applied.  The patient was then awoken from general anesthesia and transferred to the postanesthesia care unit in stable condition.      Segun Jacobson PA-C was present and scrubbed throughout the case and his assistance was critical for patient positioning, assistance with prepping and draping soft tissue retraction, leg manipulation, wound closure, dressing and abduction pillow application.         JOANN BARTLETT MD             D: 2017 12:00   T: 2017 20:24   MT: #[id number]      Name:     KEISHA WEIR   MRN:      [id number]        Account:        [id number]   :      1970           Procedure Date: 2017      Document: [id number]

## 2017-11-04 NOTE — PLAN OF CARE
Problem: Patient Care Overview  Goal: Plan of Care/Patient Progress Review  PT: Orders received. PT evaluation completed and treatment initiated. Pt is POD1 R LIAT. Pt reports living in a house with 3 steps to enter and 2 flights of stairs within. Pt lives with significant other and dependent child. Significant other will be available to assist for a couple days following discharge. Pt has access to a walker, cane, and crutches if needed.        Discharge Planner PT   Patient plan for discharge: Home, possibly today.   Current status: Pt educated in precautions. Pt completes sit<>supine with SBA and cuing for precautions. Pt completes sit<>stand with CGA and cues for safety. Pt ambulates a short distance in hallway with FWW and CGA.   Barriers to return to prior living situation: Stairs to enter and within.   Recommendations for discharge: TBD POD2  Rationale for recommendations: TBD POD2       Entered by: Sophia Orantes 11/04/2017 11:05 AM           PM: Pt able to complete up/down stairs with CGA and single handrail.

## 2017-11-04 NOTE — PLAN OF CARE
"Problem: Patient Care Overview  Goal: Plan of Care/Patient Progress Review  VS BP 99/61  Pulse 88  Temp 96.6  F (35.9  C) (Oral)  Resp 16  Ht 1.626 m (5' 4\")  Wt 75.8 kg (167 lb)  LMP 07/11/2014  SpO2 93%  BMI 28.67 kg/m2  Lung sounds Clear  O2 Room air  GI/ Passing flatus, voiding  Tolerating clear liquid diet, will advance  IVF Infusing 75ml/hr  Activity up with assist x1 and walker. Up to commode x1 overnight.   Pain PRN oxycodone for pain.  Patient/family centered care Plan of care discussed with patient.  Discharge plan TBD      "

## 2017-11-04 NOTE — PLAN OF CARE
"Problem: Hip Arthroplasty (Total, Partial) (Adult)  Goal: Signs and Symptoms of Listed Potential Problems Will be Absent, Minimized or Managed (Hip Arthroplasty)  Signs and symptoms of listed potential problems will be absent, minimized or managed by discharge/transition of care (reference Hip Arthroplasty (Total, Partial) (Adult) CPG).   Outcome: Improving  From PACU at 1710, very lethargic upon arrival, now more alert.  Mild pain managed with PRN pain meds + cold.  No nausea.  LS clear bilaterally, RA, encouraged CDB hourly.  BS faint, gas-, voided x1 via BSC.  CMS intact except now c/o mild numbness incisional area.  Drsg CDI.  Dangled at bedside, A1 belt + walker.  Oriented to room and call system, denies questions.      Consulted with pharm. via phone, pt has allergy to penicillins, Allergy Cautioned popped up due to scheduled ancef cross-over drug reaction.  PharmDiony suggs RN to give scheduled ancef since pt received 1st dose preop and no allergic reaction, stated \"very small chance\" of allergic reaction .    "

## 2017-11-04 NOTE — PROGRESS NOTES
11/04/17 1010   Quick Adds   Type of Visit Initial PT Evaluation   Living Environment   Lives With significant other;child(uche), dependent   Living Arrangements house   Number of Stairs to Enter Home 3   Number of Stairs Within Home 13  (2 flights)   Stair Railings at Home inside, present on left side   Transportation Available family or friend will provide   Self-Care   Dominant Hand right   Usual Activity Tolerance good   Current Activity Tolerance moderate   Regular Exercise no   Equipment Currently Used at Home none   Activity/Exercise/Self-Care Comment Has access to cane, walker, crutches   Functional Level Prior   Ambulation 0-->independent   Transferring 0-->independent   Toileting 0-->independent   Bathing 0-->independent   Dressing 0-->independent   Eating 0-->independent   Communication 0-->understands/communicates without difficulty   Swallowing 0-->swallows foods/liquids without difficulty   Cognition 0 - no cognition issues reported   Fall history within last six months yes   Number of times patient has fallen within last six months 2   Which of the above functional risks had a recent onset or change? ambulation;transferring;toileting;bathing;dressing   General Information   Onset of Illness/Injury or Date of Surgery - Date 11/03/17   Referring Physician Shahriar Gamble MD   Patient/Family Goals Statement Return home   Pertinent History of Current Problem (include personal factors and/or comorbidities that impact the POC) Pt is POD 1 R LIAT. See chart for PMH.    Precautions/Limitations fall precautions;right hip precautions  (post precautions)   Weight-Bearing Status - LLE full weight-bearing   Weight-Bearing Status - RLE weight-bearing as tolerated   General Observations Pt supine upon initiation. Agreeable to session.    Cognitive Status Examination   Orientation orientation to person, place and time   Level of Consciousness alert   Follows Commands and Answers Questions 100% of the time  "  Personal Safety and Judgment intact   Memory intact   Pain Assessment   Patient Currently in Pain Yes, see Vital Sign flowsheet   Integumentary/Edema   Integumentary/Edema Comments Dressing intact to R hip   Posture    Posture Forward head position;Protracted shoulders   Range of Motion (ROM)   ROM Comment Decreased R LE ROM secondary to pain   Strength   Strength Comments Good quad set, able to SLR   Bed Mobility   Bed Mobility Comments sit<>supine SBA   Transfer Skills   Transfer Comments sit<>stand CGA   Gait   Gait Comments CGA with FWW   Sensory Examination   Sensory Perception no deficits were identified   Coordination   Coordination no deficits were identified   Muscle Tone   Muscle Tone no deficits were identified   Modality Interventions   Planned Modality Interventions Cryotherapy   General Therapy Interventions   Planned Therapy Interventions bed mobility training;gait training;ROM;strengthening;stretching;transfer training;home program guidelines;progressive activity/exercise   Clinical Impression   Criteria for Skilled Therapeutic Intervention yes, treatment indicated   PT Diagnosis impaired mobility   Influenced by the following impairments pain, weakness, decreased ROM   Functional limitations due to impairments Difficulty with bed mobility, transfers, ambulation, stairs   Clinical Presentation Stable/Uncomplicated   Clinical Presentation Rationale medically stable   Clinical Decision Making (Complexity) Low complexity   Therapy Frequency` 2 times/day   Predicted Duration of Therapy Intervention (days/wks) 3 days   Anticipated Discharge Disposition Home with Assist   Risk & Benefits of therapy have been explained Yes   Patient, Family & other staff in agreement with plan of care Yes   [facility] AM-PAC TM \"6 Clicks\"   2016, Trustees of [facility], under license to [employer].  All rights reserved.   6 Clicks Short Forms Basic Mobility Inpatient Short Form   [facility] AM-PAC  " "\"6 Clicks\" V.2 Basic Mobility Inpatient Short Form   1. Turning from your back to your side while in a flat bed without using bedrails? 4 - None   2. Moving from lying on your back to sitting on the side of a flat bed without using bedrails? 3 - A Little   3. Moving to and from a bed to a chair (including a wheelchair)? 3 - A Little   4. Standing up from a chair using your arms (e.g., wheelchair, or bedside chair)? 3 - A Little   5. To walk in hospital room? 3 - A Little   6. Climbing 3-5 steps with a railing? 3 - A Little   Basic Mobility Raw Score (Score out of 24.Lower scores equate to lower levels of function) 19   Total Evaluation Time   Total Evaluation Time (Minutes) 8     "

## 2017-11-04 NOTE — PROGRESS NOTES
Alert but falls asleep when talking with her. Arouses to voice. Had mild nausea this AM but declined any interventions, she stated Zofran and compazine do not help. She has Scope patch in place and she believes that helps. Took AM meds but did not give BP med as BP was 106/59. Did not give oxy yet this shift . Took scheduled tylenol for pain management.

## 2017-11-04 NOTE — PLAN OF CARE
Problem: Patient Care Overview  Goal: Plan of Care/Patient Progress Review  OT- eval completed and treatment initiated. Marcela is POD1 R LIAT. Pt reports living in a house with SO and 12 yr old daughter, has 3 steps to enter and 2 flights of stairs within. Patient has a roll in shower on lower level and all toilets are elevated. Significant other will be available to assist for a couple days following discharge. Pt has access to a walker, cane, and crutches if needed. 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     Discharge Planner OT   Patient plan for discharge: home  Current status: Educated in walker safety, EC/WS techniques, advancement of activity following surgery and driving readiness, patient was engaged in instruction and verbalized understanding, however reinforcement is recommended. After instruction, patient was mod I with donning/doffing pants and socks with reacher and sockaid. Educated in community resources and options and ADL h/o was provided. Educated in other AE patient may find useful. After instruction in hand placement for optimal support, patient was SBA for toilet transfers. Increased education and practice provided with turning with ambulation to maintain hip precautions and avoid pivoting, patient demonstrated increased awareness and I with techniques afterwards. Educated in bed mobility and afterwards, patient was SBA and vc's for sit to supine. Throughout session, patient was observed to be SBA to CGA, fww for sit<>stand and with functional mobility in room.   Patient was lethargic at start of session, reinforcement of education is highly recommended. Initially, patient was difficult to fully awake, however afterwards, patient was able to participate in OT session, however question patient's full retention as she was very fatigued and  needed cues to follow through with tasks and education. Patient reports feeling fizzy and cloudy in the head. Throughout session, cassandra was on 3L of O2 with O2 sats ar 99%. Removed O2 for toilet transfers and functional mobility in room, O2 on RA was 93%. O2 was replaced at end of session.      Barriers to return to prior living situation: unsure of full retention of all education provided during session, increased fatigue and O2 needs  Recommendations for discharge: home with SO to A as needed. Recommended AE; reacher, sockaid, long handle sponge. Patient to consider elastic shoelaces and long handle shoehorn  Rationale for recommendations: anticipate patient will meet goals for safe discharge home, will continue with OT       Entered by: Alba Yousif 11/04/2017 12:28 PM

## 2017-11-04 NOTE — PROGRESS NOTES
11/04/17 1212   Quick Adds   Type of Visit Initial Occupational Therapy Evaluation   Living Environment   Lives With significant other;child(uche), dependent  (12 yr old daughter)   Living Arrangements house   Home Accessibility stairs to enter home;stairs within home   Number of Stairs to Enter Home 3   Number of Stairs Within Home 13  (2 flights)   Transportation Available car;family or friend will provide   Living Environment Comment Patient has roll in shower and all toilets are elevated   Self-Care   Dominant Hand right   Usual Activity Tolerance good   Current Activity Tolerance moderate  (fatigued, very sleepy at start of session)   Regular Exercise no   Equipment Currently Used at Home none  (borrowed a fww and cane, all toilets are elevated)   Activity/Exercise/Self-Care Comment 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 ambulation;transferring;toileting;bathing;dressing   General Information   Onset of Illness/Injury or Date of Surgery - Date 11/03/17   Referring Physician Dr. Gamble   Patient/Family Goals Statement to return home, possibly today   Additional Occupational Profile Info/Pertinent History of Current Problem Patient is POD #1 for R LIAT   Precautions/Limitations right hip precautions  (posterior hip precautions)    Weight-Bearing Status - RLE weight-bearing as tolerated   General Observations patient was very sleepy at start of session, but able to participate   Cognitive Status Examination   Orientation orientation to person, place and time   Level of Consciousness lethargic/somnolent   Able to Follow Commands mild impairment  (at times needed reinforcement of ed, suspect related to med )   Personal Safety (Cognitive) WNL/WFL   Memory intact   Cognitive Comment patient was lethargic at start of session, reinforcement of education is highly recommended. Patient reports she hopes to dc home later today   Visual Perception   Visual Perception Wears glasses  (reading glasses)   Sensory Examination   Sensory Quick Adds No deficits were identified   Pain Assessment   Patient Currently in Pain Yes, see Vital Sign flowsheet  (rating 5/10)   Integumentary/Edema   Integumentary/Edema no deficits were identifed   Posture   Posture not impaired   Range of Motion (ROM)   ROM Quick Adds No deficits were identified   Strength   Manual Muscle Testing Quick Adds No deficits were identified   Hand Strength   Hand Strength Comments intact   Muscle Tone Assessment   Muscle Tone Quick Adds No deficits were identified   Coordination   Upper Extremity Coordination No deficits were identified   Mobility   Bed Mobility Comments treatment initiated-defer to Shyam joaquin note for details   Transfer Skill: Sit to Stand   Level of [place]: Sit/Stand stand-by assist   Physical Assist/Nonphysical Assist: Sit/Stand supervision   Transfer Skill: Sit to Stand weight-bearing as tolerated   Assistive Device for Transfer: Sit/Stand rolling walker   Toilet Transfer   Toilet Transfer Comments treatment initiated-defer to Shyam joaquin note for details   Balance   Balance Comments LOB as not observed, general unstreadiness to be expected d/t surgery   Lower Body Dressing   Level of [place]: Dress Lower Body (treatment initiated-defer to Shyam joaquin note for  "details)   Eating/Self Feeding   Level of [place]: Eating independent   Instrumental Activities of Daily Living (IADL)   IADL Comments SO to complete as needed, patient would like to return to work shortly   Activities of Daily Living Analysis   Impairments Contributing to Impaired Activities of Daily Living balance impaired;pain;post surgical precautions;ROM decreased;strength decreased   General Therapy Interventions   Planned Therapy Interventions ADL retraining;progressive activity/exercise;transfer training   Clinical Impression   Criteria for Skilled Therapeutic Interventions Met yes, treatment indicated   OT Diagnosis decreased ADL's   Influenced by the following impairments balance impaired;pain;post surgical precautions;ROM decreased;strength decreased   Assessment of Occupational Performance 5 or more Performance Deficits   Identified Performance Deficits decreased ADL's and IADL's- dsg. toileting, bathing, cooking, driving, household chores and work dutities, community mobility   Clinical Decision Making (Complexity) Low complexity   Therapy Frequency daily   Predicted Duration of Therapy Intervention (days/wks) 1-2 days   Anticipated Equipment Needs at Discharge bath sponge;reacher;sock aide   Anticipated Discharge Disposition Home   Risks and Benefits of Treatment have been explained. Yes   Patient, Family & other staff in agreement with plan of care Yes   [facility] TM \"6 Clicks\"   2016, Trustees of [facility], under license to [employer].  All rights reserved.   6 Clicks Short Forms Daily Activity Inpatient Short Form   [facility]  \"6 Clicks\" Daily Activity Inpatient Short Form   1. Putting on and taking off regular lower body clothing? 3 - A Little   2. Bathing (including washing, rinsing, drying)? 3 - A Little   3. Toileting, which includes using toilet, bedpan or urinal? 3 - A Little   4. Putting on and taking off regular upper body clothing? 4 - None   5. " Taking care of personal grooming such as brushing teeth? 4 - None   6. Eating meals? 4 - None   Daily Activity Raw Score (Score out of 24.Lower scores equate to lower levels of function) 21   Total Evaluation Time   Total Evaluation Time (Minutes) 10

## 2017-11-05 ENCOUNTER — APPOINTMENT (OUTPATIENT)
Dept: OCCUPATIONAL THERAPY | Facility: CLINIC | Age: 47
End: 2017-11-05
Attending: ORTHOPAEDIC SURGERY
Payer: COMMERCIAL

## 2017-11-05 ENCOUNTER — APPOINTMENT (OUTPATIENT)
Dept: PHYSICAL THERAPY | Facility: CLINIC | Age: 47
End: 2017-11-05
Attending: ORTHOPAEDIC SURGERY
Payer: COMMERCIAL

## 2017-11-05 VITALS
SYSTOLIC BLOOD PRESSURE: 117 MMHG | BODY MASS INDEX: 28.51 KG/M2 | HEIGHT: 64 IN | DIASTOLIC BLOOD PRESSURE: 77 MMHG | TEMPERATURE: 98.1 F | OXYGEN SATURATION: 94 % | WEIGHT: 167 LBS | HEART RATE: 88 BPM | RESPIRATION RATE: 14 BRPM

## 2017-11-05 LAB
GLUCOSE SERPL-MCNC: 100 MG/DL (ref 70–99)
HGB BLD-MCNC: 10 G/DL (ref 11.7–15.7)

## 2017-11-05 PROCEDURE — 36415 COLL VENOUS BLD VENIPUNCTURE: CPT | Performed by: ORTHOPAEDIC SURGERY

## 2017-11-05 PROCEDURE — 97535 SELF CARE MNGMENT TRAINING: CPT | Mod: GO | Performed by: REHABILITATION PRACTITIONER

## 2017-11-05 PROCEDURE — 40000193 ZZH STATISTIC PT WARD VISIT

## 2017-11-05 PROCEDURE — 25000128 H RX IP 250 OP 636: Performed by: ORTHOPAEDIC SURGERY

## 2017-11-05 PROCEDURE — 82947 ASSAY GLUCOSE BLOOD QUANT: CPT | Performed by: ORTHOPAEDIC SURGERY

## 2017-11-05 PROCEDURE — 97530 THERAPEUTIC ACTIVITIES: CPT | Mod: GP

## 2017-11-05 PROCEDURE — 85018 HEMOGLOBIN: CPT | Performed by: ORTHOPAEDIC SURGERY

## 2017-11-05 PROCEDURE — 97116 GAIT TRAINING THERAPY: CPT | Mod: GP

## 2017-11-05 PROCEDURE — 90686 IIV4 VACC NO PRSV 0.5 ML IM: CPT | Performed by: ORTHOPAEDIC SURGERY

## 2017-11-05 PROCEDURE — 25000132 ZZH RX MED GY IP 250 OP 250 PS 637: Performed by: ORTHOPAEDIC SURGERY

## 2017-11-05 PROCEDURE — 40000133 ZZH STATISTIC OT WARD VISIT: Performed by: REHABILITATION PRACTITIONER

## 2017-11-05 PROCEDURE — 97110 THERAPEUTIC EXERCISES: CPT | Mod: GP

## 2017-11-05 RX ORDER — IBUPROFEN 200 MG
200-400 TABLET ORAL EVERY 6 HOURS PRN
Status: DISCONTINUED | OUTPATIENT
Start: 2017-11-05 | End: 2017-11-05 | Stop reason: HOSPADM

## 2017-11-05 RX ADMIN — IBUPROFEN 400 MG: 200 TABLET, FILM COATED ORAL at 14:48

## 2017-11-05 RX ADMIN — GABAPENTIN 400 MG: 400 CAPSULE ORAL at 09:48

## 2017-11-05 RX ADMIN — OXYCODONE HYDROCHLORIDE 5 MG: 5 TABLET ORAL at 06:17

## 2017-11-05 RX ADMIN — ASPIRIN 162 MG: 81 TABLET, COATED ORAL at 18:06

## 2017-11-05 RX ADMIN — ASPIRIN 162 MG: 81 TABLET, COATED ORAL at 09:45

## 2017-11-05 RX ADMIN — SENNOSIDES AND DOCUSATE SODIUM 2 TABLET: 8.6; 5 TABLET ORAL at 09:44

## 2017-11-05 RX ADMIN — OXYCODONE HYDROCHLORIDE 5 MG: 5 TABLET ORAL at 18:08

## 2017-11-05 RX ADMIN — OXYCODONE HYDROCHLORIDE 5 MG: 5 TABLET ORAL at 01:03

## 2017-11-05 RX ADMIN — INFLUENZA A VIRUS A/MICHIGAN/45/2015 X-275 (H1N1) ANTIGEN (FORMALDEHYDE INACTIVATED), INFLUENZA A VIRUS A/HONG KONG/4801/2014 X-263B (H3N2) ANTIGEN (FORMALDEHYDE INACTIVATED), INFLUENZA B VIRUS B/PHUKET/3073/2013 ANTIGEN (FORMALDEHYDE INACTIVATED), AND INFLUENZA B VIRUS B/BRISBANE/60/2008 ANTIGEN (FORMALDEHYDE INACTIVATED) 0.5 ML: 15; 15; 15; 15 INJECTION, SUSPENSION INTRAMUSCULAR at 12:51

## 2017-11-05 RX ADMIN — OXYCODONE HYDROCHLORIDE 5 MG: 5 TABLET ORAL at 09:45

## 2017-11-05 RX ADMIN — ACETAMINOPHEN 975 MG: 325 TABLET, FILM COATED ORAL at 06:17

## 2017-11-05 NOTE — PLAN OF CARE
Problem: Patient Care Overview  Goal: Plan of Care/Patient Progress Review  Outcome: Adequate for Discharge Date Met:  11/05/17  1453:  A/O. Sleepy today. See previous note regarding notification to Dr Funk. Up SBA w/ walker moving OK. PT this afternoon said safe to D/C and pt seems slightly more alert, and adamant on going home, oxy has been minimized to once this shift to try to minimize sedation, although pt still sleeps between cares and safety checks. Voiding. Dressing CDI. CMS intact. Flatus +. Pt will D/C home with phil this afternoon if they decide to go and it is safe.     1600: Phil came up to this RN expressed that he also is concerned about drowsiness and discharge, he stated he walked into find her talking to herself in the room(this has not been observed by staff), he will try to talk with pt about staying night. RN will defer continued discussion to next shift.

## 2017-11-05 NOTE — PROGRESS NOTES
"[facility]  Orthopedics Progress Note          Assessment and Plan:   S/p R LIAT   -doing well dc to home today per Dr Gamble orders if passes PT               Interval History:   No complaints pain  Controlled                  Physical Exam:   Vitals were reviewed  Blood pressure 100/62, pulse 95, temperature 98.3  F (36.8  C), temperature source Oral, resp. rate 16, height 1.626 m (5' 4\"), weight 75.8 kg (167 lb), last menstrual period 07/11/2014, SpO2 93 %, not currently breastfeeding.  Dressing intact  Calf soft           Data:     Hemoglobin   Date Value Ref Range Status   11/05/2017 10.0 (L) 11.7 - 15.7 g/dL Final   11/04/2017 10.4 (L) 11.7 - 15.7 g/dL Final     Lab Results   Component Value Date    INR 0.88 03/09/2017    INR 1.13 03/22/2013       Bright Funk MD, MD  11/5/2017          "

## 2017-11-05 NOTE — PLAN OF CARE
Problem: Hip Arthroplasty (Total, Partial) (Adult)  Goal: Signs and Symptoms of Listed Potential Problems Will be Absent, Minimized or Managed (Hip Arthroplasty)  Signs and symptoms of listed potential problems will be absent, minimized or managed by discharge/transition of care (reference Hip Arthroplasty (Total, Partial) (Adult) CPG).   Outcome: Improving  VSS, ex soft BPs. Pt very sedated all day and in beginning of shift; woke up and became more alert around 1800. Ambulated in hallway and to bathroom with Ax1, gait belt, and walker.  Tolerating regular diet. Dressing CDI, CMS intact. Voiding in adequate amounts. Pain managed with scheduled Tylenol and prn Oxycodone. Went out this evening with significant other to have a cigarette x1, refused nicotine alternatives. Plans d/c to home.

## 2017-11-05 NOTE — DISCHARGE INSTRUCTIONS
While on narcotic pain medication:  You have been drowsy in the hospital. Take oxy minimally after you try tylenol. OK per Dr Funk with [facility] to use over the counter ibuprofen in small amounts but monitor dressing for bleeding and stop ibuprofen and call surgeon if bleeding noted.   Monitor for sedation stop taking narcotics if drowsiness continues.     To prevent constipation  1. Drink plenty of water to keep well hydrated   2. May take over the counter Colace or Senna (follow instructions on label)          Transderm Scop (Scopolamine) Patch    The Transderm Scop patch placed behind your ear helps to prevent nausea and vomiting associated with the use of anesthesia and certain analgesics used during or after many types surgery.  You will need to remove this patch after 3 days.  However, it may be removed sooner if you are no longer concerned about nausea or vomiting.      The most common side effects:  ?  dryness of the mouth  ?  drowsiness  ?  blurred vision  ?  dilation of pupils  ?  disorientation, memory disturbances and/or confusion  ?  dizziness  ?  restlessness  ?  hallucinations  ?  difficulty urinating  ?  skin rash  ?  red or painful eyes    Avoid drinking alcohol while using Transderm Scop patch.  Be careful about driving or operating any machinery while using this medication since it may make you drowsy.  In the unlikely event that you experience pain in the eye, which may be accompanied by widening of the pupil, eye redness and blurred vision, remove the Transderm Scop Patch immediately and consult your doctor.    Removal of Transderm Scop Patch:  To remove the patch simply peel it off your skin.  Be sure to wash your hands and the area behind your ear thoroughly with soap and water.  The Transderm Scop Patch will continue to have some active ingredient after use.  Therefore, to avoid accidental contact or ingestion by children or pets, fold the used patch in half with the sticky side together  and dispose in the trash out of reach of children and pets.    Call surgeon or primary care md before or after your follow appointment if any of these issues occur:  1.Uncontrolled/persistant temperature, chills, sweats. Temp >101  2. Uncontrolled pain despite pain medication  3. Numbness or tingling in operative leg, weakness, falls  4. Increased/persistant bleeding,redness,swelling, bruising, drainage (yellow/odourous), or bleeding from or around incision or incision pulling apart.  5. Dizziness, lightheaded (could be pain meds)  6. Calf pain, hard/swollen/warm area, chest pain or shortness of breath   7. Constipation despite taking over the counter stool softner and laxative for constipation   8. Trouble voiding or signs or symptoms of urinary tract infection  9. Uncontrolled bleeding (nose bleed, incision)  10.  if unable to wake call 911    Please have all family and caregivers review this information.    Other instructions:  See general discharge instruction sheet for hips.  1. Do exercises as instructed by therapist-slowly increased activity as pain tolerates  2. Observe your hip precautions.  3. Walk daily increasing distance and time each day by a little.  4. Ice hip for swelling and discomfort 3-4 times daily for 20 minutes  5.Notify your dentist of your implant so you can get antibiotics before any dental work or for any other invasive procedure.  6. Take over the counter stool softener (mirilax, senna, colace) while on narcotics to prevent constipation so bowel movements are regular., take laxative (milk of magnesia) or a suppository if no bm in 2-3 days (take as directed)  7.  8.Keep track of when you take all medication, especially pain medication. See bottles for instructions and side effects  9. Incentive spirometer hourly to help prevent pneumonia  10. See medication handouts for medication information and side effects  11. DO NOT DRINK alcohol or DRIVE on narcotic pain medication.    Friends and  family please read and review all discharge information and medication sheet    IF unable to wake call 911    Dressing information:Do not change dressing  Can shower with aquacell dressing, it is waterproof-do not remove the dressing will be changed at the follow up apt. with your suregon  Inspect surrounding skin daily for s/s of infections.   Do not soak in tub or pool.   If dressing  Loosens wash hands and tape edge down then call surgeon for direction-do not touch incision   If dressing is 1/2 or more full of drainage or leaking call your suregon  If dressing is half full of drainage call your surgeon      Follow up Appointment:  Make follow up apt. 10-14 days post surgery,call to make apt. [phone]. Call the same number with any questions or issues prior to or after apt.

## 2017-11-05 NOTE — PLAN OF CARE
Problem: Patient Care Overview  Goal: Plan of Care/Patient Progress Review  Discharge Planner OT   Patient plan for discharge: home today  Current status: Patient continues to have difficulty fully verbalizing hip precautions despite multiple reminders, increased education and cues throughout session. Patient continues to be very fatigued and reports understanding previous education, but is unable to verbalize back to OT. Patient given several opportunities for further practice for LE dressing and toilet transfers, which patient declined. Reviewed ADL AE handouts and reiterated AE recommendations as well as uses for AE. Reviewed and re-educated in walker safety, EC/WS techniques, advancement of activity following surgery and driving and return to work readiness, patient was engaged in instruction and verbalized understanding. Patient able to demonstrate safe walker technique with functional mobility in mayer and satellite. I with bed mobility, SBA, fww for sit<>stand and functional mobility. Encourage RN staff to further reinforce hip precautions and safety. Patient has met needed OT goals, will discharge from OT services.   Barriers to return to prior living situation: decreased ability to verbalize hip precautions, however patient observed to maintain throughout functional activity  Recommendations for discharge: home with SO to A as needed, recommended AE; reacher, sockaid, long handle sponge  Rationale for recommendations: patient has met needed goals for safe discharge home       Entered by: Alba Yousif 11/05/2017 2:08 PM        Occupational Therapy Discharge Summary     Reason for therapy discharge:    All goals and outcomes met, no further needs identified.     Progress towards therapy goal(s). See goals on Care Plan in [facility] electronic health record for goal details.  Goals met     Therapy recommendation(s):    No further therapy is recommended.

## 2017-11-05 NOTE — PLAN OF CARE
Problem: Patient Care Overview  Goal: Plan of Care/Patient Progress Review     Discharge Planner PT   Patient plan for discharge: Home, possibly today.   Current status: Pt performs supine to/from sit and sit to/from stand with SBA, however requiring continued cues for safety and maintaining hip precautions. Pt ambulates 300ft with fww and SBA. Pt instructed in stairs x8 with L railing and SPC.   Barriers to return to prior living situation: Many stairs to enter and within.   Recommendations for discharge: Home with assist   Rationale for recommendations: Pt is demonstrating safe mobility with SBA and will have fiance assist once home       Entered by: Devika Martinez 11/05/2017 9:48 AM           PM: Pt continues to require SBA for mobility and recommended she have fiance with during mobility. No SPC needed on stairs this PM. Pt states her mom has a fww that she plans to borrow and will have tonight. Goals partially met as needing SBA for mobiltiy due to increased pain and mild instability.     Physical Therapy Discharge Summary    Reason for therapy discharge:    Discharged to home.    Progress towards therapy goal(s). See goals on Care Plan in [facility] electronic health record for goal details.  Goals partially met.  Barriers to achieving goals:   limited tolerance for therapy and discharge from facility.    Therapy recommendation(s):    No further therapy is recommended.

## 2017-11-05 NOTE — PROVIDER NOTIFICATION
Spoke with Dr Funk over the phone regarding pt sleepiness, RN concerned that it may not be safe at home w/o staff observation with pain meds. Dr Funk gave OK to stay night if needed, decision can be made this evening after PTs. Also see mar for order, Dr Funk ordered motrin, will check with pt before ordering d/t allergies.

## 2017-11-06 ENCOUNTER — TELEPHONE (OUTPATIENT)
Dept: INTERNAL MEDICINE | Facility: CLINIC | Age: 47
End: 2017-11-06

## 2017-11-06 NOTE — TELEPHONE ENCOUNTER
IP F/U    Date: 11/05/17  Diagnosis: Right Hip Osteoarthritis and in the setting of displasia, Status Post Total Rt hip replacement  Is patient active in care coordination? No  Was patient in TCU? No

## 2017-11-06 NOTE — PLAN OF CARE
Problem: Patient Care Overview  Goal: Discharge Needs Assessment  Outcome: Adequate for Discharge Date Met:  11/05/17  Pt. Alert and oriented x4, appropriate.  With sig. Other and stated she is ready to discharge home.  Sig. Other present to provide transport and be present with pt. This evening.  Reviewed all DC instructions including medications safety, indications and appropriate dosages.  Reviewed all follow up, precautions.  Pt. And sig. Other verbalized understanding.  DC at 1816 via wc/NA.

## 2017-11-07 NOTE — TELEPHONE ENCOUNTER
ED / Discharge Outreach Protocol    Patient Contact    Attempt # 2    Was call answered?  No.  Left message on voicemail with information to call me back.

## 2017-11-09 ENCOUNTER — HOSPITAL ENCOUNTER (EMERGENCY)
Facility: CLINIC | Age: 47
Discharge: HOME | End: 2017-11-09
Payer: COMMERCIAL

## 2017-11-09 VITALS
SYSTOLIC BLOOD PRESSURE: 124 MMHG | DIASTOLIC BLOOD PRESSURE: 94 MMHG | OXYGEN SATURATION: 99 % | RESPIRATION RATE: 20 BRPM | TEMPERATURE: 96.4 F | HEART RATE: 77 BPM

## 2017-11-09 NOTE — ED NOTES
Reports right total hip replacement last week, states unable to tolerate oxycodone which is making her vomit and having unbearable pain because she cannot keep meds down; sent over from [facility] today for pain relief and nausea control. ABCs intact.

## 2017-11-10 ENCOUNTER — TRANSFERRED RECORDS (OUTPATIENT)
Dept: HEALTH INFORMATION MANAGEMENT | Facility: CLINIC | Age: 47
End: 2017-11-10

## 2017-11-17 ENCOUNTER — INFUSION THERAPY VISIT (OUTPATIENT)
Dept: INFUSION THERAPY | Facility: CLINIC | Age: 47
End: 2017-11-17
Attending: ALLERGY & IMMUNOLOGY
Payer: COMMERCIAL

## 2017-11-17 VITALS
SYSTOLIC BLOOD PRESSURE: 122 MMHG | TEMPERATURE: 98.1 F | DIASTOLIC BLOOD PRESSURE: 74 MMHG | RESPIRATION RATE: 16 BRPM | OXYGEN SATURATION: 100 % | HEART RATE: 91 BPM | HEIGHT: 64 IN

## 2017-11-17 DIAGNOSIS — J45.50 SEVERE PERSISTENT ASTHMA WITHOUT COMPLICATION (H): Primary | ICD-10-CM

## 2017-11-17 PROCEDURE — 96372 THER/PROPH/DIAG INJ SC/IM: CPT

## 2017-11-17 PROCEDURE — 25000128 H RX IP 250 OP 636: Performed by: ALLERGY & IMMUNOLOGY

## 2017-11-17 RX ADMIN — OMALIZUMAB 375 MG: 202.5 INJECTION, SOLUTION SUBCUTANEOUS at 09:15

## 2017-11-17 NOTE — MR AVS SNAPSHOT
After Visit Summary   11/17/2017    Alba Varela    MRN: [id number]           Patient Information     Date Of Birth          1970        Visit Information        Provider Department      11/17/2017 9:00 AM RH INFUSION CHAIR 3 [facility]        Today's Diagnoses     Severe persistent asthma without complication    -  1       Follow-ups after your visit        Your next 10 appointments already scheduled     Nov 28, 2017  9:00 AM CST   Level 1 with RH INFUSION CHAIR 1   [facility] ([facility] [place])    [place] [id number]  [facility] [address]   [phone]            Dec 12, 2017  9:00 AM CST   Level 1 with RH INFUSION CHAIR 1   [facility] ([place])    [facility]  [address]  [facility] [address]   [phone]            Dec 26, 2017  9:00 AM CST   Level 1 with RH INFUSION CHAIR 1   [facility] ([place])    [facility]  [address]  [facility] [address]   [phone]              Who to contact     If you have questions or need follow up information about today's clinic visit or your schedule please contact [facility] directly at [phone].  Normal or non-critical lab and imaging results will be communicated to you by MyChart, letter or phone within 4 business days after the clinic has received the results. If you do not hear from us within 7 days, please contact the clinic through MyChart or phone. If you have a critical or abnormal lab result, we will notify you by phone as soon as possible.  Submit refill requests through [employer] or call your pharmacy and they will forward the refill request to us. Please allow 3 business days for your refill to be  "completed.          Additional Information About Your Visit        [employer] Information     [employer] gives you secure access to your electronic health record. If you see a primary care provider, you can also send messages to your care team and make appointments. If you have questions, please call your primary care clinic.  If you do not have a primary care provider, please call [phone] and they will assist you.        Care EveryWhere ID     This is your Care EveryWhere ID. This could be used by other organizations to access your [facility] medical records  [id number]        Your Vitals Were     Pulse Temperature Respirations Height Last Period Pulse Oximetry    91 98.1  F (36.7  C) (Tympanic) 16 1.626 m (5' 4\") 07/11/2014 100%       Blood Pressure from Last 3 Encounters:   11/17/17 122/74   11/09/17 (!) 124/94   11/05/17 117/77    Weight from Last 3 Encounters:   11/03/17 75.8 kg (167 lb)   10/24/17 75.4 kg (166 lb 3.2 oz)   05/12/17 75.3 kg (166 lb)              Today, you had the following     No orders found for display         Today's Medication Changes          These changes are accurate as of: 11/17/17 10:19 AM.  If you have any questions, ask your nurse or doctor.               These medicines have changed or have updated prescriptions.        Dose/Directions    gabapentin 100 MG capsule   Commonly known as:  NEURONTIN   This may have changed:  additional instructions   Used for:  Cervicalgia        Take 100 mg in the morning and 400 mg at bedtime for one week, then increase to 100 mg in the morning and 600 mg at bedtime.   Quantity:  210 capsule   Refills:  1                Primary Care Provider Office Phone # Fax #    Bright [facility]elo [phone]       [address] [address]        Equal Access to [facility]     REINA VILL[place] AH: Yonny Pacheco, bailee nassar, dominic weeks. So wa " [phone].    ATENCIÓN: Si abdifatah lovelace, tiene a rojas disposición servicios gratuitos de asistencia lingüística. Brissa sanabria [phone].    We comply with applicable federal civil rights laws and [place] laws. We do not discriminate on the basis of race, color, national origin, age, disability, sex, sexual orientation, or gender identity.            Thank you!     Thank you for choosing [facility]  for your care. Our goal is always to provide you with excellent care. Hearing back from our patients is one way we can continue to improve our services. Please take a few minutes to complete the written survey that you may receive in the mail after your visit with us. Thank you!             Your Updated Medication List - Protect others around you: Learn how to safely use, store and throw away your medicines at www.disposemymeds.org.          This list is accurate as of: 11/17/17 10:19 AM.  Always use your most recent med list.                   Brand Name Dispense Instructions for use Diagnosis    acetaminophen 325 MG tablet    TYLENOL    100 tablet    Take 2 tablets (650 mg) by mouth every 4 hours as needed for other (surgical pain)    Status post total replacement of right hip       aspirin 81 MG EC tablet     120 tablet    Take 2 tablets (162 mg) by mouth 2 times daily (with meals)    Status post total replacement of right hip       BOTOX IJ      As directed        diltiazem 120 MG 24 hr capsule    CARDIZEM CD    30 capsule    Take 1 capsule (120 mg) by mouth daily You are due to see the cardiologist- please call [phone] to schedule.    Chest pressure       gabapentin 100 MG capsule    NEURONTIN    210 capsule    Take 100 mg in the morning and 400 mg at bedtime for one week, then increase to 100 mg in the morning and 600 mg at bedtime.    Cervicalgia       hydrOXYzine 25 MG tablet    ATARAX    60 tablet    Take 1 tablet (25 mg) by mouth every 6 hours as needed for itching  (pain)    Status post total replacement of right hip       loratadine 10 MG tablet    CLARITIN     Take 10 mg by mouth daily        NORTRIPTYLINE HCL PO      Take 200 mg by mouth At Bedtime        oxyCODONE IR 5 MG tablet    ROXICODONE    80 tablet    Take 1-2 tablets (5-10 mg) by mouth every 3 hours as needed for moderate to severe pain    Status post total replacement of right hip       PAROXETINE HCL PO      Take 20 mg by mouth At Bedtime        senna-docusate 8.6-50 MG per tablet    SENOKOT-S;PERICOLACE    30 tablet    Take 1-2 tablets by mouth 2 times daily    Status post total replacement of right hip       XOLAIR 150 MG injection   Generic drug:  omalizumab      Inject Subcutaneous every 14 days    Severe persistent asthma without complication

## 2017-11-17 NOTE — DISCHARGE SUMMARY
DATE OF ADMISSION:  2017.       DATE OF DISCHARGE:  2017.        ADMISSION DIAGNOSIS:  Right hip osteoarthritis in the setting of Perthes.        DISCHARGE DIAGNOSIS:  Right hip osteoarthritis in the setting of Perthes.        PROCEDURE PERFORMED DURING HOSPITALIZATION:  Right total hip arthroplasty.      HOSPITAL COURSE:  Alba Varela underwent a right total hip arthroplasty without intraoperative complication and was admitted to the regular surgical floor.  She did well with physical therapy during her 2-day hospital stay and progressed appropriate milestones, to point it was felt that she was stable for discharge to home.  She was afebrile with stable vital signs.  She was neurovascularly intact at the time of discharge.      DISCHARGE INSTRUCTIONS:  The patient will ambulate progressively further distances each day with the use of assistive device.  She will follow posterior hip precautions.  She will monitor her incision for any increasing erythema or drainage and call if she has uncontrolled pain or fevers.      Home medications were resumed.  She was also started on acetaminophen, hydroxyzine, oxycodone, Senokot and aspirin.         JOANN BARTLETT MD             D: 2017 09:17   T: 2017 10:49   MT: CARA#[id number]      Name:     ALBA VARELA   MRN:      [id number]        Account:        [id number]   :      1970           Admit Date:                                       Discharge Date: 2017      Document: [id number]

## 2017-11-17 NOTE — PROGRESS NOTES
Infusion Nursing Note:  Alba Varela presents today for Xolair.    Patient seen by provider today: No   present during visit today: Not Applicable.    Note: N/A.    Intravenous Access:  No Intravenous access/labs at this visit.    Treatment Conditions:  Not Applicable.      Post Infusion Assessment:  Patient tolerated injection without incident.  Patient observed for 30 minutes post Xolair per protocol.    Discharge Plan:   Discharge instructions reviewed with: Patient.  Patient and/or family verbalized understanding of discharge instructions and all questions answered.  AVS to patient via [employer].  Patient will return 11/28/17 for next appointment.   Patient discharged in stable condition accompanied by: self.  Departure Mode: Ambulatory.    Toma Camargo RN

## 2017-11-20 ENCOUNTER — TRANSFERRED RECORDS (OUTPATIENT)
Dept: HEALTH INFORMATION MANAGEMENT | Facility: CLINIC | Age: 47
End: 2017-11-20

## 2017-11-28 ENCOUNTER — INFUSION THERAPY VISIT (OUTPATIENT)
Dept: INFUSION THERAPY | Facility: CLINIC | Age: 47
End: 2017-11-28
Attending: ALLERGY & IMMUNOLOGY
Payer: COMMERCIAL

## 2017-11-28 VITALS
OXYGEN SATURATION: 97 % | RESPIRATION RATE: 16 BRPM | SYSTOLIC BLOOD PRESSURE: 115 MMHG | DIASTOLIC BLOOD PRESSURE: 84 MMHG | HEART RATE: 98 BPM | TEMPERATURE: 98.1 F

## 2017-11-28 DIAGNOSIS — J45.50 SEVERE PERSISTENT ASTHMA WITHOUT COMPLICATION (H): Primary | ICD-10-CM

## 2017-11-28 PROCEDURE — 96372 THER/PROPH/DIAG INJ SC/IM: CPT

## 2017-11-28 PROCEDURE — 25000128 H RX IP 250 OP 636: Performed by: ALLERGY & IMMUNOLOGY

## 2017-11-28 RX ADMIN — OMALIZUMAB 375 MG: 202.5 INJECTION, SOLUTION SUBCUTANEOUS at 09:28

## 2017-11-28 NOTE — PROGRESS NOTES
Infusion Nursing Note:  Alba Varela presents today for Xolair.    Patient seen by provider today: No   present during visit today: Not Applicable.    Note: N/A.    Intravenous Access:  No Intravenous access/labs at this visit.    Treatment Conditions:  Not Applicable.      Post Infusion Assessment:  Patient tolerated injection without incident.  Patient observed for 30 minutes post Xolair per protocol.    Discharge Plan:   Discharge instructions reviewed with: Patient.  Patient and/or family verbalized understanding of discharge instructions and all questions answered.  AVS to patient via [employer].  Patient will return 12/12/2017 for next appointment.   Patient discharged in stable condition accompanied by: self.  Departure Mode: Ambulatory.    Lyn Bunn RN

## 2017-11-28 NOTE — MR AVS SNAPSHOT
After Visit Summary   11/28/2017    Alba Varela    MRN: [id number]           Patient Information     Date Of Birth          1970        Visit Information        Provider Department      11/28/2017 9:00 AM RH INFUSION CHAIR 1 [facility]        Today's Diagnoses     Severe persistent asthma without complication    -  1       Follow-ups after your visit        Your next 10 appointments already scheduled     Dec 12, 2017  9:00 AM CST   Level 1 with RH INFUSION CHAIR 1   [facility] ([facility])    [facility]  [address]  [facility] [address]   [phone]            Dec 26, 2017  9:00 AM CST   Level 1 with RH INFUSION CHAIR 1   [facility] ([facility])    [facility]  [address]  [facility] [id number] [phone]              Who to contact     If you have questions or need follow up information about today's clinic visit or your schedule please contact [facility] INFUSION [facility] directly at [phone].  Normal or non-critical lab and imaging results will be communicated to you by ZOOM TV[employer], letter or phone within 4 business days after the clinic has received the results. If you do not hear from us within 7 days, please contact the clinic through ZOOM TV[employer] or phone. If you have a critical or abnormal lab result, we will notify you by phone as soon as possible.  Submit refill requests through [employer] or call your pharmacy and they will forward the refill request to us. Please allow 3 business days for your refill to be completed.          Additional Information About Your Visit        MyChart Information     [employer] gives you secure access to your electronic health record. If you see a primary care provider, you can also send messages to your care team and make  appointments. If you have questions, please call your primary care clinic.  If you do not have a primary care provider, please call [phone] and they will assist you.        Care EveryWhere ID     This is your Care EveryWhere ID. This could be used by other organizations to access your [place] medical records  [id number]        Your Vitals Were     Pulse Temperature Respirations Last Period Pulse Oximetry       98 98.1  F (36.7  C) (Tympanic) 16 07/11/2014 97%        Blood Pressure from Last 3 Encounters:   11/28/17 115/84   11/17/17 122/74   11/09/17 (!) 124/94    Weight from Last 3 Encounters:   11/03/17 75.8 kg (167 lb)   10/24/17 75.4 kg (166 lb 3.2 oz)   05/12/17 75.3 kg (166 lb)              Today, you had the following     No orders found for display         Today's Medication Changes          These changes are accurate as of: 11/28/17 10:08 AM.  If you have any questions, ask your nurse or doctor.               These medicines have changed or have updated prescriptions.        Dose/Directions    gabapentin 100 MG capsule   Commonly known as:  NEURONTIN   This may have changed:  additional instructions   Used for:  Cervicalgia        Take 100 mg in the morning and 400 mg at bedtime for one week, then increase to 100 mg in the morning and 600 mg at bedtime.   Quantity:  210 capsule   Refills:  1                Primary Care Provider Office Phone # Fax #    Bright [facility]elo [phone] [address] [address]        Equal Access to [facility]     CHARLENE VILLA AH: Hadii vale ku hadasho Soomaali, waaxda luqadaha, qaybta kaalmada adeegyada, dominic estrella. So [facility] [phone].    ATENCIÓN: Si habla español, tiene a rojas disposición servicios gratuitos de asistencia lingüística. Llame al [phone].    We comply with applicable federal civil rights laws and [place] laws. We do not discriminate on the basis of race, color, national origin,  age, disability, sex, sexual orientation, or gender identity.            Thank you!     Thank you for choosing [facility] INF[facility]ION [facility]  for your care. Our goal is always to provide you with excellent care. Hearing back from our patients is one way we can continue to improve our services. Please take a few minutes to complete the written survey that you may receive in the mail after your visit with us. Thank you!             Your Updated Medication List - Protect others around you: Learn how to safely use, store and throw away your medicines at www.disposemymeds.org.          This list is accurate as of: 11/28/17 10:08 AM.  Always use your most recent med list.                   Brand Name Dispense Instructions for use Diagnosis    acetaminophen 325 MG tablet    TYLENOL    100 tablet    Take 2 tablets (650 mg) by mouth every 4 hours as needed for other (surgical pain)    Status post total replacement of right hip       aspirin 81 MG EC tablet     120 tablet    Take 2 tablets (162 mg) by mouth 2 times daily (with meals)    Status post total replacement of right hip       BOTOX IJ      As directed        diltiazem 120 MG 24 hr capsule    CARDIZEM CD    30 capsule    Take 1 capsule (120 mg) by mouth daily You are due to see the cardiologist- please call [phone] to schedule.    Chest pressure       gabapentin 100 MG capsule    NEURONTIN    210 capsule    Take 100 mg in the morning and 400 mg at bedtime for one week, then increase to 100 mg in the morning and 600 mg at bedtime.    Cervicalgia       hydrOXYzine 25 MG tablet    ATARAX    60 tablet    Take 1 tablet (25 mg) by mouth every 6 hours as needed for itching (pain)    Status post total replacement of right hip       loratadine 10 MG tablet    CLARITIN     Take 10 mg by mouth daily        NORTRIPTYLINE HCL PO      Take 200 mg by mouth At Bedtime        oxyCODONE IR 5 MG tablet    ROXICODONE    80 tablet    Take 1-2 tablets (5-10 mg) by  mouth every 3 hours as needed for moderate to severe pain    Status post total replacement of right hip       PAROXETINE HCL PO      Take 20 mg by mouth At Bedtime        senna-docusate 8.6-50 MG per tablet    SENOKOT-S;PERICOLACE    30 tablet    Take 1-2 tablets by mouth 2 times daily    Status post total replacement of right hip       XOLAIR 150 MG injection   Generic drug:  omalizumab      Inject Subcutaneous every 14 days    Severe persistent asthma without complication

## 2017-12-12 ENCOUNTER — INFUSION THERAPY VISIT (OUTPATIENT)
Dept: INFUSION THERAPY | Facility: CLINIC | Age: 47
End: 2017-12-12
Attending: ALLERGY & IMMUNOLOGY
Payer: COMMERCIAL

## 2017-12-12 VITALS
HEART RATE: 100 BPM | OXYGEN SATURATION: 97 % | DIASTOLIC BLOOD PRESSURE: 79 MMHG | TEMPERATURE: 97.6 F | RESPIRATION RATE: 16 BRPM | SYSTOLIC BLOOD PRESSURE: 135 MMHG

## 2017-12-12 DIAGNOSIS — J45.50 SEVERE PERSISTENT ASTHMA WITHOUT COMPLICATION (H): Primary | ICD-10-CM

## 2017-12-12 DIAGNOSIS — Z47.89 AFTERCARE FOLLOWING SURGERY OF THE MUSCULOSKELETAL SYSTEM: Primary | ICD-10-CM

## 2017-12-12 PROCEDURE — 25000128 H RX IP 250 OP 636: Performed by: ALLERGY & IMMUNOLOGY

## 2017-12-12 PROCEDURE — 96372 THER/PROPH/DIAG INJ SC/IM: CPT

## 2017-12-12 RX ADMIN — OMALIZUMAB 375 MG: 202.5 INJECTION, SOLUTION SUBCUTANEOUS at 09:05

## 2017-12-12 NOTE — PROGRESS NOTES
Infusion Nursing Note:  Alba Varela presents today for Xolair.    Patient seen by provider today: No   present during visit today: Not Applicable.    Note: Patient continues to have pain from her right hip into her groin. Patient also states her right leg is longer since her hip surgery. Patient states she is trying to get in to discuss this with her surgeon Dr. Gamble.    Intravenous Access:  No Intravenous access/labs at this visit.    Treatment Conditions:  Not Applicable.      Post Infusion Assessment:  Patient tolerated injection without incident.  Patient observed for 30 minutes post Xolair per protocol.    Discharge Plan:   Discharge instructions reviewed with: Patient.  Patient and/or family verbalized understanding of discharge instructions and all questions answered.  AVS to patient via [employer].  Patient will return 12/26/2017 for next appointment.   Patient discharged in stable condition accompanied by: self.  Departure Mode: Ambulatory.    Lyn Bunn RN

## 2017-12-12 NOTE — MR AVS SNAPSHOT
After Visit Summary   12/12/2017    Alba Varela    MRN: [id number]           Patient Information     Date Of Birth          1970        Visit Information        Provider Department      12/12/2017 9:00 AM RH INFUSION CHAIR 5 [facility]        Today's Diagnoses     Severe persistent asthma without complication    -  1       Follow-ups after your visit        Your next 10 appointments already scheduled     Dec 26, 2017  9:00 AM CST   injection with RH LAB DRAW 2   [facility] ([facility])    [facility]  [address]  [facility] [id number] [phone]              Who to contact     If you have questions or need follow up information about today's clinic visit or your schedule please contact [facility] INFUSION [facility] directly at [phone].  Normal or non-critical lab and imaging results will be communicated to you by [employer] Sales[employer], letter or phone within 4 business days after the clinic has received the results. If you do not hear from us within 7 days, please contact the clinic through [employer] or phone. If you have a critical or abnormal lab result, we will notify you by phone as soon as possible.  Submit refill requests through [employer] or call your pharmacy and they will forward the refill request to us. Please allow 3 business days for your refill to be completed.          Additional Information About Your Visit        MyChart Information     [employer] gives you secure access to your electronic health record. If you see a primary care provider, you can also send messages to your care team and make appointments. If you have questions, please call your primary care clinic.  If you do not have a primary care provider, please call [phone] and they will assist you.        Care EveryWhere ID     This is your Care EveryWhere ID. This could be used by other  organizations to access your [place] medical records  [id number]        Your Vitals Were     Pulse Temperature Respirations Last Period Pulse Oximetry       100 97.6  F (36.4  C) (Tympanic) 16 07/11/2014 97%        Blood Pressure from Last 3 Encounters:   12/12/17 135/79   11/28/17 115/84   11/17/17 122/74    Weight from Last 3 Encounters:   11/03/17 75.8 kg (167 lb)   10/24/17 75.4 kg (166 lb 3.2 oz)   05/12/17 75.3 kg (166 lb)              Today, you had the following     No orders found for display         Today's Medication Changes          These changes are accurate as of: 12/12/17  9:42 AM.  If you have any questions, ask your nurse or doctor.               These medicines have changed or have updated prescriptions.        Dose/Directions    gabapentin 100 MG capsule   Commonly known as:  NEURONTIN   This may have changed:  additional instructions   Used for:  Cervicalgia        Take 100 mg in the morning and 400 mg at bedtime for one week, then increase to 100 mg in the morning and 600 mg at bedtime.   Quantity:  210 capsule   Refills:  1                Primary Care Provider Office Phone # Fax #    Bright [facility]elo [phone]       [address] [address]        Equal Access to [facility]     REINA VILLA : Yonny gamboao Soomaali, waaxda luqadaha, qaybta kaalmada adeegyada, dominic estrella. So [facility] [phone].    ATENCIÓN: Si habla español, tiene a rojas disposición servicios gratuitos de asistencia lingüística. Llame al [phone].    We comply with applicable federal civil rights laws and [place] laws. We do not discriminate on the basis of race, color, national origin, age, disability, sex, sexual orientation, or gender identity.            Thank you!     Thank you for choosing [facility] INFUSION [facility]  for your care. Our goal is always to provide you with excellent care. Hearing back from our patients is  one way we can continue to improve our services. Please take a few minutes to complete the written survey that you may receive in the mail after your visit with us. Thank you!             Your Updated Medication List - Protect others around you: Learn how to safely use, store and throw away your medicines at www.disposemymeds.org.          This list is accurate as of: 12/12/17  9:42 AM.  Always use your most recent med list.                   Brand Name Dispense Instructions for use Diagnosis    acetaminophen 325 MG tablet    TYLENOL    100 tablet    Take 2 tablets (650 mg) by mouth every 4 hours as needed for other (surgical pain)    Status post total replacement of right hip       BOTOX IJ      As directed        diltiazem 120 MG 24 hr capsule    CARDIZEM CD    30 capsule    Take 1 capsule (120 mg) by mouth daily You are due to see the cardiologist- please call [phone] to schedule.    Chest pressure       gabapentin 100 MG capsule    NEURONTIN    210 capsule    Take 100 mg in the morning and 400 mg at bedtime for one week, then increase to 100 mg in the morning and 600 mg at bedtime.    Cervicalgia       hydrOXYzine 25 MG tablet    ATARAX    60 tablet    Take 1 tablet (25 mg) by mouth every 6 hours as needed for itching (pain)    Status post total replacement of right hip       loratadine 10 MG tablet    CLARITIN     Take 10 mg by mouth daily        NORTRIPTYLINE HCL PO      Take 200 mg by mouth At Bedtime        oxyCODONE IR 5 MG tablet    ROXICODONE    80 tablet    Take 1-2 tablets (5-10 mg) by mouth every 3 hours as needed for moderate to severe pain    Status post total replacement of right hip       PAROXETINE HCL PO      Take 20 mg by mouth At Bedtime        senna-docusate 8.6-50 MG per tablet    SENOKOT-S;PERICOLACE    30 tablet    Take 1-2 tablets by mouth 2 times daily    Status post total replacement of right hip       XOLAIR 150 MG injection   Generic drug:  omalizumab      Inject Subcutaneous  every 14 days    Severe persistent asthma without complication

## 2017-12-14 ENCOUNTER — HOSPITAL ENCOUNTER (OUTPATIENT)
Dept: LAB | Facility: CLINIC | Age: 47
Discharge: HOME | End: 2017-12-14
Attending: ORTHOPAEDIC SURGERY | Admitting: ORTHOPAEDIC SURGERY
Payer: COMMERCIAL

## 2017-12-14 DIAGNOSIS — Z47.89 AFTERCARE FOLLOWING SURGERY OF THE MUSCULOSKELETAL SYSTEM: ICD-10-CM

## 2017-12-14 LAB
CRP SERPL-MCNC: <2.9 MG/L (ref 0–8)
ERYTHROCYTE [DISTWIDTH] IN BLOOD BY AUTOMATED COUNT: 12.1 % (ref 10–15)
ERYTHROCYTE [SEDIMENTATION RATE] IN BLOOD BY WESTERGREN METHOD: 10 MM/H (ref 0–20)
HCT VFR BLD AUTO: 38.3 % (ref 35–47)
HGB BLD-MCNC: 12.8 G/DL (ref 11.7–15.7)
MCH RBC QN AUTO: 31.1 PG (ref 26.5–33)
MCHC RBC AUTO-ENTMCNC: 33.4 G/DL (ref 31.5–36.5)
MCV RBC AUTO: 93 FL (ref 78–100)
PLATELET # BLD AUTO: 272 10E9/L (ref 150–450)
RBC # BLD AUTO: 4.11 10E12/L (ref 3.8–5.2)
WBC # BLD AUTO: 7.1 10E9/L (ref 4–11)

## 2017-12-14 PROCEDURE — 86140 C-REACTIVE PROTEIN: CPT | Performed by: ORTHOPAEDIC SURGERY

## 2017-12-14 PROCEDURE — 85027 COMPLETE CBC AUTOMATED: CPT | Performed by: ORTHOPAEDIC SURGERY

## 2017-12-14 PROCEDURE — 85652 RBC SED RATE AUTOMATED: CPT | Performed by: ORTHOPAEDIC SURGERY

## 2017-12-14 PROCEDURE — 36415 COLL VENOUS BLD VENIPUNCTURE: CPT | Performed by: ORTHOPAEDIC SURGERY

## 2017-12-16 ENCOUNTER — MEDICAL CORRESPONDENCE (OUTPATIENT)
Dept: HEALTH INFORMATION MANAGEMENT | Facility: CLINIC | Age: 47
End: 2017-12-16

## 2017-12-21 ENCOUNTER — TRANSFERRED RECORDS (OUTPATIENT)
Dept: HEALTH INFORMATION MANAGEMENT | Facility: CLINIC | Age: 47
End: 2017-12-21

## 2017-12-26 ENCOUNTER — INFUSION THERAPY VISIT (OUTPATIENT)
Dept: INFUSION THERAPY | Facility: CLINIC | Age: 47
End: 2017-12-26
Attending: ALLERGY & IMMUNOLOGY
Payer: COMMERCIAL

## 2017-12-26 VITALS
OXYGEN SATURATION: 98 % | HEART RATE: 102 BPM | TEMPERATURE: 97.7 F | SYSTOLIC BLOOD PRESSURE: 109 MMHG | RESPIRATION RATE: 16 BRPM | DIASTOLIC BLOOD PRESSURE: 66 MMHG

## 2017-12-26 DIAGNOSIS — J45.50 SEVERE PERSISTENT ASTHMA WITHOUT COMPLICATION (H): Primary | ICD-10-CM

## 2017-12-26 PROCEDURE — 25000128 H RX IP 250 OP 636: Mod: JW | Performed by: ALLERGY & IMMUNOLOGY

## 2017-12-26 PROCEDURE — 96372 THER/PROPH/DIAG INJ SC/IM: CPT

## 2017-12-26 RX ADMIN — OMALIZUMAB 375 MG: 202.5 INJECTION, SOLUTION SUBCUTANEOUS at 08:52

## 2017-12-26 NOTE — PROGRESS NOTES
Infusion Nursing Note:  Alba Varela presents today for Xolair    Patient seen by provider today: No   present during visit today: Not Applicable.    Note: N/A.    Intravenous Access:  No Intravenous access/labs at this visit.    Treatment Conditions:  Not Applicable.      Post Infusion Assessment:  Patient tolerated injection without incident.  Patient observed for 30 minutes post Xolair per protocol.    Discharge Plan:   Discharge instructions reviewed with: Patient.  Patient and/or family verbalized understanding of discharge instructions and all questions answered.  AVS to patient via [employer].  Patient will return 1/9/17 for next appointment.   Patient discharged in stable condition accompanied by: Significant other.  Departure Mode: Ambulatory.    Lyn Bunn RN

## 2017-12-26 NOTE — MR AVS SNAPSHOT
After Visit Summary   12/26/2017    Alba Varela    MRN: [id number]           Patient Information     Date Of Birth          1970        Visit Information        Provider Department      12/26/2017 9:00 AM RH INFUSION CHAIR 7 [facility]        Today's Diagnoses     Severe persistent asthma without complication    -  1       Follow-ups after your visit        Your next 10 appointments already scheduled     Jan 09, 2018  9:00 AM CST   Level 1 with RH INFUSION CHAIR 6   [facility] ([facility])    [facility]  [address]  [place] [address]   [phone]            Jan 23, 2018  9:30 AM CST   Level 1 with RH INFUSION CHAIR 1   [facility] ([facility])    [facility]  [address]  [place] [address]   [phone]            Feb 06, 2018  9:00 AM CST   Level 1 with RH INFUSION CHAIR 11   [facility] ([facility])    [facility]  [address]  [place] [address]   [phone]            Feb 20, 2018  9:00 AM CST   Level 1 with RH INFUSION CHAIR 1   [facility] ([facility])    [address]  [address]  [place] [address]   [phone]            Mar 06, 2018  9:00 AM CST   Level 1 with RH INFUSION CHAIR 5   [facility] ([facility])    [facility]  [address]  [place] [address]   [phone]              Who to contact     If you have questions or need follow up information about today's clinic visit or your schedule please contact [facility] INFUSION [facility] directly at [phone].  Normal or non-critical  lab and imaging results will be communicated to you by [employer], letter or phone within 4 business days after the clinic has received the results. If you do not hear from us within 7 days, please contact the clinic through Bringr[employer] or phone. If you have a critical or abnormal lab result, we will notify you by phone as soon as possible.  Submit refill requests through [employer] or call your pharmacy and they will forward the refill request to us. Please allow 3 business days for your refill to be completed.          Additional Information About Your Visit        [employer] Information     [employer] gives you secure access to your electronic health record. If you see a primary care provider, you can also send messages to your care team and make appointments. If you have questions, please call your primary care clinic.  If you do not have a primary care provider, please call [phone] and they will assist you.        Care EveryWhere ID     This is your Care EveryWhere ID. This could be used by other organizations to access your [place] medical records  [id number]        Your Vitals Were     Pulse Temperature Respirations Last Period Pulse Oximetry       102 97.7  F (36.5  C) (Tympanic) 16 07/11/2014 98%        Blood Pressure from Last 3 Encounters:   12/26/17 109/66   12/12/17 135/79   11/28/17 115/84    Weight from Last 3 Encounters:   11/03/17 75.8 kg (167 lb)   10/24/17 75.4 kg (166 lb 3.2 oz)   05/12/17 75.3 kg (166 lb)              Today, you had the following     No orders found for display         Today's Medication Changes          These changes are accurate as of: 12/26/17  9:37 AM.  If you have any questions, ask your nurse or doctor.               These medicines have changed or have updated prescriptions.        Dose/Directions    gabapentin 100 MG capsule   Commonly known as:  NEURONTIN   This may have changed:  additional instructions   Used for:  Cervicalgia        Take 100 mg in the morning and 400 mg at  bedtime for one week, then increase to 100 mg in the morning and 600 mg at bedtime.   Quantity:  210 capsule   Refills:  1                Primary Care Provider Office Phone # Fax #    Bright [facility]elo [phone] [address] [address]        Equal Access to [facility]     REINA [facility] : Hadii aad ku hadasho Soomaali, waaxda luqadaha, qaybta kaalmada adeegyada, waxay idiin hayaan adeeg kharash la'aan . So [facility] [phone].    ATENCIÓN: Si habla español, tiene a rojas disposición servicios gratuitos de asistencia lingüística. Llame al [phone].    We comply with applicable federal civil rights laws and [place] laws. We do not discriminate on the basis of race, color, national origin, age, disability, sex, sexual orientation, or gender identity.            Thank you!     Thank you for choosing [facility] INFUSION [facility]  for your care. Our goal is always to provide you with excellent care. Hearing back from our patients is one way we can continue to improve our services. Please take a few minutes to complete the written survey that you may receive in the mail after your visit with us. Thank you!             Your Updated Medication List - Protect others around you: Learn how to safely use, store and throw away your medicines at www.disposemymeds.org.          This list is accurate as of: 12/26/17  9:37 AM.  Always use your most recent med list.                   Brand Name Dispense Instructions for use Diagnosis    acetaminophen 325 MG tablet    TYLENOL    100 tablet    Take 2 tablets (650 mg) by mouth every 4 hours as needed for other (surgical pain)    Status post total replacement of right hip       BOTOX IJ      As directed        diltiazem 120 MG 24 hr capsule    CARDIZEM CD    30 capsule    Take 1 capsule (120 mg) by mouth daily You are due to see the cardiologist- please call [phone] to schedule.    Chest pressure       gabapentin 100 MG  capsule    NEURONTIN    210 capsule    Take 100 mg in the morning and 400 mg at bedtime for one week, then increase to 100 mg in the morning and 600 mg at bedtime.    Cervicalgia       hydrOXYzine 25 MG tablet    ATARAX    60 tablet    Take 1 tablet (25 mg) by mouth every 6 hours as needed for itching (pain)    Status post total replacement of right hip       loratadine 10 MG tablet    CLARITIN     Take 10 mg by mouth daily        NORTRIPTYLINE HCL PO      Take 200 mg by mouth At Bedtime        oxyCODONE IR 5 MG tablet    ROXICODONE    80 tablet    Take 1-2 tablets (5-10 mg) by mouth every 3 hours as needed for moderate to severe pain    Status post total replacement of right hip       PAROXETINE HCL PO      Take 20 mg by mouth At Bedtime        senna-docusate 8.6-50 MG per tablet    SENOKOT-S;PERICOLACE    30 tablet    Take 1-2 tablets by mouth 2 times daily    Status post total replacement of right hip       XOLAIR 150 MG injection   Generic drug:  omalizumab      Inject Subcutaneous every 14 days    Severe persistent asthma without complication

## 2017-12-29 ENCOUNTER — TRANSFERRED RECORDS (OUTPATIENT)
Dept: HEALTH INFORMATION MANAGEMENT | Facility: CLINIC | Age: 47
End: 2017-12-29

## 2018-01-09 ENCOUNTER — INFUSION THERAPY VISIT (OUTPATIENT)
Dept: INFUSION THERAPY | Facility: CLINIC | Age: 48
End: 2018-01-09
Attending: ALLERGY & IMMUNOLOGY
Payer: COMMERCIAL

## 2018-01-09 VITALS
OXYGEN SATURATION: 97 % | SYSTOLIC BLOOD PRESSURE: 133 MMHG | HEART RATE: 93 BPM | RESPIRATION RATE: 16 BRPM | TEMPERATURE: 97.8 F | DIASTOLIC BLOOD PRESSURE: 78 MMHG

## 2018-01-09 DIAGNOSIS — J45.50 SEVERE PERSISTENT ASTHMA WITHOUT COMPLICATION (H): Primary | ICD-10-CM

## 2018-01-09 PROCEDURE — 96372 THER/PROPH/DIAG INJ SC/IM: CPT

## 2018-01-09 PROCEDURE — 25000128 H RX IP 250 OP 636: Performed by: ALLERGY & IMMUNOLOGY

## 2018-01-09 RX ADMIN — OMALIZUMAB 375 MG: 202.5 INJECTION, SOLUTION SUBCUTANEOUS at 08:56

## 2018-01-09 NOTE — MR AVS SNAPSHOT
After Visit Summary   1/9/2018    Alba Varela    MRN: [id number]           Patient Information     Date Of Birth          1970        Visit Information        Provider Department      1/9/2018 9:00 AM RH INFUSION CHAIR 6 [facility]        Today's Diagnoses     Severe persistent asthma without complication    -  1       Follow-ups after your visit        Your next 10 appointments already scheduled     Jan 23, 2018  9:30 AM CST   Level 1 with RH INFUSION CHAIR 9   [facility] ([facility])    [facility]  [address]  [facility] [address]   [phone]            Feb 06, 2018  9:00 AM CST   Level 1 with RH INFUSION CHAIR 11   [facility] ([facility])    [facility]  [address]  [facility] [address]   [phone]            Feb 20, 2018  9:00 AM CST   Level 1 with RH INFUSION CHAIR 1   [facility] ([facility])    [facility]  [address]  [facility] [address]   [phone]            Mar 06, 2018  9:00 AM CST   Level 1 with RH INFUSION CHAIR 5   [facility] ([facility])    [facility]  [address]  [facility] [address]   [phone]              Who to contact     If you have questions or need follow up information about today's clinic visit or your schedule please contact [facility] INFUSION [facility] directly at [phone].  Normal or non-critical lab and imaging results will be communicated to you by MyChart, letter or phone within 4 business days after the clinic has received the results. If you do not hear from us within 7 days, please contact the clinic through MyChart or phone. If you have a  critical or abnormal lab result, we will notify you by phone as soon as possible.  Submit refill requests through [employer] ePrint or call your pharmacy and they will forward the refill request to us. Please allow 3 business days for your refill to be completed.          Additional Information About Your Visit        [employer] Information     [employer] gives you secure access to your electronic health record. If you see a primary care provider, you can also send messages to your care team and make appointments. If you have questions, please call your primary care clinic.  If you do not have a primary care provider, please call [phone] and they will assist you.        Care EveryWhere ID     This is your Care EveryWhere ID. This could be used by other organizations to access your [place] medical records  [id number]        Your Vitals Were     Pulse Temperature Respirations Last Period Pulse Oximetry       93 97.8  F (36.6  C) (Tympanic) 16 07/11/2014 97%        Blood Pressure from Last 3 Encounters:   01/09/18 133/78   12/26/17 109/66   12/12/17 135/79    Weight from Last 3 Encounters:   11/03/17 75.8 kg (167 lb)   10/24/17 75.4 kg (166 lb 3.2 oz)   05/12/17 75.3 kg (166 lb)              Today, you had the following     No orders found for display         Today's Medication Changes          These changes are accurate as of: 1/9/18  9:45 AM.  If you have any questions, ask your nurse or doctor.               These medicines have changed or have updated prescriptions.        Dose/Directions    gabapentin 100 MG capsule   Commonly known as:  NEURONTIN   This may have changed:  additional instructions   Used for:  Cervicalgia        Take 100 mg in the morning and 400 mg at bedtime for one week, then increase to 100 mg in the morning and 600 mg at bedtime.   Quantity:  210 capsule   Refills:  1                Primary Care Provider Office Phone # Fax #    Bright [facility]elo [phone]        [address]  [facility] [address]        Equal Access to [facility]     REINA VILLA : Hadii vale miguel maru Pacheco, waelliottda luqadaha, qakarineta kaalmada jace, dominic castrourvashioly estrella. So [facility] [phone].    ATENCIÓN: Si habla español, tiene a rojas disposición servicios gratuitos de asistencia lingüística. Farzadame al [phone].    We comply with applicable federal civil rights laws and [place] laws. We do not discriminate on the basis of race, color, national origin, age, disability, sex, sexual orientation, or gender identity.            Thank you!     Thank you for choosing [facility] INFUSION [facility]  for your care. Our goal is always to provide you with excellent care. Hearing back from our patients is one way we can continue to improve our services. Please take a few minutes to complete the written survey that you may receive in the mail after your visit with us. Thank you!             Your Updated Medication List - Protect others around you: Learn how to safely use, store and throw away your medicines at www.disposemymeds.org.          This list is accurate as of: 1/9/18  9:45 AM.  Always use your most recent med list.                   Brand Name Dispense Instructions for use Diagnosis    acetaminophen 325 MG tablet    TYLENOL    100 tablet    Take 2 tablets (650 mg) by mouth every 4 hours as needed for other (surgical pain)    Status post total replacement of right hip       BOTOX IJ      As directed        diltiazem 120 MG 24 hr capsule    CARDIZEM CD    30 capsule    Take 1 capsule (120 mg) by mouth daily You are due to see the cardiologist- please call [phone] to schedule.    Chest pressure       gabapentin 100 MG capsule    NEURONTIN    210 capsule    Take 100 mg in the morning and 400 mg at bedtime for one week, then increase to 100 mg in the morning and 600 mg at bedtime.    Cervicalgia       hydrOXYzine 25 MG tablet    ATARAX    60 tablet    Take 1 tablet (25 mg) by  mouth every 6 hours as needed for itching (pain)    Status post total replacement of right hip       loratadine 10 MG tablet    CLARITIN     Take 10 mg by mouth daily        NORTRIPTYLINE HCL PO      Take 200 mg by mouth At Bedtime        oxyCODONE IR 5 MG tablet    ROXICODONE    80 tablet    Take 1-2 tablets (5-10 mg) by mouth every 3 hours as needed for moderate to severe pain    Status post total replacement of right hip       PAROXETINE HCL PO      Take 20 mg by mouth At Bedtime        senna-docusate 8.6-50 MG per tablet    SENOKOT-S;PERICOLACE    30 tablet    Take 1-2 tablets by mouth 2 times daily    Status post total replacement of right hip       XOLAIR 150 MG injection   Generic drug:  omalizumab      Inject Subcutaneous every 14 days    Severe persistent asthma without complication

## 2018-01-09 NOTE — PROGRESS NOTES
Infusion Nursing Note:  Alba Varela presents today for Xolair.    Patient seen by provider today: No   present during visit today: Not Applicable.    Note: N/A.    Intravenous Access:  No Intravenous access/labs at this visit.    Treatment Conditions:  Not Applicable.      Post Infusion Assessment:  Patient tolerated injection without incident.  Patient observed for 30 minutes post Xolair per protocol.    Discharge Plan:   Discharge instructions reviewed with: Patient.  Patient and/or family verbalized understanding of discharge instructions and all questions answered.  AVS to patient via [employer].  Patient will return 1/23/2018 for next appointment.   Patient discharged in stable condition accompanied by: self.  Departure Mode: Ambulatory.    Lyn Bunn RN

## 2018-01-23 ENCOUNTER — INFUSION THERAPY VISIT (OUTPATIENT)
Dept: INFUSION THERAPY | Facility: CLINIC | Age: 48
End: 2018-01-23
Attending: ALLERGY & IMMUNOLOGY
Payer: COMMERCIAL

## 2018-01-23 VITALS — HEART RATE: 94 BPM | SYSTOLIC BLOOD PRESSURE: 145 MMHG | DIASTOLIC BLOOD PRESSURE: 79 MMHG

## 2018-01-23 DIAGNOSIS — J45.50 SEVERE PERSISTENT ASTHMA WITHOUT COMPLICATION (H): Primary | ICD-10-CM

## 2018-01-23 PROCEDURE — 96372 THER/PROPH/DIAG INJ SC/IM: CPT

## 2018-01-23 PROCEDURE — 25000128 H RX IP 250 OP 636: Mod: JW | Performed by: ALLERGY & IMMUNOLOGY

## 2018-01-23 RX ADMIN — OMALIZUMAB 375 MG: 202.5 INJECTION, SOLUTION SUBCUTANEOUS at 09:45

## 2018-01-23 NOTE — PROGRESS NOTES
Infusion Nursing Note:  Alba Varela presents today for Xolair.    Patient seen by provider today: No   present during visit today: Not Applicable.    Note: N/A.    Intravenous Access:  No Intravenous access/labs at this visit.    Treatment Conditions:  Not Applicable.      Post Infusion Assessment:  Patient tolerated injection without incident.  Patient observed for 30 minutes post Xolair per protocol.    Discharge Plan:   Discharge instructions reviewed with: Patient.  Patient and/or family verbalized understanding of discharge instructions and all questions answered.  AVS to patient via [employer].  Patient will return 2/6/18 for next Xolair.  Patient discharged in stable condition accompanied by: self.  Departure Mode: Ambulatory.    Mame Condon RN

## 2018-01-23 NOTE — MR AVS SNAPSHOT
After Visit Summary   1/23/2018    Alba Varela    MRN: [id number]           Patient Information     Date Of Birth          1970        Visit Information        Provider Department      1/23/2018 9:30 AM RH INFUSION CHAIR 9 [facility]        Today's Diagnoses     Severe persistent asthma without complication    -  1       Follow-ups after your visit        Your next 10 appointments already scheduled     Feb 06, 2018  9:00 AM CST   Level 1 with RH INFUSION CHAIR 11   [facility] ([facility])    [place] [id number]  [facility] [address]   [phone]            Feb 20, 2018  9:00 AM CST   Level 1 with RH INFUSION CHAIR 1   [facility] ([facility])    [facility]  [address]  [facility] [address]   [phone]            Mar 06, 2018  9:00 AM CST   Level 1 with RH INFUSION CHAIR 4   [facility] ([facility])    [facility]  [address]  [facility] [address]   [phone]              Who to contact     If you have questions or need follow up information about today's clinic visit or your schedule please contact [facility] INFUSION [facility] directly at [phone].  Normal or non-critical lab and imaging results will be communicated to you by MyChart, letter or phone within 4 business days after the clinic has received the results. If you do not hear from us within 7 days, please contact the clinic through MyChart or phone. If you have a critical or abnormal lab result, we will notify you by phone as soon as possible.  Submit refill requests through [employer] or call your pharmacy and they will forward the refill request to us. Please allow 3 business days for your refill to be  completed.          Additional Information About Your Visit        [employer] Information     [employer] gives you secure access to your electronic health record. If you see a primary care provider, you can also send messages to your care team and make appointments. If you have questions, please call your primary care clinic.  If you do not have a primary care provider, please call [phone] and they will assist you.        Care EveryWhere ID     This is your Care EveryWhere ID. This could be used by other organizations to access your [place] medical records  [id number]        Your Vitals Were     Pulse Last Period                94 07/11/2014           Blood Pressure from Last 3 Encounters:   01/23/18 145/79   01/09/18 133/78   12/26/17 109/66    Weight from Last 3 Encounters:   11/03/17 75.8 kg (167 lb)   10/24/17 75.4 kg (166 lb 3.2 oz)   05/12/17 75.3 kg (166 lb)              Today, you had the following     No orders found for display         Today's Medication Changes          These changes are accurate as of: 1/23/18 10:33 AM.  If you have any questions, ask your nurse or doctor.               These medicines have changed or have updated prescriptions.        Dose/Directions    gabapentin 100 MG capsule   Commonly known as:  NEURONTIN   This may have changed:  additional instructions   Used for:  Cervicalgia        Take 100 mg in the morning and 400 mg at bedtime for one week, then increase to 100 mg in the morning and 600 mg at bedtime.   Quantity:  210 capsule   Refills:  1                Primary Care Provider Office Phone # Fax #    Bright [facility]elo [phone]       [address] [address]        Equal Access to [facility]     [facility]: Hadkatlyn Pacheco, waaxda luqadaha, qaybta clotildealdominic hodges. So [facility] [phone].    ATENCIÓN: Si habla español, tiene a rojas disposición servicios gratuitos de asistencia  lingüísticaDiony Brumfield al [phone].    We comply with applicable federal civil rights laws and [place] laws. We do not discriminate on the basis of race, color, national origin, age, disability, sex, sexual orientation, or gender identity.            Thank you!     Thank you for choosing [facility] INFUSION [facility]  for your care. Our goal is always to provide you with excellent care. Hearing back from our patients is one way we can continue to improve our services. Please take a few minutes to complete the written survey that you may receive in the mail after your visit with us. Thank you!             Your Updated Medication List - Protect others around you: Learn how to safely use, store and throw away your medicines at www.disposemymeds.org.          This list is accurate as of: 1/23/18 10:33 AM.  Always use your most recent med list.                   Brand Name Dispense Instructions for use Diagnosis    acetaminophen 325 MG tablet    TYLENOL    100 tablet    Take 2 tablets (650 mg) by mouth every 4 hours as needed for other (surgical pain)    Status post total replacement of right hip       BOTOX IJ      As directed        diltiazem 120 MG 24 hr capsule    CARDIZEM CD    30 capsule    Take 1 capsule (120 mg) by mouth daily You are due to see the cardiologist- please call [phone] to schedule.    Chest pressure       gabapentin 100 MG capsule    NEURONTIN    210 capsule    Take 100 mg in the morning and 400 mg at bedtime for one week, then increase to 100 mg in the morning and 600 mg at bedtime.    Cervicalgia       hydrOXYzine 25 MG tablet    ATARAX    60 tablet    Take 1 tablet (25 mg) by mouth every 6 hours as needed for itching (pain)    Status post total replacement of right hip       loratadine 10 MG tablet    CLARITIN     Take 10 mg by mouth daily        NORTRIPTYLINE HCL PO      Take 200 mg by mouth At Bedtime        oxyCODONE IR 5 MG tablet    ROXICODONE    80 tablet    Take 1-2  tablets (5-10 mg) by mouth every 3 hours as needed for moderate to severe pain    Status post total replacement of right hip       PAROXETINE HCL PO      Take 20 mg by mouth At Bedtime        senna-docusate 8.6-50 MG per tablet    SENOKOT-S;PERICOLACE    30 tablet    Take 1-2 tablets by mouth 2 times daily    Status post total replacement of right hip       XOLAIR 150 MG injection   Generic drug:  omalizumab      Inject Subcutaneous every 14 days    Severe persistent asthma without complication

## 2018-02-06 ENCOUNTER — INFUSION THERAPY VISIT (OUTPATIENT)
Dept: INFUSION THERAPY | Facility: CLINIC | Age: 48
End: 2018-02-06
Attending: ALLERGY & IMMUNOLOGY
Payer: COMMERCIAL

## 2018-02-06 VITALS
DIASTOLIC BLOOD PRESSURE: 82 MMHG | TEMPERATURE: 98.5 F | SYSTOLIC BLOOD PRESSURE: 129 MMHG | RESPIRATION RATE: 16 BRPM | OXYGEN SATURATION: 97 % | HEART RATE: 94 BPM

## 2018-02-06 DIAGNOSIS — J45.50 SEVERE PERSISTENT ASTHMA WITHOUT COMPLICATION (H): Primary | ICD-10-CM

## 2018-02-06 PROCEDURE — 96372 THER/PROPH/DIAG INJ SC/IM: CPT

## 2018-02-06 PROCEDURE — 25000128 H RX IP 250 OP 636: Performed by: ALLERGY & IMMUNOLOGY

## 2018-02-06 RX ADMIN — OMALIZUMAB 375 MG: 202.5 INJECTION, SOLUTION SUBCUTANEOUS at 08:45

## 2018-02-06 NOTE — MR AVS SNAPSHOT
After Visit Summary   2/6/2018    Alba Varela    MRN: [id number]           Patient Information     Date Of Birth          1970        Visit Information        Provider Department      2/6/2018 9:00 AM RH INFUSION CHAIR 11 [facility]fusion [facility]        Today's Diagnoses     Severe persistent asthma without complication    -  1       Follow-ups after your visit        Your next 10 appointments already scheduled     Feb 20, 2018  9:00 AM CST   Level 1 with RH INFUSION CHAIR 1   [facility] ([facility] [place])    [facility]  [address]  [facility] [address]   [phone]            Mar 06, 2018  9:00 AM CST   Level 1 with RH INFUSION CHAIR 4   [facility] ([facility] [place])    [facility]  [address]  [facility] [address] [phone]              Who to contact     If you have questions or need follow up information about today's clinic visit or your schedule please contact [facility] INFUSION [facility] directly at [phone].  Normal or non-critical lab and imaging results will be communicated to you by [employer], letter or phone within 4 business days after the clinic has received the results. If you do not hear from us within 7 days, please contact the clinic through [employer] or phone. If you have a critical or abnormal lab result, we will notify you by phone as soon as possible.  Submit refill requests through [email or web address] or call your pharmacy and they will forward the refill request to us. Please allow 3 business days for your refill to be completed.          Additional Information About Your Visit        [employer] Information     [email or web address] gives you secure access to your electronic health record. If you see a primary care provider, you can also send messages to your care team and make appointments.  If you have questions, please call your primary care clinic.  If you do not have a primary care provider, please call [phone] and they will assist you.        Care EveryWhere ID     This is your Care EveryWhere ID. This could be used by other organizations to access your [place] medical records  [id number]        Your Vitals Were     Pulse Temperature Respirations Last Period Pulse Oximetry       94 98.5  F (36.9  C) (Tympanic) 16 07/11/2014 97%        Blood Pressure from Last 3 Encounters:   02/06/18 129/82   01/23/18 145/79   01/09/18 133/78    Weight from Last 3 Encounters:   11/03/17 75.8 kg (167 lb)   10/24/17 75.4 kg (166 lb 3.2 oz)   05/12/17 75.3 kg (166 lb)              Today, you had the following     No orders found for display         Today's Medication Changes          These changes are accurate as of 2/6/18  9:21 AM.  If you have any questions, ask your nurse or doctor.               These medicines have changed or have updated prescriptions.        Dose/Directions    gabapentin 100 MG capsule   Commonly known as:  NEURONTIN   This may have changed:  additional instructions   Used for:  Cervicalgia        Take 100 mg in the morning and 400 mg at bedtime for one week, then increase to 100 mg in the morning and 600 mg at bedtime.   Quantity:  210 capsule   Refills:  1                Primary Care Provider Office Phone # Fax #    Bright [facility]elo [phone]       [address] [address]        Equal Access to [facility]     REINA VILLA AH: Hadii vale ku hadasho Soomaali, waaxda luqadaha, qaybta kaalmada adeegyada, dominic sullivan . So [facility] [phone].    ATENCIÓN: Si habla español, tiene a rojas disposición servicios gratuitos de asistencia lingüística. Llame al [phone].    We comply with applicable federal civil rights laws and [place] laws. We do not discriminate on the basis of race, color, national origin, age, disability, sex,  sexual orientation, or gender identity.            Thank you!     Thank you for choosing [facility] INFUSION [facility]  for your care. Our goal is always to provide you with excellent care. Hearing back from our patients is one way we can continue to improve our services. Please take a few minutes to complete the written survey that you may receive in the mail after your visit with us. Thank you!             Your Updated Medication List - Protect others around you: Learn how to safely use, store and throw away your medicines at www.disposemymeds.org.          This list is accurate as of 2/6/18  9:21 AM.  Always use your most recent med list.                   Brand Name Dispense Instructions for use Diagnosis    acetaminophen 325 MG tablet    TYLENOL    100 tablet    Take 2 tablets (650 mg) by mouth every 4 hours as needed for other (surgical pain)    Status post total replacement of right hip       BOTOX IJ      As directed        diltiazem 120 MG 24 hr capsule    CARDIZEM CD    30 capsule    Take 1 capsule (120 mg) by mouth daily You are due to see the cardiologist- please call [phone] to schedule.    Chest pressure       gabapentin 100 MG capsule    NEURONTIN    210 capsule    Take 100 mg in the morning and 400 mg at bedtime for one week, then increase to 100 mg in the morning and 600 mg at bedtime.    Cervicalgia       hydrOXYzine 25 MG tablet    ATARAX    60 tablet    Take 1 tablet (25 mg) by mouth every 6 hours as needed for itching (pain)    Status post total replacement of right hip       loratadine 10 MG tablet    CLARITIN     Take 10 mg by mouth daily        NORTRIPTYLINE HCL PO      Take 200 mg by mouth At Bedtime        oxyCODONE IR 5 MG tablet    ROXICODONE    80 tablet    Take 1-2 tablets (5-10 mg) by mouth every 3 hours as needed for moderate to severe pain    Status post total replacement of right hip       PAROXETINE HCL PO      Take 20 mg by mouth At Bedtime         senna-docusate 8.6-50 MG per tablet    SENOKOT-S;PERICOLACE    30 tablet    Take 1-2 tablets by mouth 2 times daily    Status post total replacement of right hip       XOLAIR 150 MG injection   Generic drug:  omalizumab      Inject Subcutaneous every 14 days    Severe persistent asthma without complication

## 2018-02-06 NOTE — PROGRESS NOTES
Infusion Nursing Note:  Alba Varela presents today for Xolair.    Patient seen by provider today: No   present during visit today: Not Applicable.    Note: N/A.    Intravenous Access:  No Intravenous access/labs at this visit.    Treatment Conditions:  Not Applicable.      Post Infusion Assessment:  Patient tolerated injection without incident.  Patient observed for 30 minutes post Xolair per protocol.    Discharge Plan:   Discharge instructions reviewed with: Patient.  Patient and/or family verbalized understanding of discharge instructions and all questions answered.  AVS to patient via [employer].  Patient will return 2/20/2018 for next appointment.   Patient discharged in stable condition accompanied by: self.  Departure Mode: Ambulatory.    Lyn Bunn RN

## 2018-02-17 ENCOUNTER — TRANSFERRED RECORDS (OUTPATIENT)
Dept: HEALTH INFORMATION MANAGEMENT | Facility: CLINIC | Age: 48
End: 2018-02-17

## 2018-02-20 ENCOUNTER — INFUSION THERAPY VISIT (OUTPATIENT)
Dept: INFUSION THERAPY | Facility: CLINIC | Age: 48
End: 2018-02-20
Attending: ALLERGY & IMMUNOLOGY
Payer: COMMERCIAL

## 2018-02-20 VITALS
HEART RATE: 88 BPM | OXYGEN SATURATION: 97 % | DIASTOLIC BLOOD PRESSURE: 72 MMHG | RESPIRATION RATE: 16 BRPM | SYSTOLIC BLOOD PRESSURE: 117 MMHG | TEMPERATURE: 97.2 F

## 2018-02-20 DIAGNOSIS — J45.50 SEVERE PERSISTENT ASTHMA WITHOUT COMPLICATION (H): Primary | ICD-10-CM

## 2018-02-20 PROCEDURE — 96372 THER/PROPH/DIAG INJ SC/IM: CPT

## 2018-02-20 PROCEDURE — 25000128 H RX IP 250 OP 636: Performed by: NURSE PRACTITIONER

## 2018-02-20 RX ADMIN — OMALIZUMAB 375 MG: 202.5 INJECTION, SOLUTION SUBCUTANEOUS at 09:13

## 2018-02-20 NOTE — PROGRESS NOTES
Infusion Nursing Note:  Alba Varela presents today for xolair.    Patient seen by provider today: No   present during visit today: Not Applicable.    Note: N/A.    Intravenous Access:  No Intravenous access/labs at this visit.    Treatment Conditions:  Not Applicable.      Post Infusion Assessment:  Patient tolerated injection without incident.  Patient observed for 30 minutes post Xolair per protocol.    Discharge Plan:   Discharge instructions reviewed with: Patient.  Patient and/or family verbalized understanding of discharge instructions and all questions answered.  AVS to patient via [employer].  Patient will return 3/6/2018 for next appointment.   Patient discharged in stable condition accompanied by: self.  Departure Mode: Ambulatory.    Lyn Bunn RN

## 2018-02-20 NOTE — MR AVS SNAPSHOT
After Visit Summary   2/20/2018    Alba Varela    MRN: [id number]           Patient Information     Date Of Birth          1970        Visit Information        Provider Department      2/20/2018 9:00 AM RH INFUSION CHAIR 1 [facility]        Today's Diagnoses     Severe persistent asthma without complication    -  1       Follow-ups after your visit        Your next 10 appointments already scheduled     Mar 06, 2018  9:00 AM CST   Level 1 with RH INFUSION CHAIR 4   [facility] ([facility])    [facility]  [address]  [facility] [id number] [phone]              Who to contact     If you have questions or need follow up information about today's clinic visit or your schedule please contact [facility] INFUSION [facility] directly at [phone].  Normal or non-critical lab and imaging results will be communicated to you by [employer], letter or phone within 4 business days after the clinic has received the results. If you do not hear from us within 7 days, please contact the clinic through [employer] or phone. If you have a critical or abnormal lab result, we will notify you by phone as soon as possible.  Submit refill requests through [employer] or call your pharmacy and they will forward the refill request to us. Please allow 3 business days for your refill to be completed.          Additional Information About Your Visit        MyChart Information     [employer] gives you secure access to your electronic health record. If you see a primary care provider, you can also send messages to your care team and make appointments. If you have questions, please call your primary care clinic.  If you do not have a primary care provider, please call [phone] and they will assist you.        Care EveryWhere ID     This is your Care EveryWhere ID. This could be used by other  organizations to access your [place] medical records  [id number]        Your Vitals Were     Pulse Temperature Respirations Last Period Pulse Oximetry       88 97.2  F (36.2  C) (Tympanic) 16 07/11/2014 97%        Blood Pressure from Last 3 Encounters:   02/20/18 117/72   02/06/18 129/82   01/23/18 145/79    Weight from Last 3 Encounters:   11/03/17 75.8 kg (167 lb)   10/24/17 75.4 kg (166 lb 3.2 oz)   05/12/17 75.3 kg (166 lb)              Today, you had the following     No orders found for display         Today's Medication Changes          These changes are accurate as of 2/20/18 10:11 AM.  If you have any questions, ask your nurse or doctor.               These medicines have changed or have updated prescriptions.        Dose/Directions    gabapentin 100 MG capsule   Commonly known as:  NEURONTIN   This may have changed:  additional instructions   Used for:  Cervicalgia        Take 100 mg in the morning and 400 mg at bedtime for one week, then increase to 100 mg in the morning and 600 mg at bedtime.   Quantity:  210 capsule   Refills:  1                Primary Care Provider Office Phone # Fax #    Bright [facility]elo [phone]       [address] [address]        Equal Access to [facility]     REINA VILLA : Yonny gamboao Socherelleali, waaxda luqadaha, qaybta kaalmada adeegyada, dominic estrella. So [facility] [phone].    ATENCIÓN: Si habla español, tiene a rojas disposición servicios gratuitos de asistencia lingüística. Llame al [phone].    We comply with applicable federal civil rights laws and [place] laws. We do not discriminate on the basis of race, color, national origin, age, disability, sex, sexual orientation, or gender identity.            Thank you!     Thank you for choosing [facility] INFUSION [facility]  for your care. Our goal is always to provide you with excellent care. Hearing back from our patients is one  way we can continue to improve our services. Please take a few minutes to complete the written survey that you may receive in the mail after your visit with us. Thank you!             Your Updated Medication List - Protect others around you: Learn how to safely use, store and throw away your medicines at www.disposemymeds.org.          This list is accurate as of 2/20/18 10:11 AM.  Always use your most recent med list.                   Brand Name Dispense Instructions for use Diagnosis    acetaminophen 325 MG tablet    TYLENOL    100 tablet    Take 2 tablets (650 mg) by mouth every 4 hours as needed for other (surgical pain)    Status post total replacement of right hip       BOTOX IJ      As directed        diltiazem 120 MG 24 hr capsule    CARDIZEM CD    30 capsule    Take 1 capsule (120 mg) by mouth daily You are due to see the cardiologist- please call [phone] to schedule.    Chest pressure       gabapentin 100 MG capsule    NEURONTIN    210 capsule    Take 100 mg in the morning and 400 mg at bedtime for one week, then increase to 100 mg in the morning and 600 mg at bedtime.    Cervicalgia       hydrOXYzine 25 MG tablet    ATARAX    60 tablet    Take 1 tablet (25 mg) by mouth every 6 hours as needed for itching (pain)    Status post total replacement of right hip       loratadine 10 MG tablet    CLARITIN     Take 10 mg by mouth daily        NORTRIPTYLINE HCL PO      Take 200 mg by mouth At Bedtime        oxyCODONE IR 5 MG tablet    ROXICODONE    80 tablet    Take 1-2 tablets (5-10 mg) by mouth every 3 hours as needed for moderate to severe pain    Status post total replacement of right hip       PAROXETINE HCL PO      Take 20 mg by mouth At Bedtime        senna-docusate 8.6-50 MG per tablet    SENOKOT-S;PERICOLACE    30 tablet    Take 1-2 tablets by mouth 2 times daily    Status post total replacement of right hip       XOLAIR 150 MG injection   Generic drug:  omalizumab      Inject Subcutaneous every 14  days    Severe persistent asthma without complication

## 2018-02-24 ENCOUNTER — TRANSFERRED RECORDS (OUTPATIENT)
Dept: HEALTH INFORMATION MANAGEMENT | Facility: CLINIC | Age: 48
End: 2018-02-24

## 2018-03-06 ENCOUNTER — INFUSION THERAPY VISIT (OUTPATIENT)
Dept: INFUSION THERAPY | Facility: CLINIC | Age: 48
End: 2018-03-06
Attending: ALLERGY & IMMUNOLOGY
Payer: COMMERCIAL

## 2018-03-06 VITALS
SYSTOLIC BLOOD PRESSURE: 120 MMHG | OXYGEN SATURATION: 98 % | DIASTOLIC BLOOD PRESSURE: 82 MMHG | HEART RATE: 99 BPM | TEMPERATURE: 98.6 F | RESPIRATION RATE: 16 BRPM

## 2018-03-06 DIAGNOSIS — J45.50 SEVERE PERSISTENT ASTHMA WITHOUT COMPLICATION (H): Primary | ICD-10-CM

## 2018-03-06 PROCEDURE — 96372 THER/PROPH/DIAG INJ SC/IM: CPT

## 2018-03-06 PROCEDURE — 25000128 H RX IP 250 OP 636: Performed by: NURSE PRACTITIONER

## 2018-03-06 RX ADMIN — OMALIZUMAB 375 MG: 202.5 INJECTION, SOLUTION SUBCUTANEOUS at 08:49

## 2018-03-06 NOTE — PROGRESS NOTES
Infusion Nursing Note:  Alba Varela presents today for Xolair.    Patient seen by provider today: No   present during visit today: Not Applicable.    Note: N/A.    Intravenous Access:  No Intravenous access/labs at this visit.    Treatment Conditions:  Not Applicable.      Post Infusion Assessment:  Patient tolerated injection without incident.  Patient observed for 30 minutes post Xolair per protocol.    Discharge Plan:   Discharge instructions reviewed with: Patient.  Patient and/or family verbalized understanding of discharge instructions and all questions answered.  AVS to patient via [employer].  Patient will call to next appointment.   Patient discharged in stable condition accompanied by: self.  Departure Mode: Ambulatory.    Lyn Bunn RN

## 2018-03-06 NOTE — MR AVS SNAPSHOT
After Visit Summary   3/6/2018    Alba Varela    MRN: [id number]           Patient Information     Date Of Birth          1970        Visit Information        Provider Department      3/6/2018 9:00 AM RH INFUSION CHAIR 4 [facility] Infusion [facility]        Today's Diagnoses     Severe persistent asthma without complication    -  1       Follow-ups after your visit        Who to contact     If you have questions or need follow up information about today's clinic visit or your schedule please contact [facility] INFUSION [facility] directly at [phone].  Normal or non-critical lab and imaging results will be communicated to you by [employer], letter or phone within 4 business days after the clinic has received the results. If you do not hear from us within 7 days, please contact the clinic through [employer] or phone. If you have a critical or abnormal lab result, we will notify you by phone as soon as possible.  Submit refill requests through [employer] or call your pharmacy and they will forward the refill request to us. Please allow 3 business days for your refill to be completed.          Additional Information About Your Visit        MyChart Information     [employer] gives you secure access to your electronic health record. If you see a primary care provider, you can also send messages to your care team and make appointments. If you have questions, please call your primary care clinic.  If you do not have a primary care provider, please call [phone] and they will assist you.        Care EveryWhere ID     This is your Care EveryWhere ID. This could be used by other organizations to access your [place] medical records  [id number]        Your Vitals Were     Pulse Temperature Respirations Last Period Pulse Oximetry       99 98.6  F (37  C) (Tympanic) 16 07/11/2014 98%        Blood Pressure from Last 3 Encounters:   03/06/18 120/82   02/20/18 117/72   02/06/18  129/82    Weight from Last 3 Encounters:   11/03/17 75.8 kg (167 lb)   10/24/17 75.4 kg (166 lb 3.2 oz)   05/12/17 75.3 kg (166 lb)              Today, you had the following     No orders found for display         Today's Medication Changes          These changes are accurate as of 3/6/18  9:10 AM.  If you have any questions, ask your nurse or doctor.               These medicines have changed or have updated prescriptions.        Dose/Directions    gabapentin 100 MG capsule   Commonly known as:  NEURONTIN   This may have changed:  additional instructions   Used for:  Cervicalgia        Take 100 mg in the morning and 400 mg at bedtime for one week, then increase to 100 mg in the morning and 600 mg at bedtime.   Quantity:  210 capsule   Refills:  1                Primary Care Provider Office Phone # Fax #    Bright [facility]elo [phone] [address] [address]        Equal Access to [facility]     REINA VILLA : Hadii vale gamboao Sogian, waaxda luqjus, qaybta kaalmada adejanelle, dominic sullivan . So [facility] [phone].    ATENCIÓN: Si habla español, tiene a rojas disposición servicios gratuitos de asistencia lingüística. Llame al [phone].    We comply with applicable federal civil rights laws and [place] laws. We do not discriminate on the basis of race, color, national origin, age, disability, sex, sexual orientation, or gender identity.            Thank you!     Thank you for choosing [facility] INFUSION [facility]  for your care. Our goal is always to provide you with excellent care. Hearing back from our patients is one way we can continue to improve our services. Please take a few minutes to complete the written survey that you may receive in the mail after your visit with us. Thank you!             Your Updated Medication List - Protect others around you: Learn how to safely use, store and throw away your medicines at  www.disposemymeds.org.          This list is accurate as of 3/6/18  9:10 AM.  Always use your most recent med list.                   Brand Name Dispense Instructions for use Diagnosis    acetaminophen 325 MG tablet    TYLENOL    100 tablet    Take 2 tablets (650 mg) by mouth every 4 hours as needed for other (surgical pain)    Status post total replacement of right hip       BOTOX IJ      As directed        diltiazem 120 MG 24 hr capsule    CARDIZEM CD    30 capsule    Take 1 capsule (120 mg) by mouth daily You are due to see the cardiologist- please call [phone] to schedule.    Chest pressure       gabapentin 100 MG capsule    NEURONTIN    210 capsule    Take 100 mg in the morning and 400 mg at bedtime for one week, then increase to 100 mg in the morning and 600 mg at bedtime.    Cervicalgia       hydrOXYzine 25 MG tablet    ATARAX    60 tablet    Take 1 tablet (25 mg) by mouth every 6 hours as needed for itching (pain)    Status post total replacement of right hip       loratadine 10 MG tablet    CLARITIN     Take 10 mg by mouth daily        NORTRIPTYLINE HCL PO      Take 200 mg by mouth At Bedtime        oxyCODONE IR 5 MG tablet    ROXICODONE    80 tablet    Take 1-2 tablets (5-10 mg) by mouth every 3 hours as needed for moderate to severe pain    Status post total replacement of right hip       PAROXETINE HCL PO      Take 20 mg by mouth At Bedtime        senna-docusate 8.6-50 MG per tablet    SENOKOT-S;PERICOLACE    30 tablet    Take 1-2 tablets by mouth 2 times daily    Status post total replacement of right hip       XOLAIR 150 MG injection   Generic drug:  omalizumab      Inject Subcutaneous every 14 days    Severe persistent asthma without complication

## 2018-03-08 ENCOUNTER — TRANSFERRED RECORDS (OUTPATIENT)
Dept: HEALTH INFORMATION MANAGEMENT | Facility: CLINIC | Age: 48
End: 2018-03-08

## 2018-03-20 ENCOUNTER — INFUSION THERAPY VISIT (OUTPATIENT)
Dept: INFUSION THERAPY | Facility: CLINIC | Age: 48
End: 2018-03-20
Attending: ALLERGY & IMMUNOLOGY
Payer: COMMERCIAL

## 2018-03-20 VITALS
TEMPERATURE: 97 F | DIASTOLIC BLOOD PRESSURE: 76 MMHG | RESPIRATION RATE: 16 BRPM | SYSTOLIC BLOOD PRESSURE: 121 MMHG | HEART RATE: 79 BPM

## 2018-03-20 DIAGNOSIS — J45.50 SEVERE PERSISTENT ASTHMA WITHOUT COMPLICATION (H): Primary | ICD-10-CM

## 2018-03-20 PROCEDURE — 25000128 H RX IP 250 OP 636: Mod: JW | Performed by: NURSE PRACTITIONER

## 2018-03-20 PROCEDURE — 96372 THER/PROPH/DIAG INJ SC/IM: CPT

## 2018-03-20 RX ADMIN — OMALIZUMAB 375 MG: 202.5 INJECTION, SOLUTION SUBCUTANEOUS at 09:03

## 2018-03-20 NOTE — MR AVS SNAPSHOT
After Visit Summary   3/20/2018    Alba Varela    MRN: [id number]           Patient Information     Date Of Birth          1970        Visit Information        Provider Department      3/20/2018 8:30 AM RH INFUSION CHAIR 4 [facility] Infusion [facility]        Today's Diagnoses     Severe persistent asthma without complication    -  1       Follow-ups after your visit        Your next 10 appointments already scheduled     Apr 03, 2018  8:30 AM CDT   Level 1 with RH INFUSION CHAIR 12   [facility] Infusion [facility] ([facility])    [facility]  [address]  [place] [address]   [phone]            Apr 17, 2018  8:30 AM CDT   Level 1 with RH INFUSION CHAIR 1   [facility] Infusion [facility] ([facility])    [facility]  [address]  [place] [address]   [phone]            May 01, 2018  8:30 AM CDT   Level 1 with RH INFUSION CHAIR 2   [facility] ([facility])    [facility] [address]  [address]  [place] [address]   [phone]            May 15, 2018  8:30 AM CDT   Level 1 with RH INFUSION CHAIR 12   [facility] ([facility])    [facility] [address]  [address]  [place] [address]   [phone]            May 29, 2018  8:30 AM CDT   Level 1 with RH INFUSION CHAIR 2   [facility] ([facility])    [facility]  [address]  [place] [address]   [phone]              Who to contact     If you have questions or need follow up information about today's clinic visit or your schedule please contact [facility] INFUSION [facility] directly at [phone].  Normal or non-critical  lab and imaging results will be communicated to you by [employer], letter or phone within 4 business days after the clinic has received the results. If you do not hear from us within 7 days, please contact the clinic through [employer] or phone. If you have a critical or abnormal lab result, we will notify you by phone as soon as possible.  Submit refill requests through [employer] or call your pharmacy and they will forward the refill request to us. Please allow 3 business days for your refill to be completed.          Additional Information About Your Visit        [employer] Information     [employer] gives you secure access to your electronic health record. If you see a primary care provider, you can also send messages to your care team and make appointments. If you have questions, please call your primary care clinic.  If you do not have a primary care provider, please call [phone] and they will assist you.        Care EveryWhere ID     This is your Care EveryWhere ID. This could be used by other organizations to access your [place] medical records  [id number]        Your Vitals Were     Pulse Temperature Respirations Last Period          79 97  F (36.1  C) (Tympanic) 16 07/11/2014         Blood Pressure from Last 3 Encounters:   03/20/18 121/76   03/06/18 120/82   02/20/18 117/72    Weight from Last 3 Encounters:   11/03/17 75.8 kg (167 lb)   10/24/17 75.4 kg (166 lb 3.2 oz)   05/12/17 75.3 kg (166 lb)              Today, you had the following     No orders found for display         Today's Medication Changes          These changes are accurate as of 3/20/18  9:10 AM.  If you have any questions, ask your nurse or doctor.               These medicines have changed or have updated prescriptions.        Dose/Directions    gabapentin 100 MG capsule   Commonly known as:  NEURONTIN   This may have changed:  additional instructions   Used for:  Cervicalgia        Take 100 mg in the morning and 400 mg at bedtime for one week,  then increase to 100 mg in the morning and 600 mg at bedtime.   Quantity:  210 capsule   Refills:  1                Primary Care Provider Office Phone # Fax #    Bright [facility]elo [phone] [address] [address]        Equal Access to [facility]     SUJEYCHARLENE DAISY : Hadii aad ku hadasho Soomaali, waaxda luqadaha, qaybta kaalmada adeegyada, waxay idiin hayaan adeeg khhyun la'jakobn ah. So [facility] [phone].    ATENCIÓN: Si habla español, tiene a rojas disposición servicios gratuitos de asistencia lingüística. [facility] [phone].    We comply with applicable federal civil rights laws and [place] laws. We do not discriminate on the basis of race, color, national origin, age, disability, sex, sexual orientation, or gender identity.            Thank you!     Thank you for choosing [facility] INFUSION [facility]  for your care. Our goal is always to provide you with excellent care. Hearing back from our patients is one way we can continue to improve our services. Please take a few minutes to complete the written survey that you may receive in the mail after your visit with us. Thank you!             Your Updated Medication List - Protect others around you: Learn how to safely use, store and throw away your medicines at www.disposemymeds.org.          This list is accurate as of 3/20/18  9:10 AM.  Always use your most recent med list.                   Brand Name Dispense Instructions for use Diagnosis    acetaminophen 325 MG tablet    TYLENOL    100 tablet    Take 2 tablets (650 mg) by mouth every 4 hours as needed for other (surgical pain)    Status post total replacement of right hip       BOTOX IJ      As directed        diltiazem 120 MG 24 hr capsule    CARDIZEM CD    30 capsule    Take 1 capsule (120 mg) by mouth daily You are due to see the cardiologist- please call [phone] to schedule.    Chest pressure       gabapentin 100 MG capsule    NEURONTIN     210 capsule    Take 100 mg in the morning and 400 mg at bedtime for one week, then increase to 100 mg in the morning and 600 mg at bedtime.    Cervicalgia       hydrOXYzine 25 MG tablet    ATARAX    60 tablet    Take 1 tablet (25 mg) by mouth every 6 hours as needed for itching (pain)    Status post total replacement of right hip       loratadine 10 MG tablet    CLARITIN     Take 10 mg by mouth daily        NORTRIPTYLINE HCL PO      Take 200 mg by mouth At Bedtime        oxyCODONE IR 5 MG tablet    ROXICODONE    80 tablet    Take 1-2 tablets (5-10 mg) by mouth every 3 hours as needed for moderate to severe pain    Status post total replacement of right hip       PAROXETINE HCL PO      Take 20 mg by mouth At Bedtime        senna-docusate 8.6-50 MG per tablet    SENOKOT-S;PERICOLACE    30 tablet    Take 1-2 tablets by mouth 2 times daily    Status post total replacement of right hip       XOLAIR 150 MG injection   Generic drug:  omalizumab      Inject Subcutaneous every 14 days    Severe persistent asthma without complication

## 2018-03-20 NOTE — PROGRESS NOTES
Infusion Nursing Note:  Alba Varela presents today for Xolair.    Patient seen by provider today: No   present during visit today: Not Applicable.    Note: N/A.    Intravenous Access:  No Intravenous access/labs at this visit.    Treatment Conditions:  Not Applicable.      Post Infusion Assessment:  Patient tolerated injection without incident.  Patient observed for 30 minutes post Xolair per protocol.    Discharge Plan:   Discharge instructions reviewed with: Patient.  Patient and/or family verbalized understanding of discharge instructions and all questions answered.  AVS to patient via THUBIT.  Patient will return 4/3/18 for next appointment.   Patient discharged in stable condition accompanied by: self.  Departure Mode: Ambulatory.    Toma Camargo RN

## 2018-03-21 ENCOUNTER — TRANSFERRED RECORDS (OUTPATIENT)
Dept: HEALTH INFORMATION MANAGEMENT | Facility: CLINIC | Age: 48
End: 2018-03-21

## 2018-03-23 ENCOUNTER — TRANSFERRED RECORDS (OUTPATIENT)
Dept: HEALTH INFORMATION MANAGEMENT | Facility: CLINIC | Age: 48
End: 2018-03-23

## 2018-03-23 ENCOUNTER — HOSPITAL ENCOUNTER (EMERGENCY)
Facility: CLINIC | Age: 48
Discharge: HOME | End: 2018-03-23
Attending: EMERGENCY MEDICINE | Admitting: EMERGENCY MEDICINE
Payer: COMMERCIAL

## 2018-03-23 VITALS
RESPIRATION RATE: 18 BRPM | SYSTOLIC BLOOD PRESSURE: 121 MMHG | DIASTOLIC BLOOD PRESSURE: 87 MMHG | BODY MASS INDEX: 29.02 KG/M2 | TEMPERATURE: 98.8 F | OXYGEN SATURATION: 99 % | HEIGHT: 64 IN | HEART RATE: 98 BPM | WEIGHT: 170 LBS

## 2018-03-23 DIAGNOSIS — R60.0 SALIVARY GLAND SWELLING: ICD-10-CM

## 2018-03-23 DIAGNOSIS — R74.8 ELEVATED AMYLASE: ICD-10-CM

## 2018-03-23 LAB
AMYLASE SERPL-CCNC: 198 U/L (ref 30–110)
ANION GAP SERPL CALCULATED.3IONS-SCNC: 7 MMOL/L (ref 3–14)
BASOPHILS # BLD AUTO: 0.1 10E9/L (ref 0–0.2)
BASOPHILS NFR BLD AUTO: 1.2 %
BUN SERPL-MCNC: 15 MG/DL (ref 7–30)
CALCIUM SERPL-MCNC: 9.1 MG/DL (ref 8.5–10.1)
CHLORIDE SERPL-SCNC: 110 MMOL/L (ref 94–109)
CO2 SERPL-SCNC: 24 MMOL/L (ref 20–32)
CREAT SERPL-MCNC: 0.64 MG/DL (ref 0.52–1.04)
DIFFERENTIAL METHOD BLD: NORMAL
EOSINOPHIL # BLD AUTO: 0.2 10E9/L (ref 0–0.7)
EOSINOPHIL NFR BLD AUTO: 3.1 %
ERYTHROCYTE [DISTWIDTH] IN BLOOD BY AUTOMATED COUNT: 12.7 % (ref 10–15)
GFR SERPL CREATININE-BSD FRML MDRD: >90 ML/MIN/1.7M2
GLUCOSE SERPL-MCNC: 103 MG/DL (ref 70–99)
HCT VFR BLD AUTO: 39 % (ref 35–47)
HGB BLD-MCNC: 12.9 G/DL (ref 11.7–15.7)
IMM GRANULOCYTES # BLD: 0 10E9/L (ref 0–0.4)
IMM GRANULOCYTES NFR BLD: 0.2 %
LIPASE SERPL-CCNC: 161 U/L (ref 73–393)
LYMPHOCYTES # BLD AUTO: 1.5 10E9/L (ref 0.8–5.3)
LYMPHOCYTES NFR BLD AUTO: 30.1 %
MCH RBC QN AUTO: 30 PG (ref 26.5–33)
MCHC RBC AUTO-ENTMCNC: 33.1 G/DL (ref 31.5–36.5)
MCV RBC AUTO: 91 FL (ref 78–100)
MONOCYTES # BLD AUTO: 0.3 10E9/L (ref 0–1.3)
MONOCYTES NFR BLD AUTO: 6 %
NEUTROPHILS # BLD AUTO: 2.9 10E9/L (ref 1.6–8.3)
NEUTROPHILS NFR BLD AUTO: 59.4 %
NRBC # BLD AUTO: 0 10*3/UL
NRBC BLD AUTO-RTO: 0 /100
PLATELET # BLD AUTO: 245 10E9/L (ref 150–450)
POTASSIUM SERPL-SCNC: 4.1 MMOL/L (ref 3.4–5.3)
RBC # BLD AUTO: 4.3 10E12/L (ref 3.8–5.2)
SODIUM SERPL-SCNC: 141 MMOL/L (ref 133–144)
WBC # BLD AUTO: 4.8 10E9/L (ref 4–11)

## 2018-03-23 PROCEDURE — 83690 ASSAY OF LIPASE: CPT | Performed by: EMERGENCY MEDICINE

## 2018-03-23 PROCEDURE — 99283 EMERGENCY DEPT VISIT LOW MDM: CPT

## 2018-03-23 PROCEDURE — 80048 BASIC METABOLIC PNL TOTAL CA: CPT | Performed by: EMERGENCY MEDICINE

## 2018-03-23 PROCEDURE — 85025 COMPLETE CBC W/AUTO DIFF WBC: CPT | Performed by: EMERGENCY MEDICINE

## 2018-03-23 PROCEDURE — 82150 ASSAY OF AMYLASE: CPT | Performed by: EMERGENCY MEDICINE

## 2018-03-23 ASSESSMENT — ENCOUNTER SYMPTOMS
TROUBLE SWALLOWING: 0
UNEXPECTED WEIGHT CHANGE: 1
ABDOMINAL PAIN: 0
FACIAL SWELLING: 1

## 2018-03-23 NOTE — ED PROVIDER NOTES
History     Chief Complaint:  Abnormal labs    HPI   Alba Varela is a 48 year old female who presents with abnormal labs. The patient reports that two days ago she was sitting on her couch resting when she began having a pain in her left jaw. She then went to urgent care and was thought to have an infected salivary gland and was placed on Augmentin. In the past two days her swelling and pain have gone. She was called this morning by her clinic due to elevated Amylase levels. The patient notes that she has been eating lemon drops for this salivary gland infection with some improvement in her symptoms. She notes having had recent rapid weight gain recently. The patient denies experiencing any abdominal pain. There is no history of pancreatitis. She endorses typically drinking alcohol 2-3 times per month with two beverages last evening. No current chest pain, shortness of breath, difficulties swallowing or any other concerns.   has sent of lab testing for Mumps, the results of which are presently pending.    Amylase level: 1,904 (H)  Mumps screen: pending    CT neck soft tissue 3/23: Report pending    Allergies:  Codeine sulfate  Contrast dye  Scopolamine  Imitrex  Naproxen  Penicillins  Sulfa drugs    Medications:    Xolair  Diltiazem  Claritin  Nortriptyline  Paroxetine  Gabapentin  Senokot  Botox    Past Medical History:    Anxiety  Asthma  C difficile colitis  Hyperlipidemia  Liver disease  Migraines  PONV sleep apnea  Tremor    Past Surgical History:    Arthroplasty hip   section  EGD  Bilateral foot reconstruction  UGI endoscopy w EUS  Hysterectomy  Laparoscopic hysterectomy supracervical with BSO  Uvulopalatopharyngoplasty     Family History:    The patient denies any relevant family medical history.     Social History:  Smoking Status: Yes, 0.50 PPD  Smokeless Tobacco: No  Alcohol Use: Yes   Marital Status:   [4]    Review of Systems   Constitutional: Positive for unexpected weight change.  "  HENT: Positive for facial swelling. Negative for trouble swallowing.    Cardiovascular: Negative for chest pain.   Gastrointestinal: Negative for abdominal pain.   All other systems reviewed and are negative.    Physical Exam   Vitals:  Patient Vitals for the past 24 hrs:   BP Temp Temp src Pulse Resp SpO2 Height Weight   03/23/18 1000 121/87 - - - - - - -   03/23/18 0955 - - - - - 99 % - -   03/23/18 0950 - - - - - 98 % - -   03/23/18 0945 - - - - - 97 % - -   03/23/18 0940 - - - - - 96 % - -   03/23/18 0935 - - - - - 97 % - -   03/23/18 0932 (!) 143/91 98.8  F (37.1  C) Temporal 98 18 97 % 1.626 m (5' 4\") 77.1 kg (170 lb)   03/23/18 0930 (!) 143/91 - - - - - - -        Physical Exam  General:                        Well-nourished                        Speaking in full sentences  Eyes:                        Conjunctiva without injection or scleral icterus                        PERRL  ENT:                        Moist mucous membranes                        Posterior oropharynx clear without erythema or exudate                        No facial asymmetry                        No facial erythema                        No palpable nodule along inner aspect of cheek                        Nares patent                        Pinnae normal  Neck:                        Full ROM                        No stiffness appreciated  Resp:                        Lungs CTAB                        No crackles, wheezing or audible rubs                        Good air movement  CV:                                        Normal rate, regular rhythm                        S1 and S2 present                        No murmur, gallop or rub  GI:                        BS present                        Abdomen soft without distention                        No epigastric tenderness                        Non-tender to light and deep palpation                        No guarding or rebound tenderness  Skin:                        Warm, " dry, well perfused                        No rashes or open wounds on exposed skin  MSK:                        Moves all extremities                        No focal deformities or swelling  Neuro:                        Alert                        Answers questions appropriately                        Moves all extremities equally                        Gait stable  Psych:                        Normal affect, normal mood    Emergency Department Course   Laboratory:  Laboratory findings were communicated with the patient who voiced understanding of the findings.  CBC: AWNL (WBC 4/8, HGB 12.9, )   BMP: Chloride: 110(H), Glucose: 103(H), o/w WNL (Creatinine 0.64)  Lipase: 161  Amylase: 198(H)    Emergency Department Course:  Nursing notes and vitals reviewed.  I performed an exam of the patient as documented above.   IV was inserted and blood was drawn for laboratory testing, results above.     1016 I spoke with Dr. Oquendo of Radiology regarding the patient    1023 I spoke with Dr. Pedraza of [facility] regarding the patient    1028 I rechecked with the patient    I discussed the treatment plan with the patient. They expressed understanding of this plan and consented to discharge. They will be discharged home with instructions for care and follow up. In addition, the patient will return to the emergency department if their symptoms persist, worsen, if new symptoms arise or if there is any concern.  All questions were answered.     I personally reviewed the laboratory results with the patient and answered all related questions prior to discharge.    Impression & Plan      Medical Decision Making:  Alba Varela is a very pleasant 48-year-old female presenting to the ER for evaluation of abnormal labs.  VS on presentation reveal elevated BP although otherwise are unremarkable.  Prior records are reviewed, including patient's visit to urgent care 2 days prior with a diagnosis of salivary gland infection for which she was  started on oral Augmentin, scheduled for a noncontrast CT scan of the neck, and had laboratory studies done.  She presents to the ER today with her amylase resulting at 1904, prompting referral to the ER with concern for pancreatitis.  Current examination is notable for a well-appearing 48-year-old female, without appreciable facial asymmetry, or overlying erythema.  I palpate no stone to the ducts of the salivary glands along the left aspect of the patient's cheek or jaw.  Symptoms are suspicious for sialoadenitis.  Patient did have mumps titers sent off from urgent care, the results of which are presently pending.  Elevated amylase level is likely secondary to salivary gland infection.  It is certainly possible there may have been a transient obstruction given the patient's notable improvements in symptoms as well.  Repeat amylase level today returned at 198.  This is not consistent with pancreatitis.  Patient notes no symptoms of abdominal pain.  Lipase presently normal.  Amylase is not specific for pancreatitis and is more likely secondary to salivary gland involvement.  I discussed the case with Dr. Oquendo, radiologist who reviewed the patient's CT scan findings this morning.  They noted some mild narrowing of the supraglottic airway, likely secondary to taking a deep breath prior to CT.  Patient notes that she took a deep breath prior to her CT scan.  She otherwise is without evidence of shortness of breath, stridor, or difficulty breathing herself.  There is no evidence of abnormality to the parotid or submandibular glands.  There are cervical lymph nodes although these are not pathologically abnormal in size per radiology.  I reviewed the case with Dr. Pedraza of ENT.  At this point, patient is felt safe for discharge home with supportive cares.  Recommended completion of the course of oral Augmentin and follow-up with ENT if symptoms persist or return.  Patient is scheduled to leave for [place] tomorrow which I  do feel to be reasonable.  Patient's questions were answered prior to discharge.    Diagnosis:    ICD-10-CM    1. Elevated amylase R74.8    2. Salivary gland swelling R60.9         Disposition:   Discharged    CMS Diagnoses: None     Scribe Disclosure:  I, Zaire Fleming, am serving as a scribe at 9:40 AM on 3/23/2018 to document services personally performed by Osmar Razo MD, based on my observations and the provider's statements to me.   [facility] EMERGENCY DEPARTMENT       Osmar Razo MD  03/24/18 0906

## 2018-03-23 NOTE — ED NOTES
ABCs intact. Pt had labs drawn about 2 days ago. Pt received a call today regarding amylase level: 1904.

## 2018-03-23 NOTE — DISCHARGE INSTRUCTIONS
"Please monitor your symptoms closely    Please complete your course of Augmentin      Salivary Gland Infection  Salivary glands make saliva. Saliva is mostly water. It also has minerals and proteins that help break down food and keep the mouth and teeth healthy. There are three pairs of salivary glands:    Parotid glands (in front of the ear)    Submandibular glands (below the jaw)    Sublingual glands (below the tongue)  A salivary gland can become infected by bacteria (germs). Things that make this more likely include dehydration and taking medicines that affect saliva flow. Infection is also more likely when the duct (channel) that carries saliva from the gland to the mouth is blocked. It may be blocked by a salivary gland \"stone.\" This is a collection of minerals that forms in the salivary gland.  Signs of infection include fever, severe pain in the gland, and redness and swelling over the gland. It may hurt to open the mouth. Symptoms may be worse when the flow of saliva is stimulated, such as by the smell of food.   Antibiotics are used to treat the infection. Drainage of the infection with a simple surgical procedure is sometimes needed. It a salivary gland stone is present, a procedure may be done to remove it.  Home Care:    Take antibiotics as directed until they are finished. Do this even if you start to feel better after only a few days.    Unless another medicine was prescribed, take over-the-counter medicines, such as acetaminophen or ibuprofen, to help relieve pain.    Moist heat can also help relieve swelling and pain. Wet a cloth with warm water and put it over the affected gland for 10-15 minutes several times a day.    Gently massage the gland a few times a day.     Suck on lemon or other tart hard candies to encourage flow of saliva.  To help prevent blockages and infections:    Drink 6-8 glasses of fluid per day (such as water, tea, and clear soup) to keep well hydrated.    If you smoke, ask " your healthcare provider for help to quit. Smoking makes salivary gland stones more likely.    Maintain good dental hygiene. Brush and floss your teeth daily. See your dentist for regular cleanings.  Follow-up care  Follow up with your healthcare provider or as advised.   When to seek medical advice  Call your healthcare provider if any of the following occur:    Fever over 100.4 F (38 C) after two days of taking antibiotics    Symptoms get worse or don't improve within a few days    Trouble breathing or swallowing  Date Last Reviewed: 5/4/2015 2000-2017 [employer] [address] [place] [id number]. All rights reserved. This information is not intended as a substitute for professional medical care. Always follow your healthcare professional's instructions.

## 2018-03-23 NOTE — ED AVS SNAPSHOT
" [facility] Emergency Department    [address]    [facility] [address]    Phone:  [phone]    Fax:  [phone]                                       Alba Varela   MRN: [id number]    Department:  [facility] Emergency Department   Date of Visit:  3/23/2018           Patient Information     Date Of Birth          1970        Your diagnoses for this visit were:     Elevated amylase     Salivary gland swelling        You were seen by Osmar Razo MD.      Follow-up Information     Follow up with Joan Pedraza MD. Schedule an appointment as soon as possible for a visit in 1 day.    Specialty:  Otolaryngology    Why:  If symptoms worsen    Contact information:    [facility]  [address] [facility] [place]  [facility] [id number] [phone]          Follow up with [facility] Emergency Department.    Specialty:  EMERGENCY MEDICINE    Why:  If symptoms worsen    Contact information:    [address] [facility] [address] [phone]        Discharge Instructions       Please monitor your symptoms closely    Please complete your course of Augmentin      Salivary Gland Infection  Salivary glands make saliva. Saliva is mostly water. It also has minerals and proteins that help break down food and keep the mouth and teeth healthy. There are three pairs of salivary glands:    Parotid glands (in front of the ear)    Submandibular glands (below the jaw)    Sublingual glands (below the tongue)  A salivary gland can become infected by bacteria (germs). Things that make this more likely include dehydration and taking medicines that affect saliva flow. Infection is also more likely when the duct (channel) that carries saliva from the gland to the mouth is blocked. It may be blocked by a salivary gland \"stone.\" This is a collection of minerals that forms in the salivary gland.  Signs of infection include fever, severe pain in the gland, " and redness and swelling over the gland. It may hurt to open the mouth. Symptoms may be worse when the flow of saliva is stimulated, such as by the smell of food.   Antibiotics are used to treat the infection. Drainage of the infection with a simple surgical procedure is sometimes needed. It a salivary gland stone is present, a procedure may be done to remove it.  Home Care:    Take antibiotics as directed until they are finished. Do this even if you start to feel better after only a few days.    Unless another medicine was prescribed, take over-the-counter medicines, such as acetaminophen or ibuprofen, to help relieve pain.    Moist heat can also help relieve swelling and pain. Wet a cloth with warm water and put it over the affected gland for 10-15 minutes several times a day.    Gently massage the gland a few times a day.     Suck on lemon or other tart hard candies to encourage flow of saliva.  To help prevent blockages and infections:    Drink 6-8 glasses of fluid per day (such as water, tea, and clear soup) to keep well hydrated.    If you smoke, ask your healthcare provider for help to quit. Smoking makes salivary gland stones more likely.    Maintain good dental hygiene. Brush and floss your teeth daily. See your dentist for regular cleanings.  Follow-up care  Follow up with your healthcare provider or as advised.   When to seek medical advice  Call your healthcare provider if any of the following occur:    Fever over 100.4 F (38 C) after two days of taking antibiotics    Symptoms get worse or don't improve within a few days    Trouble breathing or swallowing  Date Last Reviewed: 5/4/2015 2000-2017 [employer]. [address] [place] [address], [place] [address]. All rights reserved. This information is not intended as a substitute for professional medical care. Always follow your healthcare professional's instructions.          Your next 10 appointments already scheduled     Apr 03, 2018  8:30 AM CDT    Level 1 with RH INFUSION CHAIR 12   [facility] Infusion [facility] ([facility])    [facility]  [address]  [place] [address]   [phone]            Apr 17, 2018  8:30 AM CDT   Level 1 with RH INFUSION CHAIR 1   [facility] ([facility])    [facility]  [address]  [place] [address]   [phone]            May 01, 2018  8:30 AM CDT   Level 1 with RH INFUSION CHAIR 2   [facility] ([facility])    [facility]  [address]  [place] [address]   [phone]            May 15, 2018  8:30 AM CDT   Level 1 with RH INFUSION CHAIR 12   [facility] ([facility])    [facility]  [address]  [place] [address]   [phone]            May 29, 2018  8:30 AM CDT   Level 1 with RH INFUSION CHAIR 2   [facility] ([facility])    [facility]  [address]  [place] [address]   [phone]              24 Hour Appointment Hotline       To make an appointment at any [facility], call [phone] ([phone]). If you don't have a family doctor or clinic, we will help you find one. Mobile clinics are conveniently located to serve the needs of you and your family.             Review of your medicines      Our records show that you are taking the medicines listed below. If these are incorrect, please call your family doctor or clinic.        Dose / Directions Last dose taken    acetaminophen 325 MG tablet   Commonly known as:  TYLENOL   Dose:  650 mg   Quantity:  100 tablet        Take 2 tablets (650 mg) by mouth every 4 hours as needed for other (surgical pain)   Refills:  0        AUGMENTIN PO         Take by mouth 2 times daily   Refills:  0        BOTOX IJ        As directed   Refills:  0        diltiazem 120 MG 24 hr capsule   Commonly known as:  CARDIZEM CD   Dose:  120 mg   Quantity:  30 capsule        Take 1 capsule (120 mg) by mouth daily You are due to see the cardiologist- please call [phone] to schedule.   Refills:  11        gabapentin 100 MG capsule   Commonly known as:  NEURONTIN   Quantity:  210 capsule        Take 100 mg in the morning and 400 mg at bedtime for one week, then increase to 100 mg in the morning and 600 mg at bedtime.   Refills:  1        hydrOXYzine 25 MG tablet   Commonly known as:  ATARAX   Dose:  25 mg   Quantity:  60 tablet        Take 1 tablet (25 mg) by mouth every 6 hours as needed for itching (pain)   Refills:  0        loratadine 10 MG tablet   Commonly known as:  CLARITIN   Dose:  10 mg        Take 10 mg by mouth daily   Refills:  0        NORTRIPTYLINE HCL PO   Dose:  200 mg        Take 200 mg by mouth At Bedtime   Refills:  0        oxyCODONE IR 5 MG tablet   Commonly known as:  ROXICODONE   Dose:  5-10 mg   Quantity:  80 tablet        Take 1-2 tablets (5-10 mg) by mouth every 3 hours as needed for moderate to severe pain   Refills:  0        PAROXETINE HCL PO   Dose:  20 mg        Take 20 mg by mouth At Bedtime   Refills:  0        senna-docusate 8.6-50 MG per tablet   Commonly known as:  SENOKOT-S;PERICOLACE   Dose:  1-2 tablet   Quantity:  30 tablet        Take 1-2 tablets by mouth 2 times daily   Refills:  1        XOLAIR 150 MG injection   Generic drug:  omalizumab        Inject Subcutaneous every 14 days   Refills:  0                Procedures and tests performed during your visit     Amylase    Basic metabolic panel (BMP)    CBC + differential    Lipase      Orders Needing Specimen Collection     None      Pending Results     No orders found from 3/21/2018 to 3/24/2018.            Pending Culture Results     No orders found from 3/21/2018 to 3/24/2018.             Pending Results Instructions     If you had any lab results that were not finalized at the time of your Discharge, you can call the ED Lab Result RN at [phone]. You will be contacted by this team for any positive Lab results or changes in treatment. The nurses are available 7 days a week from 10A to 6:30P.  You can leave a message 24 hours per day and they will return your call.        Test Results From Your Hospital Stay        3/23/2018 10:04 AM      Component Results     Component Value Ref Range & Units Status    WBC 4.8 4.0 - 11.0 10e9/L Final    RBC Count 4.30 3.8 - 5.2 10e12/L Final    Hemoglobin 12.9 11.7 - 15.7 g/dL Final    Hematocrit 39.0 35.0 - 47.0 % Final    MCV 91 78 - 100 fl Final    MCH 30.0 26.5 - 33.0 pg Final    MCHC 33.1 31.5 - 36.5 g/dL Final    RDW 12.7 10.0 - 15.0 % Final    Platelet Count 245 150 - 450 10e9/L Final    Diff Method Automated Method  Final    % Neutrophils 59.4 % Final    % Lymphocytes 30.1 % Final    % Monocytes 6.0 % Final    % Eosinophils 3.1 % Final    % Basophils 1.2 % Final    % Immature Granulocytes 0.2 % Final    Nucleated RBCs 0 0 /100 Final    Absolute Neutrophil 2.9 1.6 - 8.3 10e9/L Final    Absolute Lymphocytes 1.5 0.8 - 5.3 10e9/L Final    Absolute Monocytes 0.3 0.0 - 1.3 10e9/L Final    Absolute Eosinophils 0.2 0.0 - 0.7 10e9/L Final    Absolute Basophils 0.1 0.0 - 0.2 10e9/L Final    Abs Immature Granulocytes 0.0 0 - 0.4 10e9/L Final    Absolute Nucleated RBC 0.0  Final         3/23/2018 10:17 AM      Component Results     Component Value Ref Range & Units Status    Sodium 141 133 - 144 mmol/L Final    Potassium 4.1 3.4 - 5.3 mmol/L Final    Chloride 110 (H) 94 - 109 mmol/L Final    Carbon Dioxide 24 20 - 32 mmol/L Final    Anion Gap 7 3 - 14 mmol/L Final    Glucose 103 (H) 70 - 99 mg/dL Final    Urea Nitrogen 15 7 - 30 mg/dL Final    Creatinine 0.64 0.52 - 1.04 mg/dL Final    GFR Estimate >90 >60 mL/min/1.7m2 Final    Non African American GFR  Calc    GFR Estimate If Black >90 >60 mL/min/1.7m2 Final    African American GFR Calc    Calcium 9.1 8.5 - 10.1 mg/dL Final         3/23/2018 10:17 AM      Component Results     Component Value Ref Range & Units Status    Lipase 161 73 - 393 U/L Final         3/23/2018 10:17 AM      Component Results     Component Value Ref Range & Units Status    Amylase 198 (H) 30 - 110 U/L Final                Clinical Quality Measure: Blood Pressure Screening     Your blood pressure was checked while you were in the emergency department today. The last reading we obtained was  BP: 121/87 . Please read the guidelines below about what these numbers mean and what you should do about them.  If your systolic blood pressure (the top number) is less than 120 and your diastolic blood pressure (the bottom number) is less than 80, then your blood pressure is normal. There is nothing more that you need to do about it.  If your systolic blood pressure (the top number) is 120-139 or your diastolic blood pressure (the bottom number) is 80-89, your blood pressure may be higher than it should be. You should have your blood pressure rechecked within a year by a primary care provider.  If your systolic blood pressure (the top number) is 140 or greater or your diastolic blood pressure (the bottom number) is 90 or greater, you may have high blood pressure. High blood pressure is treatable, but if left untreated over time it can put you at risk for heart attack, stroke, or kidney failure. You should have your blood pressure rechecked by a primary care provider within the next 4 weeks.  If your provider in the emergency department today gave you specific instructions to follow-up with your doctor or provider even sooner than that, you should follow that instruction and not wait for up to 4 weeks for your follow-up visit.        Thank you for choosing [facility]       Thank you for choosing [facility] for your care. Our goal is always to provide you with  excellent care. Hearing back from our patients is one way we can continue to improve our services. Please take a few minutes to complete the written survey that you may receive in the mail after you visit with us. Thank you!        [employer] Information     [employer] gives you secure access to your electronic health record. If you see a primary care provider, you can also send messages to your care team and make appointments. If you have questions, please call your primary care clinic.  If you do not have a primary care provider, please call [phone] and they will assist you.        Care EveryWhere ID     This is your Care EveryWhere ID. This could be used by other organizations to access your [place] medical records  [id number]        Equal Access to [facility]     REINA VILLA : Yonny Pacheco, bailee nassar, conner mccormick, dominic estrella. So [facility] [phone].    ATENCIÓN: Si habla español, tiene a rojas disposición servicios gratuitos de asistencia lingüística. Llame al [phone].    We comply with applicable federal civil rights laws and [place] laws. We do not discriminate on the basis of race, color, national origin, age, disability, sex, sexual orientation, or gender identity.            After Visit Summary       This is your record. Keep this with you and show to your community pharmacist(s) and doctor(s) at your next visit.

## 2018-03-23 NOTE — ED AVS SNAPSHOT
[facility] Emergency Department    [address]    [facility] [address]    Phone:  [phone]    Fax:  [phone]                                       Alba Varela   MRN: [id number]    Department:  [facility] Emergency Department   Date of Visit:  3/23/2018           After Visit Summary Signature Page     I have received my discharge instructions, and my questions have been answered. I have discussed any challenges I see with this plan with the nurse or doctor.    ..........................................................................................................................................  Patient/Patient Representative Signature      ..........................................................................................................................................  Patient Representative Print Name and Relationship to Patient    ..................................................               ................................................  Date                                            Time    ..........................................................................................................................................  Reviewed by Signature/Title    ...................................................              ..............................................  Date                                                            Time

## 2018-04-03 ENCOUNTER — INFUSION THERAPY VISIT (OUTPATIENT)
Dept: INFUSION THERAPY | Facility: CLINIC | Age: 48
End: 2018-04-03
Attending: ALLERGY & IMMUNOLOGY
Payer: COMMERCIAL

## 2018-04-03 VITALS
RESPIRATION RATE: 16 BRPM | DIASTOLIC BLOOD PRESSURE: 77 MMHG | OXYGEN SATURATION: 99 % | HEART RATE: 87 BPM | SYSTOLIC BLOOD PRESSURE: 110 MMHG | TEMPERATURE: 97.4 F

## 2018-04-03 DIAGNOSIS — J45.50 SEVERE PERSISTENT ASTHMA WITHOUT COMPLICATION (H): Primary | ICD-10-CM

## 2018-04-03 PROCEDURE — 96372 THER/PROPH/DIAG INJ SC/IM: CPT

## 2018-04-03 PROCEDURE — 25000128 H RX IP 250 OP 636: Performed by: NURSE PRACTITIONER

## 2018-04-03 RX ADMIN — OMALIZUMAB 375 MG: 202.5 INJECTION, SOLUTION SUBCUTANEOUS at 08:57

## 2018-04-03 NOTE — MR AVS SNAPSHOT
After Visit Summary   4/3/2018    Alba Varela    MRN: [id number]           Patient Information     Date Of Birth          1970        Visit Information        Provider Department      4/3/2018 8:30 AM RH INFUSION CHAIR 12 [facility]fusion [facility]        Today's Diagnoses     Severe persistent asthma without complication    -  1       Follow-ups after your visit        Your next 10 appointments already scheduled     Apr 17, 2018  8:30 AM CDT   Level 1 with RH INFUSION CHAIR 1   [facility] ([facility])    [facility] [place] [id number]  [facility] [address]   [phone]            May 01, 2018  8:30 AM CDT   Level 1 with RH INFUSION CHAIR 2   [facility] ([facility])    [facility]  [address]  [facility] [address]   [phone]            May 15, 2018  8:30 AM CDT   Level 1 with RH INFUSION CHAIR 12   [facility] ([facility])    [facility]  [address]  [facility] [address]   [phone]            May 29, 2018  8:30 AM CDT   Level 1 with RH INFUSION CHAIR 2   [facility] ([facility])    [facility]  [address]  [facility] [address]   [phone]              Who to contact     If you have questions or need follow up information about today's clinic visit or your schedule please contact [facility] directly at [phone].  Normal or non-critical lab and imaging results will be communicated to you by MyChart, letter or phone within 4 business days after the clinic has received the results. If you do not hear from us within 7 days, please contact the clinic through MyChart or phone. If you have a  critical or abnormal lab result, we will notify you by phone as soon as possible.  Submit refill requests through [employer] or call your pharmacy and they will forward the refill request to us. Please allow 3 business days for your refill to be completed.          Additional Information About Your Visit        [employer] Information     [employer] gives you secure access to your electronic health record. If you see a primary care provider, you can also send messages to your care team and make appointments. If you have questions, please call your primary care clinic.  If you do not have a primary care provider, please call [phone] and they will assist you.        Care EveryWhere ID     This is your Care EveryWhere ID. This could be used by other organizations to access your [place] medical records  [id number]        Your Vitals Were     Pulse Temperature Respirations Last Period Pulse Oximetry       87 97.4  F (36.3  C) (Tympanic) 16 07/11/2014 99%        Blood Pressure from Last 3 Encounters:   04/03/18 110/77   03/23/18 121/87   03/20/18 121/76    Weight from Last 3 Encounters:   03/23/18 77.1 kg (170 lb)   11/03/17 75.8 kg (167 lb)   10/24/17 75.4 kg (166 lb 3.2 oz)              Today, you had the following     No orders found for display         Today's Medication Changes          These changes are accurate as of 4/3/18  9:16 AM.  If you have any questions, ask your nurse or doctor.               These medicines have changed or have updated prescriptions.        Dose/Directions    gabapentin 100 MG capsule   Commonly known as:  NEURONTIN   This may have changed:  additional instructions   Used for:  Cervicalgia        Take 100 mg in the morning and 400 mg at bedtime for one week, then increase to 100 mg in the morning and 600 mg at bedtime.   Quantity:  210 capsule   Refills:  1                Primary Care Provider Office Phone # Fax #    Bright [facility]elo [phone]       [address]  [facility] [address]        Equal Access to [facility]     REINA VILLA : Hadii vale ku maru Sogian, waelliottda luqadaha, qaybta kaalmada jace, dominic castrourvasiholy estrella. So [facility] [phone].    ATENCIÓN: Si habla español, tiene a rojas disposición servicios gratuitos de asistencia lingüística. Farzadame al [phone].    We comply with applicable federal civil rights laws and [place] laws. We do not discriminate on the basis of race, color, national origin, age, disability, sex, sexual orientation, or gender identity.            Thank you!     Thank you for choosing [facility]  for your care. Our goal is always to provide you with excellent care. Hearing back from our patients is one way we can continue to improve our services. Please take a few minutes to complete the written survey that you may receive in the mail after your visit with us. Thank you!             Your Updated Medication List - Protect others around you: Learn how to safely use, store and throw away your medicines at www.disposemymeds.org.          This list is accurate as of 4/3/18  9:16 AM.  Always use your most recent med list.                   Brand Name Dispense Instructions for use Diagnosis    acetaminophen 325 MG tablet    TYLENOL    100 tablet    Take 2 tablets (650 mg) by mouth every 4 hours as needed for other (surgical pain)    Status post total replacement of right hip       BOTOX IJ      As directed        diltiazem 120 MG 24 hr capsule    CARDIZEM CD    30 capsule    Take 1 capsule (120 mg) by mouth daily You are due to see the cardiologist- please call [phone] to schedule.    Chest pressure       gabapentin 100 MG capsule    NEURONTIN    210 capsule    Take 100 mg in the morning and 400 mg at bedtime for one week, then increase to 100 mg in the morning and 600 mg at bedtime.    Cervicalgia       hydrOXYzine 25 MG tablet    ATARAX    60 tablet    Take 1 tablet (25 mg) by  mouth every 6 hours as needed for itching (pain)    Status post total replacement of right hip       loratadine 10 MG tablet    CLARITIN     Take 10 mg by mouth daily        NORTRIPTYLINE HCL PO      Take 200 mg by mouth At Bedtime        oxyCODONE IR 5 MG tablet    ROXICODONE    80 tablet    Take 1-2 tablets (5-10 mg) by mouth every 3 hours as needed for moderate to severe pain    Status post total replacement of right hip       PAROXETINE HCL PO      Take 20 mg by mouth At Bedtime        senna-docusate 8.6-50 MG per tablet    SENOKOT-S;PERICOLACE    30 tablet    Take 1-2 tablets by mouth 2 times daily    Status post total replacement of right hip       XOLAIR 150 MG injection   Generic drug:  omalizumab      Inject Subcutaneous every 14 days    Severe persistent asthma without complication

## 2018-04-03 NOTE — PROGRESS NOTES
Infusion Nursing Note:  Alba Varela presents today for xolair.    Patient seen by provider today: No   present during visit today: Not Applicable.    Note: N/A.    Intravenous Access:  No Intravenous access/labs at this visit.    Treatment Conditions:  Not Applicable.      Post Infusion Assessment:  Patient tolerated injection without incident.  Patient observed for 30 minutes post xolair per protocol.    Discharge Plan:   Discharge instructions reviewed with: Patient.  AVS to patient via [employer].  Patient will return 4/17 for next appointment.   Patient discharged in stable condition accompanied by: self.  Departure Mode: Ambulatory.    Vivi Rosado RN

## 2018-04-12 ENCOUNTER — TRANSFERRED RECORDS (OUTPATIENT)
Dept: HEALTH INFORMATION MANAGEMENT | Facility: CLINIC | Age: 48
End: 2018-04-12

## 2018-04-17 ENCOUNTER — INFUSION THERAPY VISIT (OUTPATIENT)
Dept: INFUSION THERAPY | Facility: CLINIC | Age: 48
End: 2018-04-17
Attending: ALLERGY & IMMUNOLOGY
Payer: COMMERCIAL

## 2018-04-17 VITALS
SYSTOLIC BLOOD PRESSURE: 120 MMHG | DIASTOLIC BLOOD PRESSURE: 83 MMHG | HEART RATE: 98 BPM | TEMPERATURE: 96.8 F | RESPIRATION RATE: 16 BRPM

## 2018-04-17 DIAGNOSIS — J45.50 SEVERE PERSISTENT ASTHMA WITHOUT COMPLICATION (H): Primary | ICD-10-CM

## 2018-04-17 PROCEDURE — 25000128 H RX IP 250 OP 636: Performed by: NURSE PRACTITIONER

## 2018-04-17 PROCEDURE — 96372 THER/PROPH/DIAG INJ SC/IM: CPT

## 2018-04-17 RX ADMIN — OMALIZUMAB 375 MG: 202.5 INJECTION, SOLUTION SUBCUTANEOUS at 08:44

## 2018-04-17 NOTE — PROGRESS NOTES
Infusion Nursing Note:  Alba Varela presents today for xolair.    Patient seen by provider today: No   present during visit today: Not Applicable.    Note: N/A.    Intravenous Access:  No Intravenous access/labs at this visit.    Treatment Conditions:  Not Applicable.      Post Infusion Assessment:  Patient tolerated injection without incident.  Patient observed for 30 minutes post xolair per protocol.    Discharge Plan:   AVS to patient via MYCHART.  Patient will return 5/1/18 for next appointment.   Patient discharged in stable condition accompanied by: self.  Departure Mode: Ambulatory.    Sangita Edwards RN

## 2018-04-17 NOTE — MR AVS SNAPSHOT
After Visit Summary   4/17/2018    Alba Varela    MRN: [id number]           Patient Information     Date Of Birth          1970        Visit Information        Provider Department      4/17/2018 8:30 AM RH INFUSION CHAIR 1 [facility]        Today's Diagnoses     Severe persistent asthma without complication    -  1       Follow-ups after your visit        Your next 10 appointments already scheduled     May 01, 2018  8:30 AM CDT   Level 1 with RH INFUSION CHAIR 2   [facility] ([facility])    [place] [address]  [facility] [address]   [phone]            May 15, 2018  8:30 AM CDT   Level 1 with RH INFUSION CHAIR 12   [facility] ([facility])    [facility]  [address]  [facility] [address]   [phone]            May 29, 2018  8:30 AM CDT   Level 1 with RH INFUSION CHAIR 2   [facility] ([facility])    [facility]  [address]  [facility] [address]   [phone]              Who to contact     If you have questions or need follow up information about today's clinic visit or your schedule please contact [facility] INFUSION [facility] directly at [phone].  Normal or non-critical lab and imaging results will be communicated to you by MyChart, letter or phone within 4 business days after the clinic has received the results. If you do not hear from us within 7 days, please contact the clinic through MyChart or phone. If you have a critical or abnormal lab result, we will notify you by phone as soon as possible.  Submit refill requests through Impermium or call your pharmacy and they will forward the refill request to us. Please allow 3 business days for your refill to be  completed.          Additional Information About Your Visit        MyChart Information     Algotochip gives you secure access to your electronic health record. If you see a primary care provider, you can also send messages to your care team and make appointments. If you have questions, please call your primary care clinic.  If you do not have a primary care provider, please call [phone] and they will assist you.        Care EveryWhere ID     This is your Care EveryWhere ID. This could be used by other organizations to access your [facility] medical records  [id number]        Your Vitals Were     Pulse Temperature Respirations Last Period          98 96.8  F (36  C) (Tympanic) 16 07/11/2014         Blood Pressure from Last 3 Encounters:   04/17/18 120/83   04/03/18 110/77   03/23/18 121/87    Weight from Last 3 Encounters:   03/23/18 77.1 kg (170 lb)   11/03/17 75.8 kg (167 lb)   10/24/17 75.4 kg (166 lb 3.2 oz)              Today, you had the following     No orders found for display         Today's Medication Changes          These changes are accurate as of 4/17/18 11:33 AM.  If you have any questions, ask your nurse or doctor.               These medicines have changed or have updated prescriptions.        Dose/Directions    gabapentin 100 MG capsule   Commonly known as:  NEURONTIN   This may have changed:  additional instructions   Used for:  Cervicalgia        Take 100 mg in the morning and 400 mg at bedtime for one week, then increase to 100 mg in the morning and 600 mg at bedtime.   Quantity:  210 capsule   Refills:  1                Primary Care Provider Office Phone # Fax #    Bright [facility]elo [phone]       [address] [address]        Equal Access to Services     [facility] : bailee Key, dominic weeks. So [facility] [phone].    ATENCIÓN: Si alec crawford rojas  disposición servicios gratuitos de asistencia lingüística. Brissa sanabria [phone].    We comply with applicable federal civil rights laws and [place] laws. We do not discriminate on the basis of race, color, national origin, age, disability, sex, sexual orientation, or gender identity.            Thank you!     Thank you for choosing [facility] INFUSION [facility]  for your care. Our goal is always to provide you with excellent care. Hearing back from our patients is one way we can continue to improve our services. Please take a few minutes to complete the written survey that you may receive in the mail after your visit with us. Thank you!             Your Updated Medication List - Protect others around you: Learn how to safely use, store and throw away your medicines at www.disposemymeds.org.          This list is accurate as of 4/17/18 11:33 AM.  Always use your most recent med list.                   Brand Name Dispense Instructions for use Diagnosis    acetaminophen 325 MG tablet    TYLENOL    100 tablet    Take 2 tablets (650 mg) by mouth every 4 hours as needed for other (surgical pain)    Status post total replacement of right hip       BOTOX IJ      As directed        diltiazem 120 MG 24 hr capsule    CARDIZEM CD    30 capsule    Take 1 capsule (120 mg) by mouth daily You are due to see the cardiologist- please call [phone] to schedule.    Chest pressure       gabapentin 100 MG capsule    NEURONTIN    210 capsule    Take 100 mg in the morning and 400 mg at bedtime for one week, then increase to 100 mg in the morning and 600 mg at bedtime.    Cervicalgia       hydrOXYzine 25 MG tablet    ATARAX    60 tablet    Take 1 tablet (25 mg) by mouth every 6 hours as needed for itching (pain)    Status post total replacement of right hip       loratadine 10 MG tablet    CLARITIN     Take 10 mg by mouth daily        NORTRIPTYLINE HCL PO      Take 200 mg by mouth At Bedtime        oxyCODONE IR 5 MG  tablet    ROXICODONE    80 tablet    Take 1-2 tablets (5-10 mg) by mouth every 3 hours as needed for moderate to severe pain    Status post total replacement of right hip       PAROXETINE HCL PO      Take 20 mg by mouth At Bedtime        senna-docusate 8.6-50 MG per tablet    SENOKOT-S;PERICOLACE    30 tablet    Take 1-2 tablets by mouth 2 times daily    Status post total replacement of right hip       XOLAIR 150 MG injection   Generic drug:  omalizumab      Inject Subcutaneous every 14 days    Severe persistent asthma without complication

## 2018-04-26 ENCOUNTER — TELEPHONE (OUTPATIENT)
Dept: INTERNAL MEDICINE | Facility: CLINIC | Age: 48
End: 2018-04-26

## 2018-04-26 NOTE — TELEPHONE ENCOUNTER
Panel Management Review      Patient has the following on her problem list:     Asthma review     ACT Total Scores 10/24/2017   ACT TOTAL SCORE (Goal Greater than or Equal to 20) 24   In the past 12 months, how many times did you visit the emergency room for your asthma without being admitted to the hospital? 0   In the past 12 months, how many times were you hospitalized overnight because of your asthma? 0      1. Is Asthma diagnosis on the Problem List? Yes    2. Is Asthma listed on Health Maintenance? Yes    3. Patient is due for:  ACT      Composite cancer screening  Chart review shows that this patient is due/due soon for the following Pap Smear  Summary:    Patient is due/failing the following:   ACT and PAP    Action needed:   Patient needs office visit for see above.    Type of outreach:    Sent Danlan message.    Questions for provider review:    None                                                                                                                                    .RAVNIDER TEE LPN       Chart routed to none .

## 2018-04-30 ENCOUNTER — TRANSFERRED RECORDS (OUTPATIENT)
Dept: HEALTH INFORMATION MANAGEMENT | Facility: CLINIC | Age: 48
End: 2018-04-30

## 2018-05-01 ENCOUNTER — INFUSION THERAPY VISIT (OUTPATIENT)
Dept: INFUSION THERAPY | Facility: CLINIC | Age: 48
End: 2018-05-01
Attending: ALLERGY & IMMUNOLOGY
Payer: COMMERCIAL

## 2018-05-01 VITALS — RESPIRATION RATE: 16 BRPM | TEMPERATURE: 97.8 F | SYSTOLIC BLOOD PRESSURE: 138 MMHG | DIASTOLIC BLOOD PRESSURE: 73 MMHG

## 2018-05-01 DIAGNOSIS — J45.50 SEVERE PERSISTENT ASTHMA WITHOUT COMPLICATION (H): Primary | ICD-10-CM

## 2018-05-01 DIAGNOSIS — Z11.9 ENCOUNTER FOR SCREENING FOR INFEC/PARASTC DISEASES, UNSP: ICD-10-CM

## 2018-05-01 DIAGNOSIS — M25.559 PAIN IN HIP JOINT: Primary | ICD-10-CM

## 2018-05-01 PROCEDURE — 96372 THER/PROPH/DIAG INJ SC/IM: CPT

## 2018-05-01 PROCEDURE — 25000128 H RX IP 250 OP 636: Mod: JW | Performed by: NURSE PRACTITIONER

## 2018-05-01 RX ADMIN — OMALIZUMAB 375 MG: 202.5 INJECTION, SOLUTION SUBCUTANEOUS at 08:55

## 2018-05-01 NOTE — PROGRESS NOTES
Infusion Nursing Note:  Alba Varela presents today for Xolair.    Patient seen by provider today: No   present during visit today: Not Applicable.    Note: N/A.    Intravenous Access:  No Intravenous access/labs at this visit.    Treatment Conditions:  Not Applicable.      Post Infusion Assessment:  Patient tolerated injection without incident.  Patient observed for 30 minutes post Xolair per protocol.    Discharge Plan:   Discharge instructions reviewed with: Patient.  Patient and/or family verbalized understanding of discharge instructions and all questions answered.  AVS to patient via Kanjoya.  Patient will return 5/15/18 for next appointment.   Patient discharged in stable condition accompanied by: self.  Departure Mode: Ambulatory.    Toma Camargo RN

## 2018-05-01 NOTE — MR AVS SNAPSHOT
After Visit Summary   5/1/2018    Alba Varela    MRN: [id number]           Patient Information     Date Of Birth          1970        Visit Information        Provider Department      5/1/2018 8:30 AM RH INFUSION CHAIR 8 [facility]fusion [facility]        Today's Diagnoses     Severe persistent asthma without complication    -  1       Follow-ups after your visit        Your next 10 appointments already scheduled     May 15, 2018  8:30 AM CDT   Level 1 with RH INFUSION CHAIR 10   [facility]fusion [facility] ([facility])    [facility]  [address]  [facility] [address]   [phone]            May 29, 2018  8:30 AM CDT   Level 1 with RH INFUSION CHAIR 12   [facility]fusion [facility] ([facility])    [facility]  [address]  [facility] [address] [phone]              Who to contact     If you have questions or need follow up information about today's clinic visit or your schedule please contact [facility] INFUSION [facility] directly at [phone].  Normal or non-critical lab and imaging results will be communicated to you by [employer], letter or phone within 4 business days after the clinic has received the results. If you do not hear from us within 7 days, please contact the clinic through Kiwiplet or phone. If you have a critical or abnormal lab result, we will notify you by phone as soon as possible.  Submit refill requests through Powered by Peak or call your pharmacy and they will forward the refill request to us. Please allow 3 business days for your refill to be completed.          Additional Information About Your Visit        [employer] Information     Powered by Peak gives you secure access to your electronic health record. If you see a primary care provider, you can also send messages to your care team and make  appointments. If you have questions, please call your primary care clinic.  If you do not have a primary care provider, please call [phone] and they will assist you.        Care EveryWhere ID     This is your Care EveryWhere ID. This could be used by other organizations to access your [facility] medical records  [id number]        Your Vitals Were     Temperature Respirations Last Period             97.8  F (36.6  C) (Tympanic) 16 07/11/2014          Blood Pressure from Last 3 Encounters:   05/01/18 138/73   04/17/18 120/83   04/03/18 110/77    Weight from Last 3 Encounters:   03/23/18 77.1 kg (170 lb)   11/03/17 75.8 kg (167 lb)   10/24/17 75.4 kg (166 lb 3.2 oz)              Today, you had the following     No orders found for display         Today's Medication Changes          These changes are accurate as of 5/1/18  9:06 AM.  If you have any questions, ask your nurse or doctor.               These medicines have changed or have updated prescriptions.        Dose/Directions    gabapentin 100 MG capsule   Commonly known as:  NEURONTIN   This may have changed:  additional instructions   Used for:  Cervicalgia        Take 100 mg in the morning and 400 mg at bedtime for one week, then increase to 100 mg in the morning and 600 mg at bedtime.   Quantity:  210 capsule   Refills:  1                Primary Care Provider Office Phone # Fax #    Bright [facility]elo [phone]       [address] [address]        Equal Access to Services     [place] : Hadkatlyn gamboao Sogian, waaxda luqadaha, qaybta kaalmada jairoyaelena, wax michael sullivan . So [facility] [phone].    ATENCIÓN: Si habla español, tiene a rojas disposición servicios gratuitos de asistencia lingüística. Llame al [phone].    We comply with applicable federal civil rights laws and [place] laws. We do not discriminate on the basis of race, color, national origin, age, disability, sex,  sexual orientation, or gender identity.            Thank you!     Thank you for choosing [facility] INFUSION [facility]  for your care. Our goal is always to provide you with excellent care. Hearing back from our patients is one way we can continue to improve our services. Please take a few minutes to complete the written survey that you may receive in the mail after your visit with us. Thank you!             Your Updated Medication List - Protect others around you: Learn how to safely use, store and throw away your medicines at www.disposemymeds.org.          This list is accurate as of 5/1/18  9:06 AM.  Always use your most recent med list.                   Brand Name Dispense Instructions for use Diagnosis    acetaminophen 325 MG tablet    TYLENOL    100 tablet    Take 2 tablets (650 mg) by mouth every 4 hours as needed for other (surgical pain)    Status post total replacement of right hip       BOTOX IJ      As directed        diltiazem 120 MG 24 hr capsule    CARDIZEM CD    30 capsule    Take 1 capsule (120 mg) by mouth daily You are due to see the cardiologist- please call [phone] to schedule.    Chest pressure       gabapentin 100 MG capsule    NEURONTIN    210 capsule    Take 100 mg in the morning and 400 mg at bedtime for one week, then increase to 100 mg in the morning and 600 mg at bedtime.    Cervicalgia       hydrOXYzine 25 MG tablet    ATARAX    60 tablet    Take 1 tablet (25 mg) by mouth every 6 hours as needed for itching (pain)    Status post total replacement of right hip       loratadine 10 MG tablet    CLARITIN     Take 10 mg by mouth daily        NORTRIPTYLINE HCL PO      Take 200 mg by mouth At Bedtime        oxyCODONE IR 5 MG tablet    ROXICODONE    80 tablet    Take 1-2 tablets (5-10 mg) by mouth every 3 hours as needed for moderate to severe pain    Status post total replacement of right hip       PAROXETINE HCL PO      Take 20 mg by mouth At Bedtime         senna-docusate 8.6-50 MG per tablet    SENOKOT-S;PERICOLACE    30 tablet    Take 1-2 tablets by mouth 2 times daily    Status post total replacement of right hip       XOLAIR 150 MG injection   Generic drug:  omalizumab      Inject Subcutaneous every 14 days    Severe persistent asthma without complication

## 2018-05-07 ENCOUNTER — HOSPITAL ENCOUNTER (OUTPATIENT)
Dept: LAB | Facility: CLINIC | Age: 48
Discharge: HOME | End: 2018-05-07
Attending: ORTHOPAEDIC SURGERY | Admitting: ORTHOPAEDIC SURGERY
Payer: COMMERCIAL

## 2018-05-07 DIAGNOSIS — M25.559 PAIN IN HIP JOINT: ICD-10-CM

## 2018-05-07 DIAGNOSIS — Z11.9 ENCOUNTER FOR SCREENING FOR INFEC/PARASTC DISEASES, UNSP: ICD-10-CM

## 2018-05-07 LAB
CRP SERPL-MCNC: 5.4 MG/L (ref 0–8)
ERYTHROCYTE [SEDIMENTATION RATE] IN BLOOD BY WESTERGREN METHOD: 11 MM/H (ref 0–20)

## 2018-05-07 PROCEDURE — 86140 C-REACTIVE PROTEIN: CPT | Performed by: ORTHOPAEDIC SURGERY

## 2018-05-07 PROCEDURE — 85652 RBC SED RATE AUTOMATED: CPT | Performed by: ORTHOPAEDIC SURGERY

## 2018-05-07 PROCEDURE — 36415 COLL VENOUS BLD VENIPUNCTURE: CPT | Performed by: ORTHOPAEDIC SURGERY

## 2018-05-15 ENCOUNTER — INFUSION THERAPY VISIT (OUTPATIENT)
Dept: INFUSION THERAPY | Facility: CLINIC | Age: 48
End: 2018-05-15
Attending: ALLERGY & IMMUNOLOGY
Payer: COMMERCIAL

## 2018-05-15 VITALS — TEMPERATURE: 97 F | RESPIRATION RATE: 16 BRPM | DIASTOLIC BLOOD PRESSURE: 75 MMHG | SYSTOLIC BLOOD PRESSURE: 111 MMHG

## 2018-05-15 DIAGNOSIS — J45.50 SEVERE PERSISTENT ASTHMA WITHOUT COMPLICATION (H): Primary | ICD-10-CM

## 2018-05-15 PROCEDURE — 96372 THER/PROPH/DIAG INJ SC/IM: CPT

## 2018-05-15 PROCEDURE — 25000128 H RX IP 250 OP 636: Mod: JW | Performed by: NURSE PRACTITIONER

## 2018-05-15 RX ADMIN — OMALIZUMAB 375 MG: 202.5 INJECTION, SOLUTION SUBCUTANEOUS at 08:45

## 2018-05-15 NOTE — MR AVS SNAPSHOT
After Visit Summary   5/15/2018    Alba Varela    MRN: [id number]           Patient Information     Date Of Birth          1970        Visit Information        Provider Department      5/15/2018 8:30 AM RH INFUSION CHAIR 10 [facility] Infusion [facility]        Today's Diagnoses     Severe persistent asthma without complication    -  1       Follow-ups after your visit        Your next 10 appointments already scheduled     May 29, 2018  8:30 AM CDT   Level 1 with RH INFUSION CHAIR 12   [facility] Infusion Services ([facility])    [facility]  [address]  [facility] [address] [phone]              Who to contact     If you have questions or need follow up information about today's clinic visit or your schedule please contact [facility] INFUSION [facility] directly at [phone].  Normal or non-critical lab and imaging results will be communicated to you by Aipaihart, letter or phone within 4 business days after the clinic has received the results. If you do not hear from us within 7 days, please contact the clinic through Aipaihart or phone. If you have a critical or abnormal lab result, we will notify you by phone as soon as possible.  Submit refill requests through BriefCam or call your pharmacy and they will forward the refill request to us. Please allow 3 business days for your refill to be completed.          Additional Information About Your Visit        MyChart Information     BriefCam gives you secure access to your electronic health record. If you see a primary care provider, you can also send messages to your care team and make appointments. If you have questions, please call your primary care clinic.  If you do not have a primary care provider, please call [phone] and they will assist you.        Care EveryWhere ID     This is your Care EveryWhere ID. This could be used by other  organizations to access your [place] medical records  [id number]        Your Vitals Were     Temperature Respirations Last Period             97  F (36.1  C) (Tympanic) 16 07/11/2014          Blood Pressure from Last 3 Encounters:   05/15/18 111/75   05/01/18 138/73   04/17/18 120/83    Weight from Last 3 Encounters:   03/23/18 77.1 kg (170 lb)   11/03/17 75.8 kg (167 lb)   10/24/17 75.4 kg (166 lb 3.2 oz)              Today, you had the following     No orders found for display         Today's Medication Changes          These changes are accurate as of 5/15/18  9:16 AM.  If you have any questions, ask your nurse or doctor.               These medicines have changed or have updated prescriptions.        Dose/Directions    gabapentin 100 MG capsule   Commonly known as:  NEURONTIN   This may have changed:  additional instructions   Used for:  Cervicalgia        Take 100 mg in the morning and 400 mg at bedtime for one week, then increase to 100 mg in the morning and 600 mg at bedtime.   Quantity:  210 capsule   Refills:  1                Primary Care Provider Office Phone # Fax #    Bright [facility]elo [phone]       [address] [address]        Equal Access to Services     [place] AH: Yonny gamboao Sogian, waaxda luqadaha, qaybta kaalmada ademattyyada, dominic estrella. So [facility] [phone].    ATENCIÓN: Si habla español, tiene a rojas disposición servicios gratuitos de asistencia lingüística. Llame al [phone].    We comply with applicable federal civil rights laws and [place] laws. We do not discriminate on the basis of race, color, national origin, age, disability, sex, sexual orientation, or gender identity.            Thank you!     Thank you for choosing [facility] INFUSION [facility]  for your care. Our goal is always to provide you with excellent care. Hearing back from our patients is one way we can continue to  improve our services. Please take a few minutes to complete the written survey that you may receive in the mail after your visit with us. Thank you!             Your Updated Medication List - Protect others around you: Learn how to safely use, store and throw away your medicines at www.disposemymeds.org.          This list is accurate as of 5/15/18  9:16 AM.  Always use your most recent med list.                   Brand Name Dispense Instructions for use Diagnosis    acetaminophen 325 MG tablet    TYLENOL    100 tablet    Take 2 tablets (650 mg) by mouth every 4 hours as needed for other (surgical pain)    Status post total replacement of right hip       BOTOX IJ      As directed        diltiazem 120 MG 24 hr capsule    CARDIZEM CD    30 capsule    Take 1 capsule (120 mg) by mouth daily You are due to see the cardiologist- please call [phone] to schedule.    Chest pressure       gabapentin 100 MG capsule    NEURONTIN    210 capsule    Take 100 mg in the morning and 400 mg at bedtime for one week, then increase to 100 mg in the morning and 600 mg at bedtime.    Cervicalgia       hydrOXYzine 25 MG tablet    ATARAX    60 tablet    Take 1 tablet (25 mg) by mouth every 6 hours as needed for itching (pain)    Status post total replacement of right hip       loratadine 10 MG tablet    CLARITIN     Take 10 mg by mouth daily        NORTRIPTYLINE HCL PO      Take 200 mg by mouth At Bedtime        oxyCODONE IR 5 MG tablet    ROXICODONE    80 tablet    Take 1-2 tablets (5-10 mg) by mouth every 3 hours as needed for moderate to severe pain    Status post total replacement of right hip       PAROXETINE HCL PO      Take 20 mg by mouth At Bedtime        senna-docusate 8.6-50 MG per tablet    SENOKOT-S;PERICOLACE    30 tablet    Take 1-2 tablets by mouth 2 times daily    Status post total replacement of right hip       XOLAIR 150 MG injection   Generic drug:  omalizumab      Inject Subcutaneous every 14 days    Severe  persistent asthma without complication

## 2018-05-15 NOTE — PROGRESS NOTES
Infusion Nursing Note:  Alba Varela presents today for Xolair.    Patient seen by provider today: No   present during visit today: Not Applicable.    Note: N/A.    Intravenous Access:  No Intravenous access/labs at this visit.    Treatment Conditions:  Not Applicable.      Post Infusion Assessment:  Patient tolerated injection without incident.  Patient observed for 30 minutes post Xolair per protocol.    Discharge Plan:   Discharge instructions reviewed with: Patient.  Patient and/or family verbalized understanding of discharge instructions and all questions answered.  AVS to patient via [facility] [employer].  Patient will return 5/29/18 for next appointment.   Patient discharged in stable condition accompanied by: self.  Departure Mode: Ambulatory.    Toma Camargo RN

## 2018-05-17 ENCOUNTER — TRANSFERRED RECORDS (OUTPATIENT)
Dept: HEALTH INFORMATION MANAGEMENT | Facility: CLINIC | Age: 48
End: 2018-05-17

## 2018-05-29 ENCOUNTER — INFUSION THERAPY VISIT (OUTPATIENT)
Dept: INFUSION THERAPY | Facility: CLINIC | Age: 48
End: 2018-05-29
Attending: ALLERGY & IMMUNOLOGY
Payer: COMMERCIAL

## 2018-05-29 VITALS — DIASTOLIC BLOOD PRESSURE: 85 MMHG | SYSTOLIC BLOOD PRESSURE: 123 MMHG | TEMPERATURE: 97 F | RESPIRATION RATE: 16 BRPM

## 2018-05-29 DIAGNOSIS — J45.50 SEVERE PERSISTENT ASTHMA WITHOUT COMPLICATION (H): Primary | ICD-10-CM

## 2018-05-29 PROCEDURE — 96372 THER/PROPH/DIAG INJ SC/IM: CPT

## 2018-05-29 PROCEDURE — 25000128 H RX IP 250 OP 636: Mod: JW | Performed by: NURSE PRACTITIONER

## 2018-05-29 RX ADMIN — OMALIZUMAB 375 MG: 202.5 INJECTION, SOLUTION SUBCUTANEOUS at 08:55

## 2018-05-29 NOTE — PROGRESS NOTES
Infusion Nursing Note:  Alba Varela presents today for Xolair.    Patient seen by provider today: No   present during visit today: Not Applicable.    Note: N/A.    Intravenous Access:  No Intravenous access/labs at this visit.    Treatment Conditions:  Not Applicable.      Post Infusion Assessment:  Patient tolerated injection without incident.  Patient observed for 30 minutes post Xolair per protocol.    Discharge Plan:   Discharge instructions reviewed with: Patient.  Patient and/or family verbalized understanding of discharge instructions and all questions answered.  AVS to patient via Elivar.  Patient will return 6/12/18 for next appointment.   Patient discharged in stable condition accompanied by: self.  Departure Mode: Ambulatory.    Toma Camargo RN

## 2018-05-29 NOTE — MR AVS SNAPSHOT
After Visit Summary   5/29/2018    Alba Varela    MRN: [id number]           Patient Information     Date Of Birth          1970        Visit Information        Provider Department      5/29/2018 8:30 AM RH INFUSION CHAIR 12 [facility]        Today's Diagnoses     Severe persistent asthma without complication    -  1       Follow-ups after your visit        Your next 10 appointments already scheduled     Jun 12, 2018  9:00 AM CDT   Level 1 with RH INFUSION CHAIR 9   [facility] ([facility])    [facility]  [address]  [facility] [address]   [phone]            Jun 26, 2018  9:00 AM CDT   Level 1 with RH INFUSION CHAIR 12   [facility] ([facility])    [facility]  [address]  [facility] [address] [phone]              Who to contact     If you have questions or need follow up information about today's clinic visit or your schedule please contact [facility] directly at [phone].  Normal or non-critical lab and imaging results will be communicated to you by Paper Hunterhart, letter or phone within 4 business days after the clinic has received the results. If you do not hear from us within 7 days, please contact the clinic through SnapShopt or phone. If you have a critical or abnormal lab result, we will notify you by phone as soon as possible.  Submit refill requests through eOriginal or call your pharmacy and they will forward the refill request to us. Please allow 3 business days for your refill to be completed.          Additional Information About Your Visit        Paper Hunterhart Information     eOriginal gives you secure access to your electronic health record. If you see a primary care provider, you can also send messages to your care team and make  appointments. If you have questions, please call your primary care clinic.  If you do not have a primary care provider, please call [phone] and they will assist you.        Care EveryWhere ID     This is your Care EveryWhere ID. This could be used by other organizations to access your [place] medical records  [id number]        Your Vitals Were     Temperature Respirations Last Period             97  F (36.1  C) (Tympanic) 16 07/11/2014          Blood Pressure from Last 3 Encounters:   05/29/18 123/85   05/15/18 111/75   05/01/18 138/73    Weight from Last 3 Encounters:   03/23/18 77.1 kg (170 lb)   11/03/17 75.8 kg (167 lb)   10/24/17 75.4 kg (166 lb 3.2 oz)              Today, you had the following     No orders found for display         Today's Medication Changes          These changes are accurate as of 5/29/18  9:05 AM.  If you have any questions, ask your nurse or doctor.               These medicines have changed or have updated prescriptions.        Dose/Directions    gabapentin 100 MG capsule   Commonly known as:  NEURONTIN   This may have changed:  additional instructions   Used for:  Cervicalgia        Take 100 mg in the morning and 400 mg at bedtime for one week, then increase to 100 mg in the morning and 600 mg at bedtime.   Quantity:  210 capsule   Refills:  1                Primary Care Provider Office Phone # Fax #    Bright [facility]elo [phone]       [address] [address]        Equal Access to Services     REINA VILLA : Yonny gamboao Sogian, waaxda luqadaha, qaybta kaalmada jace, dominic estrella. So [facility] [phone].    ATENCIÓN: Si habla español, tiene a rojas disposición servicios gratuitos de asistencia lingüística. Llame al [phone].    We comply with applicable federal civil rights laws and [place] laws. We do not discriminate on the basis of race, color, national origin, age, disability, sex, sexual  orientation, or gender identity.            Thank you!     Thank you for choosing [facility]  for your care. Our goal is always to provide you with excellent care. Hearing back from our patients is one way we can continue to improve our services. Please take a few minutes to complete the written survey that you may receive in the mail after your visit with us. Thank you!             Your Updated Medication List - Protect others around you: Learn how to safely use, store and throw away your medicines at www.disposemymeds.org.          This list is accurate as of 5/29/18  9:05 AM.  Always use your most recent med list.                   Brand Name Dispense Instructions for use Diagnosis    acetaminophen 325 MG tablet    TYLENOL    100 tablet    Take 2 tablets (650 mg) by mouth every 4 hours as needed for other (surgical pain)    Status post total replacement of right hip       BOTOX IJ      As directed        diltiazem 120 MG 24 hr capsule    CARDIZEM CD    30 capsule    Take 1 capsule (120 mg) by mouth daily You are due to see the cardiologist- please call [phone] to schedule.    Chest pressure       gabapentin 100 MG capsule    NEURONTIN    210 capsule    Take 100 mg in the morning and 400 mg at bedtime for one week, then increase to 100 mg in the morning and 600 mg at bedtime.    Cervicalgia       hydrOXYzine 25 MG tablet    ATARAX    60 tablet    Take 1 tablet (25 mg) by mouth every 6 hours as needed for itching (pain)    Status post total replacement of right hip       loratadine 10 MG tablet    CLARITIN     Take 10 mg by mouth daily        NORTRIPTYLINE HCL PO      Take 200 mg by mouth At Bedtime        oxyCODONE IR 5 MG tablet    ROXICODONE    80 tablet    Take 1-2 tablets (5-10 mg) by mouth every 3 hours as needed for moderate to severe pain    Status post total replacement of right hip       PAROXETINE HCL PO      Take 20 mg by mouth At Bedtime        senna-docusate  8.6-50 MG per tablet    SENOKOT-S;PERICOLACE    30 tablet    Take 1-2 tablets by mouth 2 times daily    Status post total replacement of right hip       XOLAIR 150 MG injection   Generic drug:  omalizumab      Inject Subcutaneous every 14 days    Severe persistent asthma without complication

## 2018-06-12 ENCOUNTER — INFUSION THERAPY VISIT (OUTPATIENT)
Dept: INFUSION THERAPY | Facility: CLINIC | Age: 48
End: 2018-06-12
Attending: ALLERGY & IMMUNOLOGY
Payer: COMMERCIAL

## 2018-06-12 VITALS — SYSTOLIC BLOOD PRESSURE: 119 MMHG | DIASTOLIC BLOOD PRESSURE: 83 MMHG

## 2018-06-12 DIAGNOSIS — J45.50 SEVERE PERSISTENT ASTHMA WITHOUT COMPLICATION (H): Primary | ICD-10-CM

## 2018-06-12 PROCEDURE — 96372 THER/PROPH/DIAG INJ SC/IM: CPT

## 2018-06-12 PROCEDURE — 25000128 H RX IP 250 OP 636: Performed by: NURSE PRACTITIONER

## 2018-06-12 RX ADMIN — OMALIZUMAB 375 MG: 202.5 INJECTION, SOLUTION SUBCUTANEOUS at 09:13

## 2018-06-12 NOTE — MR AVS SNAPSHOT
After Visit Summary   6/12/2018    Alba Varela    MRN: [id number]           Patient Information     Date Of Birth          1970        Visit Information        Provider Department      6/12/2018 9:00 AM RH INFUSION CHAIR 12 [facility]        Today's Diagnoses     Severe persistent asthma without complication    -  1       Follow-ups after your visit        Your next 10 appointments already scheduled     Jun 26, 2018  9:00 AM CDT   Level 1 with RH INFUSION CHAIR 3   [facility] ([facility])    [facility]  [address]  [facility] [id number] [phone]              Who to contact     If you have questions or need follow up information about today's clinic visit or your schedule please contact [facility] directly at [phone].  Normal or non-critical lab and imaging results will be communicated to you by WealthyLifehart, letter or phone within 4 business days after the clinic has received the results. If you do not hear from us within 7 days, please contact the clinic through WealthyLifehart or phone. If you have a critical or abnormal lab result, we will notify you by phone as soon as possible.  Submit refill requests through LiveData or call your pharmacy and they will forward the refill request to us. Please allow 3 business days for your refill to be completed.          Additional Information About Your Visit        MyChart Information     LiveData gives you secure access to your electronic health record. If you see a primary care provider, you can also send messages to your care team and make appointments. If you have questions, please call your primary care clinic.  If you do not have a primary care provider, please call [phone] and they will assist you.        Care EveryWhere ID     This is your Care EveryWhere ID. This could be used by other  organizations to access your [place] medical records  [id number]        Your Vitals Were     Last Period                   07/11/2014            Blood Pressure from Last 3 Encounters:   06/12/18 119/83   05/29/18 123/85   05/15/18 111/75    Weight from Last 3 Encounters:   03/23/18 77.1 kg (170 lb)   11/03/17 75.8 kg (167 lb)   10/24/17 75.4 kg (166 lb 3.2 oz)              Today, you had the following     No orders found for display         Today's Medication Changes          These changes are accurate as of 6/12/18  9:51 AM.  If you have any questions, ask your nurse or doctor.               These medicines have changed or have updated prescriptions.        Dose/Directions    gabapentin 100 MG capsule   Commonly known as:  NEURONTIN   This may have changed:  additional instructions   Used for:  Cervicalgia        Take 100 mg in the morning and 400 mg at bedtime for one week, then increase to 100 mg in the morning and 600 mg at bedtime.   Quantity:  210 capsule   Refills:  1                Primary Care Provider Office Phone # Fax #    Bright [facility]elo [phone] [address] [address]        Equal Access to Services     CHARLENE VILLA : Hadii vale gamboao Sogian, waaxda luqadaha, qaybta kaalmada adejanelle, dominic estrella. So [facility] [phone].    ATENCIÓN: Si habla español, tiene a rojas disposición servicios gratuitos de asistencia lingüística. Llame al [phone].    We comply with applicable federal civil rights laws and [place] laws. We do not discriminate on the basis of race, color, national origin, age, disability, sex, sexual orientation, or gender identity.            Thank you!     Thank you for choosing [facility]  for your care. Our goal is always to provide you with excellent care. Hearing back from our patients is one way we can continue to improve our services. Please take a few minutes  to complete the written survey that you may receive in the mail after your visit with us. Thank you!             Your Updated Medication List - Protect others around you: Learn how to safely use, store and throw away your medicines at www.disposemymeds.org.          This list is accurate as of 6/12/18  9:51 AM.  Always use your most recent med list.                   Brand Name Dispense Instructions for use Diagnosis    acetaminophen 325 MG tablet    TYLENOL    100 tablet    Take 2 tablets (650 mg) by mouth every 4 hours as needed for other (surgical pain)    Status post total replacement of right hip       BOTOX IJ      As directed        diltiazem 120 MG 24 hr capsule    CARDIZEM CD    30 capsule    Take 1 capsule (120 mg) by mouth daily You are due to see the cardiologist- please call [phone] to schedule.    Chest pressure       gabapentin 100 MG capsule    NEURONTIN    210 capsule    Take 100 mg in the morning and 400 mg at bedtime for one week, then increase to 100 mg in the morning and 600 mg at bedtime.    Cervicalgia       hydrOXYzine 25 MG tablet    ATARAX    60 tablet    Take 1 tablet (25 mg) by mouth every 6 hours as needed for itching (pain)    Status post total replacement of right hip       loratadine 10 MG tablet    CLARITIN     Take 10 mg by mouth daily        NORTRIPTYLINE HCL PO      Take 200 mg by mouth At Bedtime        oxyCODONE IR 5 MG tablet    ROXICODONE    80 tablet    Take 1-2 tablets (5-10 mg) by mouth every 3 hours as needed for moderate to severe pain    Status post total replacement of right hip       PAROXETINE HCL PO      Take 20 mg by mouth At Bedtime        senna-docusate 8.6-50 MG per tablet    SENOKOT-S;PERICOLACE    30 tablet    Take 1-2 tablets by mouth 2 times daily    Status post total replacement of right hip       XOLAIR 150 MG injection   Generic drug:  omalizumab      Inject Subcutaneous every 14 days    Severe persistent asthma without complication

## 2018-06-12 NOTE — PROGRESS NOTES
Infusion Nursing Note:  Alba Varela presents today for Xolair.    Patient seen by provider today: No   present during visit today: Not Applicable.    Note: N/A.    Intravenous Access:  No Intravenous access/labs at this visit.    Treatment Conditions:  Not Applicable.      Post Infusion Assessment:  Patient tolerated injection without incident.  Patient observed for 30 minutes post Xolair per protocol.    Discharge Plan:   Discharge instructions reviewed with: Patient.  Patient and/or family verbalized understanding of discharge instructions and all questions answered.  AVS to patient via Pie Digital.  Patient will return 6/26/18 for next appointment.   Patient discharged in stable condition accompanied by: self.  Departure Mode: Ambulatory.    Toma Camargo RN

## 2018-06-26 ENCOUNTER — INFUSION THERAPY VISIT (OUTPATIENT)
Dept: INFUSION THERAPY | Facility: CLINIC | Age: 48
End: 2018-06-26
Attending: ALLERGY & IMMUNOLOGY
Payer: COMMERCIAL

## 2018-06-26 VITALS
TEMPERATURE: 98.3 F | SYSTOLIC BLOOD PRESSURE: 118 MMHG | OXYGEN SATURATION: 98 % | DIASTOLIC BLOOD PRESSURE: 82 MMHG | RESPIRATION RATE: 16 BRPM

## 2018-06-26 DIAGNOSIS — J45.50 SEVERE PERSISTENT ASTHMA WITHOUT COMPLICATION (H): Primary | ICD-10-CM

## 2018-06-26 PROCEDURE — 25000128 H RX IP 250 OP 636: Performed by: NURSE PRACTITIONER

## 2018-06-26 PROCEDURE — 96372 THER/PROPH/DIAG INJ SC/IM: CPT

## 2018-06-26 RX ADMIN — OMALIZUMAB 375 MG: 202.5 INJECTION, SOLUTION SUBCUTANEOUS at 08:57

## 2018-06-26 NOTE — PROGRESS NOTES
Infusion Nursing Note:  Alba Varela presents today for Xolair.    Patient seen by provider today: No   present during visit today: Not Applicable.    Note: N/A.    Intravenous Access:  No Intravenous access/labs at this visit.    Treatment Conditions:  Not Applicable.      Post Infusion Assessment:  Patient tolerated injection without incident.  Patient observed for 30 minutes post Xolair per protocol.    Discharge Plan:   Discharge instructions reviewed with: Patient.  Patient and/or family verbalized understanding of discharge instructions and all questions answered.  AVS to patient via [employer].  Patient will call to schedule her next appointment.   Patient discharged in stable condition accompanied by: self.  Departure Mode: Ambulatory.    Lyn Bunn RN

## 2018-07-09 ENCOUNTER — INFUSION THERAPY VISIT (OUTPATIENT)
Dept: INFUSION THERAPY | Facility: CLINIC | Age: 48
End: 2018-07-09
Attending: ALLERGY & IMMUNOLOGY
Payer: COMMERCIAL

## 2018-07-09 VITALS — TEMPERATURE: 97.9 F | SYSTOLIC BLOOD PRESSURE: 123 MMHG | DIASTOLIC BLOOD PRESSURE: 84 MMHG | RESPIRATION RATE: 16 BRPM

## 2018-07-09 DIAGNOSIS — J45.50 SEVERE PERSISTENT ASTHMA WITHOUT COMPLICATION (H): Primary | ICD-10-CM

## 2018-07-09 PROCEDURE — 25000128 H RX IP 250 OP 636: Mod: JW | Performed by: NURSE PRACTITIONER

## 2018-07-09 PROCEDURE — 96372 THER/PROPH/DIAG INJ SC/IM: CPT

## 2018-07-09 RX ADMIN — OMALIZUMAB 375 MG: 202.5 INJECTION, SOLUTION SUBCUTANEOUS at 09:03

## 2018-07-09 NOTE — MR AVS SNAPSHOT
After Visit Summary   7/9/2018    Alba Varela    MRN: [id number]           Patient Information     Date Of Birth          1970        Visit Information        Provider Department      7/9/2018 9:00 AM RH INFUSION CHAIR 2 [facility]        Today's Diagnoses     Severe persistent asthma without complication    -  1       Follow-ups after your visit        Your next 10 appointments already scheduled     Jul 23, 2018  9:00 AM CDT   Level 1 with RH INFUSION CHAIR 9   [facility] ([facility])    [facility] [place] [id number]  [facility] [address]   [phone]            Aug 06, 2018  9:00 AM CDT   Level 1 with RH INFUSION CHAIR 1   [facility] ([facility])    [facility]  [address]  [facility] [address]   [phone]            Aug 20, 2018  9:00 AM CDT   Level 1 with RH INFUSION CHAIR 10   [facility] ([facility])    [facility]  [address]  [facility] [address]   [phone]            Sep 04, 2018  9:00 AM CDT   Level 1 with RH INFUSION CHAIR 9   [facility] ([facility])    [facility]  [address]  [facility] [address]   [phone]              Who to contact     If you have questions or need follow up information about today's clinic visit or your schedule please contact [facility] INFUSION [facility] directly at [phone].  Normal or non-critical lab and imaging results will be communicated to you by MyChart, letter or phone within 4 business days after the clinic has received the results. If you do not hear from us within 7 days, please contact the clinic through MyChart or phone. If you have a  critical or abnormal lab result, we will notify you by phone as soon as possible.  Submit refill requests through Coveroo or call your pharmacy and they will forward the refill request to us. Please allow 3 business days for your refill to be completed.          Additional Information About Your Visit        SportsCstrhart Information     Coveroo gives you secure access to your electronic health record. If you see a primary care provider, you can also send messages to your care team and make appointments. If you have questions, please call your primary care clinic.  If you do not have a primary care provider, please call [phone] and they will assist you.        Care EveryWhere ID     This is your Care EveryWhere ID. This could be used by other organizations to access your [place] medical records  [id number]        Your Vitals Were     Temperature Respirations Last Period             97.9  F (36.6  C) (Tympanic) 16 07/11/2014          Blood Pressure from Last 3 Encounters:   07/09/18 123/84   06/26/18 118/82   06/12/18 119/83    Weight from Last 3 Encounters:   03/23/18 77.1 kg (170 lb)   11/03/17 75.8 kg (167 lb)   10/24/17 75.4 kg (166 lb 3.2 oz)              Today, you had the following     No orders found for display         Today's Medication Changes          These changes are accurate as of 7/9/18  9:12 AM.  If you have any questions, ask your nurse or doctor.               These medicines have changed or have updated prescriptions.        Dose/Directions    gabapentin 100 MG capsule   Commonly known as:  NEURONTIN   This may have changed:  additional instructions   Used for:  Cervicalgia        Take 100 mg in the morning and 400 mg at bedtime for one week, then increase to 100 mg in the morning and 600 mg at bedtime.   Quantity:  210 capsule   Refills:  1                Primary Care Provider Office Phone # Fax #    Bright [facility]elo [phone]       [address]  [address]        Equal Access to Services     [facility]: Hadii vale miguel maru Pacheco, waelliottda luqjus, qaybta kawyatt barbarabronsonelena, waxleonard michael castrourvashioly estrella. So [facility] [phone].    ATENCIÓN: Si habla español, tiene a rojas disposición servicios gratuitos de asistencia lingüística. Farzadame al [phone].    We comply with applicable federal civil rights laws and [place] laws. We do not discriminate on the basis of race, color, national origin, age, disability, sex, sexual orientation, or gender identity.            Thank you!     Thank you for choosing [facility]ION [facility]  for your care. Our goal is always to provide you with excellent care. Hearing back from our patients is one way we can continue to improve our services. Please take a few minutes to complete the written survey that you may receive in the mail after your visit with us. Thank you!             Your Updated Medication List - Protect others around you: Learn how to safely use, store and throw away your medicines at www.disposemymeds.org.          This list is accurate as of 7/9/18  9:12 AM.  Always use your most recent med list.                   Brand Name Dispense Instructions for use Diagnosis    acetaminophen 325 MG tablet    TYLENOL    100 tablet    Take 2 tablets (650 mg) by mouth every 4 hours as needed for other (surgical pain)    Status post total replacement of right hip       BOTOX IJ      As directed        diltiazem 120 MG 24 hr capsule    CARDIZEM CD    30 capsule    Take 1 capsule (120 mg) by mouth daily You are due to see the cardiologist- please call [phone] to schedule.    Chest pressure       gabapentin 100 MG capsule    NEURONTIN    210 capsule    Take 100 mg in the morning and 400 mg at bedtime for one week, then increase to 100 mg in the morning and 600 mg at bedtime.    Cervicalgia       hydrOXYzine 25 MG tablet    ATARAX    60 tablet    Take 1 tablet (25 mg) by mouth every 6 hours  as needed for itching (pain)    Status post total replacement of right hip       loratadine 10 MG tablet    CLARITIN     Take 10 mg by mouth daily        NORTRIPTYLINE HCL PO      Take 200 mg by mouth At Bedtime        oxyCODONE IR 5 MG tablet    ROXICODONE    80 tablet    Take 1-2 tablets (5-10 mg) by mouth every 3 hours as needed for moderate to severe pain    Status post total replacement of right hip       PAROXETINE HCL PO      Take 20 mg by mouth At Bedtime        senna-docusate 8.6-50 MG per tablet    SENOKOT-S;PERICOLACE    30 tablet    Take 1-2 tablets by mouth 2 times daily    Status post total replacement of right hip       XOLAIR 150 MG injection   Generic drug:  omalizumab      Inject Subcutaneous every 14 days    Severe persistent asthma without complication

## 2018-07-09 NOTE — PROGRESS NOTES
Infusion Nursing Note:  Alba Varela presents today for Xolair.    Patient seen by provider today: No   present during visit today: Not Applicable.    Note: N/A.    Intravenous Access:  No Intravenous access/labs at this visit.    Treatment Conditions:  Not Applicable.      Post Infusion Assessment:  Patient tolerated injection without incident.  Patient observed for 30 minutes post Xolair per protocol.    Discharge Plan:   Discharge instructions reviewed with: Patient.  Patient and/or family verbalized understanding of discharge instructions and all questions answered.  AVS to patient via [employer].  Patient will return 7/23/18 for next appointment.   Patient discharged in stable condition accompanied by: self.  Departure Mode: Ambulatory.    Toma Camargo RN

## 2018-07-23 ENCOUNTER — INFUSION THERAPY VISIT (OUTPATIENT)
Dept: INFUSION THERAPY | Facility: CLINIC | Age: 48
End: 2018-07-23
Attending: ALLERGY & IMMUNOLOGY
Payer: COMMERCIAL

## 2018-07-23 VITALS
SYSTOLIC BLOOD PRESSURE: 128 MMHG | DIASTOLIC BLOOD PRESSURE: 67 MMHG | RESPIRATION RATE: 16 BRPM | TEMPERATURE: 97.7 F | OXYGEN SATURATION: 98 %

## 2018-07-23 DIAGNOSIS — J45.50 SEVERE PERSISTENT ASTHMA WITHOUT COMPLICATION (H): Primary | ICD-10-CM

## 2018-07-23 PROCEDURE — 25000128 H RX IP 250 OP 636: Performed by: NURSE PRACTITIONER

## 2018-07-23 PROCEDURE — 96372 THER/PROPH/DIAG INJ SC/IM: CPT

## 2018-07-23 RX ADMIN — OMALIZUMAB 375 MG: 202.5 INJECTION, SOLUTION SUBCUTANEOUS at 09:23

## 2018-07-23 NOTE — MR AVS SNAPSHOT
After Visit Summary   7/23/2018    Alba Varela    MRN: [id number]           Patient Information     Date Of Birth          1970        Visit Information        Provider Department      7/23/2018 9:00 AM RH INFUSION CHAIR 9 [facility] Infusion [facility]        Today's Diagnoses     Severe persistent asthma without complication    -  1       Follow-ups after your visit        Your next 10 appointments already scheduled     Aug 06, 2018  9:00 AM CDT   Level 1 with RH INFUSION CHAIR 11   [facility] Infusion Services ([facility])    [place] [address]  [facility] [address]   [phone]            Aug 20, 2018  9:00 AM CDT   Level 1 with RH INFUSION CHAIR 10   [facility] Infusion Services ([facility])    [facility]  [address]  [facility] [address]   [phone]            Sep 04, 2018  9:00 AM CDT   Level 1 with RH INFUSION CHAIR 9   [facility] Infusion [facility] ([facility])    [facility]  [address]  [facility] [address]   [phone]              Who to contact     If you have questions or need follow up information about today's clinic visit or your schedule please contact [facility] INFUSION [facility] directly at [phone].  Normal or non-critical lab and imaging results will be communicated to you by MyChart, letter or phone within 4 business days after the clinic has received the results. If you do not hear from us within 7 days, please contact the clinic through MyChart or phone. If you have a critical or abnormal lab result, we will notify you by phone as soon as possible.  Submit refill requests through "[employer] or call your pharmacy and they will forward the refill request to us. Please allow 3 business days for your refill to be  completed.          Additional Information About Your Visit        MyChart Information     [employer] gives you secure access to your electronic health record. If you see a primary care provider, you can also send messages to your care team and make appointments. If you have questions, please call your primary care clinic.  If you do not have a primary care provider, please call [phone] and they will assist you.        Care EveryWhere ID     This is your Care EveryWhere ID. This could be used by other organizations to access your [place] medical records  [id number]        Your Vitals Were     Temperature Respirations Last Period Pulse Oximetry          97.7  F (36.5  C) (Tympanic) 16 07/11/2014 98%         Blood Pressure from Last 3 Encounters:   07/23/18 128/67   07/09/18 123/84   06/26/18 118/82    Weight from Last 3 Encounters:   03/23/18 77.1 kg (170 lb)   11/03/17 75.8 kg (167 lb)   10/24/17 75.4 kg (166 lb 3.2 oz)              Today, you had the following     No orders found for display         Today's Medication Changes          These changes are accurate as of 7/23/18 11:00 AM.  If you have any questions, ask your nurse or doctor.               These medicines have changed or have updated prescriptions.        Dose/Directions    gabapentin 100 MG capsule   Commonly known as:  NEURONTIN   This may have changed:  additional instructions   Used for:  Cervicalgia        Take 100 mg in the morning and 400 mg at bedtime for one week, then increase to 100 mg in the morning and 600 mg at bedtime.   Quantity:  210 capsule   Refills:  1                Primary Care Provider Office Phone # Fax #    Bright [facility]elo [phone]       [address] [address]        Equal Access to Services     [facility]: Yonny Pacheco, bailee nassar, dominic weeks. So [facility] [phone].    ATENCIÓN: Ke lovelace,  tiene a rojas disposición servicios gratuitos de asistencia lingüística. Brissa sanabria [phone].    We comply with applicable federal civil rights laws and [place] laws. We do not discriminate on the basis of race, color, national origin, age, disability, sex, sexual orientation, or gender identity.            Thank you!     Thank you for choosing [facility] INFUSION [facility]  for your care. Our goal is always to provide you with excellent care. Hearing back from our patients is one way we can continue to improve our services. Please take a few minutes to complete the written survey that you may receive in the mail after your visit with us. Thank you!             Your Updated Medication List - Protect others around you: Learn how to safely use, store and throw away your medicines at www.disposemymeds.org.          This list is accurate as of 7/23/18 11:00 AM.  Always use your most recent med list.                   Brand Name Dispense Instructions for use Diagnosis    acetaminophen 325 MG tablet    TYLENOL    100 tablet    Take 2 tablets (650 mg) by mouth every 4 hours as needed for other (surgical pain)    Status post total replacement of right hip       BOTOX IJ      As directed        diltiazem 120 MG 24 hr capsule    CARDIZEM CD    30 capsule    Take 1 capsule (120 mg) by mouth daily You are due to see the cardiologist- please call [phone] to schedule.    Chest pressure       gabapentin 100 MG capsule    NEURONTIN    210 capsule    Take 100 mg in the morning and 400 mg at bedtime for one week, then increase to 100 mg in the morning and 600 mg at bedtime.    Cervicalgia       hydrOXYzine 25 MG tablet    ATARAX    60 tablet    Take 1 tablet (25 mg) by mouth every 6 hours as needed for itching (pain)    Status post total replacement of right hip       loratadine 10 MG tablet    CLARITIN     Take 10 mg by mouth daily        NORTRIPTYLINE HCL PO      Take 200 mg by mouth At Bedtime         oxyCODONE IR 5 MG tablet    ROXICODONE    80 tablet    Take 1-2 tablets (5-10 mg) by mouth every 3 hours as needed for moderate to severe pain    Status post total replacement of right hip       PAROXETINE HCL PO      Take 20 mg by mouth At Bedtime        senna-docusate 8.6-50 MG per tablet    SENOKOT-S;PERICOLACE    30 tablet    Take 1-2 tablets by mouth 2 times daily    Status post total replacement of right hip       XOLAIR 150 MG injection   Generic drug:  omalizumab      Inject Subcutaneous every 14 days    Severe persistent asthma without complication

## 2018-07-23 NOTE — PROGRESS NOTES
Infusion Nursing Note:  Alba Varela presents today for Xolair.    Patient seen by provider today: No   present during visit today: Not Applicable.    Note: N/A.    Intravenous Access:  No Intravenous access/labs at this visit.    Treatment Conditions:  Not Applicable.      Post Infusion Assessment:  Patient tolerated injection without incident.  Patient observed for 30 minutes post Xolair per protocol.    Discharge Plan:   Discharge instructions reviewed with: Patient.  Copy of AVS reviewed with patient and/or family.  Patient will return 8/6/18 for next appointment.  Patient discharged in stable condition accompanied by: self.  Departure Mode: Ambulatory.    Lyn Bunn RN

## 2018-08-06 ENCOUNTER — INFUSION THERAPY VISIT (OUTPATIENT)
Dept: INFUSION THERAPY | Facility: CLINIC | Age: 48
End: 2018-08-06
Attending: ALLERGY & IMMUNOLOGY
Payer: COMMERCIAL

## 2018-08-06 VITALS — SYSTOLIC BLOOD PRESSURE: 125 MMHG | TEMPERATURE: 97.7 F | RESPIRATION RATE: 16 BRPM | DIASTOLIC BLOOD PRESSURE: 80 MMHG

## 2018-08-06 DIAGNOSIS — J45.50 SEVERE PERSISTENT ASTHMA WITHOUT COMPLICATION (H): Primary | ICD-10-CM

## 2018-08-06 PROCEDURE — 96372 THER/PROPH/DIAG INJ SC/IM: CPT

## 2018-08-06 PROCEDURE — 25000128 H RX IP 250 OP 636: Performed by: NURSE PRACTITIONER

## 2018-08-06 RX ORDER — VENLAFAXINE HYDROCHLORIDE 150 MG/1
150 CAPSULE, EXTENDED RELEASE ORAL DAILY
COMMUNITY

## 2018-08-06 RX ADMIN — OMALIZUMAB 375 MG: 202.5 INJECTION, SOLUTION SUBCUTANEOUS at 09:13

## 2018-08-06 NOTE — MR AVS SNAPSHOT
After Visit Summary   8/6/2018    Alba Varela    MRN: [id number]           Patient Information     Date Of Birth          1970        Visit Information        Provider Department      8/6/2018 9:00 AM RH INFUSION CHAIR 11 [facility] Infusion [facility]        Today's Diagnoses     Severe persistent asthma without complication    -  1       Follow-ups after your visit        Your next 10 appointments already scheduled     Aug 20, 2018  9:00 AM CDT   Level 1 with RH INFUSION CHAIR 10   [facility] Infusion Services ([facility])    [facility]  [address]  [facility] [address]   [phone]            Sep 04, 2018  9:00 AM CDT   Level 1 with RH INFUSION CHAIR 8   [facility] Infusion Services ([facility])    [facility]  [address]  [facility] [address] [phone]              Who to contact     If you have questions or need follow up information about today's clinic visit or your schedule please contact [facility] INFUSION [facility] directly at [phone].  Normal or non-critical lab and imaging results will be communicated to you by [employer], letter or phone within 4 business days after the clinic has received the results. If you do not hear from us within 7 days, please contact the clinic through [employer] or phone. If you have a critical or abnormal lab result, we will notify you by phone as soon as possible.  Submit refill requests through [employer] or call your pharmacy and they will forward the refill request to us. Please allow 3 business days for your refill to be completed.          Additional Information About Your Visit        [employer] Information     [employer] gives you secure access to your electronic health record. If you see a primary care provider, you can also send messages to your care team and make  appointments. If you have questions, please call your primary care clinic.  If you do not have a primary care provider, please call [phone] and they will assist you.        Care EveryWhere ID     This is your Care EveryWhere ID. This could be used by other organizations to access your [place] medical records  [id number]        Your Vitals Were     Temperature Respirations Last Period             97.7  F (36.5  C) (Tympanic) 16 07/11/2014          Blood Pressure from Last 3 Encounters:   08/06/18 125/80   07/23/18 128/67   07/09/18 123/84    Weight from Last 3 Encounters:   03/23/18 77.1 kg (170 lb)   11/03/17 75.8 kg (167 lb)   10/24/17 75.4 kg (166 lb 3.2 oz)              Today, you had the following     No orders found for display         Today's Medication Changes          These changes are accurate as of 8/6/18 12:00 PM.  If you have any questions, ask your nurse or doctor.               These medicines have changed or have updated prescriptions.        Dose/Directions    gabapentin 100 MG capsule   Commonly known as:  NEURONTIN   This may have changed:  additional instructions   Used for:  Cervicalgia        Take 100 mg in the morning and 400 mg at bedtime for one week, then increase to 100 mg in the morning and 600 mg at bedtime.   Quantity:  210 capsule   Refills:  1                Primary Care Provider Office Phone # Fax #    Bright [facility]elo [phone]       [address] [address]        Equal Access to Services     [place] : Yonny gamboao Sogian, waaxda luqadaha, qaybta kaalmada jace, wax michael sullivan . So [facility] [phone].    ATENCIÓN: Si habla español, tiene a rojas disposición servicios gratuitos de asistencia lingüística. Llame al [phone].    We comply with applicable federal civil rights laws and [place] laws. We do not discriminate on the basis of race, color, national origin, age, disability, sex,  sexual orientation, or gender identity.            Thank you!     Thank you for choosing [facility] INF[facility]ION [facility]  for your care. Our goal is always to provide you with excellent care. Hearing back from our patients is one way we can continue to improve our services. Please take a few minutes to complete the written survey that you may receive in the mail after your visit with us. Thank you!             Your Updated Medication List - Protect others around you: Learn how to safely use, store and throw away your medicines at www.disposemymeds.org.          This list is accurate as of 8/6/18 12:00 PM.  Always use your most recent med list.                   Brand Name Dispense Instructions for use Diagnosis    AMITRIPTYLINE HCL PO      Take 20 mg by mouth At Bedtime        BOTOX IJ      As directed        diltiazem 120 MG 24 hr capsule    CARDIZEM CD    30 capsule    Take 1 capsule (120 mg) by mouth daily You are due to see the cardiologist- please call [phone] to schedule.    Chest pressure       gabapentin 100 MG capsule    NEURONTIN    210 capsule    Take 100 mg in the morning and 400 mg at bedtime for one week, then increase to 100 mg in the morning and 600 mg at bedtime.    Cervicalgia       loratadine 10 MG tablet    CLARITIN     Take 10 mg by mouth daily        venlafaxine 150 MG 24 hr capsule    EFFEXOR-XR     Take 150 mg by mouth daily        XOLAIR 150 MG injection   Generic drug:  omalizumab      Inject Subcutaneous every 14 days    Severe persistent asthma without complication

## 2018-08-06 NOTE — PROGRESS NOTES
Infusion Nursing Note:  Alba Varela presents today for xolair.    Patient seen by provider today: No   present during visit today: Not Applicable.    Note: N/A.    Intravenous Access:  No Intravenous access/labs at this visit.    Treatment Conditions:  Not Applicable.      Post Infusion Assessment:  Patient tolerated injection without incident.  Patient observed for 20 minutes post xolair per protocol (patient had to leave early for an appointment).    Discharge Plan:   [facility]  Patient will return 8/20/18 for next appointment.   Patient discharged in stable condition accompanied by: self.  Departure Mode: Ambulatory.    Sangita Edwards RN

## 2018-08-20 ENCOUNTER — INFUSION THERAPY VISIT (OUTPATIENT)
Dept: INFUSION THERAPY | Facility: CLINIC | Age: 48
End: 2018-08-20
Attending: ALLERGY & IMMUNOLOGY
Payer: COMMERCIAL

## 2018-08-20 VITALS
SYSTOLIC BLOOD PRESSURE: 119 MMHG | TEMPERATURE: 96.7 F | DIASTOLIC BLOOD PRESSURE: 80 MMHG | RESPIRATION RATE: 16 BRPM | OXYGEN SATURATION: 98 %

## 2018-08-20 DIAGNOSIS — J45.50 SEVERE PERSISTENT ASTHMA WITHOUT COMPLICATION (H): Primary | ICD-10-CM

## 2018-08-20 PROCEDURE — 25000128 H RX IP 250 OP 636: Mod: JW | Performed by: NURSE PRACTITIONER

## 2018-08-20 PROCEDURE — 96372 THER/PROPH/DIAG INJ SC/IM: CPT

## 2018-08-20 RX ADMIN — OMALIZUMAB 375 MG: 202.5 INJECTION, SOLUTION SUBCUTANEOUS at 15:39

## 2018-08-20 NOTE — PROGRESS NOTES
Infusion Nursing Note:  Alba Varela presents today for xolair.    Patient seen by provider today: No   present during visit today: Not Applicable.    Note: N/A.    Intravenous Access:  No Intravenous access/labs at this visit.    Treatment Conditions:  Not Applicable.      Post Infusion Assessment:  Patient tolerated injection without incident.  Observed pt for 30 minutes post injection of xolair.  Site patent and intact, free from redness, edema or discomfort.    Discharge Plan:   Discharge instructions reviewed with: Patient.  AVS to patient via [employer].  Patient will return 9/4 for next appointment.   Patient discharged in stable condition accompanied by: self.  Departure Mode: Ambulatory.    Vivi Rosado RN

## 2018-08-20 NOTE — MR AVS SNAPSHOT
After Visit Summary   8/20/2018    Alba Varela    MRN: [id number]           Patient Information     Date Of Birth          1970        Visit Information        Provider Department      8/20/2018 3:30 PM RH INFUSION CHAIR 4 [facility] Infusion [facility]        Today's Diagnoses     Severe persistent asthma without complication    -  1       Follow-ups after your visit        Your next 10 appointments already scheduled     Sep 04, 2018  9:00 AM CDT   Level 1 with RH INFUSION CHAIR 8   [facility] Infusion Services ([facility])    [facility] [place] [facility]  [address]  [facility] [id number] [phone]              Who to contact     If you have questions or need follow up information about today's clinic visit or your schedule please contact [facility] INFUSION [facility] directly at [phone].  Normal or non-critical lab and imaging results will be communicated to you by [employer], letter or phone within 4 business days after the clinic has received the results. If you do not hear from us within 7 days, please contact the clinic through [employer] or phone. If you have a critical or abnormal lab result, we will notify you by phone as soon as possible.  Submit refill requests through [employer] or call your pharmacy and they will forward the refill request to us. Please allow 3 business days for your refill to be completed.          Additional Information About Your Visit        MyChart Information     [employer] gives you secure access to your electronic health record. If you see a primary care provider, you can also send messages to your care team and make appointments. If you have questions, please call your primary care clinic.  If you do not have a primary care provider, please call [phone] and they will assist you.        Care EveryWhere ID     This is your Care EveryWhere ID. This could be used by other  organizations to access your [place] medical records  [id number]        Your Vitals Were     Temperature Respirations Last Period Pulse Oximetry          96.7  F (35.9  C) (Tympanic) 16 07/11/2014 98%         Blood Pressure from Last 3 Encounters:   08/20/18 119/80   08/06/18 125/80   07/23/18 128/67    Weight from Last 3 Encounters:   03/23/18 77.1 kg (170 lb)   11/03/17 75.8 kg (167 lb)   10/24/17 75.4 kg (166 lb 3.2 oz)              Today, you had the following     No orders found for display         Today's Medication Changes          These changes are accurate as of 8/20/18  4:01 PM.  If you have any questions, ask your nurse or doctor.               These medicines have changed or have updated prescriptions.        Dose/Directions    gabapentin 100 MG capsule   Commonly known as:  NEURONTIN   This may have changed:  additional instructions   Used for:  Cervicalgia        Take 100 mg in the morning and 400 mg at bedtime for one week, then increase to 100 mg in the morning and 600 mg at bedtime.   Quantity:  210 capsule   Refills:  1                Primary Care Provider Office Phone # Fax #    Bright [facility]elo [phone]       [address] [address]        Equal Access to Services     [place] AH: Hadii aad ku hadasho Soomaali, waaxda luqadaha, qaybta kaalmada adeegyada, dominic estrella. So [facility] [phone].    ATENCIÓN: Si habla español, tiene a rojas disposición servicios gratuitos de asistencia lingüística. Llame al [phone].    We comply with applicable federal civil rights laws and [place] laws. We do not discriminate on the basis of race, color, national origin, age, disability, sex, sexual orientation, or gender identity.            Thank you!     Thank you for choosing [facility] INFUSION [facility]  for your care. Our goal is always to provide you with excellent care. Hearing back from our patients is one way we  can continue to improve our services. Please take a few minutes to complete the written survey that you may receive in the mail after your visit with us. Thank you!             Your Updated Medication List - Protect others around you: Learn how to safely use, store and throw away your medicines at www.disposemymeds.org.          This list is accurate as of 8/20/18  4:01 PM.  Always use your most recent med list.                   Brand Name Dispense Instructions for use Diagnosis    AMITRIPTYLINE HCL PO      Take 20 mg by mouth At Bedtime        BOTOX IJ      As directed        diltiazem 120 MG 24 hr capsule    CARDIZEM CD    30 capsule    Take 1 capsule (120 mg) by mouth daily You are due to see the cardiologist- please call [phone] to schedule.    Chest pressure       gabapentin 100 MG capsule    NEURONTIN    210 capsule    Take 100 mg in the morning and 400 mg at bedtime for one week, then increase to 100 mg in the morning and 600 mg at bedtime.    Cervicalgia       loratadine 10 MG tablet    CLARITIN     Take 10 mg by mouth daily        venlafaxine 150 MG 24 hr capsule    EFFEXOR-XR     Take 150 mg by mouth daily        XOLAIR 150 MG injection   Generic drug:  omalizumab      Inject Subcutaneous every 14 days    Severe persistent asthma without complication

## 2018-08-27 ENCOUNTER — TRANSFERRED RECORDS (OUTPATIENT)
Dept: HEALTH INFORMATION MANAGEMENT | Facility: CLINIC | Age: 48
End: 2018-08-27

## 2018-09-04 ENCOUNTER — INFUSION THERAPY VISIT (OUTPATIENT)
Dept: INFUSION THERAPY | Facility: CLINIC | Age: 48
End: 2018-09-04
Attending: ALLERGY & IMMUNOLOGY
Payer: COMMERCIAL

## 2018-09-04 VITALS
DIASTOLIC BLOOD PRESSURE: 89 MMHG | TEMPERATURE: 97.2 F | OXYGEN SATURATION: 98 % | RESPIRATION RATE: 16 BRPM | SYSTOLIC BLOOD PRESSURE: 129 MMHG

## 2018-09-04 DIAGNOSIS — J45.50 SEVERE PERSISTENT ASTHMA WITHOUT COMPLICATION (H): Primary | ICD-10-CM

## 2018-09-04 PROCEDURE — 25000128 H RX IP 250 OP 636: Performed by: NURSE PRACTITIONER

## 2018-09-04 PROCEDURE — 96372 THER/PROPH/DIAG INJ SC/IM: CPT

## 2018-09-04 RX ADMIN — OMALIZUMAB 375 MG: 202.5 INJECTION, SOLUTION SUBCUTANEOUS at 09:09

## 2018-09-04 NOTE — MR AVS SNAPSHOT
After Visit Summary   9/4/2018    Alba Varela    MRN: [id number]           Patient Information     Date Of Birth          1970        Visit Information        Provider Department      9/4/2018 9:00 AM RH INFUSION CHAIR 8 [facility]        Today's Diagnoses     Severe persistent asthma without complication    -  1       Follow-ups after your visit        Your next 10 appointments already scheduled     Sep 17, 2018  9:00 AM CDT   Level 1 with RH INFUSION CHAIR 9   [facility] ([facility])    [facility] [address]  [place] [address]   [phone]            Oct 01, 2018  9:00 AM CDT   Level 1 with RH INFUSION CHAIR 8   [facility] ([facility])    [facility] [address]  [place] [address]   [phone]            Oct 15, 2018  9:00 AM CDT   Level 1 with RH INFUSION CHAIR 7   [facility] ([facility])    [facility] [address]  [place] [address]   [phone]            Oct 29, 2018  9:00 AM CDT   Level 1 with RH INFUSION CHAIR 8   [facility] ([facility])    [facility] [address]  [address]  [place] [address]   [phone]            Nov 12, 2018  9:00 AM CST   Level 1 with RH INFUSION CHAIR 3   [facility] ([facility])    [facility] [address]  [place] [address]   [phone]            Nov 26, 2018  9:00 AM CST   Level 1 with RH INFUSION CHAIR 12   [facility] ([facility])    [facility] [address]  [place] [address]    [phone]            Dec 10, 2018  9:00 AM CST   Level 1 with RH INFUSION CHAIR 9   [facility] ([facility])    [facility]  [address]  [facility] [address] [phone]            Dec 26, 2018  9:00 AM CST   Level 1 with RH INFUSION CHAIR 12   [facility] ([facility])    [facility]  [address]  [facility] [address] [phone]              Who to contact     If you have questions or need follow up information about today's clinic visit or your schedule please contact [facility] directly at [phone].  Normal or non-critical lab and imaging results will be communicated to you by MyChart, letter or phone within 4 business days after the clinic has received the results. If you do not hear from us within 7 days, please contact the clinic through [employer] or phone. If you have a critical or abnormal lab result, we will notify you by phone as soon as possible.  Submit refill requests through [employer] or call your pharmacy and they will forward the refill request to us. Please allow 3 business days for your refill to be completed.          Additional Information About Your Visit        [employer] Information     [employer] gives you secure access to your electronic health record. If you see a primary care provider, you can also send messages to your care team and make appointments. If you have questions, please call your primary care clinic.  If you do not have a primary care provider, please call [phone] and they will assist you.        Care EveryWhere ID     This is your Care EveryWhere ID. This could be used by other organizations to access your [place] medical records  [id number]        Your Vitals Were     Temperature Respirations Last Period Pulse Oximetry          97.2  F (36.2  C) (Tympanic)  16 07/11/2014 98%         Blood Pressure from Last 3 Encounters:   09/04/18 129/89   08/20/18 119/80   08/06/18 125/80    Weight from Last 3 Encounters:   03/23/18 77.1 kg (170 lb)   11/03/17 75.8 kg (167 lb)   10/24/17 75.4 kg (166 lb 3.2 oz)              Today, you had the following     No orders found for display         Today's Medication Changes          These changes are accurate as of 9/4/18  9:48 AM.  If you have any questions, ask your nurse or doctor.               These medicines have changed or have updated prescriptions.        Dose/Directions    gabapentin 100 MG capsule   Commonly known as:  NEURONTIN   This may have changed:  additional instructions   Used for:  Cervicalgia        Take 100 mg in the morning and 400 mg at bedtime for one week, then increase to 100 mg in the morning and 600 mg at bedtime.   Quantity:  210 capsule   Refills:  1                Primary Care Provider Office Phone # Fax #    Bright [facility]elo [phone]       [address] [address]        Equal Access to Services     [facility]: Hadii aad ku hadasho Sogian, waaxda luqadaha, qaybta kaalmada jace, dominic estrella. So [facility] [phone].    ATENCIÓN: Si habla español, tiene a rojas disposición servicios gratuitos de asistencia lingüística. [facility] [phone].    We comply with applicable federal civil rights laws and [place] laws. We do not discriminate on the basis of race, color, national origin, age, disability, sex, sexual orientation, or gender identity.            Thank you!     Thank you for choosing [facility]  for your care. Our goal is always to provide you with excellent care. Hearing back from our patients is one way we can continue to improve our services. Please take a few minutes to complete the written survey that you may receive in the mail after your visit with us. Thank you!             Your  Updated Medication List - Protect others around you: Learn how to safely use, store and throw away your medicines at www.disposemymeds.org.          This list is accurate as of 9/4/18  9:48 AM.  Always use your most recent med list.                   Brand Name Dispense Instructions for use Diagnosis    AMITRIPTYLINE HCL PO      Take 20 mg by mouth At Bedtime        BOTOX IJ      As directed        diltiazem 120 MG 24 hr capsule    CARDIZEM CD    30 capsule    Take 1 capsule (120 mg) by mouth daily You are due to see the cardiologist- please call [phone] to schedule.    Chest pressure       gabapentin 100 MG capsule    NEURONTIN    210 capsule    Take 100 mg in the morning and 400 mg at bedtime for one week, then increase to 100 mg in the morning and 600 mg at bedtime.    Cervicalgia       loratadine 10 MG tablet    CLARITIN     Take 10 mg by mouth daily        venlafaxine 150 MG 24 hr capsule    EFFEXOR-XR     Take 150 mg by mouth daily        XOLAIR 150 MG injection   Generic drug:  omalizumab      Inject Subcutaneous every 14 days    Severe persistent asthma without complication

## 2018-09-04 NOTE — PROGRESS NOTES
Infusion Nursing Note:  Alba Varela presents today for Xolair.    Patient seen by provider today: No   present during visit today: Not Applicable.    Note: N/A.    Intravenous Access:  No Intravenous access/labs at this visit.    Treatment Conditions:  Not Applicable.      Post Infusion Assessment:  Patient tolerated injection without incident.  Patient observed for Xolair minutes post 30 per protocol.    Discharge Plan:   Discharge instructions reviewed with: Patient.  Patient and/or family verbalized understanding of discharge instructions and all questions answered.  AVS to patient via [employer].  Patient will return 9/17/18 for next appointment.   Patient discharged in stable condition accompanied by: self.  Departure Mode: Ambulatory.    Lyn Bunn RN

## 2018-09-17 ENCOUNTER — INFUSION THERAPY VISIT (OUTPATIENT)
Dept: INFUSION THERAPY | Facility: CLINIC | Age: 48
End: 2018-09-17
Attending: ALLERGY & IMMUNOLOGY
Payer: COMMERCIAL

## 2018-09-17 VITALS
OXYGEN SATURATION: 100 % | TEMPERATURE: 97.3 F | RESPIRATION RATE: 16 BRPM | SYSTOLIC BLOOD PRESSURE: 122 MMHG | DIASTOLIC BLOOD PRESSURE: 82 MMHG

## 2018-09-17 DIAGNOSIS — J45.50 SEVERE PERSISTENT ASTHMA WITHOUT COMPLICATION (H): Primary | ICD-10-CM

## 2018-09-17 PROCEDURE — 25000128 H RX IP 250 OP 636: Performed by: NURSE PRACTITIONER

## 2018-09-17 PROCEDURE — 96372 THER/PROPH/DIAG INJ SC/IM: CPT

## 2018-09-17 RX ADMIN — OMALIZUMAB 375 MG: 202.5 INJECTION, SOLUTION SUBCUTANEOUS at 09:05

## 2018-09-17 NOTE — PROGRESS NOTES
Infusion Nursing Note:  Alba Varela presents today for Xolair.    Patient seen by provider today: No   present during visit today: Not Applicable.    Note: N/A.    Intravenous Access:  No Intravenous access/labs at this visit.    Treatment Conditions:  Not Applicable.      Post Infusion Assessment:  Patient tolerated injection without incident.  Patient observed for 30 minutes post Xolair per protocol.    Discharge Plan:   Discharge instructions reviewed with: Patient.  Patient and/or family verbalized understanding of discharge instructions and all questions answered.  AVS to patient via [employer].  Patient will return 9/27/18 for next appointment.   Patient discharged in stable condition accompanied by: self.  Departure Mode: Ambulatory.    Toma Camargo RN

## 2018-09-17 NOTE — MR AVS SNAPSHOT
After Visit Summary   9/17/2018    Alba Varela    MRN: [id number]           Patient Information     Date Of Birth          1970        Visit Information        Provider Department      9/17/2018 9:00 AM RH INFUSION CHAIR 9 [facility]        Today's Diagnoses     Severe persistent asthma without complication    -  1       Follow-ups after your visit        Your next 10 appointments already scheduled     Sep 24, 2018 12:40 PM CDT   Pre-Op physical with Derik Cook MD   [facility] ([facility])    [address]  [facility] [address]   [phone]            Sep 27, 2018 12:00 PM CDT   Level 1 with RH INFUSION CHAIR 7   [facility] ([facility])    [facility]  [address]  [facility] [address]   [phone]            Oct 15, 2018  9:00 AM CDT   Level 1 with RH INFUSION CHAIR 7   [facility] ([facility])    [facility] [place] [address]  [address]  [facility] [address]   [phone]            Oct 29, 2018  9:00 AM CDT   Level 1 with RH INFUSION CHAIR 8   [facility] ([facility])    [facility]  [address]  [facility] [address]   [phone]            Nov 12, 2018  9:00 AM CST   Level 1 with RH INFUSION CHAIR 3   [facility] ([facility])    [facility] [facility]  [address]  [facility] [address]   [phone]            Nov 26, 2018  9:00 AM CST   Level 1 with RH INFUSION CHAIR 12   [facility] ([facility])    [facility]  [address]  [facility] [address]   [phone]            Dec 10, 2018  9:00 AM  CST   Level 1 with RH INFUSION CHAIR 9   [facility] ([facility])    [facility]  [address]  [place] [address] [phone]            Dec 26, 2018  9:00 AM CST   Level 1 with RH INFUSION CHAIR 12   [facility] ([facility])    [facility]  [address]  [place] [address] [phone]              Who to contact     If you have questions or need follow up information about today's clinic visit or your schedule please contact [facility] INF[facility]ION [facility] directly at [phone].  Normal or non-critical lab and imaging results will be communicated to you by [employer], letter or phone within 4 business days after the clinic has received the results. If you do not hear from us within 7 days, please contact the clinic through [employer] or phone. If you have a critical or abnormal lab result, we will notify you by phone as soon as possible.  Submit refill requests through [employer] or call your pharmacy and they will forward the refill request to us. Please allow 3 business days for your refill to be completed.          Additional Information About Your Visit        Bot[employer]BTR Information     [employer] gives you secure access to your electronic health record. If you see a primary care provider, you can also send messages to your care team and make appointments. If you have questions, please call your primary care clinic.  If you do not have a primary care provider, please call [phone] and they will assist you.        Care EveryWhere ID     This is your Care EveryWhere ID. This could be used by other organizations to access your [place] medical records  [id number]        Your Vitals Were     Temperature Respirations Last Period Pulse Oximetry          97.3  F (36.3  C) (Tympanic) 16 07/11/2014 100%         Blood Pressure from  Last 3 Encounters:   09/17/18 122/82   09/04/18 129/89   08/20/18 119/80    Weight from Last 3 Encounters:   03/23/18 77.1 kg (170 lb)   11/03/17 75.8 kg (167 lb)   10/24/17 75.4 kg (166 lb 3.2 oz)              Today, you had the following     No orders found for display         Today's Medication Changes          These changes are accurate as of 9/17/18  9:35 AM.  If you have any questions, ask your nurse or doctor.               These medicines have changed or have updated prescriptions.        Dose/Directions    gabapentin 100 MG capsule   Commonly known as:  NEURONTIN   This may have changed:  additional instructions   Used for:  Cervicalgia        Take 100 mg in the morning and 400 mg at bedtime for one week, then increase to 100 mg in the morning and 600 mg at bedtime.   Quantity:  210 capsule   Refills:  1                Primary Care Provider Office Phone # Fax #    Bright [facility]elo [phone]       [address] [address]        Equal Access to Services     [facility]: Hadii aad ku hadasho Soomaali, waaxda luqadaha, qaybta kaalmada adeegyaelena, dominic sullivan . So [facility] [phone].    ATENCIÓN: Si habla español, tiene a rojas disposición servicios gratuitos de asistencia lingüística. [facility] [phone].    We comply with applicable federal civil rights laws and [place] laws. We do not discriminate on the basis of race, color, national origin, age, disability, sex, sexual orientation, or gender identity.            Thank you!     Thank you for choosing [facility] INFUSION [facility]  for your care. Our goal is always to provide you with excellent care. Hearing back from our patients is one way we can continue to improve our services. Please take a few minutes to complete the written survey that you may receive in the mail after your visit with us. Thank you!             Your Updated Medication List - Protect others around  you: Learn how to safely use, store and throw away your medicines at www.disposemymeds.org.          This list is accurate as of 9/17/18  9:35 AM.  Always use your most recent med list.                   Brand Name Dispense Instructions for use Diagnosis    AMITRIPTYLINE HCL PO      Take 20 mg by mouth At Bedtime        BOTOX IJ      As directed        diltiazem 120 MG 24 hr capsule    CARDIZEM CD    30 capsule    Take 1 capsule (120 mg) by mouth daily You are due to see the cardiologist- please call [phone] to schedule.    Chest pressure       gabapentin 100 MG capsule    NEURONTIN    210 capsule    Take 100 mg in the morning and 400 mg at bedtime for one week, then increase to 100 mg in the morning and 600 mg at bedtime.    Cervicalgia       loratadine 10 MG tablet    CLARITIN     Take 10 mg by mouth daily        venlafaxine 150 MG 24 hr capsule    EFFEXOR-XR     Take 150 mg by mouth daily        XOLAIR 150 MG injection   Generic drug:  omalizumab      Inject Subcutaneous every 14 days    Severe persistent asthma without complication

## 2018-09-21 ENCOUNTER — ANESTHESIA - HEALTHEAST (OUTPATIENT)
Dept: SURGERY | Facility: CLINIC | Age: 48
End: 2018-09-21

## 2018-09-24 ENCOUNTER — OFFICE VISIT (OUTPATIENT)
Dept: INTERNAL MEDICINE | Facility: CLINIC | Age: 48
End: 2018-09-24
Payer: COMMERCIAL

## 2018-09-24 VITALS
TEMPERATURE: 98.7 F | WEIGHT: 168 LBS | DIASTOLIC BLOOD PRESSURE: 80 MMHG | SYSTOLIC BLOOD PRESSURE: 118 MMHG | RESPIRATION RATE: 16 BRPM | HEIGHT: 64 IN | OXYGEN SATURATION: 98 % | HEART RATE: 90 BPM | BODY MASS INDEX: 28.68 KG/M2

## 2018-09-24 DIAGNOSIS — J45.50 SEVERE PERSISTENT ASTHMA WITHOUT COMPLICATION (H): ICD-10-CM

## 2018-09-24 DIAGNOSIS — M25.551 HIP PAIN, RIGHT: ICD-10-CM

## 2018-09-24 DIAGNOSIS — Z01.818 PRE-OPERATIVE EXAMINATION: Primary | ICD-10-CM

## 2018-09-24 DIAGNOSIS — Z96.641 STATUS POST TOTAL REPLACEMENT OF RIGHT HIP: ICD-10-CM

## 2018-09-24 LAB
BASOPHILS # BLD AUTO: 0 10E9/L (ref 0–0.2)
BASOPHILS NFR BLD AUTO: 0.3 %
DIFFERENTIAL METHOD BLD: NORMAL
EOSINOPHIL # BLD AUTO: 0 10E9/L (ref 0–0.7)
EOSINOPHIL NFR BLD AUTO: 0.5 %
ERYTHROCYTE [DISTWIDTH] IN BLOOD BY AUTOMATED COUNT: 12.4 % (ref 10–15)
HCT VFR BLD AUTO: 38.9 % (ref 35–47)
HGB BLD-MCNC: 12.9 G/DL (ref 11.7–15.7)
LYMPHOCYTES # BLD AUTO: 2.2 10E9/L (ref 0.8–5.3)
LYMPHOCYTES NFR BLD AUTO: 34.8 %
MCH RBC QN AUTO: 30.7 PG (ref 26.5–33)
MCHC RBC AUTO-ENTMCNC: 33.2 G/DL (ref 31.5–36.5)
MCV RBC AUTO: 93 FL (ref 78–100)
MONOCYTES # BLD AUTO: 0.4 10E9/L (ref 0–1.3)
MONOCYTES NFR BLD AUTO: 6.4 %
NEUTROPHILS # BLD AUTO: 3.6 10E9/L (ref 1.6–8.3)
NEUTROPHILS NFR BLD AUTO: 58 %
PLATELET # BLD AUTO: 259 10E9/L (ref 150–450)
RBC # BLD AUTO: 4.2 10E12/L (ref 3.8–5.2)
WBC # BLD AUTO: 6.2 10E9/L (ref 4–11)

## 2018-09-24 PROCEDURE — 99214 OFFICE O/P EST MOD 30 MIN: CPT | Performed by: FAMILY MEDICINE

## 2018-09-24 PROCEDURE — 93000 ELECTROCARDIOGRAM COMPLETE: CPT | Performed by: FAMILY MEDICINE

## 2018-09-24 PROCEDURE — 36415 COLL VENOUS BLD VENIPUNCTURE: CPT | Performed by: FAMILY MEDICINE

## 2018-09-24 PROCEDURE — 85025 COMPLETE CBC W/AUTO DIFF WBC: CPT | Performed by: FAMILY MEDICINE

## 2018-09-24 PROCEDURE — 80048 BASIC METABOLIC PNL TOTAL CA: CPT | Performed by: FAMILY MEDICINE

## 2018-09-24 ASSESSMENT — MIFFLIN-ST. JEOR: SCORE: 1308.08

## 2018-09-24 NOTE — PROGRESS NOTES
[place] [address]  [facility] [address]  [phone]  Dept: [phone]    PRE-OP EVALUATION:  Today's date: 2018    Alba Varela (: 1970) presents for pre-operative evaluation.    Fax number for surgical facility: [phone]  Primary Physician: Bright Sotelo  Type of Anesthesia Anticipated: General    Patient has a Health Care Directive or Living Will:  NO    Preop Questions 2018   Who is doing your surgery? Dr. Mayes   What are you having done? Right hip revision   Date of Surgery/Procedure:    Facility or Hospital where procedure/surgery will be performed: [facility]   1.  Do you have a history of Heart attack, stroke, stent, coronary bypass surgery, or other heart surgery? No   2.  Do you ever have any pain or discomfort in your chest? YES -  Neg cardiac HX.   3.  Do you have a history of  Heart Failure? No   4.   Are you troubled by shortness of breath when:  walking on a level surface, or up a slight hill, or at night? No   5.  Do you currently have a cold, bronchitis or other respiratory infection? No   6.  Do you have a cough, shortness of breath, or wheezing? No   7.  Do you sometimes get pains in the calves of your legs when you walk? No   8. Do you or anyone in your family have previous history of blood clots? No   9.  Do you or does anyone in your family have a serious bleeding problem such as prolonged bleeding following surgeries or cuts? No   10. Have you ever had problems with anemia or been told to take iron pills? YES - in 20's   11. Have you had any abnormal blood loss such as black, tarry or bloody stools, or abnormal vaginal bleeding? No   12. Have you ever had a blood transfusion? No   13. Have you or any of your relatives ever had problems with anesthesia? YES - Slow to wake up.   14. Do you have sleep apnea, excessive snoring or daytime drowsiness? YES - CORIN   15. Do you have any prosthetic heart valves? No   16. Do  you have prosthetic joints? No   17. Is there any chance that you may be pregnant? No         HPI:     HPI related to upcoming procedure:     She had a R LIAT in 2017 and had persistent pain. Planning revision at this time.        She has h/o severe persistent asthma. Smokes 1/2 ppd. No recent exacerbation.    She got c dif post op hysterectomy.    Slow to awaken from Anesthesia.    MEDICAL HISTORY:     Patient Active Problem List    Diagnosis Date Noted     Status post total replacement of right hip 2017     Priority: Medium     Chest pressure 10/24/2017     Priority: Medium     High cholesterol      Priority: Medium     Severe persistent asthma 10/21/2016     Priority: Medium     Anxiety      Priority: Medium     C. difficile colitis      Priority: Medium     Migraines      Priority: Medium     Headache 12/10/2014     Priority: Medium     Class: Chronic     Problem list name updated by automated process. Provider to review       Colitis 2013     Priority: Medium     Asthma 2011     Priority: Medium     Problem list name updated by automated process. Provider to review        Past Medical History:   Diagnosis Date     Abdominal pain      Anxiety      Asthma      C. difficile colitis      Headache(784.0) 12/10/2014     High cholesterol      Hyperlipidemia      Liver disease     Being evalted for fatty liver disease. ABnormal LFT.     Migraines      PONV (postoperative nausea and vomiting)      Sleep apnea     Doesn't use a CPAP     Tremor      Past Surgical History:   Procedure Laterality Date     ARTHROPLASTY HIP Right 11/3/2017    Procedure: ARTHROPLASTY HIP;  Right total hip arthroplasty    ;  Surgeon: Shahriar Gamble MD;  Location: [facility] OR      SECTION      [facility] TL     ENDOSCOPIC ULTRASOUND, ESOPHAGOSCOPY, GASTROSCOPY, DUODENOSCOPY (EGD), COMBINED  13     ESOPHAGOSCOPY, GASTROSCOPY, DUODENOSCOPY (EGD), COMBINED  10/21/2013    Procedure: COMBINED ESOPHAGOSCOPY, GASTROSCOPY,  DUODENOSCOPY (EGD), BIOPSY SINGLE OR MULTIPLE;;  Surgeon: Rito Gupta MD;  Location: [facility]     FOOT SURGERY      Bilateral foot reconstruction.      [facility] ENDOSCOPY W EUS  11/21/2013    Procedure: COMBINED ENDOSCOPIC ULTRASOUND, ESOPHAGOSCOPY, GASTROSCOPY, DUODENOSCOPY (EGD);  Surgeon: Emre Campbell MD;  Location: [facility]     HYSTERECTOMY       LAPAROSCOPIC HYSTERECTOMY SUPRACERVICAL, BILATERAL SALPINGO-OOPHORECTOMY, COMBINED  3/6/2013    Procedure: COMBINED LAPAROSCOPIC HYSTERECTOMY SUPRACERVICAL, SALPINGO-OOPHORECTOMY;  Laparoscopic Supracervical Hysterectomy, Right salpingo-oopherectomy;  Surgeon: Tanika Jones MD;  Location: [facility] OR     UVULOPALATOPHARYNGOPLASTY      Plus Septoplasy.     Current Outpatient Prescriptions   Medication Sig Dispense Refill     AMITRIPTYLINE HCL PO Take 20 mg by mouth At Bedtime       diltiazem (CARDIZEM CD) 120 MG 24 hr capsule Take 1 capsule (120 mg) by mouth daily You are due to see the cardiologist- please call [phone] to schedule. 30 capsule 11     gabapentin (NEURONTIN) 100 MG capsule Take 100 mg in the morning and 400 mg at bedtime for one week, then increase to 100 mg in the morning and 600 mg at bedtime. (Patient taking differently: Take 400 mg in the morning and 600 mg at bedtime.) 210 capsule 1     loratadine (CLARITIN) 10 MG tablet Take 10 mg by mouth daily       omalizumab (XOLAIR) 150 MG injection Inject Subcutaneous every 14 days       OnabotulinumtoxinA (BOTOX IJ) As directed       venlafaxine (EFFEXOR-XR) 150 MG 24 hr capsule Take 150 mg by mouth daily       OTC products: no recent use of OTC ASA, NSAIDS or Steroids    Allergies   Allergen Reactions     Codeine Sulfate Nausea and Vomiting     Contrast Dye Hives     Patient is allergic to CT iodine contrast.       Scopolamine Visual Disturbance     Imitrex [Sumatriptan] Rash     Naproxen Rash     Penicillins Rash     Per patient     Sulfa Drugs Rash      Latex Allergy: NO    Social History   Substance Use  Topics     Smoking status: Current Every Day Smoker     Packs/day: 0.50     Years: 20.00     Types: Cigarettes     Smokeless tobacco: Never Used     Alcohol use 0.0 oz/week     0 Standard drinks or equivalent per week      Comment:  3 drinks weekly     History   Drug Use No       REVIEW OF SYSTEMS:   Constitutional, neuro, ENT, endocrine, pulmonary, cardiac, gastrointestinal, genitourinary, musculoskeletal, integument and psychiatric systems are negative, except as otherwise noted.    EXAM:   /80  LMP 07/11/2014    GENERAL APPEARANCE: healthy, alert and no distress     EYES: EOMI, PERRL     HENT:  mouth without ulcers or lesions     NECK: no adenopathy, no asymmetry, masses, or scars and thyroid normal to palpation     RESP: lungs clear to auscultation      CV: regular rates and rhythm, no murmur, click or rub     ABDOMEN:  soft, nontender, no HSM or masses      MS: tenderness R hip      SKIN: no suspicious lesions or rashes     NEURO: Normal strength and tone, mentation intact and speech normal     PSYCH: mentation appears normal. and affect normal/bright     LYMPHATICS: No cervical adenopathy    DIAGNOSTICS:            EKG Interpretation:      Interpreted by Derik Cook  Time reviewed: 12:22   Symptoms at time of EKG: pre op, asymptomatic   Rhythm: normal sinus with occasional PAC and blocked PAC (also seen on event monitor 4-7-2017)  Rate: normal, 72  Axis: NORMAL  Ectopy: none  Conduction: normal  ST Segments/ T Waves: ST segments normal, NS T flattening  Q Waves: none  Comparison to prior: Unchanged from 10-24-17    Clinical Impression: abnormal EKG, see above.      Results for orders placed or performed in visit on 09/24/18   Basic metabolic panel  (Ca, Cl, CO2, Creat, Gluc, K, Na, BUN)   Result Value Ref Range    Sodium 138 133 - 144 mmol/L    Potassium 4.0 3.4 - 5.3 mmol/L    Chloride 107 94 - 109 mmol/L    Carbon Dioxide 24 20 - 32 mmol/L    Anion Gap 7 3 - 14 mmol/L    Glucose 83 70 - 99 mg/dL     Urea Nitrogen 16 7 - 30 mg/dL    Creatinine 0.61 0.52 - 1.04 mg/dL    GFR Estimate >90 >60 mL/min/1.7m2    GFR Estimate If Black >90 >60 mL/min/1.7m2    Calcium 8.8 8.5 - 10.1 mg/dL   CBC with platelets and differential   Result Value Ref Range    WBC 6.2 4.0 - 11.0 10e9/L    RBC Count 4.20 3.8 - 5.2 10e12/L    Hemoglobin 12.9 11.7 - 15.7 g/dL    Hematocrit 38.9 35.0 - 47.0 %    MCV 93 78 - 100 fl    MCH 30.7 26.5 - 33.0 pg    MCHC 33.2 31.5 - 36.5 g/dL    RDW 12.4 10.0 - 15.0 %    Platelet Count 259 150 - 450 10e9/L    % Neutrophils 58.0 %    % Lymphocytes 34.8 %    % Monocytes 6.4 %    % Eosinophils 0.5 %    % Basophils 0.3 %    Absolute Neutrophil 3.6 1.6 - 8.3 10e9/L    Absolute Lymphocytes 2.2 0.8 - 5.3 10e9/L    Absolute Monocytes 0.4 0.0 - 1.3 10e9/L    Absolute Eosinophils 0.0 0.0 - 0.7 10e9/L    Absolute Basophils 0.0 0.0 - 0.2 10e9/L    Diff Method Automated Method            Recent Labs   Lab Test  03/23/18   0940  12/14/17   1732   10/24/17   0825  03/09/17   1111   03/22/13   1830   HGB  12.9  12.8   < >  13.2  13.1   < >  11.5*   PLT  245  272   --   263  232   < >  196   INR   --    --    --    --   0.88   --   1.13   NA  141   --    --   142  144   < >  137   POTASSIUM  4.1   --    --   3.7  4.4   < >  3.6   CR  0.64   --    --   0.77  0.66   < >  0.63    < > = values in this interval not displayed.        IMPRESSION:   Diagnosis/reason for consult:       (Z01.818) Pre-operative examination  (primary encounter diagnosis)  Comment: satis  Plan: Basic metabolic panel  (Ca, Cl, CO2, Creat,         Gluc, K, Na, BUN), CBC with platelets and         differential, EKG 12-lead complete w/read -         Clinics            (M25.551) Hip pain, right  Comment:   Plan:     (Z96.641) Status post total replacement of right hip  Comment:   Plan:     (J45.50) Severe persistent asthma without complication  Comment:   Plan:         The proposed surgical procedure is considered INTERMEDIATE risk.    REVISED CARDIAC  RISK INDEX  The patient has the following serious cardiovascular risks for perioperative complications such as (MI, PE, VFib and 3  AV Block):  No serious cardiac risks  INTERPRETATION: 2 risks: Class III (moderate risk - 6.6% complication rate)    The patient has the following additional risks for perioperative complications:  Asthma HX.      ICD-10-CM    1. Pre-operative examination Z01.818 Basic metabolic panel  (Ca, Cl, CO2, Creat, Gluc, K, Na, BUN)     CBC with platelets and differential     EKG 12-lead complete w/read - Clinics   2. Hip pain, right M25.551    3. Status post total replacement of right hip Z96.641    4. Severe persistent asthma without complication J45.50        RECOMMENDATIONS:     --Consult hospital rounder / IM to assist post-op medical management    --Patient is to take all scheduled medications on the day of surgery EXCEPT for modifications listed below.    APPROVAL GIVEN to proceed with proposed procedure, without further diagnostic evaluation       Signed Electronically by: Derik Cook MD    Copy of this evaluation report is provided to requesting physician.    [facility] Preop Guidelines    Revised Cardiac Risk Index

## 2018-09-24 NOTE — MR AVS SNAPSHOT
After Visit Summary   9/24/2018    Alba Varela    MRN: [id number]           Patient Information     Date Of Birth          1970        Visit Information        Provider Department      9/24/2018 12:40 PM Derik Cook MD [facility]        Today's Diagnoses     Pre-operative examination    -  1    Hip pain, right        Status post total replacement of right hip        Severe persistent asthma without complication           Follow-ups after your visit        Your next 10 appointments already scheduled     Sep 27, 2018  8:30 AM CDT   Level 1 with [facility] INFUSION CHAIR 12   [facility] ([facility])    [facility]  [address]  [facility] [address]   [phone]            Oct 15, 2018  9:00 AM CDT   Level 1 with RH INFUSION CHAIR 7   [facility] ([facility])    [facility]  [address]  [address]  [facility] [address]   [phone]            Oct 29, 2018  9:00 AM CDT   Level 1 with RH INFUSION CHAIR 8   [facility] ([facility])    [facility]  [address]  [facility] [address]   [phone]            Nov 12, 2018  9:00 AM CST   Level 1 with RH INFUSION CHAIR 3   [facility] ([facility])    [facility]  [address]  [facility] [address]   [phone]            Nov 26, 2018  9:00 AM CST   Level 1 with RH INFUSION CHAIR 12   [facility] ([facility])    [facility]  [address]  [facility] [address]   [phone]            Dec 10, 2018  9:00 AM CST   Level 1 with RH INFUSION CHAIR 9   [facility] ([facility])     "[facility]  [address]  [place] [address] [phone]            Dec 26, 2018  9:00 AM CST   Level 1 with RH INFUSION CHAIR 12   [facility] ([facility])    [facility]  [address]  [place] [address]   [phone]              Who to contact     If you have questions or need follow up information about today's clinic visit or your schedule please contact [facility] directly at [phone].  Normal or non-critical lab and imaging results will be communicated to you by YouFastUnlockhart, letter or phone within 4 business days after the clinic has received the results. If you do not hear from us within 7 days, please contact the clinic through YouFastUnlockhart or phone. If you have a critical or abnormal lab result, we will notify you by phone as soon as possible.  Submit refill requests through K[employer] Capture or call your pharmacy and they will forward the refill request to us. Please allow 3 business days for your refill to be completed.          Additional Information About Your Visit        YouFastUnlockhart Information     [employer]ure gives you secure access to your electronic health record. If you see a primary care provider, you can also send messages to your care team and make appointments. If you have questions, please call your primary care clinic.  If you do not have a primary care provider, please call [phone] and they will assist you.        Care EveryWhere ID     This is your Care EveryWhere ID. This could be used by other organizations to access your [facility] medical records  [id number]        Your Vitals Were     Pulse Temperature Respirations Height Last Period Pulse Oximetry    90 98.7  F (37.1  C) (Oral) 16 5' 4\" (1.626 m) 07/11/2014 98%    Breastfeeding? BMI (Body Mass Index)                No 28.84 kg/m2           Blood Pressure from Last 3 Encounters:   09/24/18 118/80 "   09/17/18 122/82   09/04/18 129/89    Weight from Last 3 Encounters:   09/24/18 168 lb (76.2 kg)   03/23/18 170 lb (77.1 kg)   11/03/17 167 lb (75.8 kg)              We Performed the Following     Basic metabolic panel  (Ca, Cl, CO2, Creat, Gluc, K, Na, BUN)     CBC with platelets and differential     EKG 12-lead complete w/read - Clinics          Today's Medication Changes          These changes are accurate as of 9/24/18 11:59 PM.  If you have any questions, ask your nurse or doctor.               These medicines have changed or have updated prescriptions.        Dose/Directions    gabapentin 100 MG capsule   Commonly known as:  NEURONTIN   This may have changed:  additional instructions   Used for:  Cervicalgia        Take 100 mg in the morning and 400 mg at bedtime for one week, then increase to 100 mg in the morning and 600 mg at bedtime.   Quantity:  210 capsule   Refills:  1                Primary Care Provider Office Phone # Fax #    Bright [facility]elo [phone]       [address] [address]        Equal Access to Services     [facility] : Hadkatlyn mahoney Sogian, waaxda luqjus, qaybta kaaljolie mccormick, dominic estrella. So [facility] [phone].    ATENCIÓN: Si habla español, tiene a rojas disposición servicios gratuitos de asistencia lingüística. [facility] [phone].    We comply with applicable federal civil rights laws and [place] laws. We do not discriminate on the basis of race, color, national origin, age, disability, sex, sexual orientation, or gender identity.            Thank you!     Thank you for choosing [facility]  for your care. Our goal is always to provide you with excellent care. Hearing back from our patients is one way we can continue to improve our services. Please take a few minutes to complete the written survey that you may receive in the mail after your visit with us. Thank you!              Your Updated Medication List - Protect others around you: Learn how to safely use, store and throw away your medicines at www.disposemymeds.org.          This list is accurate as of 9/24/18 11:59 PM.  Always use your most recent med list.                   Brand Name Dispense Instructions for use Diagnosis    AMITRIPTYLINE HCL PO      Take 20 mg by mouth At Bedtime        BOTOX IJ      As directed        diltiazem 120 MG 24 hr capsule    CARDIZEM CD    30 capsule    Take 1 capsule (120 mg) by mouth daily You are due to see the cardiologist- please call [phone] to schedule.    Chest pressure       gabapentin 100 MG capsule    NEURONTIN    210 capsule    Take 100 mg in the morning and 400 mg at bedtime for one week, then increase to 100 mg in the morning and 600 mg at bedtime.    Cervicalgia       loratadine 10 MG tablet    CLARITIN     Take 10 mg by mouth daily        venlafaxine 150 MG 24 hr capsule    EFFEXOR-XR     Take 150 mg by mouth daily        XOLAIR 150 MG injection   Generic drug:  omalizumab      Inject Subcutaneous every 14 days    Severe persistent asthma without complication

## 2018-09-25 LAB
ANION GAP SERPL CALCULATED.3IONS-SCNC: 7 MMOL/L (ref 3–14)
BUN SERPL-MCNC: 16 MG/DL (ref 7–30)
CALCIUM SERPL-MCNC: 8.8 MG/DL (ref 8.5–10.1)
CHLORIDE SERPL-SCNC: 107 MMOL/L (ref 94–109)
CO2 SERPL-SCNC: 24 MMOL/L (ref 20–32)
CREAT SERPL-MCNC: 0.61 MG/DL (ref 0.52–1.04)
GFR SERPL CREATININE-BSD FRML MDRD: >90 ML/MIN/1.7M2
GLUCOSE SERPL-MCNC: 83 MG/DL (ref 70–99)
POTASSIUM SERPL-SCNC: 4 MMOL/L (ref 3.4–5.3)
SODIUM SERPL-SCNC: 138 MMOL/L (ref 133–144)

## 2018-09-28 ENCOUNTER — SURGERY - HEALTHEAST (OUTPATIENT)
Dept: SURGERY | Facility: CLINIC | Age: 48
End: 2018-09-28

## 2018-09-28 ENCOUNTER — TRANSFERRED RECORDS (OUTPATIENT)
Dept: HEALTH INFORMATION MANAGEMENT | Facility: CLINIC | Age: 48
End: 2018-09-28

## 2018-09-28 ASSESSMENT — MIFFLIN-ST. JEOR: SCORE: 1354.29

## 2018-10-08 ENCOUNTER — INFUSION THERAPY VISIT (OUTPATIENT)
Dept: INFUSION THERAPY | Facility: CLINIC | Age: 48
End: 2018-10-08
Attending: ALLERGY & IMMUNOLOGY
Payer: COMMERCIAL

## 2018-10-08 VITALS — HEART RATE: 99 BPM | RESPIRATION RATE: 16 BRPM | DIASTOLIC BLOOD PRESSURE: 80 MMHG | SYSTOLIC BLOOD PRESSURE: 124 MMHG

## 2018-10-08 DIAGNOSIS — J45.50 SEVERE PERSISTENT ASTHMA WITHOUT COMPLICATION (H): Primary | ICD-10-CM

## 2018-10-08 PROCEDURE — 25000128 H RX IP 250 OP 636: Performed by: NURSE PRACTITIONER

## 2018-10-08 PROCEDURE — 96372 THER/PROPH/DIAG INJ SC/IM: CPT

## 2018-10-08 RX ADMIN — OMALIZUMAB 375 MG: 202.5 INJECTION, SOLUTION SUBCUTANEOUS at 09:08

## 2018-10-08 NOTE — PROGRESS NOTES
Infusion Nursing Note:  Alba Varela presents today for Xolair.    Patient seen by provider today: No   present during visit today: Not Applicable.    Note: N/A.    Intravenous Access:  No Intravenous access/labs at this visit.    Treatment Conditions:  Not Applicable.    Post Infusion Assessment:  Patient tolerated injection without incident.  Patient observed for 30 minutes post Xolair per protocol.    Discharge Plan:   Discharge instructions reviewed with: Patient.  Patient and/or family verbalized understanding of discharge instructions and all questions answered.  AVS to patient via Link Trigger.  Patient will return 10/22/18 for next appointment.   Patient discharged in stable condition accompanied by: self.  Departure Mode: Ambulatory.    Toma Camargo RN

## 2018-10-08 NOTE — MR AVS SNAPSHOT
After Visit Summary   10/8/2018    Alba Varela    MRN: [id number]           Patient Information     Date Of Birth          1970        Visit Information        Provider Department      10/8/2018 9:00 AM RH INFUSION CHAIR 7  Infusion [facility]        Today's Diagnoses     Severe persistent asthma without complication    -  1       Follow-ups after your visit        Your next 10 appointments already scheduled     Oct 22, 2018  9:00 AM CDT   Level 1 with RH INFUSION CHAIR 7    Infusion Services ([facility])    [facility]  [address]  [place] [address]   [phone]            Nov 05, 2018  9:00 AM CST   Level 1 with RH INFUSION CHAIR 11    Infusion Services ([facility])    [facility]  [address]  [place] [address]   [phone]            Nov 19, 2018  9:00 AM CST   Level 1 with RH INFUSION CHAIR 11    Infusion Services ([facility])    [facility]  [address]  [place] [address]   [phone]            Dec 03, 2018  9:00 AM CST   Level 1 with RH INFUSION CHAIR 6    Infusion Services ([facility])    [address]  [address]  [place] [address]   [phone]            Dec 17, 2018  9:00 AM CST   Level 1 with RH INFUSION CHAIR 6    Infusion Services ([facility])    [facility]  [address]  [place] [address]   [phone]              Who to contact     If you have questions or need follow up information about today's clinic visit or your schedule please contact [facility] INFUSION [facility] directly at [phone].  Normal or non-critical  lab and imaging results will be communicated to you by CoinSeedhart, letter or phone within 4 business days after the clinic has received the results. If you do not hear from us within 7 days, please contact the clinic through AppSurfert or phone. If you have a critical or abnormal lab result, we will notify you by phone as soon as possible.  Submit refill requests through [employer]utation or call your pharmacy and they will forward the refill request to us. Please allow 3 business days for your refill to be completed.          Additional Information About Your Visit        CoinSeedharLessonLab Information     [employer] gives you secure access to your electronic health record. If you see a primary care provider, you can also send messages to your care team and make appointments. If you have questions, please call your primary care clinic.  If you do not have a primary care provider, please call [phone] and they will assist you.        Care EveryWhere ID     This is your Care EveryWhere ID. This could be used by other organizations to access your [place] medical records  [id number]        Your Vitals Were     Pulse Respirations Last Period             99 16 07/11/2014          Blood Pressure from Last 3 Encounters:   10/08/18 124/80   09/24/18 118/80   09/17/18 122/82    Weight from Last 3 Encounters:   09/24/18 76.2 kg (168 lb)   03/23/18 77.1 kg (170 lb)   11/03/17 75.8 kg (167 lb)              Today, you had the following     No orders found for display         Today's Medication Changes          These changes are accurate as of 10/8/18  9:50 AM.  If you have any questions, ask your nurse or doctor.               These medicines have changed or have updated prescriptions.        Dose/Directions    gabapentin 100 MG capsule   Commonly known as:  NEURONTIN   This may have changed:  additional instructions   Used for:  Cervicalgia        Take 100 mg in the morning and 400 mg at bedtime for one week, then increase to 100 mg in the morning  and 600 mg at bedtime.   Quantity:  210 capsule   Refills:  1                Primary Care Provider Office Phone # Fax #    Bright [facility]elo [phone]       [address] [address]        Equal Access to Services     SUJEYCHARLENE DAISY : Hadii aad ku hadasho Soomaali, waaxda luqadaha, qaybta kaalmada adeegyada, waxay idiin hayaan adeeg quinten laJosejakobdee estrella. So [facility] [phone].    ATENCIÓN: Si habla español, tiene a rojas disposición servicios gratuitos de asistencia lingüística. Llame al [phone].    We comply with applicable federal civil rights laws and [place] laws. We do not discriminate on the basis of race, color, national origin, age, disability, sex, sexual orientation, or gender identity.            Thank you!     Thank you for choosing [facility] INFUSION [facility]  for your care. Our goal is always to provide you with excellent care. Hearing back from our patients is one way we can continue to improve our services. Please take a few minutes to complete the written survey that you may receive in the mail after your visit with us. Thank you!             Your Updated Medication List - Protect others around you: Learn how to safely use, store and throw away your medicines at www.disposemymeds.org.          This list is accurate as of 10/8/18  9:50 AM.  Always use your most recent med list.                   Brand Name Dispense Instructions for use Diagnosis    AMITRIPTYLINE HCL PO      Take 20 mg by mouth At Bedtime        BOTOX IJ      As directed        diltiazem 120 MG 24 hr capsule    CARDIZEM CD    30 capsule    Take 1 capsule (120 mg) by mouth daily You are due to see the cardiologist- please call [phone] to schedule.    Chest pressure       gabapentin 100 MG capsule    NEURONTIN    210 capsule    Take 100 mg in the morning and 400 mg at bedtime for one week, then increase to 100 mg in the morning and 600 mg at bedtime.    Cervicalgia        loratadine 10 MG tablet    CLARITIN     Take 10 mg by mouth daily        venlafaxine 150 MG 24 hr capsule    EFFEXOR-XR     Take 150 mg by mouth daily        XOLAIR 150 MG injection   Generic drug:  omalizumab      Inject Subcutaneous every 14 days    Severe persistent asthma without complication

## 2018-10-22 ENCOUNTER — INFUSION THERAPY VISIT (OUTPATIENT)
Dept: INFUSION THERAPY | Facility: CLINIC | Age: 48
End: 2018-10-22
Attending: ALLERGY & IMMUNOLOGY
Payer: COMMERCIAL

## 2018-10-22 VITALS
TEMPERATURE: 96.2 F | RESPIRATION RATE: 16 BRPM | SYSTOLIC BLOOD PRESSURE: 110 MMHG | OXYGEN SATURATION: 99 % | DIASTOLIC BLOOD PRESSURE: 76 MMHG | HEART RATE: 79 BPM

## 2018-10-22 DIAGNOSIS — J45.50 SEVERE PERSISTENT ASTHMA WITHOUT COMPLICATION (H): Primary | ICD-10-CM

## 2018-10-22 PROCEDURE — 96372 THER/PROPH/DIAG INJ SC/IM: CPT

## 2018-10-22 PROCEDURE — 25000128 H RX IP 250 OP 636: Performed by: NURSE PRACTITIONER

## 2018-10-22 RX ADMIN — OMALIZUMAB 375 MG: 202.5 INJECTION, SOLUTION SUBCUTANEOUS at 09:10

## 2018-10-22 NOTE — PROGRESS NOTES
Infusion Nursing Note:  Alba Varela presents today for xolair.    Patient seen by provider today: No   present during visit today: Not Applicable.    Note: N/A.    Intravenous Access:  No Intravenous access/labs at this visit.    Treatment Conditions:  Not Applicable.      Post Infusion Assessment:  Patient tolerated injection without incident.  Patient observed for 30 minutes post xolair per protocol.    Discharge Plan:   AVS to patient via MYCHART.  Patient will return 11/5/18 for next appointment.   Patient discharged in stable condition accompanied by: self.  Departure Mode: Ambulatory.    Sangita Edwards RN

## 2018-10-22 NOTE — MR AVS SNAPSHOT
After Visit Summary   10/22/2018    Alba Varela    MRN: [id number]           Patient Information     Date Of Birth          1970        Visit Information        Provider Department      10/22/2018 9:00 AM RH INFUSION CHAIR 7 [facility] Infusion [facility]        Today's Diagnoses     Severe persistent asthma without complication    -  1       Follow-ups after your visit        Your next 10 appointments already scheduled     Nov 05, 2018  9:00 AM CST   Level 1 with RH INFUSION CHAIR 11   [facility] Infusion Services ([facility])    [facility]  [address]  [facility] [address]   [phone]            Nov 19, 2018  9:00 AM CST   Level 1 with RH INFUSION CHAIR 11   [facility] Infusion Services ([facility])    [facility]  [address]  [facility] [address]   [phone]            Dec 03, 2018  9:00 AM CST   Level 1 with RH INFUSION CHAIR 6   [facility] Infusion Services ([facility])    [facility]  [address]  [facility] [address] [phone]            Dec 17, 2018  9:00 AM CST   Level 1 with RH INFUSION CHAIR 6   [facility] Infusion Services ([facility])    [facility]  [address]  [facility] [address] [phone]              Who to contact     If you have questions or need follow up information about today's clinic visit or your schedule please contact [facility] INFUSION [facility] directly at [phone].  Normal or non-critical lab and imaging results will be communicated to you by MyChart, letter or phone within 4 business days after the clinic has received the results. If you do not hear from us within 7 days, please contact the clinic through MyChart or phone. If you  have a critical or abnormal lab result, we will notify you by phone as soon as possible.  Submit refill requests through [employer] or call your pharmacy and they will forward the refill request to us. Please allow 3 business days for your refill to be completed.          Additional Information About Your Visit        CiiNOWhart Information     [employer] gives you secure access to your electronic health record. If you see a primary care provider, you can also send messages to your care team and make appointments. If you have questions, please call your primary care clinic.  If you do not have a primary care provider, please call [phone] and they will assist you.        Care EveryWhere ID     This is your Care EveryWhere ID. This could be used by other organizations to access your [place] medical records  [id number]        Your Vitals Were     Pulse Temperature Respirations Last Period Pulse Oximetry       79 96.2  F (35.7  C) (Tympanic) 16 07/11/2014 99%        Blood Pressure from Last 3 Encounters:   10/22/18 110/76   10/08/18 124/80   09/24/18 118/80    Weight from Last 3 Encounters:   09/24/18 76.2 kg (168 lb)   03/23/18 77.1 kg (170 lb)   11/03/17 75.8 kg (167 lb)              Today, you had the following     No orders found for display         Today's Medication Changes          These changes are accurate as of 10/22/18  9:44 AM.  If you have any questions, ask your nurse or doctor.               These medicines have changed or have updated prescriptions.        Dose/Directions    gabapentin 100 MG capsule   Commonly known as:  NEURONTIN   This may have changed:  additional instructions   Used for:  Cervicalgia        Take 100 mg in the morning and 400 mg at bedtime for one week, then increase to 100 mg in the morning and 600 mg at bedtime.   Quantity:  210 capsule   Refills:  1                Primary Care Provider Office Phone # Fax #    Bright [facility]elo [phone]        [address]  [facility] [address]        Equal Access to Services     [facility] DAISY : Hadii vale miguel coopermazin Anaali, waelliottda luqadaha, qakarineta kaalmada jace, dominic estrella. So [facility] [phone].    ATENCIÓN: Si habla español, tiene a rojas disposición servicios gratuitos de asistencia lingüística. Farzadame al [phone].    We comply with applicable federal civil rights laws and [place] laws. We do not discriminate on the basis of race, color, national origin, age, disability, sex, sexual orientation, or gender identity.            Thank you!     Thank you for choosing [facility] INFUSION [facility]  for your care. Our goal is always to provide you with excellent care. Hearing back from our patients is one way we can continue to improve our services. Please take a few minutes to complete the written survey that you may receive in the mail after your visit with us. Thank you!             Your Updated Medication List - Protect others around you: Learn how to safely use, store and throw away your medicines at www.disposemymeds.org.          This list is accurate as of 10/22/18  9:44 AM.  Always use your most recent med list.                   Brand Name Dispense Instructions for use Diagnosis    AMITRIPTYLINE HCL PO      Take 20 mg by mouth At Bedtime        BOTOX IJ      As directed        diltiazem 120 MG 24 hr capsule    CARDIZEM CD    30 capsule    Take 1 capsule (120 mg) by mouth daily You are due to see the cardiologist- please call [phone] to schedule.    Chest pressure       gabapentin 100 MG capsule    NEURONTIN    210 capsule    Take 100 mg in the morning and 400 mg at bedtime for one week, then increase to 100 mg in the morning and 600 mg at bedtime.    Cervicalgia       loratadine 10 MG tablet    CLARITIN     Take 10 mg by mouth daily        venlafaxine 150 MG 24 hr capsule    EFFEXOR-XR     Take 150 mg by mouth daily        XOLAIR 150 MG injection   Generic drug:   omalizumab      Inject Subcutaneous every 14 days    Severe persistent asthma without complication

## 2018-11-05 ENCOUNTER — INFUSION THERAPY VISIT (OUTPATIENT)
Dept: INFUSION THERAPY | Facility: CLINIC | Age: 48
End: 2018-11-05
Attending: ALLERGY & IMMUNOLOGY
Payer: COMMERCIAL

## 2018-11-05 VITALS
OXYGEN SATURATION: 100 % | TEMPERATURE: 97.9 F | RESPIRATION RATE: 16 BRPM | HEART RATE: 89 BPM | DIASTOLIC BLOOD PRESSURE: 82 MMHG | SYSTOLIC BLOOD PRESSURE: 121 MMHG

## 2018-11-05 DIAGNOSIS — J45.50 SEVERE PERSISTENT ASTHMA WITHOUT COMPLICATION (H): Primary | ICD-10-CM

## 2018-11-05 PROCEDURE — 96372 THER/PROPH/DIAG INJ SC/IM: CPT

## 2018-11-05 PROCEDURE — 25000128 H RX IP 250 OP 636: Performed by: ALLERGY & IMMUNOLOGY

## 2018-11-05 RX ADMIN — OMALIZUMAB 375 MG: 202.5 INJECTION, SOLUTION SUBCUTANEOUS at 09:19

## 2018-11-05 NOTE — MR AVS SNAPSHOT
After Visit Summary   11/5/2018    Alba Varela    MRN: [id number]           Patient Information     Date Of Birth          1970        Visit Information        Provider Department      11/5/2018 9:00 AM RH INFUSION CHAIR 11 [facility] Infusion [facility]        Today's Diagnoses     Severe persistent asthma without complication    -  1       Follow-ups after your visit        Your next 10 appointments already scheduled     Nov 19, 2018  9:00 AM CST   Level 1 with RH INFUSION CHAIR 11   [facility] Infusion Services ([facility])    [place] [id number]  [facility] [address]   [phone]            Dec 03, 2018  9:00 AM CST   Level 1 with RH INFUSION CHAIR 6   [facility] Infusion Services ([facility])    [facility] [place] [facility]  [address]  [facility] [address]   [phone]            Dec 17, 2018  9:00 AM CST   Level 1 with RH INFUSION CHAIR 6   [facility] Infusion Services ([facility])    [facility] [place] [facility]  [address]  [facility] [address]   [phone]              Who to contact     If you have questions or need follow up information about today's clinic visit or your schedule please contact [facility] INFUSION [facility] directly at [phone].  Normal or non-critical lab and imaging results will be communicated to you by MyChart, letter or phone within 4 business days after the clinic has received the results. If you do not hear from us within 7 days, please contact the clinic through MyChart or phone. If you have a critical or abnormal lab result, we will notify you by phone as soon as possible.  Submit refill requests through [employer] or call your pharmacy and they will forward the refill request to us. Please allow 3 business days for your refill to be  completed.          Additional Information About Your Visit        MyChart Information     [employer] gives you secure access to your electronic health record. If you see a primary care provider, you can also send messages to your care team and make appointments. If you have questions, please call your primary care clinic.  If you do not have a primary care provider, please call [phone] and they will assist you.        Care EveryWhere ID     This is your Care EveryWhere ID. This could be used by other organizations to access your [place] medical records  [id number]        Your Vitals Were     Pulse Temperature Respirations Last Period Pulse Oximetry       89 97.9  F (36.6  C) (Tympanic) 16 07/11/2014 100%        Blood Pressure from Last 3 Encounters:   11/05/18 121/82   10/22/18 110/76   10/08/18 124/80    Weight from Last 3 Encounters:   09/24/18 76.2 kg (168 lb)   03/23/18 77.1 kg (170 lb)   11/03/17 75.8 kg (167 lb)              Today, you had the following     No orders found for display         Today's Medication Changes          These changes are accurate as of 11/5/18  9:51 AM.  If you have any questions, ask your nurse or doctor.               These medicines have changed or have updated prescriptions.        Dose/Directions    gabapentin 100 MG capsule   Commonly known as:  NEURONTIN   This may have changed:  additional instructions   Used for:  Cervicalgia        Take 100 mg in the morning and 400 mg at bedtime for one week, then increase to 100 mg in the morning and 600 mg at bedtime.   Quantity:  210 capsule   Refills:  1                Primary Care Provider Office Phone # Fax #    Bright [facility]elo [phone] [address] [address]        Equal Access to Services     CHARLENE [facility] System[facility] : Yonny Pacehco, bailee nassar, dominic weeks. So [facility] [phone].    ATENCIÓN: Ke lovelace,  tiene a rojas disposición servicios gratuitos de asistencia lingüística. Brissa sanabria [phone].    We comply with applicable federal civil rights laws and [place] laws. We do not discriminate on the basis of race, color, national origin, age, disability, sex, sexual orientation, or gender identity.            Thank you!     Thank you for choosing [facility] INFUSION [facility]  for your care. Our goal is always to provide you with excellent care. Hearing back from our patients is one way we can continue to improve our services. Please take a few minutes to complete the written survey that you may receive in the mail after your visit with us. Thank you!             Your Updated Medication List - Protect others around you: Learn how to safely use, store and throw away your medicines at www.disposemymeds.org.          This list is accurate as of 11/5/18  9:51 AM.  Always use your most recent med list.                   Brand Name Dispense Instructions for use Diagnosis    AMITRIPTYLINE HCL PO      Take 20 mg by mouth At Bedtime        BOTOX IJ      As directed        diltiazem 120 MG 24 hr capsule    CARDIZEM CD    30 capsule    Take 1 capsule (120 mg) by mouth daily You are due to see the cardiologist- please call [phone] to schedule.    Chest pressure       gabapentin 100 MG capsule    NEURONTIN    210 capsule    Take 100 mg in the morning and 400 mg at bedtime for one week, then increase to 100 mg in the morning and 600 mg at bedtime.    Cervicalgia       loratadine 10 MG tablet    CLARITIN     Take 10 mg by mouth daily        venlafaxine 150 MG 24 hr capsule    EFFEXOR-XR     Take 150 mg by mouth daily        XOLAIR 150 MG injection   Generic drug:  omalizumab      Inject Subcutaneous every 14 days    Severe persistent asthma without complication

## 2018-11-05 NOTE — PROGRESS NOTES
Infusion Nursing Note:  Alba Varela presents today for Xolair.    Patient seen by provider today: No   present during visit today: Not Applicable.    Note: N/A.    Intravenous Access:  No Intravenous access/labs at this visit.    Treatment Conditions:  Not Applicable.    Post Infusion Assessment:  Patient tolerated injection without incident.  Patient observed for 30 minutes post Xolair per protocol.    Discharge Plan:   Discharge instructions reviewed with: Patient.  Patient and/or family verbalized understanding of discharge instructions and all questions answered.  AVS to patient via AdTonik.  Patient will return 11/19/18 for next appointment.   Patient discharged in stable condition accompanied by: self.  Departure Mode: Ambulatory.    Toma Camargo RN

## 2018-11-06 ENCOUNTER — TRANSFERRED RECORDS (OUTPATIENT)
Dept: HEALTH INFORMATION MANAGEMENT | Facility: CLINIC | Age: 48
End: 2018-11-06

## 2018-11-06 ENCOUNTER — TELEPHONE (OUTPATIENT)
Dept: INTERNAL MEDICINE | Facility: CLINIC | Age: 48
End: 2018-11-06

## 2018-11-06 NOTE — TELEPHONE ENCOUNTER
Panel Management Review      Patient has the following on her problem list:     Asthma review     ACT Total Scores 10/24/2017   ACT TOTAL SCORE (Goal Greater than or Equal to 20) 24   In the past 12 months, how many times did you visit the emergency room for your asthma without being admitted to the hospital? 0   In the past 12 months, how many times were you hospitalized overnight because of your asthma? 0      1. Is Asthma diagnosis on the Problem List? Yes    2. Is Asthma listed on Health Maintenance? Yes    3. Patient is due for:  ACT      Composite cancer screening  Chart review shows that this patient is due/due soon for the following Pap Smear  Summary:    Patient is due/failing the following:   ACT and PAP    Action needed:   Patient needs office visit for Pap. and Patient needs to do ACT.    Type of outreach:    Sent letter. And ACT. Will call in 2 weeks.    Questions for provider review:    None                                                                                                                                      ANGELA Quiñones       Chart routed to none .

## 2018-11-06 NOTE — LETTER
[facility]  [address], [address]  [place] [address]  [phone]        November 6, 2018    Alba Varela  [address] [facility] [address]            Dear Ms. Alba DELIA Avery:    This letter is a follow up for your Asthma. Even though you are seen elsewhere, we do need an ACT on file. Would you please keep the enclosed ACT and I will call you in one to two weeks to review your answers.   Also, a reminder that you are due for your Pap smear. If you have had this done outside of [place], please let us know when, where, and if normal.      Sincerely,      Jen ROSE LPN for  Selene Barnes N.P.-MAKAYLA  Internal Medicine

## 2018-11-06 NOTE — LETTER
[facility]  [address], [address]  [place] [address]  [phone]        November 20, 2018    [facility]ba Varela  [address] [facility] [facility] [address]            Dear Ms. Alba Varela:    Please remember to make an appointment for your physical and pap smear. Also, I know you see someone else for your Asthma but we will ask you the questions as well, so our records can be up to date.     Sincerely,        AMY Harden

## 2018-11-19 ENCOUNTER — INFUSION THERAPY VISIT (OUTPATIENT)
Dept: INFUSION THERAPY | Facility: CLINIC | Age: 48
End: 2018-11-19
Attending: ALLERGY & IMMUNOLOGY
Payer: COMMERCIAL

## 2018-11-19 VITALS
DIASTOLIC BLOOD PRESSURE: 88 MMHG | TEMPERATURE: 97.6 F | SYSTOLIC BLOOD PRESSURE: 123 MMHG | HEART RATE: 90 BPM | RESPIRATION RATE: 16 BRPM

## 2018-11-19 DIAGNOSIS — J45.50 SEVERE PERSISTENT ASTHMA WITHOUT COMPLICATION (H): Primary | ICD-10-CM

## 2018-11-19 PROCEDURE — 25000128 H RX IP 250 OP 636: Performed by: ALLERGY & IMMUNOLOGY

## 2018-11-19 PROCEDURE — 96372 THER/PROPH/DIAG INJ SC/IM: CPT

## 2018-11-19 RX ADMIN — OMALIZUMAB 375 MG: 202.5 INJECTION, SOLUTION SUBCUTANEOUS at 09:36

## 2018-11-19 NOTE — PROGRESS NOTES
Infusion Nursing Note:  Alba Varela presents today for Xolair.    Patient seen by provider today: No   present during visit today: Not Applicable.    Note: N/A.    Intravenous Access:  No Intravenous access/labs at this visit.    Treatment Conditions:  Not Applicable.      Post Infusion Assessment:  Patient tolerated injection without incident.  Patient observed for 30 minutes post Xolair per protocol.    Discharge Plan:   Discharge instructions reviewed with: Patient.  Patient and/or family verbalized understanding of discharge instructions and all questions answered.  AVS to patient via [employer].  Patient will return 12/3/18 for next appointment.   Patient discharged in stable condition accompanied by: self.  Departure Mode: Ambulatory.    Toma Camargo RN

## 2018-11-19 NOTE — MR AVS SNAPSHOT
After Visit Summary   11/19/2018    Alba Varela    MRN: [id number]           Patient Information     Date Of Birth          1970        Visit Information        Provider Department      11/19/2018 9:00 AM RH INFUSION CHAIR 11 [facility]        Today's Diagnoses     Severe persistent asthma without complication    -  1       Follow-ups after your visit        Your next 10 appointments already scheduled     Dec 03, 2018  9:00 AM CST   Level 1 with RH INFUSION CHAIR 6   [facility] ([facility])    [facility]  [address]  [facility] [address]   [phone]            Dec 17, 2018  9:00 AM CST   Level 1 with RH INFUSION CHAIR 6   [facility] ([facility])    [facility]  [address]  [facility] [id number] [phone]              Who to contact     If you have questions or need follow up information about today's clinic visit or your schedule please contact [facility] INF[facility] directly at [phone].  Normal or non-critical lab and imaging results will be communicated to you by Hometicahart, letter or phone within 4 business days after the clinic has received the results. If you do not hear from us within 7 days, please contact the clinic through Hometicahart or phone. If you have a critical or abnormal lab result, we will notify you by phone as soon as possible.  Submit refill requests through Inhabi or call your pharmacy and they will forward the refill request to us. Please allow 3 business days for your refill to be completed.          Additional Information About Your Visit        MyChart Information     Inhabi gives you secure access to your electronic health record. If you see a primary care provider, you can also send messages to your care team and make  appointments. If you have questions, please call your primary care clinic.  If you do not have a primary care provider, please call [phone] and they will assist you.        Care EveryWhere ID     This is your Care EveryWhere ID. This could be used by other organizations to access your [facility] medical records  [id number]        Your Vitals Were     Pulse Temperature Respirations Last Period          90 97.6  F (36.4  C) (Tympanic) 16 07/11/2014         Blood Pressure from Last 3 Encounters:   11/19/18 123/88   11/05/18 121/82   10/22/18 110/76    Weight from Last 3 Encounters:   09/24/18 76.2 kg (168 lb)   03/23/18 77.1 kg (170 lb)   11/03/17 75.8 kg (167 lb)              Today, you had the following     No orders found for display         Today's Medication Changes          These changes are accurate as of 11/19/18  9:39 AM.  If you have any questions, ask your nurse or doctor.               These medicines have changed or have updated prescriptions.        Dose/Directions    gabapentin 100 MG capsule   Commonly known as:  NEURONTIN   This may have changed:  additional instructions   Used for:  Cervicalgia        Take 100 mg in the morning and 400 mg at bedtime for one week, then increase to 100 mg in the morning and 600 mg at bedtime.   Quantity:  210 capsule   Refills:  1                Primary Care Provider Office Phone # Fax #    Bright [facility]elo [phone]       [address] [address]        Equal Access to Services     REINA VILLA : Yonny gamboao Sogian, waaxda luqadaha, qaybta kaalmada jace, dominic estrella. So [facility] [phone].    ATENCIÓN: Si habla español, tiene a rojas disposición servicios gratuitos de asistencia lingüística. Llame al [phone].    We comply with applicable federal civil rights laws and [place] laws. We do not discriminate on the basis of race, color, national origin, age, disability, sex,  sexual orientation, or gender identity.            Thank you!     Thank you for choosing [facility] IN[facility]  for your care. Our goal is always to provide you with excellent care. Hearing back from our patients is one way we can continue to improve our services. Please take a few minutes to complete the written survey that you may receive in the mail after your visit with us. Thank you!             Your Updated Medication List - Protect others around you: Learn how to safely use, store and throw away your medicines at www.disposemymeds.org.          This list is accurate as of 11/19/18  9:39 AM.  Always use your most recent med list.                   Brand Name Dispense Instructions for use Diagnosis    AMITRIPTYLINE HCL PO      Take 20 mg by mouth At Bedtime        BOTOX IJ      As directed        diltiazem 120 MG 24 hr capsule    CARDIZEM CD    30 capsule    Take 1 capsule (120 mg) by mouth daily You are due to see the cardiologist- please call [phone] to schedule.    Chest pressure       gabapentin 100 MG capsule    NEURONTIN    210 capsule    Take 100 mg in the morning and 400 mg at bedtime for one week, then increase to 100 mg in the morning and 600 mg at bedtime.    Cervicalgia       loratadine 10 MG tablet    CLARITIN     Take 10 mg by mouth daily        venlafaxine 150 MG 24 hr capsule    EFFEXOR-XR     Take 150 mg by mouth daily        XOLAIR 150 MG injection   Generic drug:  omalizumab      Inject Subcutaneous every 14 days    Severe persistent asthma without complication

## 2018-12-03 ENCOUNTER — INFUSION THERAPY VISIT (OUTPATIENT)
Dept: INFUSION THERAPY | Facility: CLINIC | Age: 48
End: 2018-12-03
Attending: ALLERGY & IMMUNOLOGY
Payer: COMMERCIAL

## 2018-12-03 VITALS
OXYGEN SATURATION: 98 % | TEMPERATURE: 97.6 F | HEART RATE: 91 BPM | SYSTOLIC BLOOD PRESSURE: 121 MMHG | RESPIRATION RATE: 16 BRPM | DIASTOLIC BLOOD PRESSURE: 86 MMHG

## 2018-12-03 DIAGNOSIS — J45.50 SEVERE PERSISTENT ASTHMA WITHOUT COMPLICATION (H): Primary | ICD-10-CM

## 2018-12-03 PROCEDURE — 96372 THER/PROPH/DIAG INJ SC/IM: CPT

## 2018-12-03 PROCEDURE — 25000128 H RX IP 250 OP 636: Performed by: ALLERGY & IMMUNOLOGY

## 2018-12-03 RX ADMIN — OMALIZUMAB 375 MG: 202.5 INJECTION, SOLUTION SUBCUTANEOUS at 08:55

## 2018-12-03 NOTE — PROGRESS NOTES
Infusion Nursing Note:  Alba Varela presents today for Xolair.    Patient seen by provider today: No   present during visit today: Not Applicable.    Note: N/A.    Intravenous Access:  No Intravenous access/labs at this visit.    Treatment Conditions:  Not Applicable.    Post Infusion Assessment:  Patient tolerated injection without incident.  Patient observed for 30 minutes post Xolair per protocol.    Discharge Plan:   Discharge instructions reviewed with: Patient.  Patient and/or family verbalized understanding of discharge instructions and all questions answered.  AVS to patient via [employer].  Patient will return 12/17/18 for next appointment.   Patient discharged in stable condition accompanied by: self.  Departure Mode: Ambulatory.    Toma Camargo RN

## 2018-12-03 NOTE — MR AVS SNAPSHOT
After Visit Summary   12/3/2018    Alba Varela    MRN: [id number]           Patient Information     Date Of Birth          1970        Visit Information        Provider Department      12/3/2018 9:00 AM RH INFUSION CHAIR 6 [facility]        Today's Diagnoses     Severe persistent asthma without complication    -  1       Follow-ups after your visit        Your next 10 appointments already scheduled     Dec 17, 2018  9:00 AM CST   Level 1 with RH INFUSION CHAIR 6   [facility] ([facility])    [facility]  [address]  [facility] [address]   [phone]            Dec 31, 2018  9:00 AM CST   Level 1 with RH INFUSION CHAIR 1   [facility] ([facility])    [facility]  [address]  [facility] [address]   [phone]            Jan 14, 2019  9:00 AM CST   Level 1 with RH INFUSION CHAIR 12   [facility] ([facility])    [facility]  [address]  [facility] [address] [phone]            Jan 28, 2019  9:00 AM CST   Level 1 with RH INFUSION CHAIR 8   [facility] ([facility])    [facility]  [address]  [facility] [address] [phone]              Who to contact     If you have questions or need follow up information about today's clinic visit or your schedule please contact [facility] INFUSION [facility] directly at [phone].  Normal or non-critical lab and imaging results will be communicated to you by MyChart, letter or phone within 4 business days after the clinic has received the results. If you do not hear from us within 7 days, please contact the clinic through MyChart or phone. If you have a  critical or abnormal lab result, we will notify you by phone as soon as possible.  Submit refill requests through [employer] or call your pharmacy and they will forward the refill request to us. Please allow 3 business days for your refill to be completed.          Additional Information About Your Visit        Spectralmindhart Information     [employer] gives you secure access to your electronic health record. If you see a primary care provider, you can also send messages to your care team and make appointments. If you have questions, please call your primary care clinic.  If you do not have a primary care provider, please call [phone] and they will assist you.        Care EveryWhere ID     This is your Care EveryWhere ID. This could be used by other organizations to access your [facility] medical records  [id number]        Your Vitals Were     Pulse Temperature Respirations Last Period Pulse Oximetry       91 97.6  F (36.4  C) (Tympanic) 16 07/11/2014 98%        Blood Pressure from Last 3 Encounters:   12/03/18 121/86   11/19/18 123/88   11/05/18 121/82    Weight from Last 3 Encounters:   09/24/18 76.2 kg (168 lb)   03/23/18 77.1 kg (170 lb)   11/03/17 75.8 kg (167 lb)              Today, you had the following     No orders found for display         Today's Medication Changes          These changes are accurate as of 12/3/18  9:11 AM.  If you have any questions, ask your nurse or doctor.               These medicines have changed or have updated prescriptions.        Dose/Directions    gabapentin 100 MG capsule   Commonly known as:  NEURONTIN   This may have changed:  additional instructions   Used for:  Cervicalgia        Take 100 mg in the morning and 400 mg at bedtime for one week, then increase to 100 mg in the morning and 600 mg at bedtime.   Quantity:  210 capsule   Refills:  1                Primary Care Provider Office Phone # Fax #    Bright [facility]elo [phone] [address]  [facility] [address]        Equal Access to Services     REINA VILLA : Hadii vale miguel coopermazin Anaali, waelliottda luqadaha, qakarineta kaalmaelena mccormick, dominic estrella. So [facility] [phone].    ATENCIÓN: Si habla español, tiene a rojas disposición servicios gratuitos de asistencia lingüística. Brissa al [phone].    We comply with applicable federal civil rights laws and [place] laws. We do not discriminate on the basis of race, color, national origin, age, disability, sex, sexual orientation, or gender identity.            Thank you!     Thank you for choosing [facility] INF[facility]ION [facility]  for your care. Our goal is always to provide you with excellent care. Hearing back from our patients is one way we can continue to improve our services. Please take a few minutes to complete the written survey that you may receive in the mail after your visit with us. Thank you!             Your Updated Medication List - Protect others around you: Learn how to safely use, store and throw away your medicines at www.disposemymeds.org.          This list is accurate as of 12/3/18  9:11 AM.  Always use your most recent med list.                   Brand Name Dispense Instructions for use Diagnosis    AMITRIPTYLINE HCL PO      Take 20 mg by mouth At Bedtime        BOTOX IJ      As directed        diltiazem ER COATED BEADS 120 MG 24 hr capsule    CARDIZEM CD    30 capsule    Take 1 capsule (120 mg) by mouth daily You are due to see the cardiologist- please call [phone] to schedule.    Chest pressure       gabapentin 100 MG capsule    NEURONTIN    210 capsule    Take 100 mg in the morning and 400 mg at bedtime for one week, then increase to 100 mg in the morning and 600 mg at bedtime.    Cervicalgia       loratadine 10 MG tablet    CLARITIN     Take 10 mg by mouth daily        venlafaxine 150 MG 24 hr capsule    EFFEXOR-XR     Take 150 mg by mouth daily        XOLAIR 150 MG injection    Generic drug:  omalizumab      Inject Subcutaneous every 14 days    Severe persistent asthma without complication

## 2018-12-17 ENCOUNTER — INFUSION THERAPY VISIT (OUTPATIENT)
Dept: INFUSION THERAPY | Facility: CLINIC | Age: 48
End: 2018-12-17
Attending: ALLERGY & IMMUNOLOGY
Payer: COMMERCIAL

## 2018-12-17 VITALS
DIASTOLIC BLOOD PRESSURE: 88 MMHG | HEART RATE: 97 BPM | SYSTOLIC BLOOD PRESSURE: 139 MMHG | RESPIRATION RATE: 16 BRPM | OXYGEN SATURATION: 98 %

## 2018-12-17 DIAGNOSIS — J45.50 SEVERE PERSISTENT ASTHMA WITHOUT COMPLICATION (H): Primary | ICD-10-CM

## 2018-12-17 PROCEDURE — 96372 THER/PROPH/DIAG INJ SC/IM: CPT

## 2018-12-17 PROCEDURE — 25000128 H RX IP 250 OP 636: Mod: JW | Performed by: ALLERGY & IMMUNOLOGY

## 2018-12-17 RX ADMIN — OMALIZUMAB 375 MG: 202.5 INJECTION, SOLUTION SUBCUTANEOUS at 09:10

## 2018-12-17 NOTE — PROGRESS NOTES
Infusion Nursing Note:  Alba Varela presents today for Xolair.    Patient seen by provider today: No   present during visit today: Not Applicable.    Note: N/A.    Intravenous Access:  No Intravenous access/labs at this visit.    Treatment Conditions:  Not Applicable.      Post Infusion Assessment:  Patient tolerated injection without incident.  Patient observed for 30 minutes post Xolair per protocol.    Discharge Plan:   Discharge instructions reviewed with: Patient.  Patient and/or family verbalized understanding of discharge instructions and all questions answered.  AVS to patient via [employer].  Patient will return 12/31/18 for next appointment.   Patient discharged in stable condition accompanied by: self.    Toma Camargo RN

## 2018-12-31 ENCOUNTER — INFUSION THERAPY VISIT (OUTPATIENT)
Dept: INFUSION THERAPY | Facility: CLINIC | Age: 48
End: 2018-12-31
Attending: ALLERGY & IMMUNOLOGY
Payer: COMMERCIAL

## 2018-12-31 VITALS
HEART RATE: 96 BPM | RESPIRATION RATE: 16 BRPM | OXYGEN SATURATION: 97 % | TEMPERATURE: 97.6 F | DIASTOLIC BLOOD PRESSURE: 78 MMHG | SYSTOLIC BLOOD PRESSURE: 130 MMHG

## 2018-12-31 DIAGNOSIS — J45.50 SEVERE PERSISTENT ASTHMA WITHOUT COMPLICATION (H): Primary | ICD-10-CM

## 2018-12-31 PROCEDURE — 96372 THER/PROPH/DIAG INJ SC/IM: CPT

## 2018-12-31 PROCEDURE — 25000128 H RX IP 250 OP 636: Performed by: ALLERGY & IMMUNOLOGY

## 2018-12-31 RX ADMIN — OMALIZUMAB 375 MG: 202.5 INJECTION, SOLUTION SUBCUTANEOUS at 09:18

## 2018-12-31 NOTE — PROGRESS NOTES
Infusion Nursing Note:  Alba Varela presents today for Xolair.    Patient seen by provider today: No   present during visit today: Not Applicable.    Note: N/A.    Intravenous Access:  No Intravenous access/labs at this visit.    Treatment Conditions:  Not Applicable.      Post Infusion Assessment:  Patient tolerated injection without incident.  Patient observed for 30 minutes post Xolair per protocol.    Discharge Plan:   Discharge instructions reviewed with: Patient.  Patient and/or family verbalized understanding of discharge instructions and all questions answered.  AVS to patient via [employer].  Patient will return 1/14/18 for next appointment.   Patient discharged in stable condition accompanied by: self.  Departure Mode: Ambulatory.    Lyn Bunn RN

## 2019-01-14 ENCOUNTER — INFUSION THERAPY VISIT (OUTPATIENT)
Dept: INFUSION THERAPY | Facility: CLINIC | Age: 49
End: 2019-01-14
Attending: ALLERGY & IMMUNOLOGY
Payer: COMMERCIAL

## 2019-01-14 VITALS
TEMPERATURE: 97.6 F | SYSTOLIC BLOOD PRESSURE: 131 MMHG | OXYGEN SATURATION: 97 % | RESPIRATION RATE: 16 BRPM | DIASTOLIC BLOOD PRESSURE: 83 MMHG | HEART RATE: 101 BPM

## 2019-01-14 DIAGNOSIS — J45.50 SEVERE PERSISTENT ASTHMA WITHOUT COMPLICATION (H): Primary | ICD-10-CM

## 2019-01-14 PROCEDURE — 96374 THER/PROPH/DIAG INJ IV PUSH: CPT

## 2019-01-14 PROCEDURE — 25000128 H RX IP 250 OP 636: Mod: JW | Performed by: ALLERGY & IMMUNOLOGY

## 2019-01-14 RX ADMIN — OMALIZUMAB 375 MG: 202.5 INJECTION, SOLUTION SUBCUTANEOUS at 10:09

## 2019-01-14 NOTE — PROGRESS NOTES
Infusion Nursing Note:  Alba Varela presents today for zolair.    Patient seen by provider today: No   present during visit today: Not Applicable.    Note: Thirty minute observation period today.    Intravenous Access:  No Intravenous access/labs at this visit.    Treatment Conditions:  Not Applicable.      Post Infusion Assessment:  Patient tolerated injection without incident.    Discharge Plan:   Patient declined prescription refills.  Discharge instructions reviewed with: Patient.  Patient and/or family verbalized understanding of discharge instructions and all questions answered.  Copy of AVS reviewed with patient and/or family.  Patient will return 1/28/19 for next appointment.  Patient discharged in stable condition accompanied by: self.  Departure Mode: Ambulatory.    Lucía Yu RN

## 2019-01-28 ENCOUNTER — INFUSION THERAPY VISIT (OUTPATIENT)
Dept: INFUSION THERAPY | Facility: CLINIC | Age: 49
End: 2019-01-28
Attending: ALLERGY & IMMUNOLOGY
Payer: COMMERCIAL

## 2019-01-28 VITALS
TEMPERATURE: 97.5 F | OXYGEN SATURATION: 100 % | SYSTOLIC BLOOD PRESSURE: 125 MMHG | RESPIRATION RATE: 16 BRPM | DIASTOLIC BLOOD PRESSURE: 77 MMHG

## 2019-01-28 DIAGNOSIS — J45.50 SEVERE PERSISTENT ASTHMA WITHOUT COMPLICATION (H): Primary | ICD-10-CM

## 2019-01-28 PROCEDURE — 96372 THER/PROPH/DIAG INJ SC/IM: CPT

## 2019-01-28 PROCEDURE — 25000128 H RX IP 250 OP 636: Mod: JW | Performed by: ALLERGY & IMMUNOLOGY

## 2019-01-28 RX ADMIN — OMALIZUMAB 375 MG: 202.5 INJECTION, SOLUTION SUBCUTANEOUS at 15:12

## 2019-01-28 NOTE — PROGRESS NOTES
Infusion Nursing Note:  Alba Varela presents today for Xolair.    Patient seen by provider today: No   present during visit today: Not Applicable.    Note: N/A.    Intravenous Access:  No Intravenous access/labs at this visit.    Treatment Conditions:  Not Applicable.      Post Infusion Assessment:  Patient tolerated injection without incident.  Patient observed for 30 minutes post Xolair per protocol.    Discharge Plan:   Discharge instructions reviewed with: Patient.  Patient and/or family verbalized understanding of discharge instructions and all questions answered.  AVS to patient via [employer].  Patient will return 2/11/19 for next appointment.   Patient discharged in stable condition accompanied by: self.  Departure Mode: Ambulatory.    Toma Camargo RN

## 2019-02-11 ENCOUNTER — INFUSION THERAPY VISIT (OUTPATIENT)
Dept: INFUSION THERAPY | Facility: CLINIC | Age: 49
End: 2019-02-11
Attending: ALLERGY & IMMUNOLOGY
Payer: COMMERCIAL

## 2019-02-11 VITALS
OXYGEN SATURATION: 98 % | SYSTOLIC BLOOD PRESSURE: 127 MMHG | DIASTOLIC BLOOD PRESSURE: 85 MMHG | RESPIRATION RATE: 16 BRPM

## 2019-02-11 DIAGNOSIS — J45.50 SEVERE PERSISTENT ASTHMA WITHOUT COMPLICATION (H): Primary | ICD-10-CM

## 2019-02-11 PROCEDURE — 96372 THER/PROPH/DIAG INJ SC/IM: CPT

## 2019-02-11 PROCEDURE — 25000128 H RX IP 250 OP 636: Performed by: ALLERGY & IMMUNOLOGY

## 2019-02-11 RX ADMIN — OMALIZUMAB 375 MG: 150 INJECTION, SOLUTION SUBCUTANEOUS at 09:20

## 2019-02-11 NOTE — PROGRESS NOTES
Infusion Nursing Note:  Alba Varela presents today for Xolair.    Patient seen by provider today: No   present during visit today: Not Applicable.    Note: N/A.    Intravenous Access:  No Intravenous access/labs at this visit.    Treatment Conditions:  Not Applicable.    Post Infusion Assessment:  Patient tolerated injection without incident.  Patient observed for 30 minutes post Xolair per protocol.    Discharge Plan:   Discharge instructions reviewed with: Patient.  Patient and/or family verbalized understanding of discharge instructions and all questions answered.  AVS to patient via [employer].  Patient will return 2/25/19 for next appointment.   Patient discharged in stable condition accompanied by: self.  Departure Mode: Ambulatory.    Toma Camargo RN

## 2019-02-25 ENCOUNTER — INFUSION THERAPY VISIT (OUTPATIENT)
Dept: INFUSION THERAPY | Facility: CLINIC | Age: 49
End: 2019-02-25
Attending: ALLERGY & IMMUNOLOGY
Payer: COMMERCIAL

## 2019-02-25 VITALS
DIASTOLIC BLOOD PRESSURE: 77 MMHG | OXYGEN SATURATION: 98 % | SYSTOLIC BLOOD PRESSURE: 122 MMHG | RESPIRATION RATE: 16 BRPM | TEMPERATURE: 97.6 F

## 2019-02-25 DIAGNOSIS — J45.50 SEVERE PERSISTENT ASTHMA WITHOUT COMPLICATION (H): Primary | ICD-10-CM

## 2019-02-25 PROCEDURE — 96372 THER/PROPH/DIAG INJ SC/IM: CPT

## 2019-02-25 PROCEDURE — 25000128 H RX IP 250 OP 636: Performed by: ALLERGY & IMMUNOLOGY

## 2019-02-25 RX ADMIN — OMALIZUMAB 375 MG: 150 INJECTION, SOLUTION SUBCUTANEOUS at 09:03

## 2019-02-25 NOTE — PROGRESS NOTES
Infusion Nursing Note:  Alba Varela presents today for Xolair.    Patient seen by provider today: No   present during visit today: Not Applicable.    Note: N/A.    Intravenous Access:  No Intravenous access/labs at this visit.    Treatment Conditions:  Not Applicable.      Post Infusion Assessment:  Patient tolerated injection without incident.  Patient observed for 30 minutes post Xolair per protocol.    Discharge Plan:   Discharge instructions reviewed with: Patient.  Patient and/or family verbalized understanding of discharge instructions and all questions answered.  AVS to patient via [employer].  Patient will return 3/11/19 for next appointment.   Patient discharged in stable condition accompanied by: self.  Departure Mode: Ambulatory.    Toma Camargo RN

## 2019-03-11 ENCOUNTER — INFUSION THERAPY VISIT (OUTPATIENT)
Dept: INFUSION THERAPY | Facility: CLINIC | Age: 49
End: 2019-03-11
Attending: ALLERGY & IMMUNOLOGY
Payer: COMMERCIAL

## 2019-03-11 VITALS
TEMPERATURE: 96.6 F | RESPIRATION RATE: 16 BRPM | DIASTOLIC BLOOD PRESSURE: 84 MMHG | OXYGEN SATURATION: 99 % | SYSTOLIC BLOOD PRESSURE: 127 MMHG

## 2019-03-11 DIAGNOSIS — J45.50 SEVERE PERSISTENT ASTHMA WITHOUT COMPLICATION (H): Primary | ICD-10-CM

## 2019-03-11 PROCEDURE — 96372 THER/PROPH/DIAG INJ SC/IM: CPT

## 2019-03-11 PROCEDURE — 25000128 H RX IP 250 OP 636: Performed by: ALLERGY & IMMUNOLOGY

## 2019-03-11 RX ADMIN — OMALIZUMAB 375 MG: 150 INJECTION, SOLUTION SUBCUTANEOUS at 14:10

## 2019-03-11 NOTE — PROGRESS NOTES
Infusion Nursing Note:  Alba Varela presents today for Xolair.    Patient seen by provider today: No   present during visit today: Not Applicable.    Note: N/A.    Intravenous Access:  No Intravenous access/labs at this visit.    Treatment Conditions:  Not Applicable.      Post Infusion Assessment:  Patient tolerated injection without incident.  Patient observed for 30 minutes post Xolair per protocol.    Discharge Plan:   Discharge instructions reviewed with: Patient.  Patient and/or family verbalized understanding of discharge instructions and all questions answered.  AVS to patient via [employer].  Patient will return 3/25/19 for next appointment.   Patient discharged in stable condition accompanied by: self.  Departure Mode: Ambulatory.    Toma Camargo RN

## 2019-03-27 ENCOUNTER — INFUSION THERAPY VISIT (OUTPATIENT)
Dept: INFUSION THERAPY | Facility: CLINIC | Age: 49
End: 2019-03-27
Attending: ALLERGY & IMMUNOLOGY
Payer: COMMERCIAL

## 2019-03-27 ENCOUNTER — HOSPITAL ENCOUNTER (OUTPATIENT)
Facility: CLINIC | Age: 49
Setting detail: SPECIMEN
End: 2019-03-27
Attending: ALLERGY & IMMUNOLOGY
Payer: COMMERCIAL

## 2019-03-27 VITALS
RESPIRATION RATE: 18 BRPM | OXYGEN SATURATION: 100 % | SYSTOLIC BLOOD PRESSURE: 129 MMHG | DIASTOLIC BLOOD PRESSURE: 83 MMHG

## 2019-03-27 DIAGNOSIS — J45.50 SEVERE PERSISTENT ASTHMA WITHOUT COMPLICATION (H): Primary | ICD-10-CM

## 2019-03-27 PROCEDURE — 96372 THER/PROPH/DIAG INJ SC/IM: CPT

## 2019-03-27 PROCEDURE — 25000128 H RX IP 250 OP 636: Performed by: ALLERGY & IMMUNOLOGY

## 2019-03-27 RX ADMIN — OMALIZUMAB 375 MG: 150 INJECTION, SOLUTION SUBCUTANEOUS at 12:05

## 2019-03-27 NOTE — PROGRESS NOTES
Infusion Nursing Note:  Alba Varela presents today for Xolair.    Patient seen by provider today: No   present during visit today: Not Applicable.    Note: N/A.    Intravenous Access:  No Intravenous access/labs at this visit.    Treatment Conditions:  Not Applicable.      Post Infusion Assessment:  Patient tolerated injection without incident.  Patient observed for 30 minutes post Xolair per protocol.       Discharge Plan:   Discharge instructions reviewed with: Patient.  Patient and/or family verbalized understanding of discharge instructions and all questions answered.  AVS to patient via [employer].  Patient will return 4/8/19 for next appointment.   Patient discharged in stable condition accompanied by: self.  Departure Mode: Ambulatory.    Toma Camargo RN

## 2019-04-01 ENCOUNTER — APPOINTMENT (OUTPATIENT)
Dept: CT IMAGING | Facility: CLINIC | Age: 49
End: 2019-04-01
Attending: EMERGENCY MEDICINE
Payer: COMMERCIAL

## 2019-04-01 ENCOUNTER — HOSPITAL ENCOUNTER (EMERGENCY)
Facility: CLINIC | Age: 49
Discharge: HOME | End: 2019-04-01
Attending: EMERGENCY MEDICINE | Admitting: EMERGENCY MEDICINE
Payer: COMMERCIAL

## 2019-04-01 VITALS
SYSTOLIC BLOOD PRESSURE: 120 MMHG | TEMPERATURE: 98.7 F | DIASTOLIC BLOOD PRESSURE: 75 MMHG | OXYGEN SATURATION: 98 % | HEART RATE: 75 BPM | RESPIRATION RATE: 17 BRPM

## 2019-04-01 DIAGNOSIS — I49.1 PREMATURE ATRIAL CONTRACTIONS: Primary | ICD-10-CM

## 2019-04-01 DIAGNOSIS — R10.31 ABDOMINAL PAIN, RIGHT LOWER QUADRANT: ICD-10-CM

## 2019-04-01 LAB
ALBUMIN SERPL-MCNC: 3.8 G/DL (ref 3.4–5)
ALBUMIN UR-MCNC: NEGATIVE MG/DL
ALP SERPL-CCNC: 66 U/L (ref 40–150)
ALT SERPL W P-5'-P-CCNC: 27 U/L (ref 0–50)
ANION GAP SERPL CALCULATED.3IONS-SCNC: 3 MMOL/L (ref 3–14)
APPEARANCE UR: CLEAR
AST SERPL W P-5'-P-CCNC: 27 U/L (ref 0–45)
BASOPHILS # BLD AUTO: 0 10E9/L (ref 0–0.2)
BASOPHILS NFR BLD AUTO: 0.7 %
BILIRUB DIRECT SERPL-MCNC: <0.1 MG/DL (ref 0–0.2)
BILIRUB SERPL-MCNC: 0.2 MG/DL (ref 0.2–1.3)
BILIRUB UR QL STRIP: NEGATIVE
BUN SERPL-MCNC: 23 MG/DL (ref 7–30)
CALCIUM SERPL-MCNC: 8.9 MG/DL (ref 8.5–10.1)
CHLORIDE SERPL-SCNC: 110 MMOL/L (ref 94–109)
CO2 SERPL-SCNC: 26 MMOL/L (ref 20–32)
COLOR UR AUTO: ABNORMAL
CREAT SERPL-MCNC: 0.69 MG/DL (ref 0.52–1.04)
DIFFERENTIAL METHOD BLD: NORMAL
EOSINOPHIL # BLD AUTO: 0.1 10E9/L (ref 0–0.7)
EOSINOPHIL NFR BLD AUTO: 2.2 %
ERYTHROCYTE [DISTWIDTH] IN BLOOD BY AUTOMATED COUNT: 12.4 % (ref 10–15)
GFR SERPL CREATININE-BSD FRML MDRD: >90 ML/MIN/{1.73_M2}
GLUCOSE SERPL-MCNC: 93 MG/DL (ref 70–99)
GLUCOSE UR STRIP-MCNC: NEGATIVE MG/DL
HCT VFR BLD AUTO: 37.7 % (ref 35–47)
HGB BLD-MCNC: 12.2 G/DL (ref 11.7–15.7)
HGB UR QL STRIP: NEGATIVE
IMM GRANULOCYTES # BLD: 0 10E9/L (ref 0–0.4)
IMM GRANULOCYTES NFR BLD: 0.2 %
KETONES UR STRIP-MCNC: NEGATIVE MG/DL
LEUKOCYTE ESTERASE UR QL STRIP: ABNORMAL
LYMPHOCYTES # BLD AUTO: 2.1 10E9/L (ref 0.8–5.3)
LYMPHOCYTES NFR BLD AUTO: 36.2 %
MCH RBC QN AUTO: 30.8 PG (ref 26.5–33)
MCHC RBC AUTO-ENTMCNC: 32.4 G/DL (ref 31.5–36.5)
MCV RBC AUTO: 95 FL (ref 78–100)
MONOCYTES # BLD AUTO: 0.4 10E9/L (ref 0–1.3)
MONOCYTES NFR BLD AUTO: 6.9 %
MUCOUS THREADS #/AREA URNS LPF: PRESENT /LPF
NEUTROPHILS # BLD AUTO: 3.1 10E9/L (ref 1.6–8.3)
NEUTROPHILS NFR BLD AUTO: 53.8 %
NITRATE UR QL: NEGATIVE
NRBC # BLD AUTO: 0 10*3/UL
NRBC BLD AUTO-RTO: 0 /100
PH UR STRIP: 6 PH (ref 5–7)
PLATELET # BLD AUTO: 232 10E9/L (ref 150–450)
POTASSIUM SERPL-SCNC: 4.5 MMOL/L (ref 3.4–5.3)
PROT SERPL-MCNC: 7.2 G/DL (ref 6.8–8.8)
RBC # BLD AUTO: 3.96 10E12/L (ref 3.8–5.2)
RBC #/AREA URNS AUTO: 3 /HPF (ref 0–2)
SODIUM SERPL-SCNC: 139 MMOL/L (ref 133–144)
SOURCE: ABNORMAL
SP GR UR STRIP: 1.02 (ref 1–1.03)
SQUAMOUS #/AREA URNS AUTO: 1 /HPF (ref 0–1)
UROBILINOGEN UR STRIP-MCNC: NORMAL MG/DL (ref 0–2)
WBC # BLD AUTO: 5.8 10E9/L (ref 4–11)
WBC #/AREA URNS AUTO: 2 /HPF (ref 0–5)

## 2019-04-01 PROCEDURE — 25000128 H RX IP 250 OP 636: Performed by: EMERGENCY MEDICINE

## 2019-04-01 PROCEDURE — 80076 HEPATIC FUNCTION PANEL: CPT | Performed by: EMERGENCY MEDICINE

## 2019-04-01 PROCEDURE — 81001 URINALYSIS AUTO W/SCOPE: CPT | Performed by: EMERGENCY MEDICINE

## 2019-04-01 PROCEDURE — 96375 TX/PRO/DX INJ NEW DRUG ADDON: CPT

## 2019-04-01 PROCEDURE — 85025 COMPLETE CBC W/AUTO DIFF WBC: CPT | Performed by: EMERGENCY MEDICINE

## 2019-04-01 PROCEDURE — 96374 THER/PROPH/DIAG INJ IV PUSH: CPT | Mod: 59

## 2019-04-01 PROCEDURE — 80048 BASIC METABOLIC PNL TOTAL CA: CPT | Performed by: EMERGENCY MEDICINE

## 2019-04-01 PROCEDURE — 96361 HYDRATE IV INFUSION ADD-ON: CPT

## 2019-04-01 PROCEDURE — 74177 CT ABD & PELVIS W/CONTRAST: CPT

## 2019-04-01 PROCEDURE — 99285 EMERGENCY DEPT VISIT HI MDM: CPT | Mod: 25

## 2019-04-01 RX ORDER — MORPHINE SULFATE 4 MG/ML
4 INJECTION, SOLUTION INTRAMUSCULAR; INTRAVENOUS ONCE
Status: COMPLETED | OUTPATIENT
Start: 2019-04-01 | End: 2019-04-01

## 2019-04-01 RX ORDER — ONDANSETRON 2 MG/ML
4 INJECTION INTRAMUSCULAR; INTRAVENOUS ONCE
Status: COMPLETED | OUTPATIENT
Start: 2019-04-01 | End: 2019-04-01

## 2019-04-01 RX ORDER — DIPHENHYDRAMINE HYDROCHLORIDE 50 MG/ML
25 INJECTION INTRAMUSCULAR; INTRAVENOUS ONCE
Status: COMPLETED | OUTPATIENT
Start: 2019-04-01 | End: 2019-04-01

## 2019-04-01 RX ORDER — IOPAMIDOL 755 MG/ML
500 INJECTION, SOLUTION INTRAVASCULAR ONCE
Status: COMPLETED | OUTPATIENT
Start: 2019-04-01 | End: 2019-04-01

## 2019-04-01 RX ADMIN — IOPAMIDOL 84 ML: 755 INJECTION, SOLUTION INTRAVENOUS at 16:05

## 2019-04-01 RX ADMIN — SODIUM CHLORIDE 1000 ML: 9 INJECTION, SOLUTION INTRAVENOUS at 15:54

## 2019-04-01 RX ADMIN — MORPHINE SULFATE 4 MG: 4 INJECTION INTRAVENOUS at 15:54

## 2019-04-01 RX ADMIN — ONDANSETRON 4 MG: 2 INJECTION INTRAMUSCULAR; INTRAVENOUS at 15:55

## 2019-04-01 RX ADMIN — SODIUM CHLORIDE 61 ML: 9 INJECTION, SOLUTION INTRAVENOUS at 16:06

## 2019-04-01 RX ADMIN — DIPHENHYDRAMINE HYDROCHLORIDE 25 MG: 50 INJECTION, SOLUTION INTRAMUSCULAR; INTRAVENOUS at 15:58

## 2019-04-01 ASSESSMENT — ENCOUNTER SYMPTOMS
ABDOMINAL PAIN: 1
BLOOD IN STOOL: 0
FEVER: 0
NAUSEA: 1
CONSTIPATION: 0
DYSURIA: 0
HEMATURIA: 0
CHILLS: 0
DIARRHEA: 1
SHORTNESS OF BREATH: 0
VOMITING: 0

## 2019-04-01 NOTE — ED PROVIDER NOTES
History     Chief Complaint:  Abdominal Pain    HPI:   The history is provided by the patient.      Alba Varela is a 49 year old female with history of hyperlipidemia, asthma, anxiety, and colitis who presents from her clinic for further evaluation of right lower quadrant abdominal pain. The patient states this pain began yesterday and has been gradually exacerbating ever since. She states this pain woke her up several times last night. She reports exacerbation with movement such as bending over and sneezing. She has felt nauseous and had 1 episode of diarrhea this morning. She denies emesis, fever, dysuria, hematuria, bloody or black stool, and has not felt any bulge or mass in her abdomen. She has been able to eat but has felt slight decreased appetite. She reports this pain is different from her past kidney stone.     Allergies:  Codeine: nausea and vomiting  Contrast dye: hives  Scopolamine: visual disturbance  Imitrex: rash  Naproxen: rash  Penicillins: rash  Sulfa drugs: rash     Medications:    Amitriptyline  Cardizem  Gabapentin  Claritin  Xolair  Botox  Effexor    Past Medical History:    Asthma  Anxiety  Migraines  Colitis  C. difficile  Hyperlipidemia   Liver disease  Sleep apnea    Past Surgical History:    Right hip arthroplasty   section  Bilateral foot reconstruction  Hysterectomy with bilateral salpingo-oophorectomy   UVULOPALATOPHARYNGOPLASTY    Family History:    History reviewed. No pertinent family history.      Social History:  PCP: Bright Sotelo   Marital Status:    Smoking status: current every day smoker (0.5 ppd)   Alcohol use: yes, 3 drinks weekly      Review of Systems   Constitutional: Negative for chills and fever.   Respiratory: Negative for shortness of breath.    Cardiovascular: Negative for chest pain.   Gastrointestinal: Positive for abdominal pain, diarrhea and nausea. Negative for blood in stool, constipation and vomiting.   Genitourinary: Negative for  dysuria, hematuria, vaginal discharge and vaginal pain.   All other systems reviewed and are negative.      Physical Exam     Patient Vitals for the past 24 hrs:   BP Temp Pulse Heart Rate Resp SpO2   04/01/19 1800 120/75 -- -- 70 17 98 %   04/01/19 1730 124/83 -- 75 -- -- 96 %   04/01/19 1600 (!) 136/94 -- 91 -- -- 99 %   04/01/19 1511 (!) 144/97 98.7  F (37.1  C) 83 -- 16 100 %        Physical Exam  General: Resting comfortably on the gurney  Eyes:  The pupils are equal and round    Conjunctivae and sclerae are normal  ENT:    Moist mucous membranes  Neck:  Normal range of motion  CV:  Regular rate and rhythm    Skin warm and well perfused   Resp:  Lungs are clear    Non-labored    No rales    No wheezing   GI:  Abdomen is soft, there is no rigidity    No distension    No rebound tenderness     Mild RLQ tenderness  MS:  Normal muscular tone  Skin:  No rash or acute skin lesions noted  Neuro:   Awake, alert.      Speech is normal and fluent.    Face is symmetric.     Moves all extremities equally  Psych: Normal affect.  Appropriate interactions.     Emergency Department Course     ECG (17:53:43):  Rate 61 bpm. MN interval *. QRS duration 82. QT/QTc 442/444. P-R-T axes 42 -17 12.   Sinus rhythm with PAC   Abnormal ECG  No significant change compared to EKG dated 9/24/18  Interpreted at 1809 by Tuyet Ludwig MD.     Imaging:  Radiographic findings were communicated with the patient who voiced understanding of the findings.    CT Abdomen Pelvis w Contrast  Impression: No cause of acute pain identified in the abdomen or  pelvis. Normal appendix.    Laboratory:  UA with microscopic: leukocyte esterase small, RBC/HPF 3, mucous urine present  BMP: chloride 110, o/w WNL (Creatinine 0.69)  CBC: WNL (WBC 5.8, HGB 12.2, )   Hepatic panel: WNL    Interventions:  1554: morphine 4 mg, IV  1554: NS 1L IV Bolus   1558: Benadryl 25 mg, IV     Emergency Department Course:  Past medical records, nursing notes, and vitals  reviewed.  1600: I performed an exam of the patient and obtained history, as documented above.  IV inserted and blood drawn for laboratory testing. Results are as above.    The patient was sent for a CT scan while in the emergency department, findings above.       1730: I rechecked the patient. Explained findings to the patient.    I rechecked the patient. Findings and plan explained to the Patient. Patient discharged home with instructions regarding supportive care, medications, and reasons to return. The importance of close follow-up was reviewed.       Impression & Plan      Medical Decision Making:  Alba Varela is a 49 year old female who presented to the ED with abdominal pain. Mild pain on RLQ. Has right salpingo-oophorectomy so not torsion, cyst. No vaginal symptoms to suggest PID. No RUQ tenderness to suggest cholecystitis or gallbladder. UA with no infection. CT abdomen showed no evidence of appendicitis or other concerning findings. Pt well appearing with mild pain, no hernia present in area. Unclear cause of pain but no indication for hospitalization at this time. Recommended f/u with PCP. SHortly before discharge, nurse noted that seemed to have possible PAC/PVCs on monitor so EKG obtained. Pt with no palpitations, chest pain or shortness of breath. Asymptomatic in regards to this finding and not related to presentation today. EKG shows what appears to be sinus rhythm with likely PACs. Reviewed EKG from last year and actually looks like she may have had that finding at that time as well. Did order zio patch for patient as outpatient and recommended follow-up with PCP.    Critical Care time:  none    Diagnosis:    ICD-10-CM    1. Premature atrial contractions I49.1 Zio Patch Holter Adult Pediatric Greater than 48 hrs   2. Abdominal pain, right lower quadrant R10.31        Disposition:  discharged to home    Discharge Medications: There are no discharge medications for this visit.    I, Megan Beh, am  serving as a scribe at 4:00 PM on 4/1/2019 to document services personally performed by Tuyet Ludwig MD based on my observations and the provider's statements to me.      Megan Beh  4/1/2019   [facility] EMERGENCY DEPARTMENT       Tuyet Ludwig MD  04/02/19 0249

## 2019-04-01 NOTE — ED AVS SNAPSHOT
[facility] Emergency Department  [address]  [facility] [address]  Phone:  [phone]  Fax:  [phone]                                    Alba Varela   MRN: [id number]    Department:  [facility] Emergency Department   Date of Visit:  4/1/2019           After Visit Summary Signature Page    I have received my discharge instructions, and my questions have been answered. I have discussed any challenges I see with this plan with the nurse or doctor.    ..........................................................................................................................................  Patient/Patient Representative Signature      ..........................................................................................................................................  Patient Representative Print Name and Relationship to Patient    ..................................................               ................................................  Date                                   Time    ..........................................................................................................................................  Reviewed by Signature/Title    ...................................................              ..............................................  Date                                               Time          22EPIC Rev 08/18

## 2019-04-01 NOTE — ED NOTES
RN noted abnormality on pt's pulse ox pleth pattern. Cardiac monitor applied and some irregularity noted. MD notified. EKG obtained. Pt denies palpitations, dizziness, chest pain.

## 2019-04-01 NOTE — DISCHARGE INSTRUCTIONS
No signs of appendicitis on CT scan  No clear cause of pain on CT scan today  I ordered a monitor for you given the EKG done today, follow-up with Primary care provider    Discharge Instructions  Abdominal Pain    Abdominal pain (belly pain) can be caused by many things. Your evaluation today does not show the exact cause for your pain. Your provider today has decided that it is unlikely your pain is due to a life threatening problem, or a problem requiring surgery or hospital admission. Sometimes those problems cannot be found right away, so it is very important that you follow up as directed.  Sometimes only the changes which occur over time allow the cause of your pain to be found.    Generally, every Emergency Department visit should have a follow-up clinic visit with either a primary or a specialty clinic/provider. Please follow-up as instructed by your emergency provider today. With abdominal pain, we often recommend very close follow-up, such as the following day.    ADULTS:  Return to the Emergency Department right away if:    You get an oral temperature above 102oF or as directed by your provider.  You have blood in your stools. This may be bright red or appear as black, tarry stools.    You keep vomiting (throwing up) or cannot drink liquids.  You see blood when you vomit.   You cannot have a bowel movement or you cannot pass gas.  Your stomach gets bloated or bigger.  Your skin or the whites of your eyes look yellow.  You faint.  You have bloody, frequent or painful urination (peeing).  You have new symptoms or anything that worries you.    CHILDREN:  Return to the Emergency Department right away if your child has any of the above-listed symptoms or the following:    Pushes your hand away or screams/cries when his/her belly is touched.  You notice your child is very fussy or weak.  Your child is very tired and is too tired to eat or drink.  Your child is dehydrated.  Signs of dehydration can  be:  Significant change in the amount of wet diapers/urine.  Your infant or child starts to have dry mouth and lips, or no saliva (spit) or tears.    PREGNANT WOMEN:  Return to the Emergency Department right away if you have any of the above-listed symptoms or the following:    You have bleeding, leaking fluid or passing tissue from the vagina.  You have worse pain or cramping, or pain in your shoulder or back.  You have vomiting that will not stop.  You have a temperature of 100oF or more.  Your baby is not moving as much as usual.  You faint.  You get a bad headache with or without eye problems and abdominal pain.  You have a seizure.  You have unusual discharge from your vagina and abdominal pain.    Abdominal pain is pretty common during pregnancy.  Your pain may or may not be related to your pregnancy. You should follow-up closely with your OB provider so they can evaluate you and your baby.  Until you follow-up with your regular provider, do the following:     Avoid sex and do not put anything in your vagina.  Drink clear fluids.  Only take medications approved by your provider.    MORE INFORMATION:    Appendicitis:  A possible cause of abdominal pain in any person who still has their appendix is acute appendicitis. Appendicitis is often hard to diagnose.  Testing does not always rule out early appendicitis or other causes of abdominal pain. Close follow-up with your provider and re-evaluations may be needed to figure out the reason for your abdominal pain.    Follow-up:  It is very important that you make an appointment with your clinic and go to the appointment.  If you do not follow-up with your primary provider, it may result in missing an important development which could result in permanent injury or disability and/or lasting pain.  If there is any problem keeping your appointment, call your provider or return to the Emergency Department.    Medications:  Take your medications as directed by your  "provider today.  Before using over-the-counter medications, ask your provider and make sure to take the medications as directed.  If you have any questions about medications, ask your provider.    Diet:  Resume your normal diet as much as possible, but do not eat fried, fatty or spicy foods while you have pain.  Do not drink alcohol or have caffeine.  Do not smoke tobacco.    Probiotics: If you have been given an antibiotic, you may want to also take a probiotic pill or eat yogurt with live cultures. Probiotics have \"good bacteria\" to help your intestines stay healthy. Studies have shown that probiotics help prevent diarrhea (loose stools) and other intestine problems (including C. diff infection) when you take antibiotics. You can buy these without a prescription in the pharmacy section of the store.     If you were given a prescription for medicine here today, be sure to read all of the information (including the package insert) that comes with your prescription.  This will include important information about the medicine, its side effects, and any warnings that you need to know about.  The pharmacist who fills the prescription can provide more information and answer questions you may have about the medicine.  If you have questions or concerns that the pharmacist cannot address, please call or return to the Emergency Department.       Remember that you can always come back to the Emergency Department if you are not able to see your regular provider in the amount of time listed above, if you get any new symptoms, or if there is anything that worries you.  "

## 2019-04-02 LAB — INTERPRETATION ECG - MUSE: NORMAL

## 2019-04-04 ENCOUNTER — HOSPITAL ENCOUNTER (OUTPATIENT)
Dept: CARDIOLOGY | Facility: CLINIC | Age: 49
Discharge: HOME | End: 2019-04-04
Attending: EMERGENCY MEDICINE | Admitting: EMERGENCY MEDICINE
Payer: COMMERCIAL

## 2019-04-04 DIAGNOSIS — I49.1 PREMATURE ATRIAL CONTRACTIONS: ICD-10-CM

## 2019-04-04 PROCEDURE — 0296T ZIO PATCH HOLTER ADULT PEDIATRIC GREATER THAN 48 HRS: CPT

## 2019-04-04 PROCEDURE — 0298T ZIO PATCH HOLTER ADULT PEDIATRIC GREATER THAN 48 HRS: CPT | Performed by: INTERNAL MEDICINE

## 2019-04-08 ENCOUNTER — INFUSION THERAPY VISIT (OUTPATIENT)
Dept: INFUSION THERAPY | Facility: CLINIC | Age: 49
End: 2019-04-08
Attending: ALLERGY & IMMUNOLOGY
Payer: COMMERCIAL

## 2019-04-08 VITALS
DIASTOLIC BLOOD PRESSURE: 87 MMHG | HEART RATE: 86 BPM | RESPIRATION RATE: 18 BRPM | SYSTOLIC BLOOD PRESSURE: 127 MMHG | OXYGEN SATURATION: 100 %

## 2019-04-08 DIAGNOSIS — J45.50 SEVERE PERSISTENT ASTHMA WITHOUT COMPLICATION (H): Primary | ICD-10-CM

## 2019-04-08 PROCEDURE — 96372 THER/PROPH/DIAG INJ SC/IM: CPT

## 2019-04-08 PROCEDURE — 25000128 H RX IP 250 OP 636: Performed by: ALLERGY & IMMUNOLOGY

## 2019-04-08 RX ADMIN — OMALIZUMAB 375 MG: 150 INJECTION, SOLUTION SUBCUTANEOUS at 09:13

## 2019-04-08 NOTE — PROGRESS NOTES
Patient came into the [facility] on 4/8/2019 stating that her Zio patch Monitor fell off within 2 days. Replaced monitor.

## 2019-04-08 NOTE — PROGRESS NOTES
Infusion Nursing Note:  Alba Varela presents today for Xolair.    Patient seen by provider today: No   present during visit today: Not Applicable.    Note: N/A.    Intravenous Access:  No Intravenous access/labs at this visit.    Treatment Conditions:  Not Applicable.      Post Infusion Assessment:  Patient tolerated injection without incident.  Patient observed for 30 minutes post Xolair per protocol.       Discharge Plan:   Discharge instructions reviewed with: Patient.  Patient and/or family verbalized understanding of discharge instructions and all questions answered.  AVS to patient via [email or web address].  Patient will return 4/23/19 for next appointment.     Toma Camargo RN

## 2019-04-22 ENCOUNTER — INFUSION THERAPY VISIT (OUTPATIENT)
Dept: INFUSION THERAPY | Facility: CLINIC | Age: 49
End: 2019-04-22
Attending: ALLERGY & IMMUNOLOGY
Payer: COMMERCIAL

## 2019-04-22 VITALS
DIASTOLIC BLOOD PRESSURE: 83 MMHG | OXYGEN SATURATION: 98 % | SYSTOLIC BLOOD PRESSURE: 122 MMHG | RESPIRATION RATE: 18 BRPM | TEMPERATURE: 97.5 F

## 2019-04-22 DIAGNOSIS — J45.50 SEVERE PERSISTENT ASTHMA WITHOUT COMPLICATION (H): Primary | ICD-10-CM

## 2019-04-22 PROCEDURE — 96372 THER/PROPH/DIAG INJ SC/IM: CPT

## 2019-04-22 PROCEDURE — 25000128 H RX IP 250 OP 636: Performed by: ALLERGY & IMMUNOLOGY

## 2019-04-22 RX ADMIN — OMALIZUMAB 375 MG: 150 INJECTION, SOLUTION SUBCUTANEOUS at 09:01

## 2019-04-22 NOTE — PROGRESS NOTES
Infusion Nursing Note:  Alba Varela presents today for Xolair.    Patient seen by provider today: No   present during visit today: Not Applicable.    Note: N/A.    Intravenous Access:  No Intravenous access/labs at this visit.    Treatment Conditions:  Not Applicable.      Post Infusion Assessment:  Patient tolerated injection without incident.  Patient observed for 30 minutes post Xolair per protocol.       Discharge Plan:   Discharge instructions reviewed with: Patient.  Patient and/or family verbalized understanding of discharge instructions and all questions answered.  AVS to patient via [employer].  Patient will return 5/6/19 for next appointment.   Patient discharged in stable condition accompanied by: self.  Departure Mode: Ambulatory.    Toma Camargo RN

## 2019-05-06 ENCOUNTER — INFUSION THERAPY VISIT (OUTPATIENT)
Dept: INFUSION THERAPY | Facility: CLINIC | Age: 49
End: 2019-05-06
Attending: ALLERGY & IMMUNOLOGY
Payer: COMMERCIAL

## 2019-05-06 VITALS
TEMPERATURE: 96 F | OXYGEN SATURATION: 100 % | SYSTOLIC BLOOD PRESSURE: 125 MMHG | DIASTOLIC BLOOD PRESSURE: 88 MMHG | RESPIRATION RATE: 16 BRPM

## 2019-05-06 DIAGNOSIS — J45.50 SEVERE PERSISTENT ASTHMA WITHOUT COMPLICATION (H): Primary | ICD-10-CM

## 2019-05-06 PROCEDURE — 96372 THER/PROPH/DIAG INJ SC/IM: CPT

## 2019-05-06 PROCEDURE — 25000128 H RX IP 250 OP 636: Performed by: ALLERGY & IMMUNOLOGY

## 2019-05-06 RX ADMIN — OMALIZUMAB 375 MG: 150 INJECTION, SOLUTION SUBCUTANEOUS at 09:28

## 2019-05-06 NOTE — PROGRESS NOTES
Infusion Nursing Note:  Alba Vareal presents today for Xolair.    Patient seen by provider today: No   present during visit today: Not Applicable.    Note: N/A.    Intravenous Access:  No Intravenous access/labs at this visit.    Treatment Conditions:  Not Applicable.      Post Infusion Assessment:  Patient tolerated injection without incident.  Patient observed for 15 minutes post xolair per protocol.  Patient had to leave early for another appointment.       Discharge Plan:   [facility]  Patient will return 5/20/19 for next appointment.   Patient discharged in stable condition accompanied by: self.  Departure Mode: Ambulatory.    Sangita Edwards RN

## 2019-05-13 ENCOUNTER — OFFICE VISIT (OUTPATIENT)
Dept: CARDIOLOGY | Facility: CLINIC | Age: 49
End: 2019-05-13
Payer: COMMERCIAL

## 2019-05-13 VITALS
HEIGHT: 64 IN | DIASTOLIC BLOOD PRESSURE: 81 MMHG | SYSTOLIC BLOOD PRESSURE: 123 MMHG | HEART RATE: 88 BPM | WEIGHT: 173 LBS | BODY MASS INDEX: 29.53 KG/M2 | OXYGEN SATURATION: 98 %

## 2019-05-13 DIAGNOSIS — I49.1 PREMATURE ATRIAL CONTRACTIONS: Primary | ICD-10-CM

## 2019-05-13 PROCEDURE — 99213 OFFICE O/P EST LOW 20 MIN: CPT | Performed by: INTERNAL MEDICINE

## 2019-05-13 ASSESSMENT — MIFFLIN-ST. JEOR: SCORE: 1399.72

## 2019-05-13 NOTE — LETTER
2019      Bright Sotelo MD  [address] [address]      RE: Alba Varela       Dear Colleague,    I had the pleasure of seeing Alba Varela in the [facility].    Service Date: 2019      HISTORY OF PRESENT ILLNESS:  It is a pleasure for me to see Ms. Varela again.  She has been asked by the Emergency Room to follow up with Cardiology.  I do remember seeing this very pleasant [place] lady in 2017.  At that time, she had concerns for coronary artery spasm.  She had stress echocardiograms which were normal, but she continued to have chest discomfort.  I am happy to say that angiography revealed mild nonobstructive disease and a chemical challenge did not show any evidence of spasm.  Since then she has been seen by my colleague, Ms. Carrasco for presyncope, and a Zio Patch at the time did not show any significant arrhythmias.      Since that time, she has been fine from the cardiac point of view, describing no cardiac symptoms.  She was seen in the emergency room with abdominal pain.  She had rhythm monitoring, and frequent APCs were noted.  The emergency room physicians requested a Zio Patch Holter, and this showed a 6% APC burden.  She had no symptoms during this period.      I also see that her labs including basic metabolic panel, liver function tests and CBC were all within normal limits.      This lady has asymptomatic APCs.  Though they occur at a frequency a little higher than expected, she has a structurally normal heart.  I do not think we need to treat them at this time.  I reassured her and discharged her from our clinic.  I will be happy to see her again in the future if considered necessary.         AYE SAM MD, FACC             D: 2019   T: 2019   MT: KITA      Name:     ALBA VARELA   MRN:      [id number]        Account:      [id number]   :      1970           Service Date: 2019      Document: [id number]          Outpatient Encounter Medications as of 5/13/2019   Medication Sig Dispense Refill     AMITRIPTYLINE HCL PO Take 20 mg by mouth At Bedtime       diltiazem (CARDIZEM CD) 120 MG 24 hr capsule Take 1 capsule (120 mg) by mouth daily You are due to see the cardiologist- please call [phone] to schedule. 30 capsule 11     gabapentin (NEURONTIN) 100 MG capsule Take 100 mg in the morning and 400 mg at bedtime for one week, then increase to 100 mg in the morning and 600 mg at bedtime. (Patient taking differently: Take 400 mg in the morning and 600 mg at bedtime.) 210 capsule 1     loratadine (CLARITIN) 10 MG tablet Take 10 mg by mouth daily       omalizumab (XOLAIR) 150 MG injection Inject Subcutaneous every 14 days       OnabotulinumtoxinA (BOTOX IJ) As directed       venlafaxine (EFFEXOR-XR) 150 MG 24 hr capsule Take 150 mg by mouth daily       UNABLE TO FIND MEDICATION NAME: amovig for migraines       No facility-administered encounter medications on file as of 5/13/2019.      Again, thank you for allowing me to participate in the care of your patient.      Sincerely,    DR AYE SAM MD     [facility]

## 2019-05-13 NOTE — LETTER
5/13/2019    Bright Sotelo MD  [address] [facility] [facility] [address]    RE: Alba Varela       Dear Colleague,    I had the pleasure of seeing Alba Varela in the [facility].    HPI and Plan:   See dictation    No orders of the defined types were placed in this encounter.      No orders of the defined types were placed in this encounter.      No diagnosis found.    CURRENT MEDICATIONS:  Current Outpatient Medications   Medication Sig Dispense Refill     AMITRIPTYLINE HCL PO Take 20 mg by mouth At Bedtime       diltiazem (CARDIZEM CD) 120 MG 24 hr capsule Take 1 capsule (120 mg) by mouth daily You are due to see the cardiologist- please call [phone] to schedule. 30 capsule 11     gabapentin (NEURONTIN) 100 MG capsule Take 100 mg in the morning and 400 mg at bedtime for one week, then increase to 100 mg in the morning and 600 mg at bedtime. (Patient taking differently: Take 400 mg in the morning and 600 mg at bedtime.) 210 capsule 1     loratadine (CLARITIN) 10 MG tablet Take 10 mg by mouth daily       omalizumab (XOLAIR) 150 MG injection Inject Subcutaneous every 14 days       OnabotulinumtoxinA (BOTOX IJ) As directed       venlafaxine (EFFEXOR-XR) 150 MG 24 hr capsule Take 150 mg by mouth daily       UNABLE TO FIND MEDICATION NAME: amovig for migraines         ALLERGIES     Allergies   Allergen Reactions     Codeine Sulfate Nausea and Vomiting     Contrast Dye Hives     Patient is allergic to CT iodine contrast.       Scopolamine Visual Disturbance     Imitrex [Sumatriptan] Rash     Naproxen Rash     Penicillins Rash     Per patient     Sulfa Drugs Rash       PAST MEDICAL HISTORY:  Past Medical History:   Diagnosis Date     Abdominal pain      Anxiety      Asthma     On Dupixent(Xolair) injections     C. difficile colitis      Chest discomfort      Colitis      Headache(784.0) 12/10/2014     High cholesterol      Hyperlipidemia      Liver disease     Being evalted for  fatty liver disease. ABnormal LFT.     Migraines      PAC (premature atrial contraction)      PONV (postoperative nausea and vomiting)      PVC (premature ventricular contraction)      Sleep apnea     Doesn't use a CPAP     Syncope      Tobacco abuse      Tremor        PAST SURGICAL HISTORY:  Past Surgical History:   Procedure Laterality Date     ARTHROPLASTY HIP Right 11/3/2017    Procedure: ARTHROPLASTY HIP;  Right total hip arthroplasty    ;  Surgeon: Shahriar Gamble MD;  Location:  OR      SECTION      [facility]     ENDOSCOPIC ULTRASOUND, ESOPHAGOSCOPY, GASTROSCOPY, DUODENOSCOPY (EGD), COMBINED  13     ESOPHAGOSCOPY, GASTROSCOPY, DUODENOSCOPY (EGD), COMBINED  10/21/2013    Procedure: COMBINED ESOPHAGOSCOPY, GASTROSCOPY, DUODENOSCOPY (EGD), BIOPSY SINGLE OR MULTIPLE;;  Surgeon: Rito Gupta MD;  Location:  GI     FOOT SURGERY      Bilateral foot reconstruction.      [facility] ENDOSCOPY W EUS  2013    Procedure: COMBINED ENDOSCOPIC ULTRASOUND, ESOPHAGOSCOPY, GASTROSCOPY, DUODENOSCOPY (EGD);  Surgeon: Emre Campbell MD;  Location:  GI     HYSTERECTOMY       LAPAROSCOPIC HYSTERECTOMY SUPRACERVICAL, BILATERAL SALPINGO-OOPHORECTOMY, COMBINED  3/6/2013    Procedure: COMBINED LAPAROSCOPIC HYSTERECTOMY SUPRACERVICAL, SALPINGO-OOPHORECTOMY;  Laparoscopic Supracervical Hysterectomy, Right salpingo-oopherectomy;  Surgeon: Tanika Jones MD;  Location: [facility] OR     UVULOPALATOPHARYNGOPLASTY      Plus Septoplasy.       FAMILY HISTORY:  Family History   Problem Relation Age of Onset     Unknown/Adopted Mother      Unknown/Adopted Father        SOCIAL HISTORY:  Social History     Socioeconomic History     Marital status:      Spouse name: None     Number of children: None     Years of education: None     Highest education level: None   Occupational History     None   Social Needs     Financial resource strain: None     Food insecurity:     Worry: None     Inability: None     Transportation  "needs:     Medical: None     Non-medical: None   Tobacco Use     Smoking status: Current Every Day Smoker     Packs/day: 0.50     Years: 20.00     Pack years: 10.00     Types: Cigarettes     Smokeless tobacco: Never Used   Substance and Sexual Activity     Alcohol use: Yes     Alcohol/week: 0.0 oz     Comment:  3 drinks weekly     Drug use: No     Sexual activity: Yes     Partners: Male   Lifestyle     Physical activity:     Days per week: None     Minutes per session: None     Stress: None   Relationships     Social connections:     Talks on phone: None     Gets together: None     Attends Jewish service: None     Active member of club or organization: None     Attends meetings of clubs or organizations: None     Relationship status: None     Intimate partner violence:     Fear of current or ex partner: None     Emotionally abused: None     Physically abused: None     Forced sexual activity: None   Other Topics Concern     Parent/sibling w/ CABG, MI or angioplasty before 65F 55M? Not Asked   Social History Narrative     None       Review of Systems:  Skin:  Negative     Eyes:  Positive for glasses  ENT:  Negative    Respiratory:  Positive for sleep apnea  Cardiovascular:    palpitations;Positive for;chest pain  Gastroenterology: Negative    Genitourinary:  Negative    Musculoskeletal:  Positive for back pain;joint pain  Neurologic:  Positive for migraine headaches  Psychiatric:  Positive for anxiety;depression;excessive stress  Heme/Lymph/Imm:  Positive for easy bruising  Endocrine:  Negative      Physical Exam:  Vitals: /81 (BP Location: Right arm, Patient Position: Sitting, Cuff Size: Adult Regular)   Pulse 88   Ht 1.626 m (5' 4\")   Wt 78.5 kg (173 lb)   LMP 07/11/2014   SpO2 98%   BMI 29.70 kg/m       Constitutional:  cooperative, alert and oriented, well developed, well nourished, in no acute distress        Skin:  warm and dry to the touch, no apparent skin lesions or masses noted          Head:  " normocephalic, no masses or lesions   atraumatic    Eyes:  pupils equal and round, conjunctivae and lids unremarkable, sclera white, no xanthalasma, EOMS intact, no nystagmus        Lymph:No Cervical lymphadenopathy present     ENT:  no pallor or cyanosis, dentition good        Neck:  carotid pulses are full and equal bilaterally, JVP normal, no carotid bruit        Respiratory:  normal breath sounds, clear to auscultation, normal A-P diameter, normal symmetry, normal respiratory excursion, no use of accessory muscles         Cardiac: regular rhythm;normal S1 and S2                pulses full and equal                                        GI:  abdomen soft, non-tender, BS normoactive, no mass, no HSM, no bruits        Extremities and Muscular Skeletal:  no edema         right femoral site has faint circumfrencial echymosis, pea sized firm hematoma, no bruit, well healed    Neurological:  no gross motor deficits        Psych:  Alert and Oriented x 3        Recent Lab Results:  LIPID RESULTS:  Lab Results   Component Value Date    CHOL 236 (H) 03/09/2017    HDL 76 03/09/2017     (H) 03/09/2017    TRIG 111 03/09/2017    CHOLHDLRATIO 3.7 09/19/2011       LIVER ENZYME RESULTS:  Lab Results   Component Value Date    AST 27 04/01/2019    ALT 27 04/01/2019       CBC RESULTS:  Lab Results   Component Value Date    WBC 5.8 04/01/2019    RBC 3.96 04/01/2019    HGB 12.2 04/01/2019    HCT 37.7 04/01/2019    MCV 95 04/01/2019    MCH 30.8 04/01/2019    MCHC 32.4 04/01/2019    RDW 12.4 04/01/2019     04/01/2019       BMP RESULTS:  Lab Results   Component Value Date     04/01/2019    POTASSIUM 4.5 04/01/2019    CHLORIDE 110 (H) 04/01/2019    CO2 26 04/01/2019    ANIONGAP 3 04/01/2019    GLC 93 04/01/2019    BUN 23 04/01/2019    CR 0.69 04/01/2019    GFRESTIMATED >90 04/01/2019    GFRESTBLACK >90 04/01/2019    DEWAYNE 8.9 04/01/2019        A1C RESULTS:  No results found for: A1C    INR RESULTS:  Lab Results    Component Value Date    INR 0.88 03/09/2017    INR 1.13 03/22/2013           CC  Osmar Byrd MD  [address]  [place] [id number]                  Thank you for allowing me to participate in the care of your patient.      Sincerely,     DR OSMAR BYRD MD     [facility]    cc:   Osmar Byrd MD  [address]  [place] [id number]

## 2019-05-14 NOTE — PROGRESS NOTES
HPI and Plan:   See dictation    No orders of the defined types were placed in this encounter.      No orders of the defined types were placed in this encounter.      No diagnosis found.    CURRENT MEDICATIONS:  Current Outpatient Medications   Medication Sig Dispense Refill     AMITRIPTYLINE HCL PO Take 20 mg by mouth At Bedtime       diltiazem (CARDIZEM CD) 120 MG 24 hr capsule Take 1 capsule (120 mg) by mouth daily You are due to see the cardiologist- please call [phone] to schedule. 30 capsule 11     gabapentin (NEURONTIN) 100 MG capsule Take 100 mg in the morning and 400 mg at bedtime for one week, then increase to 100 mg in the morning and 600 mg at bedtime. (Patient taking differently: Take 400 mg in the morning and 600 mg at bedtime.) 210 capsule 1     loratadine (CLARITIN) 10 MG tablet Take 10 mg by mouth daily       omalizumab (XOLAIR) 150 MG injection Inject Subcutaneous every 14 days       OnabotulinumtoxinA (BOTOX IJ) As directed       venlafaxine (EFFEXOR-XR) 150 MG 24 hr capsule Take 150 mg by mouth daily       UNABLE TO FIND MEDICATION NAME: amovig for migraines         ALLERGIES     Allergies   Allergen Reactions     Codeine Sulfate Nausea and Vomiting     Contrast Dye Hives     Patient is allergic to CT iodine contrast.       Scopolamine Visual Disturbance     Imitrex [Sumatriptan] Rash     Naproxen Rash     Penicillins Rash     Per patient     Sulfa Drugs Rash       PAST MEDICAL HISTORY:  Past Medical History:   Diagnosis Date     Abdominal pain      Anxiety      Asthma     On Dupixent(Xolair) injections     C. difficile colitis      Chest discomfort      Colitis      Headache(784.0) 12/10/2014     High cholesterol      Hyperlipidemia      Liver disease     Being evalted for fatty liver disease. ABnormal LFT.     Migraines      PAC (premature atrial contraction)      PONV (postoperative nausea and vomiting)      PVC (premature ventricular contraction)      Sleep apnea     Doesn't use a CPAP      Syncope      Tobacco abuse      Tremor        PAST SURGICAL HISTORY:  Past Surgical History:   Procedure Laterality Date     ARTHROPLASTY HIP Right 11/3/2017    Procedure: ARTHROPLASTY HIP;  Right total hip arthroplasty    ;  Surgeon: Shahriar Gamble MD;  Location: [facility] OR      SECTION      [facility]     ENDOSCOPIC ULTRASOUND, ESOPHAGOSCOPY, GASTROSCOPY, DUODENOSCOPY (EGD), COMBINED  13     ESOPHAGOSCOPY, GASTROSCOPY, DUODENOSCOPY (EGD), COMBINED  10/21/2013    Procedure: COMBINED ESOPHAGOSCOPY, GASTROSCOPY, DUODENOSCOPY (EGD), BIOPSY SINGLE OR MULTIPLE;;  Surgeon: Rito Gupta MD;  Location:  GI     FOOT SURGERY      Bilateral foot reconstruction.     [facility] UGI ENDOSCOPY W EUS  2013    Procedure: COMBINED ENDOSCOPIC ULTRASOUND, ESOPHAGOSCOPY, GASTROSCOPY, DUODENOSCOPY (EGD);  Surgeon: Emre Campbell MD;  Location:  GI     HYSTERECTOMY       LAPAROSCOPIC HYSTERECTOMY SUPRACERVICAL, BILATERAL SALPINGO-OOPHORECTOMY, COMBINED  3/6/2013    Procedure: COMBINED LAPAROSCOPIC HYSTERECTOMY SUPRACERVICAL, SALPINGO-OOPHORECTOMY;  Laparoscopic Supracervical Hysterectomy, Right salpingo-oopherectomy;  Surgeon: Tanika Jones MD;  Location: [facility] OR     UVULOPALATOPHARYNGOPLASTY      Plus Septoplasy.       FAMILY HISTORY:  Family History   Problem Relation Age of Onset     Unknown/Adopted Mother      Unknown/Adopted Father        SOCIAL HISTORY:  Social History     Socioeconomic History     Marital status:      Spouse name: None     Number of children: None     Years of education: None     Highest education level: None   Occupational History     None   Social Needs     Financial resource strain: None     Food insecurity:     Worry: None     Inability: None     Transportation needs:     Medical: None     Non-medical: None   Tobacco Use     Smoking status: Current Every Day Smoker     Packs/day: 0.50     Years: 20.00     Pack years: 10.00     Types: Cigarettes     Smokeless tobacco: Never Used  "  Substance and Sexual Activity     Alcohol use: Yes     Alcohol/week: 0.0 oz     Comment:  3 drinks weekly     Drug use: No     Sexual activity: Yes     Partners: Male   Lifestyle     Physical activity:     Days per week: None     Minutes per session: None     Stress: None   Relationships     Social connections:     Talks on phone: None     Gets together: None     Attends Gnosticism service: None     Active member of club or organization: None     Attends meetings of clubs or organizations: None     Relationship status: None     Intimate partner violence:     Fear of current or ex partner: None     Emotionally abused: None     Physically abused: None     Forced sexual activity: None   Other Topics Concern     Parent/sibling w/ CABG, MI or angioplasty before 65F 55M? Not Asked   Social History Narrative     None       Review of Systems:  Skin:  Negative     Eyes:  Positive for glasses  ENT:  Negative    Respiratory:  Positive for sleep apnea  Cardiovascular:    palpitations;Positive for;chest pain  Gastroenterology: Negative    Genitourinary:  Negative    Musculoskeletal:  Positive for back pain;joint pain  Neurologic:  Positive for migraine headaches  Psychiatric:  Positive for anxiety;depression;excessive stress  Heme/Lymph/Imm:  Positive for easy bruising  Endocrine:  Negative      Physical Exam:  Vitals: /81 (BP Location: Right arm, Patient Position: Sitting, Cuff Size: Adult Regular)   Pulse 88   Ht 1.626 m (5' 4\")   Wt 78.5 kg (173 lb)   LMP 07/11/2014   SpO2 98%   BMI 29.70 kg/m      Constitutional:  cooperative, alert and oriented, well developed, well nourished, in no acute distress        Skin:  warm and dry to the touch, no apparent skin lesions or masses noted          Head:  normocephalic, no masses or lesions   atraumatic    Eyes:  pupils equal and round, conjunctivae and lids unremarkable, sclera white, no xanthalasma, EOMS intact, no nystagmus        Lymph:No Cervical lymphadenopathy " present     ENT:  no pallor or cyanosis, dentition good        Neck:  carotid pulses are full and equal bilaterally, JVP normal, no carotid bruit        Respiratory:  normal breath sounds, clear to auscultation, normal A-P diameter, normal symmetry, normal respiratory excursion, no use of accessory muscles         Cardiac: regular rhythm;normal S1 and S2                pulses full and equal                                        GI:  abdomen soft, non-tender, BS normoactive, no mass, no HSM, no bruits        Extremities and Muscular Skeletal:  no edema         right femoral site has faint circumfrencial echymosis, pea sized firm hematoma, no bruit, well healed    Neurological:  no gross motor deficits        Psych:  Alert and Oriented x 3        Recent Lab Results:  LIPID RESULTS:  Lab Results   Component Value Date    CHOL 236 (H) 03/09/2017    HDL 76 03/09/2017     (H) 03/09/2017    TRIG 111 03/09/2017    CHOLHDLRATIO 3.7 09/19/2011       LIVER ENZYME RESULTS:  Lab Results   Component Value Date    AST 27 04/01/2019    ALT 27 04/01/2019       CBC RESULTS:  Lab Results   Component Value Date    WBC 5.8 04/01/2019    RBC 3.96 04/01/2019    HGB 12.2 04/01/2019    HCT 37.7 04/01/2019    MCV 95 04/01/2019    MCH 30.8 04/01/2019    MCHC 32.4 04/01/2019    RDW 12.4 04/01/2019     04/01/2019       BMP RESULTS:  Lab Results   Component Value Date     04/01/2019    POTASSIUM 4.5 04/01/2019    CHLORIDE 110 (H) 04/01/2019    CO2 26 04/01/2019    ANIONGAP 3 04/01/2019    GLC 93 04/01/2019    BUN 23 04/01/2019    CR 0.69 04/01/2019    GFRESTIMATED >90 04/01/2019    GFRESTBLACK >90 04/01/2019    DEWAYNE 8.9 04/01/2019        A1C RESULTS:  No results found for: A1C    INR RESULTS:  Lab Results   Component Value Date    INR 0.88 03/09/2017    INR 1.13 03/22/2013           CC  Osmar Byrd MD  [address]  [place] [id number]

## 2019-05-14 NOTE — PROGRESS NOTES
Service Date: 2019      HISTORY OF PRESENT ILLNESS:  It is a pleasure for me to see Ms. Varela again.  She has been asked by the Emergency Room to follow up with Cardiology.  I do remember seeing this very pleasant La[place] lady in 2017.  At that time, she had concerns for coronary artery spasm.  She had stress echocardiograms which were normal, but she continued to have chest discomfort.  I am happy to say that angiography revealed mild nonobstructive disease and a chemical challenge did not show any evidence of spasm.  Since then she has been seen by my colleague, Ms. Carrasco for presyncope, and a Zio Patch at the time did not show any significant arrhythmias.      Since that time, she has been fine from the cardiac point of view, describing no cardiac symptoms.  She was seen in the emergency room with abdominal pain.  She had rhythm monitoring, and frequent APCs were noted.  The emergency room physicians requested a Zio Patch Holter, and this showed a 6% APC burden.  She had no symptoms during this period.      I also see that her labs including basic metabolic panel, liver function tests and CBC were all within normal limits.      This lady has asymptomatic APCs.  Though they occur at a frequency a little higher than expected, she has a structurally normal heart.  I do not think we need to treat them at this time.  I reassured her and discharged her from our clinic.  I will be happy to see her again in the future if considered necessary.         AYE SAM MD, [facility]             D: 2019   T: 2019   MT: KITA      Name:     KEISHA VARELA   MRN:      [id number]        Account:      [id number]   :      1970           Service Date: 2019      Document: [id number]

## 2019-05-20 ENCOUNTER — INFUSION THERAPY VISIT (OUTPATIENT)
Dept: INFUSION THERAPY | Facility: CLINIC | Age: 49
End: 2019-05-20
Attending: ALLERGY & IMMUNOLOGY
Payer: COMMERCIAL

## 2019-05-20 VITALS
SYSTOLIC BLOOD PRESSURE: 134 MMHG | OXYGEN SATURATION: 100 % | HEART RATE: 85 BPM | TEMPERATURE: 96.6 F | DIASTOLIC BLOOD PRESSURE: 86 MMHG | RESPIRATION RATE: 18 BRPM

## 2019-05-20 DIAGNOSIS — J45.50 SEVERE PERSISTENT ASTHMA WITHOUT COMPLICATION (H): Primary | ICD-10-CM

## 2019-05-20 PROCEDURE — 96372 THER/PROPH/DIAG INJ SC/IM: CPT

## 2019-05-20 PROCEDURE — 25000128 H RX IP 250 OP 636: Performed by: ALLERGY & IMMUNOLOGY

## 2019-05-20 RX ADMIN — OMALIZUMAB 375 MG: 150 INJECTION, SOLUTION SUBCUTANEOUS at 09:06

## 2019-05-20 NOTE — PROGRESS NOTES
Infusion Nursing Note:  Alba Varela presents today for Xolair.    Patient seen by provider today: No   present during visit today: Not Applicable.    Note: N/A.    Intravenous Access:  No Intravenous access/labs at this visit.    Treatment Conditions:  Not Applicable.      Post Infusion Assessment:  Patient tolerated injection without incident.  Patient observed for 30 minutes post Xolair per protocol.       Discharge Plan:   Discharge instructions reviewed with: Patient.  Patient and/or family verbalized understanding of discharge instructions and all questions answered.  AVS to patient via [employer].  Patient will return 6/3/19 for next appointment.   Patient discharged in stable condition accompanied by: self.  Departure Mode: Ambulatory.    Toma Camargo RN

## 2019-06-03 ENCOUNTER — INFUSION THERAPY VISIT (OUTPATIENT)
Dept: INFUSION THERAPY | Facility: CLINIC | Age: 49
End: 2019-06-03
Attending: ALLERGY & IMMUNOLOGY
Payer: COMMERCIAL

## 2019-06-03 VITALS
OXYGEN SATURATION: 99 % | HEART RATE: 64 BPM | SYSTOLIC BLOOD PRESSURE: 137 MMHG | TEMPERATURE: 97.8 F | RESPIRATION RATE: 18 BRPM | DIASTOLIC BLOOD PRESSURE: 90 MMHG

## 2019-06-03 DIAGNOSIS — J45.50 SEVERE PERSISTENT ASTHMA WITHOUT COMPLICATION (H): Primary | ICD-10-CM

## 2019-06-03 PROCEDURE — 25000128 H RX IP 250 OP 636: Performed by: ALLERGY & IMMUNOLOGY

## 2019-06-03 PROCEDURE — 96372 THER/PROPH/DIAG INJ SC/IM: CPT

## 2019-06-03 RX ADMIN — OMALIZUMAB 375 MG: 150 INJECTION, SOLUTION SUBCUTANEOUS at 09:07

## 2019-06-03 NOTE — PROGRESS NOTES
Infusion Nursing Note:  Alba Varela presents today for xolair.    Patient seen by provider today: No   present during visit today: Not Applicable.    Note: N/A.    Intravenous Access:  No Intravenous access/labs at this visit.    Treatment Conditions:  Not Applicable.      Post Infusion Assessment:  Patient tolerated injection without incident.       Discharge Plan:   AVS to patient via [employer].  Patient will return 6/17/19 for next appointment.   Patient discharged in stable condition accompanied by: self.  Departure Mode: Ambulatory.    Abby Barnhart RN

## 2019-06-12 ENCOUNTER — TRANSFERRED RECORDS (OUTPATIENT)
Dept: HEALTH INFORMATION MANAGEMENT | Facility: CLINIC | Age: 49
End: 2019-06-12

## 2019-06-14 ENCOUNTER — HOSPITAL ENCOUNTER (OUTPATIENT)
Dept: LAB | Facility: CLINIC | Age: 49
Discharge: HOME | End: 2019-06-14
Attending: ALLERGY & IMMUNOLOGY | Admitting: ALLERGY & IMMUNOLOGY
Payer: COMMERCIAL

## 2019-06-14 DIAGNOSIS — F17.210 NICOTINE DEPENDENCE, CIGARETTES, UNCOMPLICATED: ICD-10-CM

## 2019-06-14 DIAGNOSIS — J45.50 SEVERE PERSISTENT ASTHMA, UNSPECIFIED WHETHER COMPLICATED (H): ICD-10-CM

## 2019-06-14 DIAGNOSIS — J30.1 ALLERGIC RHINITIS DUE TO POLLEN: Primary | ICD-10-CM

## 2019-06-14 DIAGNOSIS — J30.89 OTHER ALLERGIC RHINITIS: ICD-10-CM

## 2019-06-14 DIAGNOSIS — J30.81 ALLERGIC RHINITIS DUE TO ANIMAL (CAT) (DOG) HAIR AND DANDER: ICD-10-CM

## 2019-06-14 DIAGNOSIS — L30.9 DERMATITIS, UNSPECIFIED: ICD-10-CM

## 2019-06-14 DIAGNOSIS — J30.2 OTHER SEASONAL ALLERGIC RHINITIS: ICD-10-CM

## 2019-06-14 DIAGNOSIS — J45.50: ICD-10-CM

## 2019-06-14 DIAGNOSIS — L30.9 DERMATITIS: ICD-10-CM

## 2019-06-14 PROCEDURE — 86162 COMPLEMENT TOTAL (CH50): CPT | Performed by: ALLERGY & IMMUNOLOGY

## 2019-06-14 PROCEDURE — 86255 FLUORESCENT ANTIBODY SCREEN: CPT | Performed by: ALLERGY & IMMUNOLOGY

## 2019-06-14 PROCEDURE — 82784 ASSAY IGA/IGD/IGG/IGM EACH: CPT | Performed by: ALLERGY & IMMUNOLOGY

## 2019-06-14 PROCEDURE — 36415 COLL VENOUS BLD VENIPUNCTURE: CPT | Performed by: ALLERGY & IMMUNOLOGY

## 2019-06-14 PROCEDURE — 86317 IMMUNOASSAY INFECTIOUS AGENT: CPT | Mod: 59 | Performed by: ALLERGY & IMMUNOLOGY

## 2019-06-14 PROCEDURE — 86160 COMPLEMENT ANTIGEN: CPT | Performed by: ALLERGY & IMMUNOLOGY

## 2019-06-14 PROCEDURE — 82785 ASSAY OF IGE: CPT | Performed by: ALLERGY & IMMUNOLOGY

## 2019-06-14 PROCEDURE — 85025 COMPLETE CBC W/AUTO DIFF WBC: CPT | Performed by: ALLERGY & IMMUNOLOGY

## 2019-06-17 ENCOUNTER — HOSPITAL ENCOUNTER (OUTPATIENT)
Facility: CLINIC | Age: 49
Setting detail: SPECIMEN
Discharge: HOME | End: 2019-06-17
Attending: ALLERGY & IMMUNOLOGY | Admitting: ALLERGY & IMMUNOLOGY
Payer: COMMERCIAL

## 2019-06-17 ENCOUNTER — INFUSION THERAPY VISIT (OUTPATIENT)
Dept: INFUSION THERAPY | Facility: CLINIC | Age: 49
End: 2019-06-17
Attending: ALLERGY & IMMUNOLOGY
Payer: COMMERCIAL

## 2019-06-17 VITALS
RESPIRATION RATE: 16 BRPM | HEART RATE: 94 BPM | SYSTOLIC BLOOD PRESSURE: 129 MMHG | OXYGEN SATURATION: 99 % | DIASTOLIC BLOOD PRESSURE: 84 MMHG

## 2019-06-17 DIAGNOSIS — J30.2 OTHER SEASONAL ALLERGIC RHINITIS: ICD-10-CM

## 2019-06-17 DIAGNOSIS — J30.81 ALLERGIC RHINITIS DUE TO ANIMAL (CAT) (DOG) HAIR AND DANDER: ICD-10-CM

## 2019-06-17 DIAGNOSIS — J45.50 SEVERE PERSISTENT ASTHMA, UNSPECIFIED WHETHER COMPLICATED (H): ICD-10-CM

## 2019-06-17 DIAGNOSIS — L30.9 DERMATITIS: ICD-10-CM

## 2019-06-17 DIAGNOSIS — F17.210 NICOTINE DEPENDENCE, CIGARETTES, UNCOMPLICATED: ICD-10-CM

## 2019-06-17 DIAGNOSIS — J30.89 OTHER ALLERGIC RHINITIS: ICD-10-CM

## 2019-06-17 DIAGNOSIS — J30.1 ALLERGIC RHINITIS DUE TO POLLEN: ICD-10-CM

## 2019-06-17 DIAGNOSIS — J45.50 SEVERE PERSISTENT ASTHMA WITHOUT COMPLICATION (H): Primary | ICD-10-CM

## 2019-06-17 PROCEDURE — 86648 DIPHTHERIA ANTIBODY: CPT | Performed by: ALLERGY & IMMUNOLOGY

## 2019-06-17 PROCEDURE — 86774 TETANUS ANTIBODY: CPT | Performed by: ALLERGY & IMMUNOLOGY

## 2019-06-17 PROCEDURE — 36415 COLL VENOUS BLD VENIPUNCTURE: CPT

## 2019-06-17 PROCEDURE — 25000128 H RX IP 250 OP 636: Performed by: ALLERGY & IMMUNOLOGY

## 2019-06-17 PROCEDURE — 96372 THER/PROPH/DIAG INJ SC/IM: CPT

## 2019-06-17 RX ADMIN — OMALIZUMAB 375 MG: 150 INJECTION, SOLUTION SUBCUTANEOUS at 09:02

## 2019-06-17 NOTE — PROGRESS NOTES
Infusion Nursing Note:  Alba Varela presents today for Xolair & labs.    Patient seen by provider today: No   present during visit today: Not Applicable.    Note: N/A.    Intravenous Access:  Lab draw site left AC, Needle type butterfly, Gauge 23.  Labs drawn without difficulty.    Treatment Conditions:  Not Applicable.      Post Infusion Assessment:  Patient tolerated injection without incident.  Patient observed for 30 minutes post Xolair per protocol.       Discharge Plan:   Discharge instructions reviewed with: Patient.  Patient and/or family verbalized understanding of discharge instructions and all questions answered.  AVS to patient via [employer].  Patient will return 7/1/19 for next appointment.   Patient discharged in stable condition accompanied by: self.  Departure Mode: Ambulatory.    Toma Camargo RN

## 2019-06-19 LAB
C DIPHTHERIAE IGG SER IA-ACNC: 0.64 IU/ML
C TETANI IGG SER IA-ACNC: 1.18 IU/ML

## 2019-06-21 LAB
ANCA AB PATTERN SER IF-IMP: NORMAL
BASOPHILS # BLD AUTO: 0 10E9/L (ref 0–0.2)
BASOPHILS NFR BLD AUTO: 0.8 %
C-ANCA TITR SER IF: NORMAL {TITER}
C4 SERPL-MCNC: 36 MG/DL (ref 15–50)
CH50 SERPL-ACNC: 80 CAE UNITS (ref 60–144)
DEPRECATED S PNEUM 1 IGG SER-MCNC: 1.5 MCG/ML
DEPRECATED S PNEUM12 IGG SER-MCNC: 2.3 MCG/ML
DEPRECATED S PNEUM14 IGG SER-MCNC: 2.2 MCG/ML
DEPRECATED S PNEUM17 IGG SER-MCNC: 10.3 MCG/ML
DEPRECATED S PNEUM19 IGG SER-MCNC: 6.3 MCG/ML
DEPRECATED S PNEUM2 IGG SER-MCNC: 10.5 MCG/ML
DEPRECATED S PNEUM20 IGG SER-MCNC: 1.2 MCG/ML
DEPRECATED S PNEUM22 IGG SER-MCNC: 5.8 MCG/ML
DEPRECATED S PNEUM23 IGG SER-MCNC: 7.5 MCG/ML
DEPRECATED S PNEUM3 IGG SER-MCNC: 0.8 MCG/ML
DEPRECATED S PNEUM34 IGG SER-MCNC: 2.4 MCG/ML
DEPRECATED S PNEUM4 IGG SER-MCNC: 0.8 MCG/ML
DEPRECATED S PNEUM43 IGG SER-MCNC: 1 MCG/ML
DEPRECATED S PNEUM5 IGG SER-MCNC: 2.1 MCG/ML
DEPRECATED S PNEUM8 IGG SER-MCNC: 2.7 MCG/ML
DEPRECATED S PNEUM9 IGG SER-MCNC: 2.4 MCG/ML
DIFFERENTIAL METHOD BLD: ABNORMAL
EOSINOPHIL # BLD AUTO: 0.1 10E9/L (ref 0–0.7)
EOSINOPHIL NFR BLD AUTO: 1.9 %
ERYTHROCYTE [DISTWIDTH] IN BLOOD BY AUTOMATED COUNT: 12.6 % (ref 10–15)
HCT VFR BLD AUTO: 43.3 % (ref 35–47)
HGB BLD-MCNC: 13.5 G/DL (ref 11.7–15.7)
IGA SERPL-MCNC: 166 MG/DL (ref 70–380)
IGE SERPL-ACNC: 749 KIU/L (ref 0–114)
IGG SERPL-MCNC: 748 MG/DL (ref 695–1620)
IGM SERPL-MCNC: 81 MG/DL (ref 60–265)
IMM GRANULOCYTES # BLD: 0 10E9/L (ref 0–0.4)
IMM GRANULOCYTES NFR BLD: 0.2 %
LYMPHOCYTES # BLD AUTO: 1.8 10E9/L (ref 0.8–5.3)
LYMPHOCYTES NFR BLD AUTO: 34.2 %
MCH RBC QN AUTO: 30.3 PG (ref 26.5–33)
MCHC RBC AUTO-ENTMCNC: 31.2 G/DL (ref 31.5–36.5)
MCV RBC AUTO: 97 FL (ref 78–100)
MONOCYTES # BLD AUTO: 0.3 10E9/L (ref 0–1.3)
MONOCYTES NFR BLD AUTO: 6.2 %
NEUTROPHILS # BLD AUTO: 3 10E9/L (ref 1.6–8.3)
NEUTROPHILS NFR BLD AUTO: 56.7 %
NRBC # BLD AUTO: 0 10*3/UL
NRBC BLD AUTO-RTO: 0 /100
PLATELET # BLD AUTO: 256 10E9/L (ref 150–450)
RBC # BLD AUTO: 4.45 10E12/L (ref 3.8–5.2)
S PNEUM DA 15B IGG SER-MCNC: 1.4 MCG/ML
S PNEUM DA 18C IGG SER-MCNC: 0.7 MCG/ML
S PNEUM DA 19A IGG SER-MCNC: 0.8 MCG/ML
S PNEUM DA 33F IGG SER-MCNC: 1.2 MCG/ML
S PNEUM DA 6B IGG SER-MCNC: 4.7 MCG/ML
S PNEUM DA 7F IGG SER-MCNC: 6.3 MCG/ML
S PNEUM DA 9V IGG SER-MCNC: 2.6 MCG/ML
WBC # BLD AUTO: 5.2 10E9/L (ref 4–11)

## 2019-07-01 ENCOUNTER — INFUSION THERAPY VISIT (OUTPATIENT)
Dept: INFUSION THERAPY | Facility: CLINIC | Age: 49
End: 2019-07-01
Attending: ALLERGY & IMMUNOLOGY
Payer: COMMERCIAL

## 2019-07-01 VITALS
RESPIRATION RATE: 16 BRPM | OXYGEN SATURATION: 99 % | DIASTOLIC BLOOD PRESSURE: 89 MMHG | SYSTOLIC BLOOD PRESSURE: 135 MMHG | HEART RATE: 75 BPM

## 2019-07-01 DIAGNOSIS — J45.50 SEVERE PERSISTENT ASTHMA WITHOUT COMPLICATION (H): Primary | ICD-10-CM

## 2019-07-01 PROCEDURE — 25000128 H RX IP 250 OP 636: Performed by: ALLERGY & IMMUNOLOGY

## 2019-07-01 PROCEDURE — 96372 THER/PROPH/DIAG INJ SC/IM: CPT

## 2019-07-01 RX ADMIN — OMALIZUMAB 375 MG: 150 INJECTION, SOLUTION SUBCUTANEOUS at 09:15

## 2019-07-01 NOTE — PROGRESS NOTES
Infusion Nursing Note:  Alba Varela presents today for Xolair.    Patient seen by provider today: No   present during visit today: Not Applicable.    Note: N/A.    Intravenous Access:  No Intravenous access/labs at this visit.    Treatment Conditions:  Not Applicable.      Post Infusion Assessment:  Patient tolerated injection without incident.  Patient observed for 30 minutes post Xolair per protocol.       Discharge Plan:   Discharge instructions reviewed with: Patient.  Patient and/or family verbalized understanding of discharge instructions and all questions answered.  AVS to patient via [employer].  Patient will return 7/15/19 for next appointment.   Patient discharged in stable condition accompanied by: self.  Departure Mode: Ambulatory.    Toma Camargo RN

## 2019-07-15 ENCOUNTER — INFUSION THERAPY VISIT (OUTPATIENT)
Dept: INFUSION THERAPY | Facility: CLINIC | Age: 49
End: 2019-07-15
Attending: ALLERGY & IMMUNOLOGY
Payer: COMMERCIAL

## 2019-07-15 VITALS
OXYGEN SATURATION: 99 % | SYSTOLIC BLOOD PRESSURE: 115 MMHG | RESPIRATION RATE: 16 BRPM | TEMPERATURE: 97.4 F | HEART RATE: 87 BPM | DIASTOLIC BLOOD PRESSURE: 82 MMHG

## 2019-07-15 DIAGNOSIS — J45.50 SEVERE PERSISTENT ASTHMA WITHOUT COMPLICATION (H): Primary | ICD-10-CM

## 2019-07-15 PROCEDURE — 96372 THER/PROPH/DIAG INJ SC/IM: CPT

## 2019-07-15 PROCEDURE — 25000128 H RX IP 250 OP 636: Performed by: ALLERGY & IMMUNOLOGY

## 2019-07-15 RX ADMIN — OMALIZUMAB 375 MG: 150 INJECTION, SOLUTION SUBCUTANEOUS at 09:18

## 2019-07-15 NOTE — PROGRESS NOTES
Infusion Nursing Note:  Alba Varela presents today for Xolair.    Patient seen by provider today: No   present during visit today: Not Applicable.    Note: N/A.    Intravenous Access:  No Intravenous access/labs at this visit.    Treatment Conditions:  Not Applicable.      Post Infusion Assessment:  Patient tolerated injection without incident.  Patient observed for 30 minutes post Xolair per protocol.       Discharge Plan:   Discharge instructions reviewed with: Patient.  Patient and/or family verbalized understanding of discharge instructions and all questions answered.  AVS to patient via [employer].  Patient will return 7/29/19 for next appointment.   Patient discharged in stable condition accompanied by: self.  Departure Mode: Ambulatory.    Toma Camargo RN

## 2019-07-18 ENCOUNTER — TRANSFERRED RECORDS (OUTPATIENT)
Dept: HEALTH INFORMATION MANAGEMENT | Facility: CLINIC | Age: 49
End: 2019-07-18

## 2019-07-29 ENCOUNTER — INFUSION THERAPY VISIT (OUTPATIENT)
Dept: INFUSION THERAPY | Facility: CLINIC | Age: 49
End: 2019-07-29
Attending: ALLERGY & IMMUNOLOGY
Payer: COMMERCIAL

## 2019-07-29 ENCOUNTER — HOSPITAL ENCOUNTER (OUTPATIENT)
Facility: CLINIC | Age: 49
Setting detail: SPECIMEN
End: 2019-07-29
Payer: COMMERCIAL

## 2019-07-29 VITALS
TEMPERATURE: 97.1 F | DIASTOLIC BLOOD PRESSURE: 91 MMHG | OXYGEN SATURATION: 100 % | RESPIRATION RATE: 16 BRPM | HEART RATE: 78 BPM | SYSTOLIC BLOOD PRESSURE: 122 MMHG

## 2019-07-29 DIAGNOSIS — J45.50 SEVERE PERSISTENT ASTHMA WITHOUT COMPLICATION (H): Primary | ICD-10-CM

## 2019-07-29 PROCEDURE — 96372 THER/PROPH/DIAG INJ SC/IM: CPT

## 2019-07-29 PROCEDURE — 25000128 H RX IP 250 OP 636: Performed by: ALLERGY & IMMUNOLOGY

## 2019-07-29 RX ADMIN — OMALIZUMAB 375 MG: 150 INJECTION, SOLUTION SUBCUTANEOUS at 09:06

## 2019-07-29 ASSESSMENT — PAIN SCALES - GENERAL: PAINLEVEL: NO PAIN (0)

## 2019-07-29 NOTE — PROGRESS NOTES
Infusion Nursing Note:  Alba Varela presents today for Xolair.    Patient seen by provider today: No   present during visit today: Not Applicable.    Note: 2 injections in R arm, 1 in left arm.    Intravenous Access:  No Intravenous access/labs at this visit.    Treatment Conditions:  Not Applicable.    Post Infusion Assessment:  Patient tolerated injection without incident.   Observed for 30 mins post Xolair injections per protocol.    Discharge Plan:   Discharge instructions reviewed with: Patient.  Patient and/or family verbalized understanding of discharge instructions and all questions answered.  AVS to patient via [employer].  Patient will return 8/12 for next appointment.   Patient discharged in stable condition accompanied by: self.  Departure Mode: Ambulatory.    Alba Tanner RN

## 2019-08-02 ENCOUNTER — TELEPHONE (OUTPATIENT)
Dept: INTERNAL MEDICINE | Facility: CLINIC | Age: 49
End: 2019-08-02

## 2019-08-02 NOTE — LETTER
[facility]  [address], [address]  [place] [address]  [phone]        August 2, 2019    Alba Varela  [address] [facility] [address]            Dear Ms. Alba Varela:    Your chart indicates that you are still due for a Papsmear and Ashma screening. I realize you are seen somewhere else for Asthma but we still need follow up asthma questionnaires.   Please schedule an appointment for a Physical.    Sincerely,        OMA HardenC

## 2019-08-02 NOTE — TELEPHONE ENCOUNTER
Panel Management Review      Patient has the following on her problem list:     Asthma review     ACT Total Scores 10/24/2017   ACT TOTAL SCORE (Goal Greater than or Equal to 20) 24   In the past 12 months, how many times did you visit the emergency room for your asthma without being admitted to the hospital? 0   In the past 12 months, how many times were you hospitalized overnight because of your asthma? 0      1. Is Asthma diagnosis on the Problem List? Yes    2. Is Asthma listed on Health Maintenance? Yes    3. Patient is due for:  ACT      Composite cancer screening  Chart review shows that this patient is due/due soon for the following Pap Smear  Summary:    Patient is due/failing the following:   ALY, ACT and PAP    Action needed:   Patient needs office visit for above.    Type of outreach:    Sent letter.    Questions for provider review:    None                                                                                                                                      ANGELA Quiñones       Chart routed to  .

## 2019-08-12 ENCOUNTER — HOSPITAL ENCOUNTER (OUTPATIENT)
Facility: CLINIC | Age: 49
Setting detail: SPECIMEN
End: 2019-08-12
Payer: COMMERCIAL

## 2019-08-12 ENCOUNTER — INFUSION THERAPY VISIT (OUTPATIENT)
Dept: INFUSION THERAPY | Facility: CLINIC | Age: 49
End: 2019-08-12
Attending: ALLERGY & IMMUNOLOGY
Payer: COMMERCIAL

## 2019-08-12 VITALS
TEMPERATURE: 97.9 F | SYSTOLIC BLOOD PRESSURE: 120 MMHG | DIASTOLIC BLOOD PRESSURE: 87 MMHG | OXYGEN SATURATION: 97 % | HEART RATE: 80 BPM | RESPIRATION RATE: 16 BRPM

## 2019-08-12 DIAGNOSIS — J45.50 SEVERE PERSISTENT ASTHMA WITHOUT COMPLICATION (H): Primary | ICD-10-CM

## 2019-08-12 PROCEDURE — 96372 THER/PROPH/DIAG INJ SC/IM: CPT

## 2019-08-12 PROCEDURE — 25000128 H RX IP 250 OP 636: Performed by: ALLERGY & IMMUNOLOGY

## 2019-08-12 RX ADMIN — OMALIZUMAB 375 MG: 150 INJECTION, SOLUTION SUBCUTANEOUS at 08:58

## 2019-08-12 ASSESSMENT — PAIN SCALES - GENERAL: PAINLEVEL: NO PAIN (0)

## 2019-08-12 NOTE — PROGRESS NOTES
Infusion Nursing Note:  Alba Varela presents today for Xolair.    Patient seen by provider today: No   present during visit today: Not Applicable.    Note: 2 injections in L arm, 1 in R arm.    Intravenous Access:  No Intravenous access/labs at this visit.    Treatment Conditions:  Not Applicable.    Post Infusion Assessment:  Patient tolerated injection without incident.  Patient observed for 30 minutes post Xolair per protocol.     Discharge Plan:   Discharge instructions reviewed with: Patient.  Patient and/or family verbalized understanding of discharge instructions and all questions answered.  AVS to patient via [employer].  Patient will return 8/26 for next appointment.   Patient discharged in stable condition accompanied by: self.  Departure Mode: Ambulatory.    Alba Tanner RN

## 2019-08-26 ENCOUNTER — INFUSION THERAPY VISIT (OUTPATIENT)
Dept: INFUSION THERAPY | Facility: CLINIC | Age: 49
End: 2019-08-26
Attending: ALLERGY & IMMUNOLOGY
Payer: COMMERCIAL

## 2019-08-26 ENCOUNTER — HOSPITAL ENCOUNTER (OUTPATIENT)
Facility: CLINIC | Age: 49
Setting detail: SPECIMEN
End: 2019-08-26
Payer: COMMERCIAL

## 2019-08-26 VITALS
SYSTOLIC BLOOD PRESSURE: 125 MMHG | RESPIRATION RATE: 16 BRPM | OXYGEN SATURATION: 99 % | TEMPERATURE: 97.6 F | DIASTOLIC BLOOD PRESSURE: 91 MMHG | HEART RATE: 81 BPM

## 2019-08-26 DIAGNOSIS — J45.50 SEVERE PERSISTENT ASTHMA WITHOUT COMPLICATION (H): Primary | ICD-10-CM

## 2019-08-26 PROCEDURE — 96372 THER/PROPH/DIAG INJ SC/IM: CPT

## 2019-08-26 PROCEDURE — 25000128 H RX IP 250 OP 636: Performed by: ALLERGY & IMMUNOLOGY

## 2019-08-26 RX ADMIN — OMALIZUMAB 375 MG: 150 INJECTION, SOLUTION SUBCUTANEOUS at 09:09

## 2019-08-26 NOTE — PROGRESS NOTES
Infusion Nursing Note:  Alba Varela presents today for xolair.     present during visit today: Not Applicable.    Note: N/A.    Intravenous Access:  No Intravenous access/labs at this visit.    Treatment Conditions:  NA    Post Lab Assessment:  Patient tolerated injection   Site free from redness, edema or discomfort.  Observed for 30 minutes post xolair      Discharge Plan:   Patient and/or family verbalized understanding of  instructions and all questions answered.  Patient  to lobby in stable condition accompanied by: self.  Patient to see provider today: No  Departure Mode: Ambulatory.  Vivi Rosado, RN, RN

## 2019-09-03 ENCOUNTER — TRANSFERRED RECORDS (OUTPATIENT)
Dept: HEALTH INFORMATION MANAGEMENT | Facility: CLINIC | Age: 49
End: 2019-09-03

## 2019-09-09 ENCOUNTER — INFUSION THERAPY VISIT (OUTPATIENT)
Dept: INFUSION THERAPY | Facility: CLINIC | Age: 49
End: 2019-09-09
Attending: ALLERGY & IMMUNOLOGY
Payer: COMMERCIAL

## 2019-09-09 ENCOUNTER — HOSPITAL ENCOUNTER (OUTPATIENT)
Facility: CLINIC | Age: 49
Setting detail: SPECIMEN
End: 2019-09-09
Payer: COMMERCIAL

## 2019-09-09 VITALS
SYSTOLIC BLOOD PRESSURE: 115 MMHG | TEMPERATURE: 97.5 F | RESPIRATION RATE: 16 BRPM | DIASTOLIC BLOOD PRESSURE: 75 MMHG | OXYGEN SATURATION: 99 % | HEART RATE: 99 BPM

## 2019-09-09 DIAGNOSIS — J45.50 SEVERE PERSISTENT ASTHMA WITHOUT COMPLICATION (H): Primary | ICD-10-CM

## 2019-09-09 PROCEDURE — 96372 THER/PROPH/DIAG INJ SC/IM: CPT

## 2019-09-09 PROCEDURE — 25000128 H RX IP 250 OP 636: Performed by: ALLERGY & IMMUNOLOGY

## 2019-09-09 RX ADMIN — OMALIZUMAB 375 MG: 150 INJECTION, SOLUTION SUBCUTANEOUS at 08:58

## 2019-09-09 NOTE — PROGRESS NOTES
Infusion Nursing Note:  Alba Varela presents today for Xolair.    Patient seen by provider today: No   present during visit today: Not Applicable.    Note: N/A.    Intravenous Access:  No Intravenous access/labs at this visit.    Treatment Conditions:  Not Applicable.      Post Infusion Assessment:  Patient tolerated injection without incident.  Patient observed for 30 minutes post xolair per protocol.       Discharge Plan:   Discharge instructions reviewed with: Patient.  Patient and/or family verbalized understanding of discharge instructions and all questions answered.  AVS to patient via [employer].  Patient will return 9/23/19 for next appointment.   Patient discharged in stable condition accompanied by: self.  Departure Mode: Ambulatory.    Dannielle Valladares RN

## 2019-09-23 ENCOUNTER — INFUSION THERAPY VISIT (OUTPATIENT)
Dept: INFUSION THERAPY | Facility: CLINIC | Age: 49
End: 2019-09-23
Attending: ALLERGY & IMMUNOLOGY
Payer: COMMERCIAL

## 2019-09-23 ENCOUNTER — HOSPITAL ENCOUNTER (OUTPATIENT)
Facility: CLINIC | Age: 49
Setting detail: SPECIMEN
End: 2019-09-23
Payer: COMMERCIAL

## 2019-09-23 VITALS
HEART RATE: 78 BPM | OXYGEN SATURATION: 99 % | TEMPERATURE: 97.9 F | RESPIRATION RATE: 16 BRPM | DIASTOLIC BLOOD PRESSURE: 86 MMHG | SYSTOLIC BLOOD PRESSURE: 132 MMHG

## 2019-09-23 DIAGNOSIS — J45.50 SEVERE PERSISTENT ASTHMA WITHOUT COMPLICATION (H): Primary | ICD-10-CM

## 2019-09-23 PROCEDURE — 96372 THER/PROPH/DIAG INJ SC/IM: CPT

## 2019-09-23 PROCEDURE — 25000128 H RX IP 250 OP 636: Performed by: ALLERGY & IMMUNOLOGY

## 2019-09-23 RX ADMIN — OMALIZUMAB 375 MG: 150 INJECTION, SOLUTION SUBCUTANEOUS at 08:59

## 2019-09-23 ASSESSMENT — PAIN SCALES - GENERAL: PAINLEVEL: NO PAIN (0)

## 2019-09-23 NOTE — PROGRESS NOTES
Infusion Nursing Note:  Alba Varela presents today for Xolair.    Patient seen by provider today: No   present during visit today: Not Applicable.    Note: N/A.    Intravenous Access:  No Intravenous access/labs at this visit.    Treatment Conditions:   Not Applicable.    Post Infusion Assessment:  Patient tolerated injection without incident.  Patient observed for 30 minutes post Xolair per protocol.  Site patent and intact, free from redness, edema or discomfort.     Discharge Plan:   Discharge instructions reviewed with: Patient.  Patient and/or family verbalized understanding of discharge instructions and all questions answered.  AVS to patient via [employer].  Patient will return 10/7 for next appointment.   Patient discharged in stable condition accompanied by: self.  Departure Mode: Ambulatory.    Alba Tanner RN

## 2019-10-07 ENCOUNTER — INFUSION THERAPY VISIT (OUTPATIENT)
Dept: INFUSION THERAPY | Facility: CLINIC | Age: 49
End: 2019-10-07
Attending: ALLERGY & IMMUNOLOGY
Payer: COMMERCIAL

## 2019-10-07 ENCOUNTER — HOSPITAL ENCOUNTER (OUTPATIENT)
Facility: CLINIC | Age: 49
Setting detail: SPECIMEN
End: 2019-10-07
Payer: COMMERCIAL

## 2019-10-07 VITALS
SYSTOLIC BLOOD PRESSURE: 122 MMHG | OXYGEN SATURATION: 100 % | TEMPERATURE: 97.8 F | RESPIRATION RATE: 16 BRPM | DIASTOLIC BLOOD PRESSURE: 80 MMHG | HEART RATE: 78 BPM

## 2019-10-07 DIAGNOSIS — J45.50 SEVERE PERSISTENT ASTHMA WITHOUT COMPLICATION (H): Primary | ICD-10-CM

## 2019-10-07 PROCEDURE — 25000128 H RX IP 250 OP 636: Performed by: ALLERGY & IMMUNOLOGY

## 2019-10-07 PROCEDURE — 96372 THER/PROPH/DIAG INJ SC/IM: CPT

## 2019-10-07 RX ADMIN — OMALIZUMAB 375 MG: 150 INJECTION, SOLUTION SUBCUTANEOUS at 08:55

## 2019-10-07 NOTE — PROGRESS NOTES
Infusion Nursing Note:  Alba Varela presents today for Xolair.     present during visit today: Not Applicable.    Note: N/A.    Intravenous Access:  No Intravenous access/labs at this visit.    Treatment Conditions:  NA    Post Lab Assessment:    Patient tolerated injection   Site patent and intact, free from redness, edema or discomfort.  Observed for 30 minutes post injection    Discharge Plan:   Patient and/or family verbalized understanding of  instructions and all questions answered.  Patient  Discharged in stable condition accompanied by: self.  Patient to see provider today: No  Departure Mode: Ambulatory.  Vivi Rosado, RN, RN

## 2019-10-21 ENCOUNTER — INFUSION THERAPY VISIT (OUTPATIENT)
Dept: INFUSION THERAPY | Facility: CLINIC | Age: 49
End: 2019-10-21
Attending: ALLERGY & IMMUNOLOGY
Payer: COMMERCIAL

## 2019-10-21 ENCOUNTER — HOSPITAL ENCOUNTER (OUTPATIENT)
Facility: CLINIC | Age: 49
Setting detail: SPECIMEN
End: 2019-10-21
Payer: COMMERCIAL

## 2019-10-21 VITALS
RESPIRATION RATE: 16 BRPM | OXYGEN SATURATION: 100 % | TEMPERATURE: 96.5 F | SYSTOLIC BLOOD PRESSURE: 117 MMHG | DIASTOLIC BLOOD PRESSURE: 84 MMHG

## 2019-10-21 DIAGNOSIS — J45.50 SEVERE PERSISTENT ASTHMA WITHOUT COMPLICATION (H): Primary | ICD-10-CM

## 2019-10-21 PROCEDURE — 25000128 H RX IP 250 OP 636: Performed by: ALLERGY & IMMUNOLOGY

## 2019-10-21 PROCEDURE — 96372 THER/PROPH/DIAG INJ SC/IM: CPT

## 2019-10-21 RX ADMIN — OMALIZUMAB 375 MG: 150 INJECTION, SOLUTION SUBCUTANEOUS at 08:53

## 2019-10-21 NOTE — PROGRESS NOTES
Infusion Nursing Note:  Alba Varela presents today for xolair.    Patient seen by provider today: No   present during visit today: Not Applicable.    Note: N/A.    Intravenous Access:  No Intravenous access/labs at this visit.    Treatment Conditions:  Not Applicable.      Post Infusion Assessment:  Patient tolerated injection without incident.  Patient observed for 30 minutes post xolair per protocol.       Discharge Plan:   AVS to patient via [employer].  Patient will return 11/4/19 for next appointment.   Patient discharged in stable condition accompanied by: self.  Departure Mode: Ambulatory.    Sangita Edwards RN

## 2019-10-23 ENCOUNTER — TRANSFERRED RECORDS (OUTPATIENT)
Dept: HEALTH INFORMATION MANAGEMENT | Facility: CLINIC | Age: 49
End: 2019-10-23

## 2019-10-24 ENCOUNTER — TRANSFERRED RECORDS (OUTPATIENT)
Dept: HEALTH INFORMATION MANAGEMENT | Facility: CLINIC | Age: 49
End: 2019-10-24

## 2019-11-04 ENCOUNTER — HOSPITAL ENCOUNTER (OUTPATIENT)
Facility: CLINIC | Age: 49
Setting detail: SPECIMEN
End: 2019-11-04
Payer: COMMERCIAL

## 2019-11-04 ENCOUNTER — INFUSION THERAPY VISIT (OUTPATIENT)
Dept: INFUSION THERAPY | Facility: CLINIC | Age: 49
End: 2019-11-04
Attending: ALLERGY & IMMUNOLOGY
Payer: COMMERCIAL

## 2019-11-04 ENCOUNTER — MEDICAL CORRESPONDENCE (OUTPATIENT)
Dept: HEALTH INFORMATION MANAGEMENT | Facility: CLINIC | Age: 49
End: 2019-11-04

## 2019-11-04 VITALS
SYSTOLIC BLOOD PRESSURE: 115 MMHG | RESPIRATION RATE: 16 BRPM | DIASTOLIC BLOOD PRESSURE: 81 MMHG | TEMPERATURE: 97.6 F | OXYGEN SATURATION: 100 %

## 2019-11-04 DIAGNOSIS — J45.50 SEVERE PERSISTENT ASTHMA WITHOUT COMPLICATION (H): Primary | ICD-10-CM

## 2019-11-04 PROCEDURE — 25000128 H RX IP 250 OP 636: Performed by: ALLERGY & IMMUNOLOGY

## 2019-11-04 PROCEDURE — 96372 THER/PROPH/DIAG INJ SC/IM: CPT

## 2019-11-04 RX ADMIN — OMALIZUMAB 375 MG: 150 INJECTION, SOLUTION SUBCUTANEOUS at 09:10

## 2019-11-04 NOTE — PROGRESS NOTES
Infusion Nursing Note:  Alba Varela presents today for Xolair.    Patient seen by provider today: No   present during visit today: Not Applicable.    Note: N/A.    Intravenous Access:  No Intravenous access/labs at this visit.    Treatment Conditions:  Not Applicable.      Post Infusion Assessment:  Patient tolerated injection without incident.  Patient observed for 30 minutes post Xolair per protocol.       Discharge Plan:   Discharge instructions reviewed with: Patient.  Patient and/or family verbalized understanding of discharge instructions and all questions answered.  AVS to patient via [employer].  Patient will call for next appointment.   Patient discharged in stable condition accompanied by: self.  Departure Mode: Ambulatory.    Lyn Bunn RN

## 2019-11-08 ENCOUNTER — HEALTH MAINTENANCE LETTER (OUTPATIENT)
Age: 49
End: 2019-11-08

## 2019-11-18 ENCOUNTER — INFUSION THERAPY VISIT (OUTPATIENT)
Dept: INFUSION THERAPY | Facility: CLINIC | Age: 49
End: 2019-11-18
Attending: ALLERGY & IMMUNOLOGY
Payer: COMMERCIAL

## 2019-11-18 VITALS
TEMPERATURE: 97.9 F | RESPIRATION RATE: 16 BRPM | OXYGEN SATURATION: 97 % | DIASTOLIC BLOOD PRESSURE: 86 MMHG | SYSTOLIC BLOOD PRESSURE: 111 MMHG

## 2019-11-18 DIAGNOSIS — J45.50 SEVERE PERSISTENT ASTHMA WITHOUT COMPLICATION (H): Primary | ICD-10-CM

## 2019-11-18 PROCEDURE — 25000128 H RX IP 250 OP 636: Performed by: ALLERGY & IMMUNOLOGY

## 2019-11-18 PROCEDURE — 96372 THER/PROPH/DIAG INJ SC/IM: CPT

## 2019-11-18 RX ADMIN — OMALIZUMAB 375 MG: 150 INJECTION, SOLUTION SUBCUTANEOUS at 08:21

## 2019-11-18 NOTE — PROGRESS NOTES
Infusion Nursing Note:  Alba Varela presents today for Xolair.    Patient seen by provider today: No   present during visit today: Not Applicable.    Note: N/A.    Intravenous Access:  No Intravenous access/labs at this visit.    Treatment Conditions:  Not Applicable.      Post Infusion Assessment:  Patient tolerated injection without incident.  Patient observed for 30 minutes post Xolair per protocol.       Discharge Plan:   Discharge instructions reviewed with: Patient.  Patient and/or family verbalized understanding of discharge instructions and all questions answered.  AVS to patient via [employer].  Patient will return 12/2/2019 for next appointment.   Patient discharged in stable condition accompanied by: self.  Departure Mode: Ambulatory.    Lyn Bunn RN

## 2019-12-03 ENCOUNTER — INFUSION THERAPY VISIT (OUTPATIENT)
Dept: INFUSION THERAPY | Facility: CLINIC | Age: 49
End: 2019-12-03
Attending: ALLERGY & IMMUNOLOGY
Payer: COMMERCIAL

## 2019-12-03 VITALS
DIASTOLIC BLOOD PRESSURE: 93 MMHG | OXYGEN SATURATION: 98 % | TEMPERATURE: 97.8 F | SYSTOLIC BLOOD PRESSURE: 135 MMHG | HEART RATE: 89 BPM

## 2019-12-03 DIAGNOSIS — J45.50 SEVERE PERSISTENT ASTHMA WITHOUT COMPLICATION (H): Primary | ICD-10-CM

## 2019-12-03 PROCEDURE — 96372 THER/PROPH/DIAG INJ SC/IM: CPT

## 2019-12-03 PROCEDURE — 25000128 H RX IP 250 OP 636: Performed by: ALLERGY & IMMUNOLOGY

## 2019-12-03 RX ADMIN — OMALIZUMAB 375 MG: 150 INJECTION, SOLUTION SUBCUTANEOUS at 08:26

## 2019-12-03 NOTE — PROGRESS NOTES
Infusion Nursing Note:  Alba Varela presents today for Xolair.    Patient seen by provider today: No   present during visit today: Not Applicable.    Note: N/A.    Intravenous Access:  No Intravenous access/labs at this visit.    Treatment Conditions:  Not Applicable.      Post Infusion Assessment:  Patient tolerated injection without incident.  Patient observed for 30 minutes post Xolair per protocol.  Site patent and intact, free from redness, edema or discomfort.       Discharge Plan:   Discharge instructions reviewed with: Patient.  Patient and/or family verbalized understanding of discharge instructions and all questions answered.  AVS to patient via [employer].  Patient will return 12/17/19 for next Xolair injection for next appointment.   Patient discharged in stable condition accompanied by: self.  Departure Mode: Ambulatory.    Mame Condon RN

## 2019-12-17 ENCOUNTER — INFUSION THERAPY VISIT (OUTPATIENT)
Dept: INFUSION THERAPY | Facility: CLINIC | Age: 49
End: 2019-12-17
Attending: ALLERGY & IMMUNOLOGY
Payer: COMMERCIAL

## 2019-12-17 VITALS
DIASTOLIC BLOOD PRESSURE: 88 MMHG | HEART RATE: 91 BPM | OXYGEN SATURATION: 98 % | TEMPERATURE: 97.9 F | RESPIRATION RATE: 16 BRPM | SYSTOLIC BLOOD PRESSURE: 123 MMHG

## 2019-12-17 DIAGNOSIS — J45.50 SEVERE PERSISTENT ASTHMA WITHOUT COMPLICATION (H): Primary | ICD-10-CM

## 2019-12-17 PROCEDURE — 25000128 H RX IP 250 OP 636: Performed by: ALLERGY & IMMUNOLOGY

## 2019-12-17 PROCEDURE — 96372 THER/PROPH/DIAG INJ SC/IM: CPT

## 2019-12-17 RX ADMIN — OMALIZUMAB 375 MG: 150 INJECTION, SOLUTION SUBCUTANEOUS at 08:29

## 2019-12-17 NOTE — PROGRESS NOTES
Infusion Nursing Note:  Alba Varela presents today for Xolair.    Patient seen by provider today: No   present during visit today: Not Applicable.    Note: N/A.    Intravenous Access:  No Intravenous access/labs at this visit.    Treatment Conditions:  Not Applicable.      Post Infusion Assessment:  Patient tolerated injection without incident.  Blood return noted pre and post infusion.  Site patent and intact, free from redness, edema or discomfort.       Discharge Plan:   Patient declined prescription refills.   Patient going to schedule next appointment in 2 weeks.  Patient discharged in stable condition accompanied by: self.  Departure Mode: Ambulatory.    Mame Pruitt RN

## 2019-12-30 ENCOUNTER — INFUSION THERAPY VISIT (OUTPATIENT)
Dept: INFUSION THERAPY | Facility: CLINIC | Age: 49
End: 2019-12-30
Attending: ALLERGY & IMMUNOLOGY
Payer: COMMERCIAL

## 2019-12-30 VITALS
OXYGEN SATURATION: 96 % | DIASTOLIC BLOOD PRESSURE: 75 MMHG | SYSTOLIC BLOOD PRESSURE: 107 MMHG | HEART RATE: 94 BPM | RESPIRATION RATE: 16 BRPM

## 2019-12-30 DIAGNOSIS — J45.50 SEVERE PERSISTENT ASTHMA WITHOUT COMPLICATION (H): Primary | ICD-10-CM

## 2019-12-30 PROCEDURE — 96372 THER/PROPH/DIAG INJ SC/IM: CPT

## 2019-12-30 PROCEDURE — 25000128 H RX IP 250 OP 636: Performed by: ALLERGY & IMMUNOLOGY

## 2019-12-30 RX ADMIN — OMALIZUMAB 375 MG: 150 INJECTION, SOLUTION SUBCUTANEOUS at 10:48

## 2019-12-30 NOTE — PROGRESS NOTES
Infusion Nursing Note:  Alba Varela presents today for Xolair.    Patient seen by provider today: No   present during visit today: Not Applicable.    Note: N/A.    Intravenous Access:  No Intravenous access/labs at this visit.    Treatment Conditions:  Not Applicable.      Post Infusion Assessment:  Patient tolerated injection without incident.  Patient observed for 30 minutes post Xolair per protocol.       Discharge Plan:   Discharge instructions reviewed with: Patient.  Patient and/or family verbalized understanding of discharge instructions and all questions answered.  AVS to patient via [employer].  Patient will return 1/13/20 for next appointment.   Patient discharged in stable condition accompanied by: self.  Departure Mode: Ambulatory.    Toma Camargo RN

## 2020-01-13 ENCOUNTER — INFUSION THERAPY VISIT (OUTPATIENT)
Dept: INFUSION THERAPY | Facility: CLINIC | Age: 50
End: 2020-01-13
Attending: ALLERGY & IMMUNOLOGY
Payer: COMMERCIAL

## 2020-01-13 VITALS
DIASTOLIC BLOOD PRESSURE: 79 MMHG | SYSTOLIC BLOOD PRESSURE: 110 MMHG | TEMPERATURE: 97.2 F | HEART RATE: 79 BPM | OXYGEN SATURATION: 99 %

## 2020-01-13 DIAGNOSIS — J45.50 SEVERE PERSISTENT ASTHMA WITHOUT COMPLICATION (H): Primary | ICD-10-CM

## 2020-01-13 PROCEDURE — 96372 THER/PROPH/DIAG INJ SC/IM: CPT

## 2020-01-13 PROCEDURE — 25000128 H RX IP 250 OP 636: Performed by: ALLERGY & IMMUNOLOGY

## 2020-01-13 RX ADMIN — OMALIZUMAB 375 MG: 150 INJECTION, SOLUTION SUBCUTANEOUS at 09:22

## 2020-01-13 NOTE — PROGRESS NOTES
Infusion Nursing Note:  Alba Varela presents today for Xolair.    Patient seen by provider today: No   present during visit today: Not Applicable.    Note: Pt reports asthma well controlled.  No recent exacerbations.    Intravenous Access:  No Intravenous access/labs at this visit.    Treatment Conditions:  Not Applicable.      Post Infusion Assessment:  Patient tolerated injection without incident.  Patient observed for 30 minutes post Xolair per protocol.  Site patent and intact, free from redness, edema or discomfort.       Discharge Plan:   Discharge instructions reviewed with: Patient.  Patient and/or family verbalized understanding of discharge instructions and all questions answered.  AVS to patient via [employer].  Patient will return 1/27/20 for next Xolair injection for next appointment.   Patient discharged in stable condition accompanied by: self.  Departure Mode: Ambulatory.    Mame Condon RN

## 2020-01-27 ENCOUNTER — INFUSION THERAPY VISIT (OUTPATIENT)
Dept: INFUSION THERAPY | Facility: CLINIC | Age: 50
End: 2020-01-27
Attending: ALLERGY & IMMUNOLOGY
Payer: COMMERCIAL

## 2020-01-27 VITALS
OXYGEN SATURATION: 98 % | DIASTOLIC BLOOD PRESSURE: 80 MMHG | HEART RATE: 85 BPM | RESPIRATION RATE: 16 BRPM | TEMPERATURE: 97.7 F | SYSTOLIC BLOOD PRESSURE: 130 MMHG

## 2020-01-27 DIAGNOSIS — J45.50 SEVERE PERSISTENT ASTHMA WITHOUT COMPLICATION (H): Primary | ICD-10-CM

## 2020-01-27 PROCEDURE — 25000128 H RX IP 250 OP 636: Performed by: ALLERGY & IMMUNOLOGY

## 2020-01-27 PROCEDURE — 96372 THER/PROPH/DIAG INJ SC/IM: CPT

## 2020-01-27 RX ADMIN — OMALIZUMAB 375 MG: 150 INJECTION, SOLUTION SUBCUTANEOUS at 08:18

## 2020-01-27 NOTE — PROGRESS NOTES
Infusion Nursing Note:  Alba Varela presents today for Xolair.    Patient seen by provider today: No   present during visit today: Not Applicable.    Note: N/A.    Intravenous Access:  No Intravenous access/labs at this visit.    Treatment Conditions:  Not Applicable.      Post Infusion Assessment:  Patient tolerated 2  Injections in Right arm and one in left without incident.  Site patent and intact, free from redness, edema or discomfort.   Patient observed 30 minutes post Xolair per protocol.      Discharge Plan:   Patient will return 2/10 for next appointment.  Patient discharged in stable condition accompanied by: self.  Departure Mode: Ambulatory.    Mame Pruitt RN

## 2020-02-10 ENCOUNTER — INFUSION THERAPY VISIT (OUTPATIENT)
Dept: INFUSION THERAPY | Facility: CLINIC | Age: 50
End: 2020-02-10
Attending: ALLERGY & IMMUNOLOGY
Payer: COMMERCIAL

## 2020-02-10 VITALS
OXYGEN SATURATION: 97 % | SYSTOLIC BLOOD PRESSURE: 108 MMHG | DIASTOLIC BLOOD PRESSURE: 75 MMHG | RESPIRATION RATE: 16 BRPM | TEMPERATURE: 97.8 F | HEART RATE: 92 BPM

## 2020-02-10 DIAGNOSIS — J45.50 SEVERE PERSISTENT ASTHMA WITHOUT COMPLICATION (H): Primary | ICD-10-CM

## 2020-02-10 PROCEDURE — 96372 THER/PROPH/DIAG INJ SC/IM: CPT

## 2020-02-10 PROCEDURE — 25000128 H RX IP 250 OP 636: Performed by: ALLERGY & IMMUNOLOGY

## 2020-02-10 RX ADMIN — OMALIZUMAB 375 MG: 150 INJECTION, SOLUTION SUBCUTANEOUS at 09:35

## 2020-02-10 NOTE — PROGRESS NOTES
Infusion Nursing Note:  Alba Varela presents today for Xolair.    Patient seen by provider today: No   present during visit today: Not Applicable.    Note: Patient given 2 injections in left arm and 1 in right arm.    Intravenous Access:  No Intravenous access/labs at this visit.    Treatment Conditions:  Not Applicable.      Post Infusion Assessment:  Patient tolerated injection without incident.  Patient observed for 30 minutes post Xolair per protocol.  Site patent and intact, free from redness, edema or discomfort.       Discharge Plan:   Copy of AVS reviewed with patient and/or family.  Patient will return 2/24 for next appointment.  Patient discharged in stable condition accompanied by: self.  Departure Mode: Ambulatory.    Mame Pruitt RN

## 2020-02-18 ENCOUNTER — TRANSFERRED RECORDS (OUTPATIENT)
Dept: HEALTH INFORMATION MANAGEMENT | Facility: CLINIC | Age: 50
End: 2020-02-18

## 2020-02-23 ENCOUNTER — HEALTH MAINTENANCE LETTER (OUTPATIENT)
Age: 50
End: 2020-02-23

## 2020-02-24 ENCOUNTER — INFUSION THERAPY VISIT (OUTPATIENT)
Dept: INFUSION THERAPY | Facility: CLINIC | Age: 50
End: 2020-02-24
Attending: ALLERGY & IMMUNOLOGY
Payer: COMMERCIAL

## 2020-02-24 VITALS
DIASTOLIC BLOOD PRESSURE: 81 MMHG | RESPIRATION RATE: 16 BRPM | SYSTOLIC BLOOD PRESSURE: 125 MMHG | TEMPERATURE: 98.9 F | HEART RATE: 96 BPM | OXYGEN SATURATION: 98 %

## 2020-02-24 DIAGNOSIS — J45.50 SEVERE PERSISTENT ASTHMA WITHOUT COMPLICATION (H): Primary | ICD-10-CM

## 2020-02-24 PROCEDURE — 25000128 H RX IP 250 OP 636: Performed by: ALLERGY & IMMUNOLOGY

## 2020-02-24 PROCEDURE — 96372 THER/PROPH/DIAG INJ SC/IM: CPT

## 2020-02-24 RX ADMIN — OMALIZUMAB 375 MG: 150 INJECTION, SOLUTION SUBCUTANEOUS at 09:20

## 2020-02-24 ASSESSMENT — PAIN SCALES - GENERAL: PAINLEVEL: NO PAIN (0)

## 2020-02-24 NOTE — PROGRESS NOTES
Infusion Nursing Note:  Alba Varela presents today for Xolir.    Patient seen by provider today: No   present during visit today: Not Applicable.    Note: N/A.    Intravenous Access:  No Intravenous access/labs at this visit.    Treatment Conditions:  Biological Infusion Checklist:  ~~~ NOTE: If the patient answers yes to any of the questions below, hold the infusion and contact ordering provider or on-call provider.    1. Have you recently had an elevated temperature, fever, chills, productive cough, coughing for 3 weeks or longer or hemoptysis, abnormal vital signs, night sweats,  chest pain or have you noticed a decrease in your appetite, unexplained weight loss or fatigue? No  2. Do you have any open wounds or new incisions? No  3. Do you have any recent or upcoming hospitalizations, surgeries or dental procedures? No  4. Do you currently have or recently have had any signs of illness or infection or are you on any antibiotics? No  5. Have you had any new, sudden or worsening abdominal pain? No  6. Have you or anyone in your household received a live vaccination in the past 4 weeks? Please note:  No live vaccines while on biologic/chemotherapy until 6 months after the last treatment.  Patient can receive the flu vaccine (shot only) and the pneumovax.  It is optimal for the patient to get these vaccines mid cycle, but they can be given at any time as long as it is not on the day of the infusion. No  7. Have you recently been diagnosed with any new nervous system diseases (ie. Multiple sclerosis, Guillain Hachita, seizures, neurological changes) or cancer diagnosis? No  8. Are you on any form of radiation or chemotherapy? No  9. Are you pregnant or breast feeding or do you have plans of pregnancy in the future? No  10. Have you been having any signs of worsening depression or suicidal ideations?  (benlysta only) No  11. Have there been any other new onset medical symptoms? No  Post Infusion  Assessment:  Patient tolerated infusion without incident.  Patient observed for 30 minutes post Xolair per protocol.     Discharge Plan:   Discharge instructions reviewed with: Patient.  Patient and/or family verbalized understanding of discharge instructions and all questions answered.  AVS to patient via [employer].  Patient will return 3/9 for next appointment.   Patient discharged in stable condition accompanied by: self.  Departure Mode: Ambulatory.    Alba Tanner RN

## 2020-03-09 ENCOUNTER — INFUSION THERAPY VISIT (OUTPATIENT)
Dept: INFUSION THERAPY | Facility: CLINIC | Age: 50
End: 2020-03-09
Attending: ALLERGY & IMMUNOLOGY
Payer: COMMERCIAL

## 2020-03-09 VITALS
DIASTOLIC BLOOD PRESSURE: 74 MMHG | HEART RATE: 93 BPM | SYSTOLIC BLOOD PRESSURE: 108 MMHG | TEMPERATURE: 96.8 F | OXYGEN SATURATION: 98 %

## 2020-03-09 DIAGNOSIS — J45.50 SEVERE PERSISTENT ASTHMA WITHOUT COMPLICATION (H): Primary | ICD-10-CM

## 2020-03-09 PROCEDURE — 25000128 H RX IP 250 OP 636: Performed by: ALLERGY & IMMUNOLOGY

## 2020-03-09 PROCEDURE — 96372 THER/PROPH/DIAG INJ SC/IM: CPT

## 2020-03-09 RX ADMIN — OMALIZUMAB 375 MG: 150 INJECTION, SOLUTION SUBCUTANEOUS at 09:23

## 2020-03-09 NOTE — PROGRESS NOTES
Infusion Nursing Note:  Alba Varela presents today for Xolair.    Patient seen by provider today: No   present during visit today: Not Applicable.    Note: Pt states asthma well controlled.    Intravenous Access:  No Intravenous access/labs at this visit.    Treatment Conditions:  Not Applicable.      Post Infusion Assessment:  Patient tolerated injection without incident.  Patient observed for 30 minutes post Xolair per protocol.  Site patent and intact, free from redness, edema or discomfort.       Discharge Plan:   Discharge instructions reviewed with: Patient.  Patient and/or family verbalized understanding of discharge instructions and all questions answered.  AVS to patient via [employer].  Patient will return 3/23/20 for next Xolair injection for next appointment.   Patient discharged in stable condition accompanied by: self.  Departure Mode: Ambulatory.    Mame Condon RN

## 2020-03-13 ENCOUNTER — TRANSFERRED RECORDS (OUTPATIENT)
Dept: HEALTH INFORMATION MANAGEMENT | Facility: CLINIC | Age: 50
End: 2020-03-13

## 2020-03-23 ENCOUNTER — INFUSION THERAPY VISIT (OUTPATIENT)
Dept: INFUSION THERAPY | Facility: CLINIC | Age: 50
End: 2020-03-23
Attending: ALLERGY & IMMUNOLOGY
Payer: COMMERCIAL

## 2020-03-23 VITALS
RESPIRATION RATE: 16 BRPM | SYSTOLIC BLOOD PRESSURE: 109 MMHG | OXYGEN SATURATION: 99 % | DIASTOLIC BLOOD PRESSURE: 74 MMHG | HEART RATE: 80 BPM | TEMPERATURE: 97.9 F

## 2020-03-23 DIAGNOSIS — J45.50 SEVERE PERSISTENT ASTHMA WITHOUT COMPLICATION (H): Primary | ICD-10-CM

## 2020-03-23 PROCEDURE — 25000128 H RX IP 250 OP 636: Performed by: ALLERGY & IMMUNOLOGY

## 2020-03-23 PROCEDURE — 96372 THER/PROPH/DIAG INJ SC/IM: CPT

## 2020-03-23 RX ADMIN — OMALIZUMAB 375 MG: 150 INJECTION, SOLUTION SUBCUTANEOUS at 14:36

## 2020-03-23 NOTE — PROGRESS NOTES
Infusion Nursing Note:  Alba Varela presents today for Xolair.    Patient seen by provider today: No   present during visit today: Not Applicable.    Note: N/A.    Intravenous Access:  No Intravenous access/labs at this visit.    Treatment Conditions:  Not Applicable.      Post Infusion Assessment:  Patient tolerated injection without incident.  Patient observed for 30 minutes post Xolair per protocol.       Discharge Plan:   Discharge instructions reviewed with: Patient.  Patient and/or family verbalized understanding of discharge instructions and all questions answered.  AVS to patient via [employer].  Patient will return 4/6/20 for next appointment.   Patient discharged in stable condition accompanied by: self.  Departure Mode: Ambulatory.    Toma Camargo RN

## 2020-04-06 ENCOUNTER — INFUSION THERAPY VISIT (OUTPATIENT)
Dept: INFUSION THERAPY | Facility: CLINIC | Age: 50
End: 2020-04-06
Attending: ALLERGY & IMMUNOLOGY
Payer: COMMERCIAL

## 2020-04-06 VITALS
DIASTOLIC BLOOD PRESSURE: 80 MMHG | RESPIRATION RATE: 16 BRPM | HEART RATE: 74 BPM | OXYGEN SATURATION: 100 % | SYSTOLIC BLOOD PRESSURE: 118 MMHG | TEMPERATURE: 97.4 F

## 2020-04-06 DIAGNOSIS — J45.50 SEVERE PERSISTENT ASTHMA WITHOUT COMPLICATION (H): Primary | ICD-10-CM

## 2020-04-06 PROCEDURE — 96372 THER/PROPH/DIAG INJ SC/IM: CPT

## 2020-04-06 PROCEDURE — 25000128 H RX IP 250 OP 636: Performed by: ALLERGY & IMMUNOLOGY

## 2020-04-06 RX ADMIN — OMALIZUMAB 375 MG: 150 INJECTION, SOLUTION SUBCUTANEOUS at 10:06

## 2020-04-06 NOTE — PROGRESS NOTES
Infusion Nursing Note:  Alba Varela presents today for xolair.    Patient seen by provider today: No   present during visit today: Not Applicable.    Note: N/A.    Intravenous Access:  No Intravenous access/labs at this visit.    Treatment Conditions:  Not Applicable.      Post Infusion Assessment:  Patient tolerated injection without incident.  Patient observed for 30 minutes post xolair per protocol.       Discharge Plan:   Copy of AVS reviewed with patient and/or family.  Patient will return 4/20/2020 for next appointment.  Patient discharged in stable condition accompanied by: self.  Departure Mode: Ambulatory.    Sangita Edwards RN

## 2020-04-20 ENCOUNTER — INFUSION THERAPY VISIT (OUTPATIENT)
Dept: INFUSION THERAPY | Facility: CLINIC | Age: 50
End: 2020-04-20
Attending: ALLERGY & IMMUNOLOGY
Payer: COMMERCIAL

## 2020-04-20 VITALS
OXYGEN SATURATION: 100 % | SYSTOLIC BLOOD PRESSURE: 113 MMHG | TEMPERATURE: 96.8 F | DIASTOLIC BLOOD PRESSURE: 69 MMHG | HEART RATE: 95 BPM | RESPIRATION RATE: 16 BRPM

## 2020-04-20 DIAGNOSIS — J45.50 SEVERE PERSISTENT ASTHMA WITHOUT COMPLICATION (H): Primary | ICD-10-CM

## 2020-04-20 PROCEDURE — 96372 THER/PROPH/DIAG INJ SC/IM: CPT

## 2020-04-20 PROCEDURE — 25000128 H RX IP 250 OP 636: Performed by: ALLERGY & IMMUNOLOGY

## 2020-04-20 RX ADMIN — OMALIZUMAB 375 MG: 150 INJECTION, SOLUTION SUBCUTANEOUS at 09:09

## 2020-04-20 ASSESSMENT — PAIN SCALES - GENERAL: PAINLEVEL: NO PAIN (0)

## 2020-04-20 NOTE — PROGRESS NOTES
Infusion Nursing Note:  Alba Varela presents today for xolair.    Patient seen by provider today: No   present during visit today: Not Applicable.    Note: N/A.    Intravenous Access:  No Intravenous access/labs at this visit.    Treatment Conditions:  Not Applicable.      Post Infusion Assessment:  Patient tolerated injection without incident.  Patient observed for 30 minutes post xolair per protocol.  Site patent and intact, free from redness, edema or discomfort.       Discharge Plan:   Patient stopping at scheduling after appt to make next appointment 5/4  Patient discharged in stable condition accompanied by: self.  Departure Mode: Ambulatory.    Mame Pruitt RN

## 2020-05-04 ENCOUNTER — INFUSION THERAPY VISIT (OUTPATIENT)
Dept: INFUSION THERAPY | Facility: CLINIC | Age: 50
End: 2020-05-04
Attending: ALLERGY & IMMUNOLOGY
Payer: COMMERCIAL

## 2020-05-04 VITALS
SYSTOLIC BLOOD PRESSURE: 117 MMHG | HEART RATE: 91 BPM | TEMPERATURE: 97.1 F | OXYGEN SATURATION: 99 % | DIASTOLIC BLOOD PRESSURE: 71 MMHG | RESPIRATION RATE: 16 BRPM

## 2020-05-04 DIAGNOSIS — J45.50 SEVERE PERSISTENT ASTHMA WITHOUT COMPLICATION (H): Primary | ICD-10-CM

## 2020-05-04 PROCEDURE — 96372 THER/PROPH/DIAG INJ SC/IM: CPT

## 2020-05-04 PROCEDURE — 25000128 H RX IP 250 OP 636: Performed by: ALLERGY & IMMUNOLOGY

## 2020-05-04 RX ADMIN — OMALIZUMAB 375 MG: 150 INJECTION, SOLUTION SUBCUTANEOUS at 09:39

## 2020-05-04 NOTE — PROGRESS NOTES
Infusion Nursing Note:  Alba Varela presents today for xolair.    Patient seen by provider today: No   present during visit today: Not Applicable.    Note: 2 in L and 1 in R.    Intravenous Access:  No Intravenous access/labs at this visit.    Treatment Conditions:  Not Applicable.      Post Infusion Assessment:  Patient tolerated injection without incident.  Patient observed for 30 minutes post xolair per protocol.       Discharge Plan:   [facility]  Patient will return 5/18 for next appointment.   Patient discharged in stable condition accompanied by: self.  Departure Mode: Ambulatory.    Sangita De La Cruz RN

## 2020-05-18 ENCOUNTER — INFUSION THERAPY VISIT (OUTPATIENT)
Dept: INFUSION THERAPY | Facility: CLINIC | Age: 50
End: 2020-05-18
Attending: ALLERGY & IMMUNOLOGY
Payer: COMMERCIAL

## 2020-05-18 VITALS
SYSTOLIC BLOOD PRESSURE: 122 MMHG | DIASTOLIC BLOOD PRESSURE: 79 MMHG | RESPIRATION RATE: 16 BRPM | HEART RATE: 96 BPM | TEMPERATURE: 97.2 F | OXYGEN SATURATION: 99 %

## 2020-05-18 DIAGNOSIS — J45.50 SEVERE PERSISTENT ASTHMA WITHOUT COMPLICATION (H): Primary | ICD-10-CM

## 2020-05-18 PROCEDURE — 96372 THER/PROPH/DIAG INJ SC/IM: CPT

## 2020-05-18 PROCEDURE — 25000128 H RX IP 250 OP 636: Performed by: ALLERGY & IMMUNOLOGY

## 2020-05-18 RX ADMIN — OMALIZUMAB 375 MG: 150 INJECTION, SOLUTION SUBCUTANEOUS at 11:14

## 2020-05-18 NOTE — PROGRESS NOTES
Infusion Nursing Note:  Alba Varela presents today for Xolair.    Patient seen by provider today: No   present during visit today: Not Applicable.    Note: N/A.    Intravenous Access:  No Intravenous access/labs at this visit.    Treatment Conditions:  Not Applicable.      Post Infusion Assessment:  Patient tolerated injection without incident.       Discharge Plan:   Discharge instructions reviewed with: Patient.  Patient and/or family verbalized understanding of discharge instructions and all questions answered.  Copy of AVS reviewed with patient and/or family.  Patient will return 6/1/2020 for next appointment.  Patient discharged in stable condition accompanied by: self.  Departure Mode: Ambulatory.    Dannielle Valladares RN

## 2020-06-01 ENCOUNTER — INFUSION THERAPY VISIT (OUTPATIENT)
Dept: INFUSION THERAPY | Facility: CLINIC | Age: 50
End: 2020-06-01
Attending: ALLERGY & IMMUNOLOGY
Payer: COMMERCIAL

## 2020-06-01 VITALS
SYSTOLIC BLOOD PRESSURE: 112 MMHG | DIASTOLIC BLOOD PRESSURE: 81 MMHG | HEART RATE: 85 BPM | RESPIRATION RATE: 16 BRPM | TEMPERATURE: 97.7 F | OXYGEN SATURATION: 100 %

## 2020-06-01 DIAGNOSIS — J45.50 SEVERE PERSISTENT ASTHMA WITHOUT COMPLICATION (H): Primary | ICD-10-CM

## 2020-06-01 PROCEDURE — 96372 THER/PROPH/DIAG INJ SC/IM: CPT

## 2020-06-01 PROCEDURE — 25000128 H RX IP 250 OP 636: Performed by: ALLERGY & IMMUNOLOGY

## 2020-06-01 RX ADMIN — OMALIZUMAB 375 MG: 150 INJECTION, SOLUTION SUBCUTANEOUS at 10:56

## 2020-06-01 NOTE — PROGRESS NOTES
Infusion Nursing Note:  Alba Varela presents today for Xolair.    Patient seen by provider today: No   present during visit today: Not Applicable.    Note: N/A.    Intravenous Access:  No Intravenous access/labs at this visit.    Treatment Conditions:  Not Applicable.      Post Infusion Assessment:  Patient tolerated injection without incident.  Patient observed for 30 minutes post Xolair per protocol.       Discharge Plan:   Discharge instructions reviewed with: Patient.  Patient and/or family verbalized understanding of discharge instructions and all questions answered.  AVS to patient via [employer].  Patient will return 6/15/20 for next appointment.   Patient discharged in stable condition accompanied by: self.  Departure Mode: Ambulatory.    Toma Camargo RN

## 2020-06-15 ENCOUNTER — INFUSION THERAPY VISIT (OUTPATIENT)
Dept: INFUSION THERAPY | Facility: CLINIC | Age: 50
End: 2020-06-15
Attending: ALLERGY & IMMUNOLOGY
Payer: COMMERCIAL

## 2020-06-15 VITALS
TEMPERATURE: 97.4 F | OXYGEN SATURATION: 97 % | DIASTOLIC BLOOD PRESSURE: 85 MMHG | SYSTOLIC BLOOD PRESSURE: 124 MMHG | HEART RATE: 85 BPM

## 2020-06-15 DIAGNOSIS — J45.50 SEVERE PERSISTENT ASTHMA WITHOUT COMPLICATION (H): Primary | ICD-10-CM

## 2020-06-15 PROCEDURE — 96372 THER/PROPH/DIAG INJ SC/IM: CPT

## 2020-06-15 PROCEDURE — 25000128 H RX IP 250 OP 636: Performed by: ALLERGY & IMMUNOLOGY

## 2020-06-15 RX ADMIN — OMALIZUMAB 375 MG: 150 INJECTION, SOLUTION SUBCUTANEOUS at 09:18

## 2020-06-15 NOTE — PROGRESS NOTES
Infusion Nursing Note:  Alba Varela presents today for xolair.    Patient seen by provider today: No   present during visit today: Not Applicable.    Note: Pt reports no asthma symptoms.  Continues to be well controlled on Xolair.    Intravenous Access:  No Intravenous access/labs at this visit.    Treatment Conditions:  Not Applicable.      Post Infusion Assessment:  Patient tolerated injection without incident.  Patient observed for 30 minutes post Xolair per protocol.  Site patent and intact, free from redness, edema or discomfort.       Discharge Plan:   Discharge instructions reviewed with: Patient.  Patient and/or family verbalized understanding of discharge instructions and all questions answered.  AVS to patient via [employer].  Patient will return 6/29/20 for Xolair for next appointment.   Patient discharged in stable condition accompanied by: self.  Departure Mode: Ambulatory.    Mame Condon RN

## 2020-06-29 ENCOUNTER — INFUSION THERAPY VISIT (OUTPATIENT)
Dept: INFUSION THERAPY | Facility: CLINIC | Age: 50
End: 2020-06-29
Attending: ALLERGY & IMMUNOLOGY
Payer: COMMERCIAL

## 2020-06-29 VITALS
DIASTOLIC BLOOD PRESSURE: 82 MMHG | HEART RATE: 82 BPM | SYSTOLIC BLOOD PRESSURE: 118 MMHG | OXYGEN SATURATION: 100 % | TEMPERATURE: 97.9 F

## 2020-06-29 DIAGNOSIS — J45.50 SEVERE PERSISTENT ASTHMA WITHOUT COMPLICATION (H): Primary | ICD-10-CM

## 2020-06-29 PROCEDURE — 96372 THER/PROPH/DIAG INJ SC/IM: CPT

## 2020-06-29 PROCEDURE — 25000128 H RX IP 250 OP 636: Performed by: ALLERGY & IMMUNOLOGY

## 2020-06-29 RX ADMIN — OMALIZUMAB 375 MG: 150 INJECTION, SOLUTION SUBCUTANEOUS at 09:14

## 2020-06-29 NOTE — PROGRESS NOTES
Infusion Nursing Note:  Alba Varela presents today for Xolair.    Patient seen by provider today: No   present during visit today: Not Applicable.    Note: 2 injections in left arm, 1 injection in right arm    Pt states her asthma is still well controlled, despite the recent humidity over the past week.    Intravenous Access:  No Intravenous access/labs at this visit.    Treatment Conditions:  Not Applicable.      Post Infusion Assessment:  Patient tolerated injection without incident.  Patient observed for 30 minutes post Xolair per protocol.  Site patent and intact, free from redness, edema or discomfort.       Discharge Plan:   Discharge instructions reviewed with: Patient.  Patient and/or family verbalized understanding of discharge instructions and all questions answered.  AVS to patient via [employer].  Patient will return 7/13/20 for next Xolair injections for next appointment.   Patient discharged in stable condition accompanied by: self.  Departure Mode: Ambulatory.    Mame Condon RN

## 2020-07-13 ENCOUNTER — INFUSION THERAPY VISIT (OUTPATIENT)
Dept: INFUSION THERAPY | Facility: CLINIC | Age: 50
End: 2020-07-13
Attending: ALLERGY & IMMUNOLOGY
Payer: COMMERCIAL

## 2020-07-13 VITALS
SYSTOLIC BLOOD PRESSURE: 116 MMHG | RESPIRATION RATE: 16 BRPM | HEART RATE: 83 BPM | DIASTOLIC BLOOD PRESSURE: 78 MMHG | OXYGEN SATURATION: 99 % | TEMPERATURE: 97.8 F

## 2020-07-13 DIAGNOSIS — J45.50 SEVERE PERSISTENT ASTHMA WITHOUT COMPLICATION (H): Primary | ICD-10-CM

## 2020-07-13 PROCEDURE — 96372 THER/PROPH/DIAG INJ SC/IM: CPT

## 2020-07-13 PROCEDURE — 25000128 H RX IP 250 OP 636: Performed by: ALLERGY & IMMUNOLOGY

## 2020-07-13 RX ADMIN — OMALIZUMAB 375 MG: 150 INJECTION, SOLUTION SUBCUTANEOUS at 09:49

## 2020-07-13 NOTE — PROGRESS NOTES
Nursing Note:  Alba Varela presents today for xolair.    Patient seen by provider today: No   present during visit today: Not Applicable.    Note: Two injections in right arm and one in left. Thirty minute observation as ordered.    Intravenous Access:  No Intravenous access/labs at this visit.    Discharge Plan:   Patient was sent home    Lucía Yu RN

## 2020-07-27 ENCOUNTER — INFUSION THERAPY VISIT (OUTPATIENT)
Dept: INFUSION THERAPY | Facility: CLINIC | Age: 50
End: 2020-07-27
Attending: ALLERGY & IMMUNOLOGY
Payer: COMMERCIAL

## 2020-07-27 VITALS
OXYGEN SATURATION: 97 % | TEMPERATURE: 97.4 F | HEART RATE: 82 BPM | SYSTOLIC BLOOD PRESSURE: 113 MMHG | DIASTOLIC BLOOD PRESSURE: 82 MMHG

## 2020-07-27 DIAGNOSIS — J45.50 SEVERE PERSISTENT ASTHMA WITHOUT COMPLICATION (H): Primary | ICD-10-CM

## 2020-07-27 PROCEDURE — 25000128 H RX IP 250 OP 636: Performed by: ALLERGY & IMMUNOLOGY

## 2020-07-27 PROCEDURE — 96372 THER/PROPH/DIAG INJ SC/IM: CPT

## 2020-07-27 RX ADMIN — OMALIZUMAB 375 MG: 150 INJECTION, SOLUTION SUBCUTANEOUS at 09:20

## 2020-07-27 NOTE — PROGRESS NOTES
"Infusion Nursing Note:  Alba Varela presents today for Xolair.    Patient seen by provider today: No   present during visit today: Not Applicable.    Note: Pt states she \"notices her breathing more\" during the humid days, but has not had any difficulty breathing or any asthma exacerbations.    Intravenous Access:  No Intravenous access/labs at this visit.    Treatment Conditions:  Not Applicable.      Post Infusion Assessment:  Patient tolerated injection without incident.  Patient observed for 30 minutes post Xolair per protocol.  Site patent and intact, free from redness, edema or discomfort.       Discharge Plan:   Discharge instructions reviewed with: Patient.  Patient and/or family verbalized understanding of discharge instructions and all questions answered.  AVS to patient via [employer].  Patient will return 8/10/20 for next Xolair injection for next appointment.   Patient discharged in stable condition accompanied by: self.  Departure Mode: Ambulatory.    Mame Condon RN                      "

## 2020-08-10 ENCOUNTER — INFUSION THERAPY VISIT (OUTPATIENT)
Dept: INFUSION THERAPY | Facility: CLINIC | Age: 50
End: 2020-08-10
Attending: ALLERGY & IMMUNOLOGY
Payer: COMMERCIAL

## 2020-08-10 VITALS
DIASTOLIC BLOOD PRESSURE: 80 MMHG | OXYGEN SATURATION: 98 % | SYSTOLIC BLOOD PRESSURE: 117 MMHG | HEART RATE: 92 BPM | RESPIRATION RATE: 16 BRPM | TEMPERATURE: 97.5 F

## 2020-08-10 DIAGNOSIS — J45.50 SEVERE PERSISTENT ASTHMA WITHOUT COMPLICATION (H): Primary | ICD-10-CM

## 2020-08-10 PROCEDURE — 25000128 H RX IP 250 OP 636: Performed by: ALLERGY & IMMUNOLOGY

## 2020-08-10 PROCEDURE — 96372 THER/PROPH/DIAG INJ SC/IM: CPT

## 2020-08-10 RX ADMIN — OMALIZUMAB 375 MG: 150 INJECTION, SOLUTION SUBCUTANEOUS at 09:27

## 2020-08-10 ASSESSMENT — PAIN SCALES - GENERAL: PAINLEVEL: NO PAIN (0)

## 2020-08-10 NOTE — PROGRESS NOTES
Infusion Nursing Note:  Alba Varela presents today for Xolair.    Patient seen by provider today: No   present during visit today: Not Applicable.    Note: N/A.    Intravenous Access:  No Intravenous access/labs at this visit.    Treatment Conditions:  Biological Infusion Checklist:  ~~~ NOTE: If the patient answers yes to any of the questions below, hold the infusion and contact ordering provider or on-call provider.    1. Have you recently had an elevated temperature, fever, chills, productive cough, coughing for 3 weeks or longer or hemoptysis, abnormal vital signs, night sweats,  chest pain or have you noticed a decrease in your appetite, unexplained weight loss or fatigue? No  2. Do you have any open wounds or new incisions? No  3. Do you have any recent or upcoming hospitalizations, surgeries or dental procedures? No  4. Do you currently have or recently have had any signs of illness or infection or are you on any antibiotics? No  5. Have you had any new, sudden or worsening abdominal pain? No  6. Have you or anyone in your household received a live vaccination in the past 4 weeks? Please note:  No live vaccines while on biologic/chemotherapy until 6 months after the last treatment.  Patient can receive the flu vaccine (shot only) and the pneumovax.  It is optimal for the patient to get these vaccines mid cycle, but they can be given at any time as long as it is not on the day of the infusion. No  7. Have you recently been diagnosed with any new nervous system diseases (ie. Multiple sclerosis, Guillain [place], seizures, neurological changes) or cancer diagnosis? No  8. Are you on any form of radiation or chemotherapy? No  9. Are you pregnant or breast feeding or do you have plans of pregnancy in the future? No  10. Have you been having any signs of worsening depression or suicidal ideations?  (benlysta only) No  11. Have there been any other new onset medical symptoms? No    Post Infusion  Assessment:  Patient tolerated injection without incident.  Patient observed for 30 minutes post Xolair per protocol.     Discharge Plan:   Discharge instructions reviewed with: Patient.  Patient and/or family verbalized understanding of discharge instructions and all questions answered.  AVS to patient via [employer].  Patient will return 8/24 for next appointment.   Patient discharged in stable condition accompanied by: self.  Departure Mode: Ambulatory.    Alba Tanner RN

## 2020-08-24 ENCOUNTER — INFUSION THERAPY VISIT (OUTPATIENT)
Dept: INFUSION THERAPY | Facility: CLINIC | Age: 50
End: 2020-08-24
Attending: ALLERGY & IMMUNOLOGY
Payer: COMMERCIAL

## 2020-08-24 VITALS
RESPIRATION RATE: 16 BRPM | OXYGEN SATURATION: 97 % | SYSTOLIC BLOOD PRESSURE: 126 MMHG | HEART RATE: 85 BPM | DIASTOLIC BLOOD PRESSURE: 82 MMHG | TEMPERATURE: 97 F

## 2020-08-24 DIAGNOSIS — J45.50 SEVERE PERSISTENT ASTHMA WITHOUT COMPLICATION (H): Primary | ICD-10-CM

## 2020-08-24 PROCEDURE — 25000128 H RX IP 250 OP 636: Performed by: ALLERGY & IMMUNOLOGY

## 2020-08-24 PROCEDURE — 96372 THER/PROPH/DIAG INJ SC/IM: CPT

## 2020-08-24 RX ADMIN — OMALIZUMAB 375 MG: 150 INJECTION, SOLUTION SUBCUTANEOUS at 09:14

## 2020-08-24 NOTE — PROGRESS NOTES
Nursing Note:  Alba Varela presents today for xolair.    Patient seen by provider today: No   present during visit today: Not Applicable.    Note: 30 minute observation as ordered.    Intravenous Access:  Labs drawn without difficulty.    Discharge Plan:   Patient was sent home    Lucía Yu RN

## 2020-09-08 ENCOUNTER — INFUSION THERAPY VISIT (OUTPATIENT)
Dept: INFUSION THERAPY | Facility: CLINIC | Age: 50
End: 2020-09-08
Attending: ALLERGY & IMMUNOLOGY
Payer: COMMERCIAL

## 2020-09-08 VITALS
SYSTOLIC BLOOD PRESSURE: 123 MMHG | DIASTOLIC BLOOD PRESSURE: 86 MMHG | OXYGEN SATURATION: 98 % | HEART RATE: 88 BPM | TEMPERATURE: 98.3 F | RESPIRATION RATE: 16 BRPM

## 2020-09-08 DIAGNOSIS — J45.50 SEVERE PERSISTENT ASTHMA WITHOUT COMPLICATION (H): Primary | ICD-10-CM

## 2020-09-08 PROCEDURE — 25000128 H RX IP 250 OP 636: Performed by: ALLERGY & IMMUNOLOGY

## 2020-09-08 PROCEDURE — 96372 THER/PROPH/DIAG INJ SC/IM: CPT

## 2020-09-08 RX ADMIN — OMALIZUMAB 375 MG: 150 INJECTION, SOLUTION SUBCUTANEOUS at 13:24

## 2020-09-08 NOTE — PROGRESS NOTES
Infusion Nursing Note:  Alba Varela presents today for xolair.    Patient seen by provider today: No   present during visit today: Not Applicable.    Note: 2 injections left arm, 1 injection right arm.    Intravenous Access:  No Intravenous access/labs at this visit.    Treatment Conditions:  Not Applicable.      Post Infusion Assessment:  Patient tolerated injection without incident.  Patient observed for 30 minutes post xolair per protocol.       Discharge Plan:   Copy of AVS reviewed with patient and/or family.  Patient will return 9/21/20 for next appointment.  Patient discharged in stable condition accompanied by: self.  Departure Mode: Ambulatory.    Sangita Edwards RN

## 2020-09-21 ENCOUNTER — INFUSION THERAPY VISIT (OUTPATIENT)
Dept: INFUSION THERAPY | Facility: CLINIC | Age: 50
End: 2020-09-21
Attending: ALLERGY & IMMUNOLOGY
Payer: COMMERCIAL

## 2020-09-21 VITALS
DIASTOLIC BLOOD PRESSURE: 80 MMHG | OXYGEN SATURATION: 98 % | HEART RATE: 100 BPM | RESPIRATION RATE: 16 BRPM | SYSTOLIC BLOOD PRESSURE: 119 MMHG | TEMPERATURE: 98.2 F

## 2020-09-21 DIAGNOSIS — J45.50 SEVERE PERSISTENT ASTHMA WITHOUT COMPLICATION (H): Primary | ICD-10-CM

## 2020-09-21 PROCEDURE — 96372 THER/PROPH/DIAG INJ SC/IM: CPT

## 2020-09-21 PROCEDURE — 25000128 H RX IP 250 OP 636: Performed by: ALLERGY & IMMUNOLOGY

## 2020-09-21 RX ADMIN — OMALIZUMAB 375 MG: 150 INJECTION, SOLUTION SUBCUTANEOUS at 10:07

## 2020-09-21 ASSESSMENT — PAIN SCALES - GENERAL: PAINLEVEL: NO PAIN (0)

## 2020-09-21 NOTE — PROGRESS NOTES
Infusion Nursing Note:  Alba Varela presents today for Xolair.    Patient seen by provider today: No   present during visit today: Not Applicable.    Note: No complaints. Reports the Xolair has helped her symptoms tremendously.    Intravenous Access:  No Intravenous access/labs at this visit.    Treatment Conditions:  Biological Infusion Checklist:  ~~~ NOTE: If the patient answers yes to any of the questions below, hold the infusion and contact ordering provider or on-call provider.    1. Have you recently had an elevated temperature, fever, chills, productive cough, coughing for 3 weeks or longer or hemoptysis, abnormal vital signs, night sweats,  chest pain or have you noticed a decrease in your appetite, unexplained weight loss or fatigue? No  2. Do you have any open wounds or new incisions? No  3. Do you have any recent or upcoming hospitalizations, surgeries or dental procedures? No  4. Do you currently have or recently have had any signs of illness or infection or are you on any antibiotics? No  5. Have you had any new, sudden or worsening abdominal pain? No  6. Have you or anyone in your household received a live vaccination in the past 4 weeks? Please note:  No live vaccines while on biologic/chemotherapy until 6 months after the last treatment.  Patient can receive the flu vaccine (shot only) and the pneumovax.  It is optimal for the patient to get these vaccines mid cycle, but they can be given at any time as long as it is not on the day of the infusion. No  7. Have you recently been diagnosed with any new nervous system diseases (ie. Multiple sclerosis, Guillain [place], seizures, neurological changes) or cancer diagnosis? No  8. Are you on any form of radiation or chemotherapy? No  9. Are you pregnant or breast feeding or do you have plans of pregnancy in the future? No  10. Have you been having any signs of worsening depression or suicidal ideations?  (benlysta only) No  11. Have there been  any other new onset medical symptoms? No    Post Infusion Assessment:  Patient tolerated injection without incident.     Discharge Plan:   Discharge instructions reviewed with: Patient.  Patient and/or family verbalized understanding of discharge instructions and all questions answered.  AVS to patient via [employer].  Patient will return 10/5 for next appointment.   Patient discharged in stable condition accompanied by: self.  Departure Mode: Ambulatory.    Alba Tanner RN

## 2020-10-05 ENCOUNTER — INFUSION THERAPY VISIT (OUTPATIENT)
Dept: INFUSION THERAPY | Facility: CLINIC | Age: 50
End: 2020-10-05
Attending: ALLERGY & IMMUNOLOGY
Payer: COMMERCIAL

## 2020-10-05 VITALS
TEMPERATURE: 97.7 F | DIASTOLIC BLOOD PRESSURE: 75 MMHG | HEART RATE: 92 BPM | SYSTOLIC BLOOD PRESSURE: 126 MMHG | RESPIRATION RATE: 16 BRPM | OXYGEN SATURATION: 97 %

## 2020-10-05 DIAGNOSIS — J45.50 SEVERE PERSISTENT ASTHMA WITHOUT COMPLICATION (H): Primary | ICD-10-CM

## 2020-10-05 PROCEDURE — 250N000011 HC RX IP 250 OP 636: Performed by: ALLERGY & IMMUNOLOGY

## 2020-10-05 PROCEDURE — 96372 THER/PROPH/DIAG INJ SC/IM: CPT | Performed by: ALLERGY & IMMUNOLOGY

## 2020-10-05 RX ADMIN — OMALIZUMAB 375 MG: 150 INJECTION, SOLUTION SUBCUTANEOUS at 09:15

## 2020-10-05 NOTE — PROGRESS NOTES
Infusion Nursing Note:  Alba Varela presents today for xolair.     present during visit today: Not Applicable.    Note: NA    Intravenous Access:  No Intravenous access/labs at this visit.    Treatment Conditions:  NA    Post Lab Assessment:  Patient tolerated injection   Observed 30 minutes post injection    Discharge Plan:   Patient and/or family verbalized understanding of  instructions and all questions answered.  Patient discharged in stable condition accompanied by: self.  Patient to see provider today: No  Departure Mode: Ambulatory.  Vivi Rosado, RN, RN

## 2020-10-19 ENCOUNTER — INFUSION THERAPY VISIT (OUTPATIENT)
Dept: INFUSION THERAPY | Facility: CLINIC | Age: 50
End: 2020-10-19
Attending: ALLERGY & IMMUNOLOGY
Payer: COMMERCIAL

## 2020-10-19 VITALS
TEMPERATURE: 98 F | SYSTOLIC BLOOD PRESSURE: 110 MMHG | OXYGEN SATURATION: 97 % | RESPIRATION RATE: 16 BRPM | DIASTOLIC BLOOD PRESSURE: 78 MMHG | HEART RATE: 87 BPM

## 2020-10-19 DIAGNOSIS — J45.50 SEVERE PERSISTENT ASTHMA WITHOUT COMPLICATION (H): Primary | ICD-10-CM

## 2020-10-19 PROCEDURE — 250N000011 HC RX IP 250 OP 636: Performed by: ALLERGY & IMMUNOLOGY

## 2020-10-19 PROCEDURE — 96372 THER/PROPH/DIAG INJ SC/IM: CPT | Performed by: ALLERGY & IMMUNOLOGY

## 2020-10-19 RX ADMIN — OMALIZUMAB 375 MG: 150 INJECTION, SOLUTION SUBCUTANEOUS at 09:24

## 2020-11-02 ENCOUNTER — INFUSION THERAPY VISIT (OUTPATIENT)
Dept: INFUSION THERAPY | Facility: CLINIC | Age: 50
End: 2020-11-02
Attending: ALLERGY & IMMUNOLOGY
Payer: COMMERCIAL

## 2020-11-02 VITALS
DIASTOLIC BLOOD PRESSURE: 87 MMHG | RESPIRATION RATE: 16 BRPM | SYSTOLIC BLOOD PRESSURE: 136 MMHG | TEMPERATURE: 97.8 F | OXYGEN SATURATION: 97 % | HEART RATE: 86 BPM

## 2020-11-02 DIAGNOSIS — J45.50 SEVERE PERSISTENT ASTHMA WITHOUT COMPLICATION (H): Primary | ICD-10-CM

## 2020-11-02 PROCEDURE — 96372 THER/PROPH/DIAG INJ SC/IM: CPT | Performed by: ALLERGY & IMMUNOLOGY

## 2020-11-02 PROCEDURE — 250N000011 HC RX IP 250 OP 636: Performed by: ALLERGY & IMMUNOLOGY

## 2020-11-02 RX ADMIN — OMALIZUMAB 375 MG: 150 INJECTION, SOLUTION SUBCUTANEOUS at 08:59

## 2020-11-02 NOTE — PROGRESS NOTES
Nursing Note:  Alba Varela presents today for xolair.    Patient seen by provider today: No   present during visit today: Not Applicable.    Note: 30 minute observation as ordered    Intravenous Access:  No Intravenous access/labs at this visit.    Discharge Plan:   Patient was sent home    Lucía Yu RN

## 2020-11-16 ENCOUNTER — INFUSION THERAPY VISIT (OUTPATIENT)
Dept: INFUSION THERAPY | Facility: CLINIC | Age: 50
End: 2020-11-16
Attending: ALLERGY & IMMUNOLOGY
Payer: COMMERCIAL

## 2020-11-16 VITALS
OXYGEN SATURATION: 97 % | DIASTOLIC BLOOD PRESSURE: 82 MMHG | RESPIRATION RATE: 18 BRPM | SYSTOLIC BLOOD PRESSURE: 120 MMHG | TEMPERATURE: 97.2 F | HEART RATE: 91 BPM

## 2020-11-16 DIAGNOSIS — J45.50 SEVERE PERSISTENT ASTHMA WITHOUT COMPLICATION (H): Primary | ICD-10-CM

## 2020-11-16 PROCEDURE — 250N000011 HC RX IP 250 OP 636: Performed by: ALLERGY & IMMUNOLOGY

## 2020-11-16 PROCEDURE — 96372 THER/PROPH/DIAG INJ SC/IM: CPT | Performed by: ALLERGY & IMMUNOLOGY

## 2020-11-16 RX ADMIN — OMALIZUMAB 375 MG: 150 INJECTION, SOLUTION SUBCUTANEOUS at 07:43

## 2020-11-16 ASSESSMENT — PAIN SCALES - GENERAL: PAINLEVEL: NO PAIN (0)

## 2020-11-16 NOTE — PROGRESS NOTES
Infusion Nursing Note:  Alba Varela presents today for Xolair.    Patient seen by provider today: No   present during visit today: Not Applicable.    Note: Denies asthma flare.  Denies hives.  Xolair given 2 in L arm and 1 in R arm.    Intravenous Access:  No Intravenous access/labs at this visit.    Treatment Conditions:  Biological Infusion Checklist:  ~~~ NOTE: If the patient answers yes to any of the questions below, hold the infusion and contact ordering provider or on-call provider.    1. Have you recently had an elevated temperature, fever, chills, productive cough, coughing for 3 weeks or longer or hemoptysis, abnormal vital signs, night sweats,  chest pain or have you noticed a decrease in your appetite, unexplained weight loss or fatigue? No  2. Do you have any open wounds or new incisions? No  3. Do you have any recent or upcoming hospitalizations, surgeries or dental procedures? No  4. Do you currently have or recently have had any signs of illness or infection or are you on any antibiotics? No  5. Have you had any new, sudden or worsening abdominal pain? No  6. Have you or anyone in your household received a live vaccination in the past 4 weeks? Please note:  No live vaccines while on biologic/chemotherapy until 6 months after the last treatment.  Patient can receive the flu vaccine (shot only) and the pneumovax.  It is optimal for the patient to get these vaccines mid cycle, but they can be given at any time as long as it is not on the day of the infusion. No  7. Have you recently been diagnosed with any new nervous system diseases (ie. Multiple sclerosis, Guillain [place], seizures, neurological changes) or cancer diagnosis? No  8. Are you on any form of radiation or chemotherapy? No  9. Are you pregnant or breast feeding or do you have plans of pregnancy in the future? No  10. Have you been having any signs of worsening depression or suicidal ideations?  (benlysta only) No  11. Have there  been any other new onset medical symptoms? No        Post Infusion Assessment:  Patient tolerated injection without incident.       Discharge Plan:   Discharge instructions reviewed with: Patient.  Patient discharged in stable condition accompanied by: self.  Departure Mode: Ambulatory.  Scheduling to call patient to schedule future appointments.    SIERRA RICKETTS RN

## 2020-11-30 ENCOUNTER — INFUSION THERAPY VISIT (OUTPATIENT)
Dept: INFUSION THERAPY | Facility: CLINIC | Age: 50
End: 2020-11-30
Attending: ALLERGY & IMMUNOLOGY
Payer: COMMERCIAL

## 2020-11-30 VITALS
SYSTOLIC BLOOD PRESSURE: 111 MMHG | RESPIRATION RATE: 16 BRPM | DIASTOLIC BLOOD PRESSURE: 77 MMHG | OXYGEN SATURATION: 98 % | TEMPERATURE: 97.5 F | HEART RATE: 94 BPM

## 2020-11-30 DIAGNOSIS — J45.50 SEVERE PERSISTENT ASTHMA WITHOUT COMPLICATION (H): Primary | ICD-10-CM

## 2020-11-30 PROCEDURE — 250N000011 HC RX IP 250 OP 636: Performed by: ALLERGY & IMMUNOLOGY

## 2020-11-30 PROCEDURE — 96372 THER/PROPH/DIAG INJ SC/IM: CPT | Performed by: ALLERGY & IMMUNOLOGY

## 2020-11-30 RX ADMIN — OMALIZUMAB 375 MG: 150 INJECTION, SOLUTION SUBCUTANEOUS at 07:46

## 2020-11-30 ASSESSMENT — PAIN SCALES - GENERAL: PAINLEVEL: NO PAIN (0)

## 2020-11-30 NOTE — PROGRESS NOTES
Infusion Nursing Note:  Alba Varela presents today for Xolair.    Patient seen by provider today: No   present during visit today: Not Applicable.    Note: Denies recent asthma flares.    Intravenous Access:  No Intravenous access/labs at this visit.    Treatment Conditions:  Biological Infusion Checklist:  ~~~ NOTE: If the patient answers yes to any of the questions below, hold the infusion and contact ordering provider or on-call provider.    1. Have you recently had an elevated temperature, fever, chills, productive cough, coughing for 3 weeks or longer or hemoptysis, abnormal vital signs, night sweats,  chest pain or have you noticed a decrease in your appetite, unexplained weight loss or fatigue? No  2. Do you have any open wounds or new incisions? No  3. Do you have any recent or upcoming hospitalizations, surgeries or dental procedures? No  4. Do you currently have or recently have had any signs of illness or infection or are you on any antibiotics? No  5. Have you had any new, sudden or worsening abdominal pain? No  6. Have you or anyone in your household received a live vaccination in the past 4 weeks? Please note:  No live vaccines while on biologic/chemotherapy until 6 months after the last treatment.  Patient can receive the flu vaccine (shot only) and the pneumovax.  It is optimal for the patient to get these vaccines mid cycle, but they can be given at any time as long as it is not on the day of the infusion. No  7. Have you recently been diagnosed with any new nervous system diseases (ie. Multiple sclerosis, Guillain [place], seizures, neurological changes) or cancer diagnosis? No  8. Are you on any form of radiation or chemotherapy? No  9. Are you pregnant or breast feeding or do you have plans of pregnancy in the future? No  10. Have you been having any signs of worsening depression or suicidal ideations?  (benlysta only) No  11. Have there been any other new onset medical symptoms?  No        Post Infusion Assessment:  Patient tolerated injection without incident.   Observed patient for 30 minutes post Xolair.    Discharge Plan:   Discharge instructions reviewed with: Patient.  Patient discharged in stable condition accompanied by: self.  Departure Mode: Ambulatory.  Next injection scheduled for 12/14/2020.    SIERRA RICKETTS RN

## 2020-12-06 ENCOUNTER — HEALTH MAINTENANCE LETTER (OUTPATIENT)
Age: 50
End: 2020-12-06

## 2020-12-14 ENCOUNTER — INFUSION THERAPY VISIT (OUTPATIENT)
Dept: INFUSION THERAPY | Facility: CLINIC | Age: 50
End: 2020-12-14
Attending: ALLERGY & IMMUNOLOGY
Payer: COMMERCIAL

## 2020-12-14 VITALS
SYSTOLIC BLOOD PRESSURE: 118 MMHG | TEMPERATURE: 97.5 F | OXYGEN SATURATION: 98 % | HEART RATE: 87 BPM | DIASTOLIC BLOOD PRESSURE: 81 MMHG | RESPIRATION RATE: 16 BRPM

## 2020-12-14 DIAGNOSIS — J45.50 SEVERE PERSISTENT ASTHMA WITHOUT COMPLICATION (H): Primary | ICD-10-CM

## 2020-12-14 PROCEDURE — 250N000011 HC RX IP 250 OP 636: Performed by: ALLERGY & IMMUNOLOGY

## 2020-12-14 PROCEDURE — 96372 THER/PROPH/DIAG INJ SC/IM: CPT | Performed by: ALLERGY & IMMUNOLOGY

## 2020-12-14 RX ADMIN — OMALIZUMAB 375 MG: 150 INJECTION, SOLUTION SUBCUTANEOUS at 07:42

## 2020-12-14 NOTE — PROGRESS NOTES
Infusion Nursing Note:  Alba Varela presents today for xolair.     present during visit today: Not Applicable.    Note: N/A.    Intravenous Access:  No Intravenous access/labs at this visit.    Treatment Conditions:  NA    Post Lab Assessment:  Patient tolerated injection  Observed for 30 minutes post injection       Discharge Plan:   Patient and/or family verbalized understanding of  instructions and all questions answered.  Patient discharged in stable condition accompanied by: self.  Patient to see provider today: No  Departure Mode: Ambulatory.  Vivi Rosado, RN, RN

## 2020-12-28 ENCOUNTER — INFUSION THERAPY VISIT (OUTPATIENT)
Dept: INFUSION THERAPY | Facility: CLINIC | Age: 50
End: 2020-12-28
Attending: ALLERGY & IMMUNOLOGY
Payer: COMMERCIAL

## 2020-12-28 VITALS
TEMPERATURE: 97.8 F | RESPIRATION RATE: 18 BRPM | OXYGEN SATURATION: 98 % | DIASTOLIC BLOOD PRESSURE: 67 MMHG | HEART RATE: 74 BPM | SYSTOLIC BLOOD PRESSURE: 114 MMHG

## 2020-12-28 DIAGNOSIS — J45.50 SEVERE PERSISTENT ASTHMA WITHOUT COMPLICATION (H): Primary | ICD-10-CM

## 2020-12-28 PROCEDURE — 96372 THER/PROPH/DIAG INJ SC/IM: CPT | Performed by: ALLERGY & IMMUNOLOGY

## 2020-12-28 PROCEDURE — 250N000011 HC RX IP 250 OP 636: Performed by: ALLERGY & IMMUNOLOGY

## 2020-12-28 RX ADMIN — OMALIZUMAB 375 MG: 150 INJECTION, SOLUTION SUBCUTANEOUS at 07:47

## 2020-12-28 NOTE — PROGRESS NOTES
Infusion Nursing Note:  Alba Varela presents today for Xolair.    Patient seen by provider today: No   present during visit today: Not Applicable.    Note: Patient refused COVID testing.    Intravenous Access:  No Intravenous access/labs at this visit.    Treatment Conditions:  Not Applicable.      Post Infusion Assessment:  Patient tolerated injection without incident.  Patient observed for 30 minutes post Xolair per protocol.       Discharge Plan:   Discharge instructions reviewed with: Patient.  Patient and/or family verbalized understanding of discharge instructions and all questions answered.  AVS to patient via [employer].  Patient will return 1/11/21 for next appointment.   Patient discharged in stable condition accompanied by: self.  Departure Mode: Ambulatory.    Toma Camargo RN

## 2020-12-31 ENCOUNTER — TRANSFERRED RECORDS (OUTPATIENT)
Dept: MULTI SPECIALTY CLINIC | Facility: CLINIC | Age: 50
End: 2020-12-31

## 2020-12-31 LAB — PAP SMEAR - HIM PATIENT REPORTED: NEGATIVE

## 2021-01-11 ENCOUNTER — INFUSION THERAPY VISIT (OUTPATIENT)
Dept: INFUSION THERAPY | Facility: CLINIC | Age: 51
End: 2021-01-11
Attending: ALLERGY & IMMUNOLOGY
Payer: COMMERCIAL

## 2021-01-11 VITALS
SYSTOLIC BLOOD PRESSURE: 118 MMHG | DIASTOLIC BLOOD PRESSURE: 75 MMHG | OXYGEN SATURATION: 99 % | TEMPERATURE: 98.1 F | RESPIRATION RATE: 16 BRPM | HEART RATE: 75 BPM

## 2021-01-11 DIAGNOSIS — J45.50 SEVERE PERSISTENT ASTHMA WITHOUT COMPLICATION (H): Primary | ICD-10-CM

## 2021-01-11 PROCEDURE — 96372 THER/PROPH/DIAG INJ SC/IM: CPT | Performed by: ALLERGY & IMMUNOLOGY

## 2021-01-11 PROCEDURE — 250N000011 HC RX IP 250 OP 636: Performed by: ALLERGY & IMMUNOLOGY

## 2021-01-11 RX ADMIN — OMALIZUMAB 375 MG: 150 INJECTION, SOLUTION SUBCUTANEOUS at 07:59

## 2021-01-11 NOTE — PROGRESS NOTES
Infusion Nursing Note:  Alba Varela presents today for xolair.     present during visit today: Not Applicable.    Note: N/A.    Intravenous Access:  No Intravenous access/labs at this visit.    Treatment Conditions:  NA    Post Lab Assessment:  Patient tolerated injection  Observed for 15 minutes.  Pt had another appt to go to    Discharge Plan:   Patient and/or family verbalized understanding of  instructions and all questions answered.  Patient  to lobby in stable condition accompanied by: self.  Patient to see provider today: No  Departure Mode: Ambulatory.  Vivi Rosado, RN, RN

## 2021-01-25 ENCOUNTER — HOSPITAL ENCOUNTER (OUTPATIENT)
Facility: CLINIC | Age: 51
Setting detail: SPECIMEN
Discharge: HOME | End: 2021-01-25
Attending: ALLERGY & IMMUNOLOGY | Admitting: ALLERGY & IMMUNOLOGY
Payer: COMMERCIAL

## 2021-01-25 ENCOUNTER — INFUSION THERAPY VISIT (OUTPATIENT)
Dept: INFUSION THERAPY | Facility: CLINIC | Age: 51
End: 2021-01-25
Attending: ALLERGY & IMMUNOLOGY
Payer: COMMERCIAL

## 2021-01-25 VITALS
RESPIRATION RATE: 16 BRPM | DIASTOLIC BLOOD PRESSURE: 81 MMHG | SYSTOLIC BLOOD PRESSURE: 111 MMHG | TEMPERATURE: 98.1 F | HEART RATE: 96 BPM | OXYGEN SATURATION: 97 %

## 2021-01-25 DIAGNOSIS — J45.50 SEVERE PERSISTENT ASTHMA WITHOUT COMPLICATION (H): Primary | ICD-10-CM

## 2021-01-25 LAB
LABORATORY COMMENT REPORT: NORMAL
SARS-COV-2 RNA RESP QL NAA+PROBE: NEGATIVE
SARS-COV-2 RNA RESP QL NAA+PROBE: NORMAL
SPECIMEN SOURCE: NORMAL
SPECIMEN SOURCE: NORMAL

## 2021-01-25 PROCEDURE — 250N000011 HC RX IP 250 OP 636: Performed by: ALLERGY & IMMUNOLOGY

## 2021-01-25 PROCEDURE — U0005 INFEC AGEN DETEC AMPLI PROBE: HCPCS | Performed by: ALLERGY & IMMUNOLOGY

## 2021-01-25 PROCEDURE — U0003 INFECTIOUS AGENT DETECTION BY NUCLEIC ACID (DNA OR RNA); SEVERE ACUTE RESPIRATORY SYNDROME CORONAVIRUS 2 (SARS-COV-2) (CORONAVIRUS DISEASE [COVID-19]), AMPLIFIED PROBE TECHNIQUE, MAKING USE OF HIGH THROUGHPUT TECHNOLOGIES AS DESCRIBED BY CMS-2020-01-R: HCPCS | Performed by: ALLERGY & IMMUNOLOGY

## 2021-01-25 PROCEDURE — 96372 THER/PROPH/DIAG INJ SC/IM: CPT | Performed by: ALLERGY & IMMUNOLOGY

## 2021-01-25 RX ADMIN — OMALIZUMAB 375 MG: 150 INJECTION, SOLUTION SUBCUTANEOUS at 07:50

## 2021-01-25 NOTE — PROGRESS NOTES
Infusion Nursing Note:  Alba Varela presents today for Xolair.    Patient seen by provider today: No   present during visit today: Not Applicable.    Note: Patient reports she had her covid vaccine one week ago.  She had shortness of breath after and was observed for three hours.  It resolved and denies shortness of breath since then.     Patient did meet criteria for an asymptomatic covid-19 PCR test in infusion today. Patient accepted  the covid-19 test, right nare.    Intravenous Access:  No Intravenous access/labs at this visit.    Treatment Conditions:  Not Applicable.      Post Infusion Assessment:  Patient tolerated injection without incident.  Patient observed for 25 minutes post Xolair per protocol.       Discharge Plan:   AVS to patient via [employer].  Patient will return 2/8/21 for next appointment.   Patient discharged in stable condition accompanied by: self.  Departure Mode: Ambulatory.    Sangita Edwards RN

## 2021-02-08 ENCOUNTER — INFUSION THERAPY VISIT (OUTPATIENT)
Dept: INFUSION THERAPY | Facility: CLINIC | Age: 51
End: 2021-02-08
Attending: ALLERGY & IMMUNOLOGY
Payer: COMMERCIAL

## 2021-02-08 VITALS
SYSTOLIC BLOOD PRESSURE: 113 MMHG | TEMPERATURE: 97.9 F | RESPIRATION RATE: 16 BRPM | DIASTOLIC BLOOD PRESSURE: 74 MMHG | OXYGEN SATURATION: 98 % | HEART RATE: 80 BPM

## 2021-02-08 DIAGNOSIS — J45.50 SEVERE PERSISTENT ASTHMA WITHOUT COMPLICATION (H): Primary | ICD-10-CM

## 2021-02-08 PROCEDURE — 250N000011 HC RX IP 250 OP 636: Performed by: ALLERGY & IMMUNOLOGY

## 2021-02-08 PROCEDURE — 96372 THER/PROPH/DIAG INJ SC/IM: CPT | Performed by: ALLERGY & IMMUNOLOGY

## 2021-02-08 RX ADMIN — OMALIZUMAB 375 MG: 150 INJECTION, SOLUTION SUBCUTANEOUS at 07:51

## 2021-02-08 NOTE — PROGRESS NOTES
Infusion Nursing Note:  Alba Varela presents today for xolair.     present during visit today: Not Applicable.    Note: ~~~ NOTE: If the patient answers yes to any of the questions below, hold the infusion and contact ordering provider or on-call provider.    1. Have you recently had an elevated temperature, fever, chills, productive cough, coughing for 3 weeks or longer or hemoptysis,  abnormal vital signs, night sweats,  chest pain or have you noticed a decrease in your appetite, unexplained weight loss or fatigue? No  2. Do you have any open wounds or new incisions? No  3. Do you have any recent or upcoming hospitalizations, surgeries or dental procedures? No  4. Do you currently have or recently have had any signs of illness or infection or are you on any antibiotics? No  5. Have you had any new, sudden or worsening abdominal pain? No  6. Have you or anyone in your household received a live vaccination in the past 4 weeks? Please note:  No live vaccines while on biologic/chemotherapy until 6 months after the last treatment.  Patient can receive the flu vaccine (shot only) and the pneumovax.  It is optimal for the patient to get these vaccines mid cycle, but they can be given at any time as long as it is not on the day of the infusion. No  7. Have you recently been diagnosed with any new nervous system diseases (ie. Multiple sclerosis, Guillain [place], seizures, neurological changes) or cancer diagnosis? Are you on any form of radiation or chemotherapy? No  8. Are you pregnant or breast feeding or do you have plans of pregnancy in the future? No  9. Have you been having any signs of worsening depression or suicidal ideations?  (benlysta only) No  10. Have there been any other new onset medical symptoms? No      Intravenous Access:  No Intravenous access/labs at this visit.    Treatment Conditions:  NA    Post Lab Assessment:  Patient tolerated injection   Observed for 30 minutes post injection    Post  Infusion Assessment:  Biologic Infusion Post Education: Call the triage nurse at your clinic or seek medical attention if you have chills and/or temperature greater than or equal to 100.5, uncontrolled nausea/vomiting, diarrhea, constipation, dizziness, shortness of breath, chest pain, heart palpitations, weakness or any other new or concerning symptoms, questions or concerns.  You cannot have any live virus vaccines prior to or during treatment or up to 6 months post infusion.  If you have an upcoming surgery, medical procedure or dental procedure during treatment, this should be discussed with your ordering physician and your surgeon/dentist.  If you are having any concerning symptom, if you are unsure if you should get your next infusion or wish to speak to a provider before your next infusion, please call your care coordinator or triage nurse at your clinic to notify them so we can adequately serve you.     Discharge Plan:   Patient and/or family verbalized understanding of  instructions and all questions answered.  Patient  to lobby in stable condition accompanied by: self.  Patient to see provider today: No  Departure Mode: Ambulatory.  Vivi Rosado, RN, RN

## 2021-02-20 ENCOUNTER — HEALTH MAINTENANCE LETTER (OUTPATIENT)
Age: 51
End: 2021-02-20

## 2021-02-23 ENCOUNTER — INFUSION THERAPY VISIT (OUTPATIENT)
Dept: INFUSION THERAPY | Facility: CLINIC | Age: 51
End: 2021-02-23
Attending: ALLERGY & IMMUNOLOGY
Payer: COMMERCIAL

## 2021-02-23 VITALS
TEMPERATURE: 98.1 F | OXYGEN SATURATION: 95 % | HEART RATE: 90 BPM | DIASTOLIC BLOOD PRESSURE: 79 MMHG | SYSTOLIC BLOOD PRESSURE: 129 MMHG

## 2021-02-23 DIAGNOSIS — J45.50 SEVERE PERSISTENT ASTHMA WITHOUT COMPLICATION (H): Primary | ICD-10-CM

## 2021-02-23 PROCEDURE — 96372 THER/PROPH/DIAG INJ SC/IM: CPT | Performed by: ALLERGY & IMMUNOLOGY

## 2021-02-23 PROCEDURE — 250N000011 HC RX IP 250 OP 636: Performed by: ALLERGY & IMMUNOLOGY

## 2021-02-23 RX ADMIN — OMALIZUMAB 375 MG: 150 INJECTION, SOLUTION SUBCUTANEOUS at 07:56

## 2021-02-23 NOTE — PROGRESS NOTES
Infusion Nursing Note:  Alba Varela presents today for Xolair.    Patient seen by provider today: No   present during visit today: Not Applicable.    Note: Pt states her asthma is well controlled at this time.    Intravenous Access:  No Intravenous access/labs at this visit.    Treatment Conditions:  Not Applicable.      Post Infusion Assessment:  Patient tolerated injection without incident.  Patient observed for 30 minutes post Xolair per protocol.  Site patent and intact, free from redness, edema or discomfort.       Discharge Plan:   Discharge instructions reviewed with: Patient.  Patient and/or family verbalized understanding of discharge instructions and all questions answered.  AVS to patient via [employer].  Patient will return 3/8/21 for next Xolair injection for next appointment.   Patient discharged in stable condition accompanied by: self.  Departure Mode: Ambulatory.    Mame Condon RN

## 2021-03-08 ENCOUNTER — INFUSION THERAPY VISIT (OUTPATIENT)
Dept: INFUSION THERAPY | Facility: CLINIC | Age: 51
End: 2021-03-08
Attending: ALLERGY & IMMUNOLOGY
Payer: COMMERCIAL

## 2021-03-08 VITALS
SYSTOLIC BLOOD PRESSURE: 127 MMHG | RESPIRATION RATE: 16 BRPM | HEART RATE: 91 BPM | OXYGEN SATURATION: 98 % | TEMPERATURE: 98.2 F | DIASTOLIC BLOOD PRESSURE: 87 MMHG

## 2021-03-08 DIAGNOSIS — J45.50 SEVERE PERSISTENT ASTHMA WITHOUT COMPLICATION (H): Primary | ICD-10-CM

## 2021-03-08 PROCEDURE — 96372 THER/PROPH/DIAG INJ SC/IM: CPT | Performed by: ALLERGY & IMMUNOLOGY

## 2021-03-08 PROCEDURE — 250N000011 HC RX IP 250 OP 636: Performed by: ALLERGY & IMMUNOLOGY

## 2021-03-08 RX ADMIN — OMALIZUMAB 375 MG: 150 INJECTION, SOLUTION SUBCUTANEOUS at 07:44

## 2021-03-08 NOTE — PROGRESS NOTES
Infusion Nursing Note:  Alba Varela presents today for xolair.    Patient seen by provider today: No   present during visit today: Not Applicable.    Note: N/A.    Treatment Conditions:  Not Applicable.      Post Infusion Assessment:  Patient tolerated injection without incident.  Patient observed for 30 minutes post xolair per protocol.       Discharge Plan:   AVS to patient via [employer]CH[employer].  Patient will return 3/22/21 for next appointment.   Patient discharged in stable condition accompanied by: self.  Departure Mode: Ambulatory.    Sangita Edwards RN

## 2021-03-22 ENCOUNTER — INFUSION THERAPY VISIT (OUTPATIENT)
Dept: INFUSION THERAPY | Facility: CLINIC | Age: 51
End: 2021-03-22
Attending: ALLERGY & IMMUNOLOGY
Payer: COMMERCIAL

## 2021-03-22 VITALS
HEART RATE: 90 BPM | RESPIRATION RATE: 16 BRPM | TEMPERATURE: 98.2 F | SYSTOLIC BLOOD PRESSURE: 128 MMHG | DIASTOLIC BLOOD PRESSURE: 87 MMHG | OXYGEN SATURATION: 100 %

## 2021-03-22 DIAGNOSIS — J45.50 SEVERE PERSISTENT ASTHMA WITHOUT COMPLICATION (H): Primary | ICD-10-CM

## 2021-03-22 PROCEDURE — 96372 THER/PROPH/DIAG INJ SC/IM: CPT | Performed by: ALLERGY & IMMUNOLOGY

## 2021-03-22 PROCEDURE — 250N000011 HC RX IP 250 OP 636: Performed by: ALLERGY & IMMUNOLOGY

## 2021-03-22 RX ADMIN — OMALIZUMAB 375 MG: 150 INJECTION, SOLUTION SUBCUTANEOUS at 08:51

## 2021-03-22 NOTE — PROGRESS NOTES
Infusion Nursing Note:  Alba Varela presents today for xolair.     present during visit today: Not Applicable.    Note: N/A.    Intravenous Access:  No Intravenous access/labs at this visit.    Treatment Conditions:  NA    Post Lab Assessment:  Patient tolerated injections   Site patent and intact, free from redness, edema or discomfort.  Ob served for 30 minutes post Xolair injection    Discharge Plan:   Patient and/or family verbalized understanding of  instructions and all questions answered.  Patient  to lobby in stable condition accompanied by: self.  Patient to see provider today: No  Departure Mode: Ambulatory.  Vivi Rosado, RN, RN

## 2021-04-05 ENCOUNTER — INFUSION THERAPY VISIT (OUTPATIENT)
Dept: INFUSION THERAPY | Facility: CLINIC | Age: 51
End: 2021-04-05
Attending: ALLERGY & IMMUNOLOGY
Payer: COMMERCIAL

## 2021-04-05 VITALS
HEART RATE: 87 BPM | TEMPERATURE: 97.1 F | SYSTOLIC BLOOD PRESSURE: 142 MMHG | DIASTOLIC BLOOD PRESSURE: 68 MMHG | RESPIRATION RATE: 16 BRPM | OXYGEN SATURATION: 98 %

## 2021-04-05 DIAGNOSIS — J45.50 SEVERE PERSISTENT ASTHMA WITHOUT COMPLICATION (H): Primary | ICD-10-CM

## 2021-04-05 PROCEDURE — 250N000011 HC RX IP 250 OP 636: Performed by: ALLERGY & IMMUNOLOGY

## 2021-04-05 PROCEDURE — 96372 THER/PROPH/DIAG INJ SC/IM: CPT | Performed by: ALLERGY & IMMUNOLOGY

## 2021-04-05 RX ADMIN — OMALIZUMAB 375 MG: 150 INJECTION, SOLUTION SUBCUTANEOUS at 08:17

## 2021-04-05 ASSESSMENT — PAIN SCALES - GENERAL: PAINLEVEL: NO PAIN (0)

## 2021-04-05 NOTE — PROGRESS NOTES
Infusion Nursing Note:  Alba Varela presents today for Xolair.    Patient seen by provider today: No   present during visit today: Not Applicable.    Note: Denies change in assessment from previous wks.  Asthma controlled on Xolair.  Denies SE to Xolair.    Intravenous Access:  No Intravenous access/labs at this visit.    Treatment Conditions:  Biological Infusion Checklist:  ~~~ NOTE: If the patient answers yes to any of the questions below, hold the infusion and contact ordering provider or on-call provider.    1. Have you recently had an elevated temperature, fever, chills, productive cough, coughing for 3 weeks or longer or hemoptysis, abnormal vital signs, night sweats,  chest pain or have you noticed a decrease in your appetite, unexplained weight loss or fatigue? No  2. Do you have any open wounds or new incisions? No  3. Do you have any recent or upcoming hospitalizations, surgeries or dental procedures? No  4. Do you currently have or recently have had any signs of illness or infection or are you on any antibiotics? No  5. Have you had any new, sudden or worsening abdominal pain? No  6. Have you or anyone in your household received a live vaccination in the past 4 weeks? Please note:  No live vaccines while on biologic/chemotherapy until 6 months after the last treatment.  Patient can receive the flu vaccine (shot only) and the pneumovax.  It is optimal for the patient to get these vaccines mid cycle, but they can be given at any time as long as it is not on the day of the infusion. No  7. Have you recently been diagnosed with any new nervous system diseases (ie. Multiple sclerosis, Guillain [place], seizures, neurological changes) or cancer diagnosis? No  8. Are you on any form of radiation or chemotherapy? No  9. Are you pregnant or breast feeding or do you have plans of pregnancy in the future? No  10. Have you been having any signs of worsening depression or suicidal ideations?  (benlysta  only) No  11. Have there been any other new onset medical symptoms? No        Post Infusion Assessment:  Patient tolerated injection without incident.   Observed patient for 30 min. post Xalair per protocol.      Discharge Plan:   Discharge instructions reviewed with: Patient.  Patient discharged in stable condition accompanied by: self.  Departure Mode: Ambulatory.  Next injection scheduled for 4/19/2021      SIERRA RICKETTS RN                    ,

## 2021-04-19 ENCOUNTER — INFUSION THERAPY VISIT (OUTPATIENT)
Dept: INFUSION THERAPY | Facility: CLINIC | Age: 51
End: 2021-04-19
Attending: ALLERGY & IMMUNOLOGY
Payer: COMMERCIAL

## 2021-04-19 VITALS
DIASTOLIC BLOOD PRESSURE: 84 MMHG | SYSTOLIC BLOOD PRESSURE: 126 MMHG | TEMPERATURE: 97 F | HEART RATE: 76 BPM | OXYGEN SATURATION: 98 % | RESPIRATION RATE: 16 BRPM

## 2021-04-19 DIAGNOSIS — J45.50 SEVERE PERSISTENT ASTHMA WITHOUT COMPLICATION (H): Primary | ICD-10-CM

## 2021-04-19 PROCEDURE — 96372 THER/PROPH/DIAG INJ SC/IM: CPT | Performed by: ALLERGY & IMMUNOLOGY

## 2021-04-19 PROCEDURE — 250N000011 HC RX IP 250 OP 636: Performed by: ALLERGY & IMMUNOLOGY

## 2021-04-19 RX ADMIN — OMALIZUMAB 375 MG: 150 INJECTION, SOLUTION SUBCUTANEOUS at 08:13

## 2021-04-19 NOTE — PROGRESS NOTES
Infusion Nursing Note:  Alba Varela presents today for Xolair.    Patient seen by provider today: No   present during visit today: Not Applicable.    Note: N/A    Intravenous Access:  No Intravenous access/labs at this visit.    Treatment Conditions:  Not Applicable.      Post Infusion Assessment:  Patient tolerated injection without incident.  Patient observed for 30 minutes post xolair per protocol.  Site patent and intact, free from redness, edema or discomfort.       Discharge Plan:   AVS to patient via MYCHART.  Patient will return 5/3 for next appointment.   Patient discharged in stable condition accompanied by: self.  Departure Mode: Ambulatory.    Sangita De La Cruz RN

## 2021-05-03 ENCOUNTER — INFUSION THERAPY VISIT (OUTPATIENT)
Dept: INFUSION THERAPY | Facility: CLINIC | Age: 51
End: 2021-05-03
Attending: ALLERGY & IMMUNOLOGY
Payer: COMMERCIAL

## 2021-05-03 VITALS
SYSTOLIC BLOOD PRESSURE: 131 MMHG | DIASTOLIC BLOOD PRESSURE: 89 MMHG | HEART RATE: 83 BPM | OXYGEN SATURATION: 96 % | TEMPERATURE: 96.8 F

## 2021-05-03 DIAGNOSIS — J45.50 SEVERE PERSISTENT ASTHMA WITHOUT COMPLICATION (H): Primary | ICD-10-CM

## 2021-05-03 PROCEDURE — 96372 THER/PROPH/DIAG INJ SC/IM: CPT | Performed by: ALLERGY & IMMUNOLOGY

## 2021-05-03 PROCEDURE — 250N000011 HC RX IP 250 OP 636: Performed by: ALLERGY & IMMUNOLOGY

## 2021-05-03 RX ADMIN — OMALIZUMAB 375 MG: 150 INJECTION, SOLUTION SUBCUTANEOUS at 08:15

## 2021-05-03 NOTE — PROGRESS NOTES
Infusion Nursing Note:  Alba Varela presents today for Xolair.    Patient seen by provider today: No   present during visit today: Not Applicable.    Note: Pt reports her asthma is still well controlled at this time.       Intravenous Access:  No Intravenous access/labs at this visit.    Treatment Conditions:  Not Applicable.      Post Infusion Assessment:  Patient tolerated injection without incident.  Patient observed for 30 minutes post Xolair per protocol.  Site patent and intact, free from redness, edema or discomfort.       Discharge Plan:   Discharge instructions reviewed with: Patient.  Patient and/or family verbalized understanding of discharge instructions and all questions answered.  AVS to patient via [email or web address].  Patient will return 5/17/21 for next Xolair injection for next appointment.   Patient discharged in stable condition accompanied by: self.  Departure Mode: Ambulatory.    Mame Condon RN

## 2021-05-17 ENCOUNTER — INFUSION THERAPY VISIT (OUTPATIENT)
Dept: INFUSION THERAPY | Facility: CLINIC | Age: 51
End: 2021-05-17
Attending: ALLERGY & IMMUNOLOGY
Payer: COMMERCIAL

## 2021-05-17 VITALS
SYSTOLIC BLOOD PRESSURE: 122 MMHG | TEMPERATURE: 98.3 F | OXYGEN SATURATION: 99 % | RESPIRATION RATE: 16 BRPM | DIASTOLIC BLOOD PRESSURE: 83 MMHG | HEART RATE: 90 BPM

## 2021-05-17 DIAGNOSIS — J45.50 SEVERE PERSISTENT ASTHMA WITHOUT COMPLICATION (H): Primary | ICD-10-CM

## 2021-05-17 PROCEDURE — 96372 THER/PROPH/DIAG INJ SC/IM: CPT | Performed by: ALLERGY & IMMUNOLOGY

## 2021-05-17 PROCEDURE — 250N000011 HC RX IP 250 OP 636: Performed by: ALLERGY & IMMUNOLOGY

## 2021-05-17 RX ADMIN — OMALIZUMAB 375 MG: 150 INJECTION, SOLUTION SUBCUTANEOUS at 08:34

## 2021-05-17 NOTE — PROGRESS NOTES
Infusion Nursing Note:  Alba Varela presents today for Xolair.    Patient seen by provider today: No   present during visit today: Not Applicable.    Note: N/A.      Post Infusion Assessment:  Patient tolerated injection without incident.  Patient observed for 30 minutes post xolair per protocol.       Discharge Plan:   AVS to patient via MYCHART.  Patient will return 6/1/21 for next appointment.   Patient discharged in stable condition accompanied by: self.  Departure Mode: Ambulatory.    Sangita Edwards RN

## 2021-06-01 ENCOUNTER — INFUSION THERAPY VISIT (OUTPATIENT)
Dept: INFUSION THERAPY | Facility: CLINIC | Age: 51
End: 2021-06-01
Attending: ALLERGY & IMMUNOLOGY
Payer: COMMERCIAL

## 2021-06-01 VITALS
DIASTOLIC BLOOD PRESSURE: 82 MMHG | TEMPERATURE: 97.8 F | SYSTOLIC BLOOD PRESSURE: 123 MMHG | HEART RATE: 65 BPM | OXYGEN SATURATION: 98 %

## 2021-06-01 DIAGNOSIS — J45.50 SEVERE PERSISTENT ASTHMA WITHOUT COMPLICATION (H): Primary | ICD-10-CM

## 2021-06-01 PROCEDURE — 96372 THER/PROPH/DIAG INJ SC/IM: CPT | Performed by: ALLERGY & IMMUNOLOGY

## 2021-06-01 PROCEDURE — 250N000011 HC RX IP 250 OP 636: Performed by: ALLERGY & IMMUNOLOGY

## 2021-06-01 RX ADMIN — OMALIZUMAB 375 MG: 150 INJECTION, SOLUTION SUBCUTANEOUS at 08:17

## 2021-06-01 NOTE — PROGRESS NOTES
Infusion Nursing Note:  Alba Varela presents today for Xolair.    Patient seen by provider today: No   present during visit today: Not Applicable.    Note: Pt reports asthma is well controlled.  Has watery/itchy eyes r/t seasonal allergies but it has not affected her breathing.    Intravenous Access:  No Intravenous access/labs at this visit.    Treatment Conditions:  Not Applicable.      Post Infusion Assessment:  Patient tolerated injection without incident.  Patient observed for 30 minutes post Xolair per protocol.  Site patent and intact, free from redness, edema or discomfort.       Discharge Plan:   Discharge instructions reviewed with: Patient.  Patient and/or family verbalized understanding of discharge instructions and all questions answered.  AVS to patient via [employer] [employer].  Patient will return in 2 weeks for next Xolair injection.  Pt will schedule this next appt at her earliest convenience for next appointment.   Patient discharged in stable condition accompanied by: self.      Mame Condon RN

## 2021-06-02 VITALS — BODY MASS INDEX: 28.2 KG/M2 | HEIGHT: 64 IN | WEIGHT: 165.19 LBS

## 2021-06-09 ENCOUNTER — OFFICE VISIT (OUTPATIENT)
Dept: PEDIATRICS | Facility: CLINIC | Age: 51
End: 2021-06-09
Payer: COMMERCIAL

## 2021-06-09 VITALS
BODY MASS INDEX: 31.59 KG/M2 | HEART RATE: 83 BPM | HEIGHT: 63 IN | RESPIRATION RATE: 14 BRPM | OXYGEN SATURATION: 98 % | TEMPERATURE: 97.9 F | WEIGHT: 178.3 LBS | SYSTOLIC BLOOD PRESSURE: 112 MMHG | DIASTOLIC BLOOD PRESSURE: 68 MMHG

## 2021-06-09 DIAGNOSIS — G47.30 SLEEP APNEA, UNSPECIFIED TYPE: ICD-10-CM

## 2021-06-09 DIAGNOSIS — J45.50 SEVERE PERSISTENT ASTHMA WITHOUT COMPLICATION (H): ICD-10-CM

## 2021-06-09 DIAGNOSIS — I49.1 PAC (PREMATURE ATRIAL CONTRACTION): ICD-10-CM

## 2021-06-09 DIAGNOSIS — Z72.0 TOBACCO USE: ICD-10-CM

## 2021-06-09 DIAGNOSIS — Z01.818 PREOP GENERAL PHYSICAL EXAM: Primary | ICD-10-CM

## 2021-06-09 DIAGNOSIS — Z87.898 HISTORY OF POSTOPERATIVE NAUSEA AND VOMITING: ICD-10-CM

## 2021-06-09 DIAGNOSIS — H02.403 PTOSIS OF EYELID, BILATERAL: ICD-10-CM

## 2021-06-09 DIAGNOSIS — Z12.31 ENCOUNTER FOR SCREENING MAMMOGRAM FOR BREAST CANCER: ICD-10-CM

## 2021-06-09 DIAGNOSIS — F41.9 ANXIETY: ICD-10-CM

## 2021-06-09 PROCEDURE — 99214 OFFICE O/P EST MOD 30 MIN: CPT | Performed by: NURSE PRACTITIONER

## 2021-06-09 ASSESSMENT — PATIENT HEALTH QUESTIONNAIRE - PHQ9
SUM OF ALL RESPONSES TO PHQ QUESTIONS 1-9: 6
10. IF YOU CHECKED OFF ANY PROBLEMS, HOW DIFFICULT HAVE THESE PROBLEMS MADE IT FOR YOU TO DO YOUR WORK, TAKE CARE OF THINGS AT HOME, OR GET ALONG WITH OTHER PEOPLE: EXTREMELY DIFFICULT
SUM OF ALL RESPONSES TO PHQ QUESTIONS 1-9: 6

## 2021-06-09 ASSESSMENT — MIFFLIN-ST. JEOR: SCORE: 1399.47

## 2021-06-09 NOTE — PROGRESS NOTES
[facility]  [address]  [address] [place] [address]  Phone: [phone]  Fax: [phone]  Primary Provider: Liz Sanderson  Pre-op Performing Provider: LAUREEN MONTES    PREOPERATIVE EVALUATION:  Today's date: 6/9/2021    Alba Varela is a 51 year old female who presents for a preoperative evaluation.    Surgical Information:  Surgery/Procedure: EYELID DROOP REPAIR  Surgery Location: [facility]  Surgeon: Dr Laurie Villela  Surgery Date: 6/15/21  Time of Surgery: 2:30pm  Where patient plans to recover: At home with family  Fax number for surgical facility: [phone]    Type of Anesthesia Anticipated: MAC    Assessment & Plan     The proposed surgical procedure is considered LOW risk.    Preop general physical exam  Ptosis of eyelid, bilateral  Reason for surgery.    Severe persistent asthma without complication  Stable. Followed by allergy/asthma specialists.    Anxiety  Currently not well controlled, dealing with situational anxiety (daughter in treatment facility). Meets with therapist. On effexor.  Denies thoughts of self harm, feels. Recommend f/u with PCP to discuss further.    History of postoperative nausea and vomiting    Sleep apnea, unspecified type  Did not tolerate CPAP.    Tobacco use  Has cut back but not interested in quitting at this point. Discussed risks.    Encounter for screening mammogram for breast cancer  - [facility] SCREENING DIGITAL BILAT - Future  (s+30); Future    PAC (premature atrial contraction)  Asymptomatic. Takes diltiazem, ok to take morning of surgery.    Risks and Recommendations:  The patient has the following additional risks and recommendations for perioperative complications:  Social and Substance:    - Active nicotine user, advised smoking cessation    Medication Instructions:  Patient is to take all scheduled medications on the day of surgery    RECOMMENDATION:  APPROVAL GIVEN to proceed with  proposed procedure, without further diagnostic evaluation.          Subjective     HPI related to upcoming procedure: b/l eyelid ptosis/drooping, interferes with vision    COVID test scheduled 6/11.  COVID vaccinated.    Preop Questions 6/9/2021   1. Have you ever had a heart attack or stroke? No   2. Have you ever had surgery on your heart or blood vessels, such as a stent placement, a coronary artery bypass, or surgery on an artery in your head, neck, heart, or legs? No   3. Do you have chest pain with activity? No   4. Do you have a history of  heart failure? No   5. Do you currently have a cold, bronchitis or symptoms of other infection? No   6. Do you have a cough, shortness of breath, or wheezing? YES - hx of asthma, cough.    7. Do you or anyone in your family have previous history of blood clots? UNKNOWN -    8. Do you or does anyone in your family have a serious bleeding problem such as prolonged bleeding following surgeries or cuts? UNKNOWN -    9. Have you ever had problems with anemia or been told to take iron pills? YES - in the past   10. Have you had any abnormal blood loss such as black, tarry or bloody stools, or abnormal vaginal bleeding? No   11. Have you ever had a blood transfusion? No   12. Are you willing to have a blood transfusion if it is medically needed before, during, or after your surgery? Yes   13. Have you or any of your relatives ever had problems with anesthesia? YES - N/V, slow to wake up from anesthesia   14. Do you have sleep apnea, excessive snoring or daytime drowsiness? No   15. Do you have any artifical heart valves or other implanted medical devices like a pacemaker, defibrillator, or continuous glucose monitor? No   16. Do you have artificial joints? YES -hip   17. Are you allergic to latex? No   18. Is there any chance that you may be pregnant? No     Answers for HPI/ROS submitted by the patient on 6/9/2021   If you checked off any problems, how difficult have these  problems made it for you to do your work, take care of things at home, or get along with other people?: Extremely difficult  PHQ9 TOTAL SCORE: 6    Last pap - [facility] - approx Fall 2019 - normal  Last mammo - [facility] - approx 6 yr ago  Last colon - approx 2 yr ago - [facility] - RTC 5 yr    Health Care Directive:  Patient does not have a Health Care Directive or Living Will: Discussed advance care planning with patient; however, patient declined at this time.    Preoperative Review of :   reviewed - controlled substances reflected in medication list.  Gabapentin for tremor    Status of Chronic Conditions:  ASTHMA - Patient has a longstanding history of moderate-severe Asthma . Patient has been doing well overall noting COUGH and continues on medication regimen consisting of antihistamine and biweekly xolair injections without adverse reactions or side effects. Next due for xolair injection 6/14.     MIGRAINES: followed at [facility].   Botox injections every 8 weeks for migraines  Due for next injection 6/17.  Keeps migraines manageable, stable.     PACs:  At one point was having atypical chest pain. currently denies chest pain, dyspnea, palpitations. Last visit cardiology 5/13/19 Dr. Osmar Byrd, asymptomatic.  Hx normal stress echo. Angiography revealed mild nonobstructive disease and no evidence of spasm. 5% APC burden, asymptomatic. Takes diltiazem.     Review of Systems  Constitutional, neuro, ENT, endocrine, pulmonary, cardiac, gastrointestinal, genitourinary, musculoskeletal, integument and psychiatric systems are negative, except as otherwise noted.    Patient Active Problem List    Diagnosis Date Noted     Status post total replacement of right hip 11/03/2017     Priority: Medium     Chest pressure 10/24/2017     Priority: Medium     High cholesterol      Priority: Medium     Severe persistent asthma 10/21/2016     Priority: Medium     Anxiety      Priority: Medium     Migraines      Priority:  Medium     Headache 12/10/2014     Priority: Medium     Class: Chronic     Problem list name updated by automated process. Provider to review       Colitis 2013     Priority: Medium     Asthma 2011     Priority: Medium     Problem list name updated by automated process. Provider to review        Past Medical History:   Diagnosis Date     Abdominal pain      Anxiety      Asthma     On Dupixent(Xolair) injections     C. difficile colitis      Chest discomfort      Colitis      Headache(784.0) 12/10/2014     High cholesterol      Hyperlipidemia      Liver disease     Being evalted for fatty liver disease. ABnormal LFT.     Migraines      PAC (premature atrial contraction)      PONV (postoperative nausea and vomiting)      PVC (premature ventricular contraction)      Sleep apnea     Doesn't use a CPAP     Syncope      Tobacco abuse      Tremor      Past Surgical History:   Procedure Laterality Date     ARTHROPLASTY HIP Right 11/3/2017    Procedure: ARTHROPLASTY HIP;  Right total hip arthroplasty    ;  Surgeon: Shahriar Gamble MD;  Location: [facility] OR      SECTION      [facility]     ENDOSCOPIC ULTRASOUND, ESOPHAGOSCOPY, GASTROSCOPY, DUODENOSCOPY (EGD), COMBINED  13     ESOPHAGOSCOPY, GASTROSCOPY, DUODENOSCOPY (EGD), COMBINED  10/21/2013    Procedure: COMBINED ESOPHAGOSCOPY, GASTROSCOPY, DUODENOSCOPY (EGD), BIOPSY SINGLE OR MULTIPLE;;  Surgeon: Rito Gupta MD;  Location:  GI     FOOT SURGERY      Bilateral foot reconstruction.      UGI ENDOSCOPY W EUS  2013    Procedure: COMBINED ENDOSCOPIC ULTRASOUND, ESOPHAGOSCOPY, GASTROSCOPY, DUODENOSCOPY (EGD);  Surgeon: Emre Campbell MD;  Location:  GI     HYSTERECTOMY       LAPAROSCOPIC HYSTERECTOMY SUPRACERVICAL, BILATERAL SALPINGO-OOPHORECTOMY, COMBINED  3/6/2013    Procedure: COMBINED LAPAROSCOPIC HYSTERECTOMY SUPRACERVICAL, SALPINGO-OOPHORECTOMY;  Laparoscopic Supracervical Hysterectomy, Right salpingo-oopherectomy;  Surgeon: Robert  "Tanika SMITH MD;  Location: [facility] OR     UVULOPALATOPHARYNGOPLASTY      Plus Septoplasy.     Current Outpatient Medications   Medication Sig Dispense Refill     AMITRIPTYLINE HCL PO Take 20 mg by mouth At Bedtime       diltiazem (CARDIZEM CD) 120 MG 24 hr capsule Take 1 capsule (120 mg) by mouth daily You are due to see the cardiologist- please call [phone] to schedule. 30 capsule 11     gabapentin (NEURONTIN) 100 MG capsule Take 100 mg in the morning and 400 mg at bedtime for one week, then increase to 100 mg in the morning and 600 mg at bedtime. (Patient taking differently: Take 400 mg in the morning and 600 mg at bedtime.) 210 capsule 1     loratadine (CLARITIN) 10 MG tablet Take 10 mg by mouth daily       omalizumab (XOLAIR) 150 MG injection Inject Subcutaneous every 14 days       OnabotulinumtoxinA (BOTOX IJ) As directed       venlafaxine (EFFEXOR-XR) 150 MG 24 hr capsule Take 150 mg by mouth daily         Allergies   Allergen Reactions     Codeine Sulfate Nausea and Vomiting     Contrast Dye Hives     Patient is allergic to CT iodine contrast.       Scopolamine Visual Disturbance     Imitrex [Sumatriptan] Rash     Naproxen Rash     Penicillins Rash     Per patient     Sulfa Drugs Rash        Social History     Tobacco Use     Smoking status: Current Every Day Smoker     Packs/day: 0.50     Years: 20.00     Pack years: 10.00     Types: Cigarettes     Smokeless tobacco: Never Used   Substance Use Topics     Alcohol use: Yes     Alcohol/week: 0.0 standard drinks     Comment:  3 drinks weekly     Family History   Problem Relation Age of Onset     Unknown/Adopted Mother      Unknown/Adopted Father      History   Drug Use No         Objective     /68 (BP Location: Right arm, Cuff Size: Adult Large)   Pulse 83   Temp 97.9  F (36.6  C) (Tympanic)   Resp 14   Ht 1.603 m (5' 3.1\")   Wt 80.9 kg (178 lb 4.8 oz)   LMP 07/11/2014   SpO2 98%   BMI 31.48 kg/m      Physical Exam    GENERAL APPEARANCE: healthy, " alert and no distress     EYES: EOMI, PERRL     HENT: ear canals and TM's normal and nose and mouth without ulcers or lesions     NECK: no adenopathy, no asymmetry, masses, or scars and thyroid normal to palpation     RESP: lungs clear to auscultation - no rales, rhonchi or wheezes     CV: regular rates and rhythm, normal S1 S2, no S3 or S4 and no murmur, click or rub     ABDOMEN:  soft, nontender, no HSM or masses and bowel sounds normal     MS: extremities normal- no gross deformities noted, no evidence of inflammation in joints, FROM in all extremities.     SKIN: no suspicious lesions or rashes     NEURO: Normal strength and tone, sensory exam grossly normal, mentation intact and speech normal     PSYCH: mentation appears normal. and affect normal/bright     LYMPHATICS: No cervical adenopathy    Recent Labs   Lab Test 06/14/19  0800   HGB 13.5         Diagnostics:  No labs were ordered during this visit.   No EKG required for low risk surgery (cataract, skin procedure, breast biopsy, etc).    Revised Cardiac Risk Index (RCRI):  The patient has the following serious cardiovascular risks for perioperative complications:   - No serious cardiac risks = 0 points     RCRI Interpretation: 0 points: Class I (very low risk - 0.4% complication rate)         Signed Electronically by: Nereyda Lainez NP  Copy of this evaluation report is provided to requesting physician.

## 2021-06-09 NOTE — PATIENT INSTRUCTIONS

## 2021-06-10 ASSESSMENT — PATIENT HEALTH QUESTIONNAIRE - PHQ9: SUM OF ALL RESPONSES TO PHQ QUESTIONS 1-9: 6

## 2021-06-10 ASSESSMENT — ASTHMA QUESTIONNAIRES: ACT_TOTALSCORE: 24

## 2021-06-14 ENCOUNTER — INFUSION THERAPY VISIT (OUTPATIENT)
Dept: INFUSION THERAPY | Facility: CLINIC | Age: 51
End: 2021-06-14
Attending: ALLERGY & IMMUNOLOGY
Payer: COMMERCIAL

## 2021-06-14 VITALS
TEMPERATURE: 97.6 F | SYSTOLIC BLOOD PRESSURE: 115 MMHG | OXYGEN SATURATION: 98 % | HEART RATE: 91 BPM | RESPIRATION RATE: 16 BRPM | DIASTOLIC BLOOD PRESSURE: 74 MMHG

## 2021-06-14 DIAGNOSIS — J45.50 SEVERE PERSISTENT ASTHMA WITHOUT COMPLICATION (H): Primary | ICD-10-CM

## 2021-06-14 PROCEDURE — 250N000011 HC RX IP 250 OP 636: Performed by: ALLERGY & IMMUNOLOGY

## 2021-06-14 PROCEDURE — 96372 THER/PROPH/DIAG INJ SC/IM: CPT | Performed by: ALLERGY & IMMUNOLOGY

## 2021-06-14 RX ADMIN — OMALIZUMAB 375 MG: 150 INJECTION, SOLUTION SUBCUTANEOUS at 08:40

## 2021-06-14 ASSESSMENT — PAIN SCALES - GENERAL: PAINLEVEL: NO PAIN (0)

## 2021-06-14 NOTE — PROGRESS NOTES
Infusion Nursing Note:  Alba Varela presents today for Xolair.    Patient seen by provider today: No   present during visit today: Not Applicable.    Note: Denies changes in asthnma.  Is having eye surgery tomorrow, to lift lids.  States she confirmed it is OK to get her injection with surgery tomorrow.     Intravenous Access:  No Intravenous access/labs at this visit.    Treatment Conditions:  Biological Infusion Checklist:  ~~~ NOTE: If the patient answers yes to any of the questions below, hold the infusion and contact ordering provider or on-call provider.    1. Have you recently had an elevated temperature, fever, chills, productive cough, coughing for 3 weeks or longer or hemoptysis, abnormal vital signs, night sweats,  chest pain or have you noticed a decrease in your appetite, unexplained weight loss or fatigue? No  2. Do you have any open wounds or new incisions? No  3. Do you have any recent or upcoming hospitalizations, surgeries or dental procedures? Yes, Has eye surgery tomorrow to lift her lids.  Confirmed with MD it is OK for injection  4. Do you currently have or recently have had any signs of illness or infection or are you on any antibiotics? No  5. Have you had any new, sudden or worsening abdominal pain? No  6. Have you or anyone in your household received a live vaccination in the past 4 weeks? Please note:  No live vaccines while on biologic/chemotherapy until 6 months after the last treatment.  Patient can receive the flu vaccine (shot only) and the pneumovax.  It is optimal for the patient to get these vaccines mid cycle, but they can be given at any time as long as it is not on the day of the infusion. No  7. Have you recently been diagnosed with any new nervous system diseases (ie. Multiple sclerosis, Guillain Delray, seizures, neurological changes) or cancer diagnosis? No  8. Are you on any form of radiation or chemotherapy? No  9. Are you pregnant or breast feeding or do you  have plans of pregnancy in the future? No  10. Have you been having any signs of worsening depression or suicidal ideations?  (benlysta only) No  11. Have there been any other new onset medical symptoms? No        Post Infusion Assessment:  Patient tolerated injection without incident.   Observed patient 30 minutes post Xolair      Discharge Plan:   Discharge instructions reviewed with: Patient.  Patient discharged in stable condition accompanied by: self.  Departure Mode: Ambulatory.  Next injection scheduled for 6/28/21.      SIERRA RICKETTS RN

## 2021-06-18 ENCOUNTER — ANCILLARY PROCEDURE (OUTPATIENT)
Dept: MAMMOGRAPHY | Facility: CLINIC | Age: 51
End: 2021-06-18
Attending: NURSE PRACTITIONER
Payer: COMMERCIAL

## 2021-06-18 DIAGNOSIS — Z12.31 ENCOUNTER FOR SCREENING MAMMOGRAM FOR BREAST CANCER: ICD-10-CM

## 2021-06-18 PROCEDURE — 77067 SCR MAMMO BI INCL CAD: CPT | Mod: TC | Performed by: RADIOLOGY

## 2021-06-20 NOTE — ANESTHESIA CARE TRANSFER NOTE
Last vitals:   Vitals:    09/28/18 1611   BP: 106/71   Pulse: 77   Resp: 16   Temp: 36.4  C (97.6  F)   SpO2: 100%     Patient's level of consciousness is drowsy  Spontaneous respirations: yes  Maintains airway independently: yes  Dentition unchanged: yes  Oropharynx: oropharynx clear of all foreign objects    QCDR Measures:  ASA# 20 - Surgical Safety Checklist: WHO surgical safety checklist completed prior to induction  PQRS# 430 - Adult PONV Prevention: 4558F - Pt received => 2 anti-emetic agents (different classes) preop & intraop  ASA# 8 - Peds PONV Prevention: NA - Not pediatric patient, not GA or 2 or more risk factors NOT present  PQRS# 424 - Sarah-op Temp Management: 4559F - At least one body temp DOCUMENTED => 35.5C or 95.9F within required timeframe  PQRS# 426 - PACU Transfer Protocol: - Transfer of care checklist used  ASA# 14 - Acute Post-op Pain: ASA14B - Patient did NOT experience pain >= 7 out of 10

## 2021-06-20 NOTE — ANESTHESIA POSTPROCEDURE EVALUATION
Patient: Alba Varela  RIGHT REVISION OF TOTAL HIP ARTHROPLASTY, BOTH COMPONENTS  Anesthesia type: spinal    Patient location: PACU  Last vitals:   Vitals:    09/28/18 1825   BP: 140/83   Pulse: 68   Resp: 16   Temp: 36.6  C (97.8  F)   SpO2: 95%     Post vital signs: stable  Level of consciousness: awake and responds to simple questions  Post-anesthesia pain: pain controlled  Post-anesthesia nausea and vomiting: no  Pulmonary: unassisted, return to baseline  Cardiovascular: stable and blood pressure at baseline  Hydration: adequate  Anesthetic events: no    QCDR Measures:  ASA# 11 - Sarah-op Cardiac Arrest: ASA11B - Patient did NOT experience unanticipated cardiac arrest  ASA# 12 - Sarah-op Mortality Rate: ASA12B - Patient did NOT die  ASA# 13 - PACU Re-Intubation Rate: ASA13B - Patient did NOT require a new airway mgmt  ASA# 10 - Composite Anes Safety: ASA10A - No serious adverse event    Additional Notes:

## 2021-06-20 NOTE — ANESTHESIA PREPROCEDURE EVALUATION
Anesthesia Evaluation      Patient summary reviewed   History of anesthetic complications (slow to wake up)     Airway   Mallampati: II  Neck ROM: full   Pulmonary - normal exam   (+) asthma  sleep apnea on no CPAP, , a smoker                         Cardiovascular - negative ROS and normal exam  Exercise tolerance: > or = 4 METS   Neuro/Psych - negative ROS     Comments: Migraines      Endo/Other - negative ROS      GI/Hepatic/Renal - negative ROS           Dental - normal exam                        Anesthesia Plan  Planned anesthetic: spinal    ASA 2     Anesthetic plan and risks discussed with: patient and spouse  Anesthesia plan special considerations: antiemetics,   Post-op plan: routine recovery

## 2021-06-20 NOTE — ANESTHESIA PROCEDURE NOTES
Spinal Block    Start time: 9/28/2018 2:32 PM  End time: 9/28/2018 2:37 PM  Reason for block: at surgeon's request and primary anesthetic    Staffing:  Performing  Anesthesiologist: JORDAN VERMA    Preanesthetic Checklist  Completed: patient identified, risks, benefits, and alternatives discussed, timeout performed, consent obtained, at patient's request, airway assessed, oxygen available, suction available, emergency drugs available and hand hygiene performed  Spinal Block  Patient position: sitting  Prep: ChloraPrep  Patient monitoring: heart rate, cardiac monitor, continuous pulse ox and blood pressure  Approach: midline  Location: L3-4  Injection technique: single-shot  Needle type: pencil-tip   Needle gauge: 24 G    Assessment  Sensory level: T8

## 2021-06-27 ENCOUNTER — HOSPITAL ENCOUNTER (EMERGENCY)
Facility: CLINIC | Age: 51
Discharge: HOME | End: 2021-06-27
Attending: EMERGENCY MEDICINE | Admitting: EMERGENCY MEDICINE
Payer: COMMERCIAL

## 2021-06-27 ENCOUNTER — APPOINTMENT (OUTPATIENT)
Dept: CT IMAGING | Facility: CLINIC | Age: 51
End: 2021-06-27
Attending: EMERGENCY MEDICINE
Payer: COMMERCIAL

## 2021-06-27 VITALS
HEART RATE: 91 BPM | OXYGEN SATURATION: 96 % | TEMPERATURE: 98.1 F | RESPIRATION RATE: 16 BRPM | DIASTOLIC BLOOD PRESSURE: 77 MMHG | SYSTOLIC BLOOD PRESSURE: 113 MMHG

## 2021-06-27 DIAGNOSIS — R11.2 NAUSEA AND VOMITING, INTRACTABILITY OF VOMITING NOT SPECIFIED, UNSPECIFIED VOMITING TYPE: ICD-10-CM

## 2021-06-27 DIAGNOSIS — R56.9 SEIZURES (H): ICD-10-CM

## 2021-06-27 DIAGNOSIS — F10.929 ALCOHOLIC INTOXICATION WITH COMPLICATION (H): ICD-10-CM

## 2021-06-27 LAB
ALBUMIN SERPL-MCNC: 4.1 G/DL (ref 3.4–5)
ALP SERPL-CCNC: 63 U/L (ref 40–150)
ALT SERPL W P-5'-P-CCNC: 35 U/L (ref 0–50)
ANION GAP SERPL CALCULATED.3IONS-SCNC: 3 MMOL/L (ref 3–14)
AST SERPL W P-5'-P-CCNC: 38 U/L (ref 0–45)
BASOPHILS # BLD AUTO: 0 10E9/L (ref 0–0.2)
BASOPHILS NFR BLD AUTO: 0.6 %
BILIRUB SERPL-MCNC: 0.3 MG/DL (ref 0.2–1.3)
BUN SERPL-MCNC: 10 MG/DL (ref 7–30)
CALCIUM SERPL-MCNC: 8.2 MG/DL (ref 8.5–10.1)
CHLORIDE SERPL-SCNC: 113 MMOL/L (ref 94–109)
CO2 SERPL-SCNC: 26 MMOL/L (ref 20–32)
CREAT SERPL-MCNC: 0.62 MG/DL (ref 0.52–1.04)
DIFFERENTIAL METHOD BLD: NORMAL
EOSINOPHIL # BLD AUTO: 0.3 10E9/L (ref 0–0.7)
EOSINOPHIL NFR BLD AUTO: 4 %
ERYTHROCYTE [DISTWIDTH] IN BLOOD BY AUTOMATED COUNT: 12.6 % (ref 10–15)
ETHANOL SERPL-MCNC: 0.22 G/DL
GFR SERPL CREATININE-BSD FRML MDRD: >90 ML/MIN/{1.73_M2}
GLUCOSE SERPL-MCNC: 101 MG/DL (ref 70–99)
HCT VFR BLD AUTO: 38.6 % (ref 35–47)
HGB BLD-MCNC: 12.5 G/DL (ref 11.7–15.7)
IMM GRANULOCYTES # BLD: 0 10E9/L (ref 0–0.4)
IMM GRANULOCYTES NFR BLD: 0.3 %
LYMPHOCYTES # BLD AUTO: 3.3 10E9/L (ref 0.8–5.3)
LYMPHOCYTES NFR BLD AUTO: 49 %
MCH RBC QN AUTO: 30.2 PG (ref 26.5–33)
MCHC RBC AUTO-ENTMCNC: 32.4 G/DL (ref 31.5–36.5)
MCV RBC AUTO: 93 FL (ref 78–100)
MONOCYTES # BLD AUTO: 0.4 10E9/L (ref 0–1.3)
MONOCYTES NFR BLD AUTO: 5.4 %
NEUTROPHILS # BLD AUTO: 2.8 10E9/L (ref 1.6–8.3)
NEUTROPHILS NFR BLD AUTO: 40.7 %
NRBC # BLD AUTO: 0 10*3/UL
NRBC BLD AUTO-RTO: 0 /100
PLATELET # BLD AUTO: 266 10E9/L (ref 150–450)
POTASSIUM SERPL-SCNC: 4.2 MMOL/L (ref 3.4–5.3)
PROT SERPL-MCNC: 7.5 G/DL (ref 6.8–8.8)
RBC # BLD AUTO: 4.14 10E12/L (ref 3.8–5.2)
SODIUM SERPL-SCNC: 142 MMOL/L (ref 133–144)
TROPONIN I SERPL-MCNC: <0.015 UG/L (ref 0–0.04)
WBC # BLD AUTO: 6.8 10E9/L (ref 4–11)

## 2021-06-27 PROCEDURE — 99285 EMERGENCY DEPT VISIT HI MDM: CPT | Mod: 25

## 2021-06-27 PROCEDURE — 93005 ELECTROCARDIOGRAM TRACING: CPT

## 2021-06-27 PROCEDURE — 84484 ASSAY OF TROPONIN QUANT: CPT | Performed by: EMERGENCY MEDICINE

## 2021-06-27 PROCEDURE — 85025 COMPLETE CBC W/AUTO DIFF WBC: CPT | Performed by: EMERGENCY MEDICINE

## 2021-06-27 PROCEDURE — 80053 COMPREHEN METABOLIC PANEL: CPT | Performed by: EMERGENCY MEDICINE

## 2021-06-27 PROCEDURE — 70450 CT HEAD/BRAIN W/O DYE: CPT

## 2021-06-27 PROCEDURE — 82077 ASSAY SPEC XCP UR&BREATH IA: CPT | Performed by: EMERGENCY MEDICINE

## 2021-06-27 RX ORDER — ONDANSETRON 4 MG/1
4 TABLET, ORALLY DISINTEGRATING ORAL EVERY 8 HOURS PRN
Qty: 10 TABLET | Refills: 0 | Status: SHIPPED | OUTPATIENT
Start: 2021-06-27 | End: 2021-06-30

## 2021-06-27 RX ORDER — ONDANSETRON 2 MG/ML
4 INJECTION INTRAMUSCULAR; INTRAVENOUS ONCE
Status: DISCONTINUED | OUTPATIENT
Start: 2021-06-27 | End: 2021-06-27 | Stop reason: HOSPADM

## 2021-06-27 RX ORDER — ONDANSETRON 4 MG/1
4 TABLET, ORALLY DISINTEGRATING ORAL ONCE
Status: DISCONTINUED | OUTPATIENT
Start: 2021-06-27 | End: 2021-06-27 | Stop reason: HOSPADM

## 2021-06-27 ASSESSMENT — ENCOUNTER SYMPTOMS
NAUSEA: 1
VOMITING: 1
SHORTNESS OF BREATH: 0
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
## 2021-06-27 NOTE — ED PROVIDER NOTES
History   Chief Complaint:  Alcohol Intoxication       The history is limited by the condition of the patient.      Alba Varela is a 51 year old female with history of migraines who presents with alcohol intoxication and nausea. Patient was at a friends house and called her fiance to pick her up. Upon his arrival she was intoxicated, unresponsive and trembling. Patient reports she did not hit her head but admits to consuming alcohol. She was vomiting and felt shortness of breath. Here in the ED she states she is feeling better and no longer shortness of breath. She is still nauseous. Denies drug use.    Fiance states patient was in back of car after being picked up from friends house vomiting when her head rolled back and she passed out. She started convulsing for 2-3 minutes. She was huffing, profoundly confused and agitated after passing out. He is concerned for seizure which is why he brought her. Patient does not remember event and denies passing out.     Review of Systems   Respiratory: Negative for shortness of breath.    Gastrointestinal: Positive for nausea and vomiting.   All other systems reviewed and are negative.      Allergies:  Codeine Sulfate  Contrast dye  Scopolamine  Sumatriptan  Naproxen  Penicillins  Sulfa Drugs    Medications:  Amitriptyline hcl  Cardizem  Neurontin  Claritin  Xolair  Botox  Effexor-xr    Past Medical History:    Anxiety  Asthma  C. Diff  Colitis  Hyperlipidemia  Liver disease  Migraines  PAC  PONV  PVC  Tremor  Tobacco abuse       Past Surgical History:    Hip arthroplasty    EGD x3  Foot surgery  Laparoscopic hysterectomy  Uvulopalatopharyngoplasty  Joint replacement  Nasal septum surgery    Social History:  Presents to ED alone  Consumes alcohol    Physical Exam     Patient Vitals for the past 24 hrs:   BP Temp Temp src Pulse Resp SpO2   21 0140 113/77 98.1  F (36.7  C) Oral 91 16 96 %       Physical Exam  Constitutional:  Oriented to person, place, and  time. Slurred speech. Intoxicated. Awake and alert.   HENT:   Head:    Normocephalic. Atraumatic.   Mouth/Throat:   Oropharynx is clear and moist.   Eyes:    EOM are normal. Pupils are equal, round, and reactive to light.   Neck:    Neck supple.   Cardiovascular:  Normal rate, regular rhythm and normal heart sounds.      Exam reveals no gallop and no friction rub.       No murmur heard.  Pulmonary/Chest:  Effort normal and breath sounds normal.      No respiratory distress. No wheezes. No rales.      No reproducible chest wall pain.  Abdominal:   Soft. No distension. No tenderness. No rebound and no guarding.   Musculoskeletal:  Normal range of motion.   Neurological:   Alert and oriented to person, place, and time.           Moves all 4 extremities spontaneously. Slurred speech. Intoxicated. Awake and alert. Cranial nerves intact.     Skin:    No rash noted. No pallor. No ecchymosis or abrasions.        Emergency Department Course   ECG  [place]G taken at [address], ECG read at 0308  Sinus rhythm with occasional premature ventricular complexes and premature atrial complexes.  Otherwise normal ECG    Rate 92 bpm. MI interval 142 ms. QRS duration 78 ms. QT/QTc 406/502 ms. P-R-T axes 68 -20 16.     Imaging:  CT Head w/o Contrast  1.  No acute intracranial process.  As per radiology.     Laboratory:  CBC: WBC 6.8, HGB 12.5,    CMP: Chloride: 113(H), Glucose: 101(H), Calcium: 8.2(L) o/w WNL (Creatinine 0.62)  Alcohol ethyl: 0.22(H)  Troponin (Collected 0225): <0.015    Emergency Department Course:  0225 I discussed with rosalio.     Reviewed:  I reviewed nursing notes, vitals, past medical history and care everywhere    Assessments:  0154 I obtained history and examined the patient as noted above.   0340 I rechecked the patient and explained findings.       Disposition:  The patient was discharged to home.       Impression & Plan         Medical Decision Making:  Medical decision making for  51 year old female who  came in intoxicated and vomiting. Does not remember event prior to arrival which fiance states possibly had a seizure like event. Differential includes seizure, syncope,  unresponsive due secondary to alcohol intoxication, or other causes. A workup as seen which is otherwise reassuring including EKG and CT of the head. While she is still intoxicated she is awake and alert. Her fiance is here as a sober ride to take her home. I did discuss not driving until she has follow up with neurology in consideration for EEG. Told to return for any further unresponsive states.       Covid-19  Alba Varela was evaluated during a global COVID-19 pandemic, which necessitated consideration that the patient might be at risk for infection with the SARS-CoV-2 virus that causes COVID-19.   Applicable protocols for evaluation were followed during the patient's care.     Diagnosis:    ICD-10-CM    1. Seizures (H)  R56.9    2. Alcoholic intoxication with complication (H)  F10.929    3. Nausea and vomiting, intractability of vomiting not specified, unspecified vomiting type  R11.2        Discharge Medications:  Discharge Medication List as of 6/27/2021  5:16 AM      START taking these medications    Details   ondansetron (ZOFRAN ODT) 4 MG ODT tab Take 1 tablet (4 mg) by mouth every 8 hours as needed, Disp-10 tablet, R-0, Local Print             Scribe Disclosure:  Pedro DUPONT, am serving as a scribe at 1:52 AM on 6/27/2021 to document services personally performed by Dennis Blanca MD based on my observations and the provider's statements to me.          Dennis Blanca MD  06/27/21 5923
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
## 2021-06-27 NOTE — DISCHARGE INSTRUCTIONS
Discharge Instructions  First Time Seizure (Convulsion)    You have had a spell that may have been a seizure. Many other things can look like a seizure, including a fainting spell, a migraine, psychological issues, and other movement disorders. It can often be hard to know with certainty whether you had a seizure. Your evaluation today is likely not be complete and you may need further testing and evaluation from a neurologist (brain specialist).    Of people who have a seizure, most never have another one. For that reason, anti-seizure medicines are not started in most cases after a first seizure. If you have risk factors for seizures, medication may be started after a first seizure. People are not usually kept in the hospital after a seizure.    During a seizure, there is abnormal and excessive electrical activity in the brain. This can cause changes in awareness, behavior, and abnormal shaking or jerking movements.  This activity usually lasts only a few seconds to minutes. The period following a seizure is called the postictal state. During this time, you may be confused, tired, and you may develop a throbbing headache. Some people develop a brief period of difficulty speaking or experience temporary vision loss, numbness, or weakness.    Generally, every Emergency Department visit should have a follow-up clinic visit with either a primary or a specialty clinic/provider. Please follow-up as instructed by your emergency provider today.    Return to the Emergency Department if:   You have another seizure.  You develop a fever over 100.4 F.  You feel much more ill, or develop new symptoms like severe headache.  You have trouble walking, seeing, or develop weakness or numbness in your arms or legs.     What can I do to help myself?  Do not drive until you have been rechecked by your provider and have been told it is safe to drive.  If you have a seizure while driving you may cause a motor vehicle accident with  injury or death to yourself or others.   Do not swim, climb ladders, or do anything else that would be dangerous if you had another seizure or spell of loss of consciousness, until you are cleared by your provider.    Check your state driving requirements for patients with seizures on the [employer] Website at www.epilepsyfoundation.org/resources/drivingandtravel.cfm.  Do not drink alcohol.  Drinking alcohol increases the risk of seizures and can interfere with the effect of anti-seizure medications.  Take any medication prescribed for you exactly as directed, at the right times, and at the right doses.    If you were given a prescription for medicine here today, be sure to read all of the information (including the package insert) that comes with your prescription.  This will include important information about the medicine, its side effects, and any warnings that you need to know about.  The pharmacist who fills the prescription can provide more information and answer questions you may have about the medicine.  If you have questions or concerns that the pharmacist cannot address, please call or return to the Emergency Department.     Remember that you can always come back to the Emergency Department if you are not able to see your regular provider in the amount of time listed above, if you get any new symptoms, or if there is anything that worries you.  Discharge Instructions  Alcohol Intoxication    You have been seen today with alcohol intoxication. This means that you have enough alcohol in your system to impair your ability to mentally and physically function, perhaps to the extent that you were unable to care for yourself.    Generally, every Emergency Department visit should have a follow-up clinic visit with either a primary or a specialty clinic/provider. Please follow-up as instructed by your emergency provider today.    You may have come to the Emergency Department because of your intoxication,  or for another reason, such as because of an injury. No matter what the case is, this visit is a  red flag  regarding alcohol use, and you should consider whether your drinking pattern is a problem for you.     You may be at risk for alcohol-related problems if:    Men: you drink more than 14 drinks per week, or more than 4 drinks per occasion.    Women: you drink more than 7 drinks per week or more than 3 drinks per occasion.    You have black-outs.  You do things you regret while drinking.  You have legal problems because of drinking.  You have job problems because of drinking (you call in sick to work because of drinking).    CAGE Questions  Have you ever felt you should cut down on your drinking?  Have people annoyed you by criticizing your drinking?  Have you ever felt bad or guilty about your drinking?  Have you ever had a drink first thing in the morning to steady your nerves or get rid of a hangover (eye opener)?    If you answer yes to any of the CAGE questions, you may have a problem with alcohol.      Return to the Emergency Department if:  You become shaky or tremble when you try to stop drinking.   You have severe abdominal pain (belly pain).   You have a seizure or pass out.    You vomit (throw up) blood or have blood in your stool. This may be bright red or it may look like black coffee grounds.  You become lightheaded or faint.      For further help, contact:   Your caregiver.    Alcoholics Anonymous (AA).    [facility] Intergroup: [phone]  [facility] Central Office: [phone]   A drug or alcohol rehabilitation program.    You can get information on alcohol resources and groups by calling the number 310 or 1-259.314.5837 on any phone.     Seek medical care if:  You have persistent vomiting.   You have persistent pain in any part of your body.    You do not feel better after a few days.    If you were given a prescription for medicine here today, be sure to read all  of the information (including the package insert) that comes with your prescription.  This will include important information about the medicine, its side effects, and any warnings that you need to know about.  The pharmacist who fills the prescription can provide more information and answer questions you may have about the medicine.  If you have questions or concerns that the pharmacist cannot address, please call or return to the Emergency Department.   Remember that you can always come back to the Emergency Department if you are not able to see your regular doctor in the amount of time listed above, if you get any new symptoms, or if there is anything that worries you.  Discharge Instructions  Vomiting    You have been seen today for vomiting (throwing up). This is usually caused by a virus, but some bacteria, parasites, medicines or other medical conditions can cause similar symptoms. At this time your provider does not find that your vomiting is a sign of anything dangerous or life-threatening. However, sometimes the signs of serious illness do not show up right away. If you have new or worse symptoms, you may need to be seen again in the Emergency Department or by your primary provider. Remember that serious problems like appendicitis can start as vomiting.    Generally, every Emergency Department visit should have a follow-up clinic visit with either a primary or a specialty clinic/provider. Please follow-up as instructed by your emergency provider today.    Return to the Emergency Department if:  You keep vomiting and you are not able to keep liquids down.   You feel you are getting dehydrated, such as being very thirsty, not urinating (peeing) at least every 8-12 hours, or feeling faint or lightheaded.   You develop a new fever, or your fever continues for more than 2 days.   You have abdominal (belly pain) that seems worse than cramps, is in one spot, or is getting worse over time. Appendicitis usually  causes pain in the right lower abdomen (to the right and below your belly button) so watch for pain in this location.  You have blood in your vomit or stools.   You feel very weak.  You are not starting to improve within 24 hours of your visit here.     What can I do to help myself?  The most important thing to do is to drink clear liquids. If you have been vomiting a lot, it is best to have only small, frequent sips of liquids. Drinking too much at once may cause more vomiting. If you are vomiting often, you must replace minerals, sodium and potassium lost with your illness. Pedialyte  is the best available rehydration liquid but some find that it doesn t taste good so sports drinks are an alterative. You can also drink clear liquids such as water, weak tea, apple juice, and 7-Up . Avoid acid liquids (orange), caffeine (coffee) or alcohol. Do not drink milk until you no longer have diarrhea (loose stools).   After liquids are staying down, you may start eating mild foods. Soda crackers, toast, plain noodles, gelatin, applesauce and bananas are good first choices. Avoid foods that have acid, are spicy, fatty or have a lot of fiber (such as meats, coarse grains, vegetables). You may start eating these foods again in about 3 days when you are better.   Sometimes treatment includes prescription medicine to prevent nausea (sick to your stomach) and vomiting. If your provider prescribes these for you, take them as directed.   Do not take ibuprofen, naproxen, or other nonsteroidal anti-inflammatory (NSAID) medicines without checking with your healthcare provider.     If you were given a prescription for medicine here today, be sure to read all of the information (including the package insert) that comes with your prescription.  This will include important information about the medicine, its side effects, and any warnings that you need to know about.  The pharmacist who fills the prescription can provide more information  and answer questions you may have about the medicine.  If you have questions or concerns that the pharmacist cannot address, please call or return to the Emergency Department.     Remember that you can always come back to the Emergency Department if you are not able to see your regular provider in the amount of time listed above, if you get any new symptoms, or if there is anything that worries you.
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
## 2021-06-28 ENCOUNTER — INFUSION THERAPY VISIT (OUTPATIENT)
Dept: INFUSION THERAPY | Facility: CLINIC | Age: 51
End: 2021-06-28
Attending: ALLERGY & IMMUNOLOGY
Payer: COMMERCIAL

## 2021-06-28 VITALS
TEMPERATURE: 97.9 F | OXYGEN SATURATION: 97 % | SYSTOLIC BLOOD PRESSURE: 133 MMHG | DIASTOLIC BLOOD PRESSURE: 92 MMHG | RESPIRATION RATE: 16 BRPM | HEART RATE: 87 BPM

## 2021-06-28 DIAGNOSIS — J45.50 SEVERE PERSISTENT ASTHMA WITHOUT COMPLICATION (H): Primary | ICD-10-CM

## 2021-06-28 LAB — INTERPRETATION ECG - MUSE: NORMAL

## 2021-06-28 PROCEDURE — 250N000011 HC RX IP 250 OP 636: Performed by: ALLERGY & IMMUNOLOGY

## 2021-06-28 PROCEDURE — 96372 THER/PROPH/DIAG INJ SC/IM: CPT | Performed by: ALLERGY & IMMUNOLOGY

## 2021-06-28 RX ADMIN — OMALIZUMAB 375 MG: 150 INJECTION, SOLUTION SUBCUTANEOUS at 08:14

## 2021-06-28 ASSESSMENT — PAIN SCALES - GENERAL: PAINLEVEL: NO PAIN (0)

## 2021-06-28 NOTE — PROGRESS NOTES
Infusion Nursing Note:  Alba Varela presents today for Xolair.    Patient seen by provider today: No   present during visit today: Not Applicable.    Note: N/A.    Intravenous Access:  No Intravenous access/labs at this visit.    Treatment Conditions:  Not Applicable.      Post Infusion Assessment:  Patient tolerated injection without incident.  Site patent and intact, free from redness, edema or discomfort.   Patient observed 30 minutes per protocol.      Discharge Plan:     Patient will return 7/12 for next appointment.  Patient discharged in stable condition accompanied by: self.  Departure Mode: Ambulatory.      Mame Pruitt RN

## 2021-07-06 ENCOUNTER — TRANSFERRED RECORDS (OUTPATIENT)
Dept: HEALTH INFORMATION MANAGEMENT | Facility: CLINIC | Age: 51
End: 2021-07-06

## 2021-07-12 ENCOUNTER — INFUSION THERAPY VISIT (OUTPATIENT)
Dept: INFUSION THERAPY | Facility: CLINIC | Age: 51
End: 2021-07-12
Attending: ALLERGY & IMMUNOLOGY
Payer: COMMERCIAL

## 2021-07-12 VITALS
HEART RATE: 99 BPM | DIASTOLIC BLOOD PRESSURE: 78 MMHG | RESPIRATION RATE: 16 BRPM | SYSTOLIC BLOOD PRESSURE: 115 MMHG | TEMPERATURE: 98.1 F | OXYGEN SATURATION: 99 %

## 2021-07-12 DIAGNOSIS — J45.50 SEVERE PERSISTENT ASTHMA WITHOUT COMPLICATION (H): Primary | ICD-10-CM

## 2021-07-12 PROCEDURE — 250N000011 HC RX IP 250 OP 636: Performed by: ALLERGY & IMMUNOLOGY

## 2021-07-12 PROCEDURE — 96372 THER/PROPH/DIAG INJ SC/IM: CPT | Performed by: ALLERGY & IMMUNOLOGY

## 2021-07-12 RX ADMIN — OMALIZUMAB 375 MG: 150 INJECTION, SOLUTION SUBCUTANEOUS at 14:47

## 2021-07-12 NOTE — PROGRESS NOTES
Infusion Nursing Note:  Alba Varela presents today for Xolair.    Patient seen by provider today: No   present during visit today: Not Applicable.    Note: N/A.        Treatment Conditions:  Not Applicable.      Post Infusion Assessment:  Patient tolerated injection without incident.  Patient observed for 30 minutes post Xolair per protocol.       Discharge Plan:   AVS to patient via MYCHART.  Patient will return 7/26/21 for next appointment.   Patient discharged in stable condition accompanied by: self.  Departure Mode: Ambulatory.      Sangita Edwards RN

## 2021-07-26 ENCOUNTER — INFUSION THERAPY VISIT (OUTPATIENT)
Dept: INFUSION THERAPY | Facility: CLINIC | Age: 51
End: 2021-07-26
Attending: ALLERGY & IMMUNOLOGY
Payer: COMMERCIAL

## 2021-07-26 VITALS
RESPIRATION RATE: 16 BRPM | HEART RATE: 76 BPM | OXYGEN SATURATION: 99 % | SYSTOLIC BLOOD PRESSURE: 118 MMHG | DIASTOLIC BLOOD PRESSURE: 84 MMHG | TEMPERATURE: 98 F

## 2021-07-26 DIAGNOSIS — J45.50 SEVERE PERSISTENT ASTHMA WITHOUT COMPLICATION (H): Primary | ICD-10-CM

## 2021-07-26 PROCEDURE — 96372 THER/PROPH/DIAG INJ SC/IM: CPT | Performed by: ALLERGY & IMMUNOLOGY

## 2021-07-26 PROCEDURE — 250N000011 HC RX IP 250 OP 636: Performed by: ALLERGY & IMMUNOLOGY

## 2021-07-26 RX ADMIN — OMALIZUMAB 375 MG: 150 INJECTION, SOLUTION SUBCUTANEOUS at 08:14

## 2021-07-26 NOTE — PROGRESS NOTES
Infusion Nursing Note:  Alba Varela presents today for Xolair.    Patient seen by provider today: No   present during visit today: Not Applicable.    Note: Two injections in R and one injections in L    Intravenous Access:  No Intravenous access/labs at this visit.    Treatment Conditions:  Not Applicable.      Post Infusion Assessment:  Patient tolerated injection without incident.  Patient observed for 30 minutes post xoliar per protocol.       Discharge Plan:   [facility]  Patient will return 8/9 for next appointment.   Patient discharged in stable condition accompanied by: self.  Departure Mode: Ambulatory.      Sangita De La Cruz RN

## 2021-08-06 ENCOUNTER — OFFICE VISIT (OUTPATIENT)
Dept: CARDIOLOGY | Facility: CLINIC | Age: 51
End: 2021-08-06
Payer: COMMERCIAL

## 2021-08-06 VITALS
HEIGHT: 64 IN | SYSTOLIC BLOOD PRESSURE: 119 MMHG | BODY MASS INDEX: 29.37 KG/M2 | DIASTOLIC BLOOD PRESSURE: 78 MMHG | HEART RATE: 76 BPM | WEIGHT: 172 LBS

## 2021-08-06 DIAGNOSIS — I49.1 APC (ATRIAL PREMATURE CONTRACTIONS): Primary | ICD-10-CM

## 2021-08-06 PROCEDURE — 99213 OFFICE O/P EST LOW 20 MIN: CPT | Performed by: INTERNAL MEDICINE

## 2021-08-06 ASSESSMENT — MIFFLIN-ST. JEOR: SCORE: 1377.25

## 2021-08-06 NOTE — PROGRESS NOTES
HPI and Plan:   It is a pleasure to see Alba again for cardiology follow-up.    She is a very pleasant 51-year-old lady with a long history of atypical chest discomfort without evidence of ischemia detected on noninvasive testing.  The chest discomfort was relieved by calcium channel blockers.  Eventually in 2017 she underwent coronary angiography with acetylcholine challenge.  I am happy to  See that she had no significant obstructive coronary artery disease nor did she have coronary artery spasm.  We feel quite certain that chest pain is noncardiac in origin.    She returns today as her neurology clinic says she has an abnormal EKG and needs further cardiac follow-up.  Told she had extra beats from EKG.  Patient asked her neurologist to give our clinic a call.  We did not receive any phone calls.  She had an EKG done at that clinic because she was having EEG.  She has neurology clinic to fax EKG to us.  As of now I have not seen any EKG fax is coming through.  However I do see some EKGs done in June 2021.  She had some APCs some of which are blocked.  Review of previous EKGs do show similar findings.  She does not have any palpitations.  She also denies chest pains.  She is still smoking and is always my advice is to quit completely.      Happy to hear that she does not have any chest pains.  Cardiac examination is normal.    Her APC normal findings.  She does not need further cardiac follow-up.  No orders of the defined types were placed in this encounter.      No orders of the defined types were placed in this encounter.      No diagnosis found.    CURRENT MEDICATIONS:  Current Outpatient Medications   Medication Sig Dispense Refill     AMITRIPTYLINE HCL PO Take 20 mg by mouth At Bedtime       diltiazem (CARDIZEM CD) 120 MG 24 hr capsule Take 1 capsule (120 mg) by mouth daily You are due to see the cardiologist- please call [phone] to schedule. 30 capsule 11     gabapentin (NEURONTIN) 100 MG capsule Take 100  mg in the morning and 400 mg at bedtime for one week, then increase to 100 mg in the morning and 600 mg at bedtime. (Patient taking differently: Take 400 mg in the morning and 600 mg at bedtime.) 210 capsule 1     loratadine (CLARITIN) 10 MG tablet Take 10 mg by mouth daily       omalizumab (XOLAIR) 150 MG injection Inject Subcutaneous every 14 days       OnabotulinumtoxinA (BOTOX IJ) As directed       venlafaxine (EFFEXOR-XR) 150 MG 24 hr capsule Take 150 mg by mouth daily         ALLERGIES     Allergies   Allergen Reactions     Codeine Sulfate Nausea and Vomiting     Contrast Dye Hives     Patient is allergic to CT iodine contrast.       Scopolamine Visual Disturbance     Imitrex [Sumatriptan] Rash     Naproxen Rash     Penicillins Rash     Per patient     Sulfa Drugs Rash       PAST MEDICAL HISTORY:  Past Medical History:   Diagnosis Date     Abdominal pain      Anxiety      Asthma     On Dupixent(Xolair) injections     C. difficile colitis      Chest discomfort      Colitis      Headache(784.0) 12/10/2014     High cholesterol      Hyperlipidemia      Liver disease     Being evalted for fatty liver disease. ABnormal LFT.     Migraines      PAC (premature atrial contraction)      PONV (postoperative nausea and vomiting)      PVC (premature ventricular contraction)      Sleep apnea     Doesn't use a CPAP     Syncope      Tobacco abuse      Tremor        PAST SURGICAL HISTORY:  Past Surgical History:   Procedure Laterality Date     ARTHROPLASTY HIP Right 11/3/2017    Procedure: ARTHROPLASTY HIP;  Right total hip arthroplasty    ;  Surgeon: Shahriar Gamble MD;  Location:  OR      REVISE TOTAL HIP REPLACEMENT Right 2018    Procedure: RIGHT REVISION OF TOTAL HIP ARTHROPLASTY, BOTH COMPONENTS;  Surgeon: Hiro Mayes MD;  Location: [facility];  Service: Orthopedics      [facility]     ENDOSCOPIC ULTRASOUND, ESOPHAGOSCOPY, GASTROSCOPY, DUODENOSCOPY (EGD), COMBINED  13      ESOPHAGOSCOPY, GASTROSCOPY, DUODENOSCOPY (EGD), COMBINED  10/21/2013    Procedure: COMBINED ESOPHAGOSCOPY, GASTROSCOPY, DUODENOSCOPY (EGD), BIOPSY SINGLE OR MULTIPLE;;  Surgeon: Rito Gupta MD;  Location:  GI     FOOT SURGERY      Bilateral foot reconstruction.     FOOT SURGERY       HC UGI ENDOSCOPY W EUS  11/21/2013    Procedure: COMBINED ENDOSCOPIC ULTRASOUND, ESOPHAGOSCOPY, GASTROSCOPY, DUODENOSCOPY (EGD);  Surgeon: Emre Campbell MD;  Location:  GI     HYSTERECTOMY       HYSTERECTOMY       JOINT REPLACEMENT Right     hip     LAPAROSCOPIC HYSTERECTOMY SUPRACERVICAL, BILATERAL SALPINGO-OOPHORECTOMY, COMBINED  3/6/2013    Procedure: COMBINED LAPAROSCOPIC HYSTERECTOMY SUPRACERVICAL, SALPINGO-OOPHORECTOMY;  Laparoscopic Supracervical Hysterectomy, Right salpingo-oopherectomy;  Surgeon: Tanika Jones MD;  Location: [facility] OR     NASAL SEPTUM SURGERY       UVULOPALATOPHARYNGOPLASTY      Plus Septoplasy.       FAMILY HISTORY:  Family History   Problem Relation Age of Onset     Unknown/Adopted Mother      Unknown/Adopted Father        SOCIAL HISTORY:  Social History     Socioeconomic History     Marital status:      Spouse name: None     Number of children: None     Years of education: None     Highest education level: None   Occupational History     None   Tobacco Use     Smoking status: Current Every Day Smoker     Packs/day: 0.50     Years: 20.00     Pack years: 10.00     Types: Cigarettes     Smokeless tobacco: Never Used   Substance and Sexual Activity     Alcohol use: Yes     Alcohol/week: 0.0 standard drinks     Comment:  3 drinks weekly     Drug use: No     Sexual activity: Yes     Partners: Male   Other Topics Concern     Parent/sibling w/ CABG, MI or angioplasty before 65F 55M? Not Asked   Social History Narrative     None     Social Determinants of Health     Financial Resource Strain:      Difficulty of Paying Living Expenses:    Food Insecurity:      Worried About Running Out of Food in  "the Last Year:      Ran Out of Food in the Last Year:    Transportation Needs:      Lack of Transportation (Medical):      Lack of Transportation (Non-Medical):    Physical Activity:      Days of Exercise per Week:      Minutes of Exercise per Session:    Stress:      Feeling of Stress :    Social Connections:      Frequency of Communication with Friends and Family:      Frequency of Social Gatherings with Friends and Family:      Attends [employer] Services:      Active Member of Clubs or Organizations:      Attends Club or Organization Meetings:      Marital Status:    Intimate Partner Violence:      Fear of Current or Ex-Partner:      Emotionally Abused:      Physically Abused:      Sexually Abused:        Review of Systems:  Skin:  Negative     Eyes:  Positive for glasses  ENT:  Negative    Respiratory:  Positive for sleep apnea  Cardiovascular:    Positive for;palpitations;dizziness;lightheadedness  Gastroenterology: Negative    Genitourinary:  Negative    Musculoskeletal:  Negative    Neurologic:  Positive for migraine headaches  Psychiatric:  Positive for anxiety;depression  Heme/Lymph/Imm:  Positive for    Endocrine:  Negative      Physical Exam:  Vitals: /78 (BP Location: Right arm, Cuff Size: Adult Regular)   Pulse 76   Ht 1.613 m (5' 3.5\")   Wt 78 kg (172 lb)   LMP 07/11/2014   BMI 29.99 kg/m      Constitutional:  cooperative, alert and oriented, well developed, well nourished, in no acute distress        Skin:  warm and dry to the touch, no apparent skin lesions or masses noted          Head:  normocephalic, no masses or lesions   atraumatic    Eyes:           Lymph:No Cervical lymphadenopathy present     ENT:  no pallor or cyanosis, dentition good        Neck:  carotid pulses are full and equal bilaterally, JVP normal, no carotid bruit        Respiratory:  normal breath sounds, clear to auscultation, normal A-P diameter, normal symmetry, normal respiratory excursion, no use of accessory " muscles         Cardiac: regular rhythm;normal S1 and S2                pulses full and equal                                        GI:  abdomen soft, non-tender, BS normoactive, no mass, no HSM, no bruits        Extremities and Muscular Skeletal:  no edema         right femoral site has faint circumfrencial echymosis, pea sized firm hematoma, no bruit, well healed    Neurological:  no gross motor deficits        Psych:  Alert and Oriented x 3        Recent Lab Results:  LIPID RESULTS:  Lab Results   Component Value Date    CHOL 236 (H) 03/09/2017    HDL 76 03/09/2017     (H) 03/09/2017    TRIG 111 03/09/2017    CHOLHDLRATIO 3.7 09/19/2011       LIVER ENZYME RESULTS:  Lab Results   Component Value Date    AST 38 06/27/2021    ALT 35 06/27/2021       CBC RESULTS:  Lab Results   Component Value Date    WBC 6.8 06/27/2021    RBC 4.14 06/27/2021    HGB 12.5 06/27/2021    HCT 38.6 06/27/2021    MCV 93 06/27/2021    MCH 30.2 06/27/2021    MCHC 32.4 06/27/2021    RDW 12.6 06/27/2021     06/27/2021       BMP RESULTS:  Lab Results   Component Value Date     06/27/2021    POTASSIUM 4.2 06/27/2021    CHLORIDE 113 (H) 06/27/2021    CO2 26 06/27/2021    ANIONGAP 3 06/27/2021     (H) 06/27/2021    BUN 10 06/27/2021    CR 0.62 06/27/2021    GFRESTIMATED >90 06/27/2021    GFRESTBLACK >90 06/27/2021    DEWAYNE 8.2 (L) 06/27/2021        A1C RESULTS:  No results found for: A1C    INR RESULTS:  Lab Results   Component Value Date    INR 0.88 03/09/2017    INR 1.13 03/22/2013           CC  No referring provider defined for this encounter.

## 2021-08-06 NOTE — LETTER
8/6/2021    Liz Sanderson MD  [facility] [address]  [place] [address]    RE: Alba Varela       Dear Colleague,    I had the pleasure of seeing Alba Varela in the [facility].    HPI and Plan:   It is a pleasure to see Alba again for cardiology follow-up.    She is a very pleasant 51-year-old lady with a long history of atypical chest discomfort without evidence of ischemia detected on noninvasive testing.  The chest discomfort was relieved by calcium channel blockers.  Eventually in 2017 she underwent coronary angiography with acetylcholine challenge.  I am happy to  See that she had no significant obstructive coronary artery disease nor did she have coronary artery spasm.  We feel quite certain that chest pain is noncardiac in origin.    She returns today as her neurology clinic says she has an abnormal EKG and needs further cardiac follow-up.  Told she had extra beats from EKG.  Patient asked her neurologist to give our clinic a call.  We did not receive any phone calls.  She had an EKG done at that clinic because she was having EEG.  She has neurology clinic to fax EKG to us.  As of now I have not seen any EKG fax is coming through.  However I do see some EKGs done in June 2021.  She had some APCs some of which are blocked.  Review of previous EKGs do show similar findings.  She does not have any palpitations.  She also denies chest pains.  She is still smoking and is always my advice is to quit completely.      Happy to hear that she does not have any chest pains.  Cardiac examination is normal.    Her APC normal findings.  She does not need further cardiac follow-up.  No orders of the defined types were placed in this encounter.      No orders of the defined types were placed in this encounter.      No diagnosis found.    CURRENT MEDICATIONS:  Current Outpatient Medications   Medication Sig Dispense Refill      AMITRIPTYLINE HCL PO Take 20 mg by mouth At Bedtime       diltiazem (CARDIZEM CD) 120 MG 24 hr capsule Take 1 capsule (120 mg) by mouth daily You are due to see the cardiologist- please call [phone] to schedule. 30 capsule 11     gabapentin (NEURONTIN) 100 MG capsule Take 100 mg in the morning and 400 mg at bedtime for one week, then increase to 100 mg in the morning and 600 mg at bedtime. (Patient taking differently: Take 400 mg in the morning and 600 mg at bedtime.) 210 capsule 1     loratadine (CLARITIN) 10 MG tablet Take 10 mg by mouth daily       omalizumab (XOLAIR) 150 MG injection Inject Subcutaneous every 14 days       OnabotulinumtoxinA (BOTOX IJ) As directed       venlafaxine (EFFEXOR-XR) 150 MG 24 hr capsule Take 150 mg by mouth daily         ALLERGIES     Allergies   Allergen Reactions     Codeine Sulfate Nausea and Vomiting     Contrast Dye Hives     Patient is allergic to CT iodine contrast.       Scopolamine Visual Disturbance     Imitrex [Sumatriptan] Rash     Naproxen Rash     Penicillins Rash     Per patient     Sulfa Drugs Rash       PAST MEDICAL HISTORY:  Past Medical History:   Diagnosis Date     Abdominal pain      Anxiety      Asthma     On Dupixent(Xolair) injections     C. difficile colitis      Chest discomfort      Colitis      Headache(784.0) 12/10/2014     High cholesterol      Hyperlipidemia      Liver disease     Being evalted for fatty liver disease. ABnormal LFT.     Migraines      PAC (premature atrial contraction)      PONV (postoperative nausea and vomiting)      PVC (premature ventricular contraction)      Sleep apnea     Doesn't use a CPAP     Syncope      Tobacco abuse      Tremor        PAST SURGICAL HISTORY:  Past Surgical History:   Procedure Laterality Date     ARTHROPLASTY HIP Right 11/3/2017    Procedure: ARTHROPLASTY HIP;  Right total hip arthroplasty    ;  Surgeon: Shahriar Gamble MD;  Location:  OR     C REVISE TOTAL HIP REPLACEMENT Right 9/28/2018     Procedure: RIGHT REVISION OF TOTAL HIP ARTHROPLASTY, BOTH COMPONENTS;  Surgeon: Hiro Mayes MD;  Location: [facility] OR;  Service: Orthopedics      SECTION      [facility]     ENDOSCOPIC ULTRASOUND, ESOPHAGOSCOPY, GASTROSCOPY, DUODENOSCOPY (EGD), COMBINED  13     ESOPHAGOSCOPY, GASTROSCOPY, DUODENOSCOPY (EGD), COMBINED  10/21/2013    Procedure: COMBINED ESOPHAGOSCOPY, GASTROSCOPY, DUODENOSCOPY (EGD), BIOPSY SINGLE OR MULTIPLE;;  Surgeon: Rito Gupta MD;  Location:  GI     FOOT SURGERY      Bilateral foot reconstruction.     FOOT SURGERY       HC UGI ENDOSCOPY W EUS  2013    Procedure: COMBINED ENDOSCOPIC ULTRASOUND, ESOPHAGOSCOPY, GASTROSCOPY, DUODENOSCOPY (EGD);  Surgeon: Emre Campbell MD;  Location:  GI     HYSTERECTOMY       HYSTERECTOMY       JOINT REPLACEMENT Right     hip     LAPAROSCOPIC HYSTERECTOMY SUPRACERVICAL, BILATERAL SALPINGO-OOPHORECTOMY, COMBINED  3/6/2013    Procedure: COMBINED LAPAROSCOPIC HYSTERECTOMY SUPRACERVICAL, SALPINGO-OOPHORECTOMY;  Laparoscopic Supracervical Hysterectomy, Right salpingo-oopherectomy;  Surgeon: Tanika Jones MD;  Location: [facility] OR     NASAL SEPTUM SURGERY       UVULOPALATOPHARYNGOPLASTY      Plus Septoplasy.       FAMILY HISTORY:  Family History   Problem Relation Age of Onset     Unknown/Adopted Mother      Unknown/Adopted Father        SOCIAL HISTORY:  Social History     Socioeconomic History     Marital status:      Spouse name: None     Number of children: None     Years of education: None     Highest education level: None   Occupational History     None   Tobacco Use     Smoking status: Current Every Day Smoker     Packs/day: 0.50     Years: 20.00     Pack years: 10.00     Types: Cigarettes     Smokeless tobacco: Never Used   Substance and Sexual Activity     Alcohol use: Yes     Alcohol/week: 0.0 standard drinks     Comment:  3 drinks weekly     Drug use: No     Sexual activity: Yes     Partners: Male   Other  "Topics Concern     Parent/sibling w/ CABG, MI or angioplasty before 65F 55M? Not Asked   Social History Narrative     None     Social Determinants of Health     Financial Resource Strain:      Difficulty of Paying Living Expenses:    Food Insecurity:      Worried About Running Out of Food in the Last Year:      Ran Out of Food in the Last Year:    Transportation Needs:      Lack of Transportation (Medical):      Lack of Transportation (Non-Medical):    Physical Activity:      Days of Exercise per Week:      Minutes of Exercise per Session:    Stress:      Feeling of Stress :    Social Connections:      Frequency of Communication with Friends and Family:      Frequency of Social Gatherings with Friends and Family:      Attends Shinto Services:      Active Member of Clubs or Organizations:      Attends Club or Organization Meetings:      Marital Status:    Intimate Partner Violence:      Fear of Current or Ex-Partner:      Emotionally Abused:      Physically Abused:      Sexually Abused:        Review of Systems:  Skin:  Negative     Eyes:  Positive for glasses  ENT:  Negative    Respiratory:  Positive for sleep apnea  Cardiovascular:    Positive for;palpitations;dizziness;lightheadedness  Gastroenterology: Negative    Genitourinary:  Negative    Musculoskeletal:  Negative    Neurologic:  Positive for migraine headaches  Psychiatric:  Positive for anxiety;depression  Heme/Lymph/Imm:  Positive for    Endocrine:  Negative      Physical Exam:  Vitals: /78 (BP Location: Right arm, Cuff Size: Adult Regular)   Pulse 76   Ht 1.613 m (5' 3.5\")   Wt 78 kg (172 lb)   LMP 07/11/2014   BMI 29.99 kg/m      Constitutional:  cooperative, alert and oriented, well developed, well nourished, in no acute distress        Skin:  warm and dry to the touch, no apparent skin lesions or masses noted          Head:  normocephalic, no masses or lesions   atraumatic    Eyes:           Lymph:No Cervical lymphadenopathy present "     ENT:  no pallor or cyanosis, dentition good        Neck:  carotid pulses are full and equal bilaterally, JVP normal, no carotid bruit        Respiratory:  normal breath sounds, clear to auscultation, normal A-P diameter, normal symmetry, normal respiratory excursion, no use of accessory muscles         Cardiac: regular rhythm;normal S1 and S2                pulses full and equal                                        GI:  abdomen soft, non-tender, BS normoactive, no mass, no HSM, no bruits        Extremities and Muscular Skeletal:  no edema         right femoral site has faint circumfrencial echymosis, pea sized firm hematoma, no bruit, well healed    Neurological:  no gross motor deficits        Psych:  Alert and Oriented x 3        Recent Lab Results:  LIPID RESULTS:  Lab Results   Component Value Date    CHOL 236 (H) 03/09/2017    HDL 76 03/09/2017     (H) 03/09/2017    TRIG 111 03/09/2017    CHOLHDLRATIO 3.7 09/19/2011       LIVER ENZYME RESULTS:  Lab Results   Component Value Date    AST 38 06/27/2021    ALT 35 06/27/2021       CBC RESULTS:  Lab Results   Component Value Date    WBC 6.8 06/27/2021    RBC 4.14 06/27/2021    HGB 12.5 06/27/2021    HCT 38.6 06/27/2021    MCV 93 06/27/2021    MCH 30.2 06/27/2021    MCHC 32.4 06/27/2021    RDW 12.6 06/27/2021     06/27/2021       BMP RESULTS:  Lab Results   Component Value Date     06/27/2021    POTASSIUM 4.2 06/27/2021    CHLORIDE 113 (H) 06/27/2021    CO2 26 06/27/2021    ANIONGAP 3 06/27/2021     (H) 06/27/2021    BUN 10 06/27/2021    CR 0.62 06/27/2021    GFRESTIMATED >90 06/27/2021    GFRESTBLACK >90 06/27/2021    DEWAYNE 8.2 (L) 06/27/2021        A1C RESULTS:  No results found for: A1C    INR RESULTS:  Lab Results   Component Value Date    INR 0.88 03/09/2017    INR 1.13 03/22/2013           CC  No referring provider defined for this encounter.                    Thank you for allowing me to participate in the care of your  patient.      Sincerely,     DR AYE SAM MD     [facility]  cc:   No referring provider defined for this encounter.

## 2021-08-09 ENCOUNTER — INFUSION THERAPY VISIT (OUTPATIENT)
Dept: INFUSION THERAPY | Facility: CLINIC | Age: 51
End: 2021-08-09
Attending: ALLERGY & IMMUNOLOGY
Payer: COMMERCIAL

## 2021-08-09 VITALS
HEART RATE: 93 BPM | DIASTOLIC BLOOD PRESSURE: 69 MMHG | SYSTOLIC BLOOD PRESSURE: 133 MMHG | RESPIRATION RATE: 16 BRPM | TEMPERATURE: 98 F | OXYGEN SATURATION: 97 %

## 2021-08-09 DIAGNOSIS — J45.50 SEVERE PERSISTENT ASTHMA WITHOUT COMPLICATION (H): Primary | ICD-10-CM

## 2021-08-09 PROCEDURE — 250N000011 HC RX IP 250 OP 636: Performed by: ALLERGY & IMMUNOLOGY

## 2021-08-09 PROCEDURE — 96372 THER/PROPH/DIAG INJ SC/IM: CPT | Performed by: ALLERGY & IMMUNOLOGY

## 2021-08-09 RX ADMIN — OMALIZUMAB 375 MG: 150 INJECTION, SOLUTION SUBCUTANEOUS at 14:41

## 2021-08-09 NOTE — PROGRESS NOTES
Infusion Nursing Note:  Alba Varela presents today for Xolair.    Patient seen by provider today: No   present during visit today: Not Applicable.    Note: N/A.      Intravenous Access:  No Intravenous access/labs at this visit.    Treatment Conditions:  Not Applicable.      Post Infusion Assessment:  Patient tolerated injection without incident.  Patient observed for 30 minutes post xolair per protocol.       Discharge Plan:   Discharge instructions reviewed with: Patient.  Patient and/or family verbalized understanding of discharge instructions and all questions answered.  Copy of AVS reviewed with patient and/or family.  Patient will return 8/23/21 for next appointment.  Patient discharged in stable condition accompanied by: self.  Departure Mode: Ambulatory.      Dannielle Valladares RN

## 2021-08-11 ENCOUNTER — TRANSFERRED RECORDS (OUTPATIENT)
Dept: HEALTH INFORMATION MANAGEMENT | Facility: CLINIC | Age: 51
End: 2021-08-11

## 2021-08-23 ENCOUNTER — INFUSION THERAPY VISIT (OUTPATIENT)
Dept: INFUSION THERAPY | Facility: CLINIC | Age: 51
End: 2021-08-23
Attending: ALLERGY & IMMUNOLOGY
Payer: COMMERCIAL

## 2021-08-23 VITALS
TEMPERATURE: 97.6 F | OXYGEN SATURATION: 97 % | SYSTOLIC BLOOD PRESSURE: 114 MMHG | HEART RATE: 88 BPM | RESPIRATION RATE: 16 BRPM | DIASTOLIC BLOOD PRESSURE: 76 MMHG

## 2021-08-23 DIAGNOSIS — J45.50 SEVERE PERSISTENT ASTHMA WITHOUT COMPLICATION (H): Primary | ICD-10-CM

## 2021-08-23 PROCEDURE — 96372 THER/PROPH/DIAG INJ SC/IM: CPT | Performed by: ALLERGY & IMMUNOLOGY

## 2021-08-23 PROCEDURE — 250N000011 HC RX IP 250 OP 636: Performed by: ALLERGY & IMMUNOLOGY

## 2021-08-23 RX ADMIN — OMALIZUMAB 375 MG: 150 INJECTION, SOLUTION SUBCUTANEOUS at 08:15

## 2021-08-23 NOTE — PROGRESS NOTES
Infusion Nursing Note:  Alba Varela presents today for Xolair.    Patient seen by provider today: No   present during visit today: Not Applicable.    Note: N/A.      Intravenous Access:  No Intravenous access/labs at this visit.    Treatment Conditions:  Not Applicable.      Post Infusion Assessment:  Patient tolerated injection without incident.  Patient observed for 30 minutes post xolair per protocol.  Site patent and intact, free from redness, edema or discomfort.       Discharge Plan:   AVS to patient via MYCHART.    Patient discharged in stable condition accompanied by: self.  Departure Mode: Ambulatory.      Sangita De La Cruz RN

## 2021-09-07 ENCOUNTER — INFUSION THERAPY VISIT (OUTPATIENT)
Dept: INFUSION THERAPY | Facility: CLINIC | Age: 51
End: 2021-09-07
Attending: ALLERGY & IMMUNOLOGY
Payer: COMMERCIAL

## 2021-09-07 VITALS — SYSTOLIC BLOOD PRESSURE: 142 MMHG | OXYGEN SATURATION: 99 % | HEART RATE: 85 BPM | DIASTOLIC BLOOD PRESSURE: 95 MMHG

## 2021-09-07 DIAGNOSIS — J45.50 SEVERE PERSISTENT ASTHMA WITHOUT COMPLICATION (H): Primary | ICD-10-CM

## 2021-09-07 PROCEDURE — 250N000011 HC RX IP 250 OP 636: Performed by: ALLERGY & IMMUNOLOGY

## 2021-09-07 PROCEDURE — 96372 THER/PROPH/DIAG INJ SC/IM: CPT | Performed by: ALLERGY & IMMUNOLOGY

## 2021-09-07 RX ADMIN — OMALIZUMAB 375 MG: 150 INJECTION, SOLUTION SUBCUTANEOUS at 08:53

## 2021-09-07 NOTE — PROGRESS NOTES
Infusion Nursing Note:  Alba Varela presents today for Xolair.    Patient seen by provider today: No   present during visit today: Not Applicable.    Note: N/A.      Intravenous Access:  No Intravenous access/labs at this visit.    Treatment Conditions:  Not Applicable.      Post Infusion Assessment:  Patient tolerated injection without incident.  Patient observed for 30 minutes post Xolair per protocol.  Site patent and intact, free from redness, edema or discomfort.       Discharge Plan:   Discharge instructions reviewed with: Patient.  Patient and/or family verbalized understanding of discharge instructions and all questions answered.  AVS to patient via [employer].  Patient will return 9/20/21 for next Xolair injection for next appointment.   Patient discharged in stable condition accompanied by: self.  Departure Mode: Ambulatory.      Mame Condon RN

## 2021-09-20 ENCOUNTER — INFUSION THERAPY VISIT (OUTPATIENT)
Dept: INFUSION THERAPY | Facility: CLINIC | Age: 51
End: 2021-09-20
Attending: ALLERGY & IMMUNOLOGY
Payer: COMMERCIAL

## 2021-09-20 VITALS
OXYGEN SATURATION: 99 % | SYSTOLIC BLOOD PRESSURE: 143 MMHG | HEART RATE: 80 BPM | TEMPERATURE: 97.6 F | DIASTOLIC BLOOD PRESSURE: 95 MMHG | RESPIRATION RATE: 16 BRPM

## 2021-09-20 DIAGNOSIS — J45.50 SEVERE PERSISTENT ASTHMA WITHOUT COMPLICATION (H): Primary | ICD-10-CM

## 2021-09-20 PROCEDURE — 96372 THER/PROPH/DIAG INJ SC/IM: CPT | Performed by: ALLERGY & IMMUNOLOGY

## 2021-09-20 PROCEDURE — 250N000011 HC RX IP 250 OP 636: Performed by: ALLERGY & IMMUNOLOGY

## 2021-09-20 RX ADMIN — OMALIZUMAB 375 MG: 150 INJECTION, SOLUTION SUBCUTANEOUS at 08:08

## 2021-09-20 NOTE — PROGRESS NOTES
Infusion Nursing Note:  Alba Varela presents today for Xolair.    Patient seen by provider today: No   present during visit today: Not Applicable.    Note: N/A.      Intravenous Access:  No Intravenous access/labs at this visit.    Treatment Conditions:  Biological Infusion Checklist:  ~~~ NOTE: If the patient answers yes to any of the questions below, hold the infusion and contact ordering provider or on-call provider.    1. Have you recently had an elevated temperature, fever, chills, productive cough, coughing for 3 weeks or longer or hemoptysis, abnormal vital signs, night sweats,  chest pain or have you noticed a decrease in your appetite, unexplained weight loss or fatigue? No  2. Do you have any open wounds or new incisions? No  3. Do you have any recent or upcoming hospitalizations, surgeries or dental procedures? No  4. Do you currently have or recently have had any signs of illness or infection or are you on any antibiotics? No  5. Have you had any new, sudden or worsening abdominal pain? No  6. Have you or anyone in your household received a live vaccination in the past 4 weeks? Please note:  No live vaccines while on biologic/chemotherapy until 6 months after the last treatment.  Patient can receive the flu vaccine (shot only) and the pneumovax.  It is optimal for the patient to get these vaccines mid cycle, but they can be given at any time as long as it is not on the day of the infusion. No  7. Have you recently been diagnosed with any new nervous system diseases (ie. Multiple sclerosis, Guillain [place], seizures, neurological changes) or cancer diagnosis? No  8. Are you on any form of radiation or chemotherapy? No  9. Are you pregnant or breast feeding or do you have plans of pregnancy in the future? No  10. Have you been having any signs of worsening depression or suicidal ideations?  (benlysta only) No  11. Have there been any other new onset medical symptoms? No        Post Infusion  Assessment:  Patient tolerated injection without incident.  Patient observed for 30   minutes post injections per protocol.  Site patent and intact, free from redness, edema or discomfort.  Biologic Infusion Post Education: Call the triage nurse at your clinic or seek medical attention if you have chills and/or temperature greater than or equal to 100.5, uncontrolled nausea/vomiting, diarrhea, constipation, dizziness, shortness of breath, chest pain, heart palpitations, weakness or any other new or concerning symptoms, questions or concerns.  You cannot have any live virus vaccines prior to or during treatment or up to 6 months post infusion.  If you have an upcoming surgery, medical procedure or dental procedure during treatment, this should be discussed with your ordering physician and your surgeon/dentist.  If you are having any concerning symptom, if you are unsure if you should get your next infusion or wish to speak to a provider before your next infusion, please call your care coordinator or triage nurse at your clinic to notify them so we can adequately serve you.       Discharge Plan:   Discharge instructions reviewed with: Patient.  Patient and/or family verbalized understanding of discharge instructions and all questions answered.  AVS to patient via [employer].  Patient will return 10/4 for next appointment.   Patient discharged in stable condition accompanied by: self.  Departure Mode: Ambulatory.      Vivi Rosado RN

## 2021-09-25 ENCOUNTER — HEALTH MAINTENANCE LETTER (OUTPATIENT)
Age: 51
End: 2021-09-25

## 2021-09-30 ENCOUNTER — INFUSION THERAPY VISIT (OUTPATIENT)
Dept: INFUSION THERAPY | Facility: CLINIC | Age: 51
End: 2021-09-30
Attending: PSYCHIATRY & NEUROLOGY
Payer: COMMERCIAL

## 2021-09-30 VITALS
SYSTOLIC BLOOD PRESSURE: 115 MMHG | HEART RATE: 85 BPM | DIASTOLIC BLOOD PRESSURE: 78 MMHG | OXYGEN SATURATION: 98 % | TEMPERATURE: 98.7 F | RESPIRATION RATE: 16 BRPM

## 2021-09-30 DIAGNOSIS — G43.909 MIGRAINES: Primary | ICD-10-CM

## 2021-09-30 PROCEDURE — 250N000009 HC RX 250

## 2021-09-30 PROCEDURE — 250N000011 HC RX IP 250 OP 636

## 2021-09-30 PROCEDURE — 258N000003 HC RX IP 258 OP 636

## 2021-09-30 PROCEDURE — 96374 THER/PROPH/DIAG INJ IV PUSH: CPT

## 2021-09-30 PROCEDURE — 96375 TX/PRO/DX INJ NEW DRUG ADDON: CPT

## 2021-09-30 RX ORDER — DIPHENHYDRAMINE HYDROCHLORIDE 50 MG/ML
50 INJECTION INTRAMUSCULAR; INTRAVENOUS
Status: CANCELLED
Start: 2021-09-30

## 2021-09-30 RX ORDER — ALBUTEROL SULFATE 0.83 MG/ML
2.5 SOLUTION RESPIRATORY (INHALATION)
Status: CANCELLED | OUTPATIENT
Start: 2021-09-30

## 2021-09-30 RX ORDER — NALOXONE HYDROCHLORIDE 0.4 MG/ML
0.2 INJECTION, SOLUTION INTRAMUSCULAR; INTRAVENOUS; SUBCUTANEOUS
Status: CANCELLED | OUTPATIENT
Start: 2021-09-30

## 2021-09-30 RX ORDER — HEPARIN SODIUM (PORCINE) LOCK FLUSH IV SOLN 100 UNIT/ML 100 UNIT/ML
5 SOLUTION INTRAVENOUS
Status: CANCELLED | OUTPATIENT
Start: 2021-09-30

## 2021-09-30 RX ORDER — ALBUTEROL SULFATE 90 UG/1
1-2 AEROSOL, METERED RESPIRATORY (INHALATION)
Status: CANCELLED
Start: 2021-09-30

## 2021-09-30 RX ORDER — METHYLPREDNISOLONE SODIUM SUCCINATE 125 MG/2ML
100 INJECTION, POWDER, LYOPHILIZED, FOR SOLUTION INTRAMUSCULAR; INTRAVENOUS ONCE
Status: CANCELLED
Start: 2021-09-30 | End: 2021-09-30

## 2021-09-30 RX ORDER — ONDANSETRON 2 MG/ML
4 INJECTION INTRAMUSCULAR; INTRAVENOUS
Status: DISCONTINUED | OUTPATIENT
Start: 2021-09-30 | End: 2021-09-30 | Stop reason: HOSPADM

## 2021-09-30 RX ORDER — METHYLPREDNISOLONE SODIUM SUCCINATE 125 MG/2ML
100 INJECTION, POWDER, LYOPHILIZED, FOR SOLUTION INTRAMUSCULAR; INTRAVENOUS ONCE
Status: COMPLETED | OUTPATIENT
Start: 2021-09-30 | End: 2021-09-30

## 2021-09-30 RX ORDER — KETOROLAC TROMETHAMINE 30 MG/ML
30 INJECTION, SOLUTION INTRAMUSCULAR; INTRAVENOUS ONCE
Status: CANCELLED
Start: 2021-09-30 | End: 2021-09-30

## 2021-09-30 RX ORDER — METHYLPREDNISOLONE SODIUM SUCCINATE 125 MG/2ML
125 INJECTION, POWDER, LYOPHILIZED, FOR SOLUTION INTRAMUSCULAR; INTRAVENOUS
Status: CANCELLED
Start: 2021-09-30

## 2021-09-30 RX ORDER — HEPARIN SODIUM,PORCINE 10 UNIT/ML
5 VIAL (ML) INTRAVENOUS
Status: CANCELLED | OUTPATIENT
Start: 2021-09-30

## 2021-09-30 RX ORDER — EPINEPHRINE 1 MG/ML
0.3 INJECTION, SOLUTION INTRAMUSCULAR; SUBCUTANEOUS EVERY 5 MIN PRN
Status: CANCELLED | OUTPATIENT
Start: 2021-09-30

## 2021-09-30 RX ORDER — MEPERIDINE HYDROCHLORIDE 25 MG/ML
25 INJECTION INTRAMUSCULAR; INTRAVENOUS; SUBCUTANEOUS EVERY 30 MIN PRN
Status: CANCELLED | OUTPATIENT
Start: 2021-09-30

## 2021-09-30 RX ORDER — LORAZEPAM 2 MG/ML
1 INJECTION INTRAMUSCULAR
Status: DISCONTINUED | OUTPATIENT
Start: 2021-09-30 | End: 2021-09-30 | Stop reason: HOSPADM

## 2021-09-30 RX ORDER — KETOROLAC TROMETHAMINE 30 MG/ML
30 INJECTION, SOLUTION INTRAMUSCULAR; INTRAVENOUS ONCE
Status: COMPLETED | OUTPATIENT
Start: 2021-09-30 | End: 2021-09-30

## 2021-09-30 RX ORDER — ONDANSETRON 2 MG/ML
4 INJECTION INTRAMUSCULAR; INTRAVENOUS
Status: CANCELLED
Start: 2021-09-30 | End: 2021-10-01

## 2021-09-30 RX ORDER — LORAZEPAM 2 MG/ML
1 INJECTION INTRAMUSCULAR
Status: CANCELLED
Start: 2021-09-30 | End: 2021-10-01

## 2021-09-30 RX ADMIN — LORAZEPAM 1 MG: 2 INJECTION, SOLUTION INTRAMUSCULAR; INTRAVENOUS at 10:53

## 2021-09-30 RX ADMIN — METHYLPREDNISOLONE SODIUM SUCCINATE 100 MG: 125 INJECTION, POWDER, FOR SOLUTION INTRAMUSCULAR; INTRAVENOUS at 10:54

## 2021-09-30 RX ADMIN — SODIUM CHLORIDE 1000 ML: 9 INJECTION, SOLUTION INTRAVENOUS at 10:53

## 2021-09-30 RX ADMIN — KETOROLAC TROMETHAMINE 30 MG: 30 INJECTION, SOLUTION INTRAMUSCULAR; INTRAVENOUS at 10:56

## 2021-09-30 RX ADMIN — SODIUM CHLORIDE 1000 MG: 9 INJECTION, SOLUTION INTRAVENOUS at 11:06

## 2021-09-30 RX ADMIN — ONDANSETRON 4 MG: 2 INJECTION INTRAMUSCULAR; INTRAVENOUS at 10:56

## 2021-09-30 NOTE — PROGRESS NOTES
Infusion Nursing Note:  Alba Varela presents today for 1L IVF, Toradol/Solumedrol/Zofran/Ativan/Depacon for migraine.    Patient seen by provider today: No   present during visit today: Not Applicable.    Note: Patient has been having a significant migraine - 7-8/10 pain. Otherwise no complaints of fever/chill/SOB today.    Post infusion and medication patient states feeling some good relief from migraine.     Patient educated about not driving after receiving  Ativan dose. Patient aware and stated she will have mom give ride.       Intravenous Access:  Peripheral IV placed.    Treatment Conditions:  Not Applicable.      Post Infusion Assessment:  Patient tolerated infusion without incident.  Patient tolerated injection without incident.  Blood return noted pre and post infusion.  Site patent and intact, free from redness, edema or discomfort.  No evidence of extravasations.  Access discontinued per protocol.       Discharge Plan:   AVS to patient via MYCHART.  Patient will return 10/4 for Xolair injection for next appointment.   Patient discharged in stable condition accompanied by: self.  Departure Mode: Ambulatory.      Mac Montero RN

## 2021-10-04 ENCOUNTER — INFUSION THERAPY VISIT (OUTPATIENT)
Dept: INFUSION THERAPY | Facility: CLINIC | Age: 51
End: 2021-10-04
Attending: ALLERGY & IMMUNOLOGY
Payer: COMMERCIAL

## 2021-10-04 VITALS
OXYGEN SATURATION: 98 % | HEART RATE: 80 BPM | DIASTOLIC BLOOD PRESSURE: 82 MMHG | SYSTOLIC BLOOD PRESSURE: 121 MMHG | TEMPERATURE: 97.5 F | RESPIRATION RATE: 16 BRPM

## 2021-10-04 DIAGNOSIS — J45.50 SEVERE PERSISTENT ASTHMA WITHOUT COMPLICATION (H): Primary | ICD-10-CM

## 2021-10-04 PROCEDURE — 250N000011 HC RX IP 250 OP 636: Performed by: ALLERGY & IMMUNOLOGY

## 2021-10-04 PROCEDURE — 96372 THER/PROPH/DIAG INJ SC/IM: CPT | Performed by: ALLERGY & IMMUNOLOGY

## 2021-10-04 RX ADMIN — OMALIZUMAB 375 MG: 150 INJECTION, SOLUTION SUBCUTANEOUS at 08:24

## 2021-10-04 NOTE — PROGRESS NOTES
Infusion Nursing Note:  Alba Varela presents today for Xolair.    Patient seen by provider today: No   present during visit today: Not Applicable.    Note:   Patient reports is feeling fine today.  Patient denies fevers, chills or signs of infection.  Patient reports that her asthma is well controlled with Xolair.      Intravenous Access:  No Intravenous access/labs at this visit.    Treatment Conditions:  Biological Infusion Checklist:  ~~~ NOTE: If the patient answers yes to any of the questions below, hold the infusion and contact ordering provider or on-call provider.    1. Have you recently had an elevated temperature, fever, chills, productive cough, coughing for 3 weeks or longer or hemoptysis, abnormal vital signs, night sweats,  chest pain or have you noticed a decrease in your appetite, unexplained weight loss or fatigue? No  2. Do you have any open wounds or new incisions? No  3. Do you have any recent or upcoming hospitalizations, surgeries or dental procedures? No  4. Do you currently have or recently have had any signs of illness or infection or are you on any antibiotics? No  5. Have you had any new, sudden or worsening abdominal pain? No  6. Have you or anyone in your household received a live vaccination in the past 4 weeks? Please note:  No live vaccines while on biologic/chemotherapy until 6 months after the last treatment.  Patient can receive the flu vaccine (shot only) and the pneumovax.  It is optimal for the patient to get these vaccines mid cycle, but they can be given at any time as long as it is not on the day of the infusion. No  7. Have you recently been diagnosed with any new nervous system diseases (ie. Multiple sclerosis, Guillain [place], seizures, neurological changes) or cancer diagnosis? No  8. Are you on any form of radiation or chemotherapy? No  9. Are you pregnant or breast feeding or do you have plans of pregnancy in the future? No  10. Have you been having any signs  of worsening depression or suicidal ideations?  (benlysta only) N/A  11. Have there been any other new onset medical symptoms? No    Post Infusion Assessment:  Patient tolerated injection without incident.  Patient observed for 20 minutes post Xolair per protocol.   -Patient only wanted to wait 20 minutes post Xolair injections.  -Patient reports is feeling well and offers no complaints.      Discharge Plan:   Discharge instructions reviewed with: Patient.  Patient and/or family verbalized understanding of discharge instructions and all questions answered.  Patient discharged in stable condition accompanied by: self.  Departure Mode: Ambulatory.  10/18/21: Xolair.    Maddi Hyde RN, BSN, OCN on 10/4/2021 at 9:35 AM

## 2021-10-18 ENCOUNTER — INFUSION THERAPY VISIT (OUTPATIENT)
Dept: INFUSION THERAPY | Facility: CLINIC | Age: 51
End: 2021-10-18
Attending: ALLERGY & IMMUNOLOGY
Payer: COMMERCIAL

## 2021-10-18 VITALS
RESPIRATION RATE: 16 BRPM | OXYGEN SATURATION: 97 % | SYSTOLIC BLOOD PRESSURE: 118 MMHG | DIASTOLIC BLOOD PRESSURE: 82 MMHG | HEART RATE: 77 BPM | TEMPERATURE: 98.2 F

## 2021-10-18 DIAGNOSIS — J45.50 SEVERE PERSISTENT ASTHMA WITHOUT COMPLICATION (H): Primary | ICD-10-CM

## 2021-10-18 PROCEDURE — 250N000011 HC RX IP 250 OP 636: Performed by: ALLERGY & IMMUNOLOGY

## 2021-10-18 PROCEDURE — 96372 THER/PROPH/DIAG INJ SC/IM: CPT | Performed by: ALLERGY & IMMUNOLOGY

## 2021-10-18 RX ADMIN — OMALIZUMAB 375 MG: 150 INJECTION, SOLUTION SUBCUTANEOUS at 08:19

## 2021-10-18 ASSESSMENT — PAIN SCALES - GENERAL: PAINLEVEL: NO PAIN (0)

## 2021-10-18 NOTE — PROGRESS NOTES
Infusion Nursing Note:  Alba Varela presents today for Xolair.    Patient seen by provider today: No   present during visit today: Not Applicable.    Note: Denies SOB.  Tolerating Xolair.  Feels like it controls her asthma.      Intravenous Access:  No Intravenous access/labs at this visit.    Treatment Conditions:  Biological Infusion Checklist:  ~~~ NOTE: If the patient answers yes to any of the questions below, hold the infusion and contact ordering provider or on-call provider.    1. Have you recently had an elevated temperature, fever, chills, productive cough, coughing for 3 weeks or longer or hemoptysis, abnormal vital signs, night sweats,  chest pain or have you noticed a decrease in your appetite, unexplained weight loss or fatigue? No  2. Do you have any open wounds or new incisions? No  3. Do you have any recent or upcoming hospitalizations, surgeries or dental procedures? No  4. Do you currently have or recently have had any signs of illness or infection or are you on any antibiotics? No  5. Have you had any new, sudden or worsening abdominal pain? No  6. Have you or anyone in your household received a live vaccination in the past 4 weeks? Please note:  No live vaccines while on biologic/chemotherapy until 6 months after the last treatment.  Patient can receive the flu vaccine (shot only) and the pneumovax.  It is optimal for the patient to get these vaccines mid cycle, but they can be given at any time as long as it is not on the day of the infusion. No  7. Have you recently been diagnosed with any new nervous system diseases (ie. Multiple sclerosis, Guillain [place], seizures, neurological changes) or cancer diagnosis? No  8. Are you on any form of radiation or chemotherapy? No  9. Are you pregnant or breast feeding or do you have plans of pregnancy in the future? No  10. Have you been having any signs of worsening depression or suicidal ideations?  (benlysta only) No  11. Have there been any  other new onset medical symptoms? No        Post Infusion Assessment:  Patient tolerated injection without incident.  Patient observed for 30 minutes post Xolair per protocol.  Biologic Infusion Post Education: Call the triage nurse at your clinic or seek medical attention if you have chills and/or temperature greater than or equal to 100.5, uncontrolled nausea/vomiting, diarrhea, constipation, dizziness, shortness of breath, chest pain, heart palpitations, weakness or any other new or concerning symptoms, questions or concerns.  You cannot have any live virus vaccines prior to or during treatment or up to 6 months post infusion.  If you have an upcoming surgery, medical procedure or dental procedure during treatment, this should be discussed with your ordering physician and your surgeon/dentist.  If you are having any concerning symptom, if you are unsure if you should get your next infusion or wish to speak to a provider before your next infusion, please call your care coordinator or triage nurse at your clinic to notify them so we can adequately serve you.       Discharge Plan:   Discharge instructions reviewed with: Patient.  Patient discharged in stable condition accompanied by: self.  Departure Mode: Ambulatory.  Next Xolair scheduled for 11/1/21.      SIERRA RICKETTS RN

## 2021-10-27 ENCOUNTER — APPOINTMENT (OUTPATIENT)
Dept: CT IMAGING | Facility: CLINIC | Age: 51
End: 2021-10-27
Attending: EMERGENCY MEDICINE
Payer: COMMERCIAL

## 2021-10-27 ENCOUNTER — HOSPITAL ENCOUNTER (EMERGENCY)
Facility: CLINIC | Age: 51
Discharge: HOME | End: 2021-10-27
Attending: EMERGENCY MEDICINE | Admitting: EMERGENCY MEDICINE
Payer: COMMERCIAL

## 2021-10-27 VITALS
HEIGHT: 64 IN | WEIGHT: 176 LBS | TEMPERATURE: 98.4 F | BODY MASS INDEX: 30.05 KG/M2 | HEART RATE: 102 BPM | SYSTOLIC BLOOD PRESSURE: 137 MMHG | DIASTOLIC BLOOD PRESSURE: 83 MMHG | RESPIRATION RATE: 18 BRPM | OXYGEN SATURATION: 98 %

## 2021-10-27 DIAGNOSIS — K57.32 DIVERTICULITIS OF COLON: ICD-10-CM

## 2021-10-27 LAB
ALBUMIN UR-MCNC: 10 MG/DL
ANION GAP SERPL CALCULATED.3IONS-SCNC: 7 MMOL/L (ref 3–14)
APPEARANCE UR: CLEAR
ATRIAL RATE - MUSE: 102 BPM
BACTERIA #/AREA URNS HPF: ABNORMAL /HPF
BASOPHILS # BLD AUTO: 0 10E3/UL (ref 0–0.2)
BASOPHILS NFR BLD AUTO: 0 %
BILIRUB UR QL STRIP: NEGATIVE
BUN SERPL-MCNC: 16 MG/DL (ref 7–30)
CALCIUM SERPL-MCNC: 8.4 MG/DL (ref 8.5–10.1)
CHLORIDE BLD-SCNC: 112 MMOL/L (ref 94–109)
CO2 SERPL-SCNC: 21 MMOL/L (ref 20–32)
COLOR UR AUTO: YELLOW
CREAT SERPL-MCNC: 0.63 MG/DL (ref 0.52–1.04)
DIASTOLIC BLOOD PRESSURE - MUSE: NORMAL MMHG
EOSINOPHIL # BLD AUTO: 0.2 10E3/UL (ref 0–0.7)
EOSINOPHIL NFR BLD AUTO: 2 %
ERYTHROCYTE [DISTWIDTH] IN BLOOD BY AUTOMATED COUNT: 12.4 % (ref 10–15)
GFR SERPL CREATININE-BSD FRML MDRD: >90 ML/MIN/1.73M2
GLUCOSE BLD-MCNC: 117 MG/DL (ref 70–99)
GLUCOSE UR STRIP-MCNC: NEGATIVE MG/DL
HCT VFR BLD AUTO: 37.1 % (ref 35–47)
HGB BLD-MCNC: 12.4 G/DL (ref 11.7–15.7)
HGB UR QL STRIP: NEGATIVE
HOLD SPECIMEN: NORMAL
IMM GRANULOCYTES # BLD: 0 10E3/UL
IMM GRANULOCYTES NFR BLD: 0 %
INTERPRETATION ECG - MUSE: NORMAL
KETONES UR STRIP-MCNC: 20 MG/DL
LEUKOCYTE ESTERASE UR QL STRIP: ABNORMAL
LYMPHOCYTES # BLD AUTO: 1.6 10E3/UL (ref 0.8–5.3)
LYMPHOCYTES NFR BLD AUTO: 22 %
MCH RBC QN AUTO: 30.6 PG (ref 26.5–33)
MCHC RBC AUTO-ENTMCNC: 33.4 G/DL (ref 31.5–36.5)
MCV RBC AUTO: 92 FL (ref 78–100)
MONOCYTES # BLD AUTO: 0.6 10E3/UL (ref 0–1.3)
MONOCYTES NFR BLD AUTO: 8 %
MUCOUS THREADS #/AREA URNS LPF: PRESENT /LPF
NEUTROPHILS # BLD AUTO: 4.9 10E3/UL (ref 1.6–8.3)
NEUTROPHILS NFR BLD AUTO: 68 %
NITRATE UR QL: NEGATIVE
NRBC # BLD AUTO: 0 10E3/UL
NRBC BLD AUTO-RTO: 0 /100
P AXIS - MUSE: 49 DEGREES
PH UR STRIP: 6 [PH] (ref 5–7)
PLATELET # BLD AUTO: 258 10E3/UL (ref 150–450)
POTASSIUM BLD-SCNC: 3.6 MMOL/L (ref 3.4–5.3)
PR INTERVAL - MUSE: 128 MS
QRS DURATION - MUSE: 74 MS
QT - MUSE: 350 MS
QTC - MUSE: 456 MS
R AXIS - MUSE: -2 DEGREES
RBC # BLD AUTO: 4.05 10E6/UL (ref 3.8–5.2)
RBC URINE: 1 /HPF
SODIUM SERPL-SCNC: 140 MMOL/L (ref 133–144)
SP GR UR STRIP: 1.03 (ref 1–1.03)
SQUAMOUS EPITHELIAL: 1 /HPF
SYSTOLIC BLOOD PRESSURE - MUSE: NORMAL MMHG
T AXIS - MUSE: -3 DEGREES
TROPONIN I SERPL-MCNC: <0.015 UG/L (ref 0–0.04)
UROBILINOGEN UR STRIP-MCNC: 2 MG/DL
VENTRICULAR RATE- MUSE: 102 BPM
WBC # BLD AUTO: 7.3 10E3/UL (ref 4–11)
WBC URINE: 18 /HPF

## 2021-10-27 PROCEDURE — 250N000013 HC RX MED GY IP 250 OP 250 PS 637: Performed by: EMERGENCY MEDICINE

## 2021-10-27 PROCEDURE — 74177 CT ABD & PELVIS W/CONTRAST: CPT

## 2021-10-27 PROCEDURE — 84484 ASSAY OF TROPONIN QUANT: CPT | Performed by: EMERGENCY MEDICINE

## 2021-10-27 PROCEDURE — 85025 COMPLETE CBC W/AUTO DIFF WBC: CPT | Performed by: EMERGENCY MEDICINE

## 2021-10-27 PROCEDURE — 96375 TX/PRO/DX INJ NEW DRUG ADDON: CPT

## 2021-10-27 PROCEDURE — 96361 HYDRATE IV INFUSION ADD-ON: CPT

## 2021-10-27 PROCEDURE — 250N000011 HC RX IP 250 OP 636: Performed by: EMERGENCY MEDICINE

## 2021-10-27 PROCEDURE — 80048 BASIC METABOLIC PNL TOTAL CA: CPT | Performed by: EMERGENCY MEDICINE

## 2021-10-27 PROCEDURE — 99285 EMERGENCY DEPT VISIT HI MDM: CPT | Mod: 25

## 2021-10-27 PROCEDURE — 93005 ELECTROCARDIOGRAM TRACING: CPT

## 2021-10-27 PROCEDURE — 81003 URINALYSIS AUTO W/O SCOPE: CPT | Performed by: EMERGENCY MEDICINE

## 2021-10-27 PROCEDURE — 87086 URINE CULTURE/COLONY COUNT: CPT | Performed by: EMERGENCY MEDICINE

## 2021-10-27 PROCEDURE — 36415 COLL VENOUS BLD VENIPUNCTURE: CPT | Performed by: EMERGENCY MEDICINE

## 2021-10-27 PROCEDURE — 258N000003 HC RX IP 258 OP 636: Performed by: EMERGENCY MEDICINE

## 2021-10-27 PROCEDURE — 250N000009 HC RX 250: Performed by: EMERGENCY MEDICINE

## 2021-10-27 PROCEDURE — 96374 THER/PROPH/DIAG INJ IV PUSH: CPT | Mod: 59

## 2021-10-27 RX ORDER — ONDANSETRON 4 MG/1
4 TABLET, ORALLY DISINTEGRATING ORAL EVERY 6 HOURS PRN
Qty: 10 TABLET | Refills: 0 | Status: SHIPPED | OUTPATIENT
Start: 2021-10-27 | End: 2021-10-30

## 2021-10-27 RX ORDER — DIPHENHYDRAMINE HYDROCHLORIDE 50 MG/ML
50 INJECTION INTRAMUSCULAR; INTRAVENOUS ONCE
Status: COMPLETED | OUTPATIENT
Start: 2021-10-27 | End: 2021-10-27

## 2021-10-27 RX ORDER — CIPROFLOXACIN 500 MG/1
500 TABLET, FILM COATED ORAL ONCE
Status: COMPLETED | OUTPATIENT
Start: 2021-10-27 | End: 2021-10-27

## 2021-10-27 RX ORDER — METRONIDAZOLE 500 MG/1
500 TABLET ORAL ONCE
Status: COMPLETED | OUTPATIENT
Start: 2021-10-27 | End: 2021-10-27

## 2021-10-27 RX ORDER — METRONIDAZOLE 500 MG/1
500 TABLET ORAL 3 TIMES DAILY
Qty: 30 TABLET | Refills: 0 | Status: SHIPPED | OUTPATIENT
Start: 2021-10-27 | End: 2021-11-06

## 2021-10-27 RX ORDER — HYDROMORPHONE HYDROCHLORIDE 1 MG/ML
0.5 INJECTION, SOLUTION INTRAMUSCULAR; INTRAVENOUS; SUBCUTANEOUS
Status: DISCONTINUED | OUTPATIENT
Start: 2021-10-27 | End: 2021-10-28 | Stop reason: HOSPADM

## 2021-10-27 RX ORDER — OXYCODONE AND ACETAMINOPHEN 5; 325 MG/1; MG/1
1 TABLET ORAL EVERY 6 HOURS PRN
Qty: 12 TABLET | Refills: 0 | Status: SHIPPED | OUTPATIENT
Start: 2021-10-27 | End: 2022-08-03

## 2021-10-27 RX ORDER — METHYLPREDNISOLONE SODIUM SUCCINATE 125 MG/2ML
125 INJECTION, POWDER, LYOPHILIZED, FOR SOLUTION INTRAMUSCULAR; INTRAVENOUS ONCE
Status: COMPLETED | OUTPATIENT
Start: 2021-10-27 | End: 2021-10-27

## 2021-10-27 RX ORDER — IOPAMIDOL 755 MG/ML
89 INJECTION, SOLUTION INTRAVASCULAR ONCE
Status: COMPLETED | OUTPATIENT
Start: 2021-10-27 | End: 2021-10-27

## 2021-10-27 RX ORDER — CIPROFLOXACIN 500 MG/1
500 TABLET, FILM COATED ORAL 2 TIMES DAILY
Qty: 20 TABLET | Refills: 0 | Status: SHIPPED | OUTPATIENT
Start: 2021-10-27 | End: 2021-11-06

## 2021-10-27 RX ADMIN — METRONIDAZOLE 500 MG: 500 TABLET ORAL at 22:18

## 2021-10-27 RX ADMIN — IOPAMIDOL 89 ML: 755 INJECTION, SOLUTION INTRAVENOUS at 21:08

## 2021-10-27 RX ADMIN — SODIUM CHLORIDE 65 ML: 900 INJECTION INTRAVENOUS at 21:08

## 2021-10-27 RX ADMIN — DIPHENHYDRAMINE HYDROCHLORIDE 50 MG: 50 INJECTION, SOLUTION INTRAMUSCULAR; INTRAVENOUS at 20:52

## 2021-10-27 RX ADMIN — METHYLPREDNISOLONE SODIUM SUCCINATE 125 MG: 125 INJECTION, POWDER, FOR SOLUTION INTRAMUSCULAR; INTRAVENOUS at 20:52

## 2021-10-27 RX ADMIN — CIPROFLOXACIN 500 MG: 500 TABLET, FILM COATED ORAL at 22:18

## 2021-10-27 RX ADMIN — SODIUM CHLORIDE 1000 ML: 9 INJECTION, SOLUTION INTRAVENOUS at 20:52

## 2021-10-27 RX ADMIN — HYDROMORPHONE HYDROCHLORIDE 0.5 MG: 1 INJECTION, SOLUTION INTRAMUSCULAR; INTRAVENOUS; SUBCUTANEOUS at 20:53

## 2021-10-27 ASSESSMENT — ENCOUNTER SYMPTOMS
FLANK PAIN: 1
NAUSEA: 0
FEVER: 0
VOMITING: 0
BACK PAIN: 0
DIARRHEA: 1
COUGH: 1
SHORTNESS OF BREATH: 0

## 2021-10-27 ASSESSMENT — MIFFLIN-ST. JEOR: SCORE: 1403.33

## 2021-10-28 NOTE — ED TRIAGE NOTES
Pt c/o of left sided flank pain that radiates to shoulder and back. Pt reports fever of 100.8-102.5 at home. Pt denies any cough, no shortness of breath. Pt reports nausea, no vomiting

## 2021-10-28 NOTE — ED PROVIDER NOTES
History   Chief Complaint:  Left lateral abdominal pain    The history is provided by the patient.      Alba Varela is a 51 year old female with history of C. diff colitis and kidney stones who presents with left lateral abdominal pain. The patient states she had an onset of mild left sided pain yesterday that has worsened throughout the day to constant stabbing pain. She endorses 12 episodes of diarrhea yesterday along with a fever between 100.8-102.2 last night. She states pain has worsened today and radiates to her left shoulder. She notes increased pain with palpation and movement. She endorses a mild cough today. She denies back pain, left lower extremity pain, nausea, or emesis. Notes she has a history of kidney infections and C difficile, however symptoms today are not reminiscent to previous episodes.      Review of Systems   Constitutional: Negative for fever.   Respiratory: Positive for cough. Negative for shortness of breath.    Gastrointestinal: Positive for diarrhea. Negative for nausea and vomiting.   Genitourinary: Positive for flank pain (left).   Musculoskeletal: Negative for back pain.   All other systems reviewed and are negative.    Allergies:  Codeine Sulfate  Contrast Dye  Scopolamine  Imitrex  Naproxen  Penicillins  Sulfa Drugs    Medications:  Amitriptyline  Cardizem  Neurontin  Claritin  Xolair   Effexor    Past Medical History:     Anxiety  C. difficile colitis  Asthma  Colitis  Hyperlipidemia  High cholesterol  Liver disease  Premature atrial contraction  Sleep apnea  Syncope      Past Surgical History:    Hip arthroplasty, right   section  Esophagogastroduodenoscopy  Bilateral foot reconstruction  Hysterectomy with bilateral salpingo oophorectomy  Septoplasty  Uvulopalatopharyngoplasty     Social History:  The patient was unaccompanied to the emergency department.    Physical Exam     Patient Vitals for the past 24 hrs:   BP Temp Temp src Pulse Resp SpO2 Height Weight  "  10/27/21 1938 137/83 98.4  F (36.9  C) Oral 102 18 98 % 1.626 m (5' 4\") 79.8 kg (176 lb)       Physical Exam  General/Appearance: appears stated age, well-groomed, appears very uncomfortable  Eyes: EOMI, no scleral injection, no icterus  ENT: MMM  Neck: supple, nl ROM, no stiffness  Cardiovascular: RRR, nl S1S2, no m/r/g, 2+ pulses in all 4 extremities, cap refill <2sec  Respiratory: CTAB, good air movement throughout, no wheezes/rhonchi/rales, no increased WOB, no retractions  Back: no CVA ttp  GI: abd soft, non-distended, significant LLQ and L mid lateral abd ttp,  no HSM, no rebound, no guarding, nl BS  MSK: GONZALES, good tone, no bony abnormality  Skin: warm and well-perfused, no rash, no edema, no ecchymosis, nl turgor  Neuro: GCS 15, alert and oriented, no gross focal neuro deficits  Psych: interacts appropriately  Heme: no petechia, no purpura, no active bleeding    Emergency Department Course   ECG  ECG obtained at 1948, ECG read at 2118  Sinus tachycardia   Septal infarct, age undetermined  Abnormal ECG   No significant change as compared to prior, dated 06/27/21.  Rate 102 bpm. VA interval 128 ms. QRS duration 74 ms. QT/QTc 350/456 ms. P-R-T axes 49 -2 -3.     Imaging:  CT Abdomen Pelvis w Contrast   Final Result   IMPRESSION:    1.  Evidence for acute diverticulitis involving descending colon. GI consult recommended as an outpatient to consider colonoscopy to exclude other underlying pathology if not recently performed.      Report per radiology    Laboratory:  Labs Ordered and Resulted from Time of ED Arrival to Time of ED Departure   BASIC METABOLIC PANEL - Abnormal       Result Value    Sodium 140      Potassium 3.6      Chloride 112 (*)     Carbon Dioxide (CO2) 21      Anion Gap 7      Urea Nitrogen 16      Creatinine 0.63      Calcium 8.4 (*)     Glucose 117 (*)     GFR Estimate >90     ROUTINE UA WITH MICROSCOPIC REFLEX TO CULTURE - Abnormal    Color Urine Yellow      Appearance Urine Clear      " Glucose Urine Negative      Bilirubin Urine Negative      Ketones Urine 20  (*)     Specific Gravity Urine 1.027      Blood Urine Negative      pH Urine 6.0      Protein Albumin Urine 10  (*)     Urobilinogen Urine 2.0      Nitrite Urine Negative      Leukocyte Esterase Urine Moderate (*)     Bacteria Urine Few (*)     Mucus Urine Present (*)     RBC Urine 1      WBC Urine 18 (*)     Squamous Epithelials Urine 1     TROPONIN I - Normal    Troponin I <0.015     CBC WITH PLATELETS AND DIFFERENTIAL    WBC Count 7.3      RBC Count 4.05      Hemoglobin 12.4      Hematocrit 37.1      MCV 92      MCH 30.6      MCHC 33.4      RDW 12.4      Platelet Count 258      % Neutrophils 68      % Lymphocytes 22      % Monocytes 8      % Eosinophils 2      % Basophils 0      % Immature Granulocytes 0      NRBCs per 100 WBC 0      Absolute Neutrophils 4.9      Absolute Lymphocytes 1.6      Absolute Monocytes 0.6      Absolute Eosinophils 0.2      Absolute Basophils 0.0      Absolute Immature Granulocytes 0.0      Absolute NRBCs 0.0     URINE CULTURE      Emergency Department Course:  Reviewed:  I reviewed nursing notes, vitals, past medical history and Care Everywhere    Assessments:  2011 I obtained history and examined the patient as noted above.   2146 I rechecked the patient and explained findings.    Interventions:  2052 0.9% NaCl bolus 1000 mL IV  2052 Benadryl 50 mg IV  2052 Dilaudid 0.5 mg IV  2052 Solu Medrol 125 mg IV  2218 Flagyl 500 mg PO  2218 Cipro 500 mg PO    Disposition:  The patient was discharged to home.     Impression & Plan   Medical Decision Making:  This pt presented with abdominal pain and CT confirmation of diverticulitis without abscess or perforation; this appears consistent with the history and physical exam so I doubt another underlying etiology is also present.  The patient's pain has been controlled by interventions in the emergency department.  This represents uncomplicated diverticulitis at this time  as there are no signs of sepsis, shock or bacteremia.  The patient is tolerating po and pain is controlled and I believe safe for outpatient treatment.  They have been started on Flagyl and Cipro -- they will be d/c'd with Rx's for these, pain meds, and nausea meds.  I educated the patient regarding the symptoms and signs that should prompt return to the Emergency Department.  This would include worsening fevers, chills, vomiting, and more intense pain.  The patient is to take antibiotics and pain medications as directed.    Follow-up with primary care physician is indicated in 1-2 days.     Diagnosis:    ICD-10-CM    1. Diverticulitis of colon  K57.32      Discharge Medications:  New Prescriptions    CIPROFLOXACIN (CIPRO) 500 MG TABLET    Take 1 tablet (500 mg) by mouth 2 times daily for 10 days    METRONIDAZOLE (FLAGYL) 500 MG TABLET    Take 1 tablet (500 mg) by mouth 3 times daily for 10 days    ONDANSETRON (ZOFRAN ODT) 4 MG ODT TAB    Take 1 tablet (4 mg) by mouth every 6 hours as needed for nausea or vomiting    OXYCODONE-ACETAMINOPHEN (PERCOCET) 5-325 MG TABLET    Take 1 tablet by mouth every 6 hours as needed for moderate to severe pain     Scribe Disclosure:  I, Shona Llanes, am serving as a scribe at 8:04 PM on 10/27/2021 to document services personally performed by Kiah Perez MD based on my observations and the provider's statements to me.     Kiah Perez MD  10/27/21 [id number]

## 2021-10-29 LAB — BACTERIA UR CULT: NORMAL

## 2021-10-29 NOTE — RESULT ENCOUNTER NOTE
Final urine culture report is negative.  Adult Negative Urine culture parameters per protocol: Any # Urogenital single or mixed organism, <10,000 col/ml single organism (cath specimen), and <50,000 col/ml single organism (midstream).  [facility] Emergency Dept discharge antibiotic prescribed (If applicable): Cipro/Flagyl (dx'd with diverticulitis)  Treatment recommendations per [facility] ED Lab Result Urine Culture protocol.    No change in treatment recommendations.

## 2021-11-01 ENCOUNTER — OFFICE VISIT (OUTPATIENT)
Dept: URGENT CARE | Facility: URGENT CARE | Age: 51
End: 2021-11-01
Payer: COMMERCIAL

## 2021-11-01 ENCOUNTER — INFUSION THERAPY VISIT (OUTPATIENT)
Dept: INFUSION THERAPY | Facility: CLINIC | Age: 51
End: 2021-11-01
Attending: ALLERGY & IMMUNOLOGY
Payer: COMMERCIAL

## 2021-11-01 VITALS
SYSTOLIC BLOOD PRESSURE: 132 MMHG | DIASTOLIC BLOOD PRESSURE: 85 MMHG | OXYGEN SATURATION: 99 % | HEART RATE: 72 BPM | TEMPERATURE: 97.7 F | RESPIRATION RATE: 16 BRPM

## 2021-11-01 VITALS
TEMPERATURE: 98.7 F | HEART RATE: 75 BPM | SYSTOLIC BLOOD PRESSURE: 118 MMHG | OXYGEN SATURATION: 99 % | HEIGHT: 64 IN | WEIGHT: 176 LBS | DIASTOLIC BLOOD PRESSURE: 80 MMHG | BODY MASS INDEX: 30.05 KG/M2 | RESPIRATION RATE: 16 BRPM

## 2021-11-01 DIAGNOSIS — R10.32 LLQ ABDOMINAL PAIN: ICD-10-CM

## 2021-11-01 DIAGNOSIS — J45.50 SEVERE PERSISTENT ASTHMA WITHOUT COMPLICATION (H): Primary | ICD-10-CM

## 2021-11-01 DIAGNOSIS — K57.32 DIVERTICULITIS OF COLON: Primary | ICD-10-CM

## 2021-11-01 PROCEDURE — 96372 THER/PROPH/DIAG INJ SC/IM: CPT | Performed by: ALLERGY & IMMUNOLOGY

## 2021-11-01 PROCEDURE — 99215 OFFICE O/P EST HI 40 MIN: CPT | Performed by: NURSE PRACTITIONER

## 2021-11-01 PROCEDURE — 250N000011 HC RX IP 250 OP 636: Performed by: ALLERGY & IMMUNOLOGY

## 2021-11-01 RX ORDER — LEVOFLOXACIN 750 MG/1
750 TABLET, FILM COATED ORAL DAILY
Qty: 10 TABLET | Refills: 0 | Status: SHIPPED | OUTPATIENT
Start: 2021-11-01 | End: 2021-11-11

## 2021-11-01 RX ADMIN — OMALIZUMAB 375 MG: 150 INJECTION, SOLUTION SUBCUTANEOUS at 08:09

## 2021-11-01 ASSESSMENT — MIFFLIN-ST. JEOR: SCORE: 1403.33

## 2021-11-01 NOTE — PATIENT INSTRUCTIONS
Patient Education     Diverticulitis    Some people get pouches along the wall of the colon as they get older. These pouches are called diverticuli. They often cause no symptoms. If the pouches become blocked, you can get an infection. This infection is called diverticulitis. It causes pain in your lower belly (abdomen) and fever. It may also cause nausea, vomiting, diarrhea, or constipation. If not treated, it can become a serious health problem. It can cause an abscess to form inside the pouch. The abscess may block the intestinal tract. It may even rupture, spreading infection throughout the belly.   When treatment is started early, oral antibiotics alone may be enough to cure diverticulitis. This method is tried first. But if you don't improve or if your condition gets worse while using these medicines, you may need to be admitted to the hospital. There, you will get antibiotics through an IV. You may also have to rest your bowel. That means you won't eat or drink for a period of time. Severe cases may need surgery.   Home care  These guidelines will help you care for yourself at home:     During the acute illness, rest and follow your healthcare provider's instructions about diet. Sometimes you will need to be on a clear liquid diet to rest your bowel. Once your symptoms are better, you may be told to eat a low-fiber diet for some time. You can eat foods such as:  ? Flake cereal  ? Mashed potatoes  ? Pancakes and waffles  ? Pasta  ? White bread  ? Rice  ? Applesauce  ? Bananas  ? Eggs  ? Fish  ? Poultry  ? Tofu  ? Cooked soft vegetables    Take antibiotics exactly as told. Don't miss any doses or stop taking the medicine, even if you feel better.    Monitor your temperature. Tell your healthcare provider if you have a rising temperature.  Preventing future attacks  Once you have an episode of diverticulitis, you are at risk for having it again. But you may be able to lower your risk by eating a high-fiber  diet (20 g/day to 35 g/day of fiber). This cleans out the colon pouches that already exist. It may also prevent new ones from forming. Foods high in fiber include:     Fresh fruits and edible peelings    Raw or lightly cooked vegetables    Whole-grain cereals and breads    Dried beans and peas    Bran  Here are other steps you can take to help prevent future attacks:     Take your medicines, such as antibiotics, as your healthcare provider says.    Drink 6 to 8 glasses of water every day, unless told otherwise.    Use a heating pad or hot water bottle to help belly cramping or pain.    Start an exercise program. Ask your healthcare provider how to get started. You can benefit from simple activities such as walking or gardening.    Treat diarrhea with a bland diet. Start with liquids only. Then slowly add fiber over time.    Watch for changes in your bowel movements (constipation to diarrhea). Prevent constipation by eating a high-fiber diet and taking a stool softener if needed.    Get plenty of rest and sleep.  Follow-up care  Check in with your healthcare provider as advised or sooner if you are not getting better in the next 2 days.   When to call your healthcare provider   Call your healthcare provider right away if any of these occur:    Fever of 100.4 F (38 C) or higher, or as directed by your healthcare provider    Repeated vomiting or swelling of the belly    Weakness, dizziness, light-headedness    Pain in your belly that gets worse, is severe, or spreads to your back    Pain that moves to the right lower belly    Rectal bleeding (stools that are red, black, or maroon in color)    Unexpected vaginal bleeding  [employer] last reviewed this educational content on 8/1/2019 2000-2021 [employer]. All rights reserved. This information is not intended as a substitute for professional medical care. Always follow your healthcare professional's instructions.

## 2021-11-01 NOTE — PROGRESS NOTES
Chief Complaint   Patient presents with     RECHECK     ER f/u  - from [facility]  - diverticulitis     SUBJECTIVE:  Alba Varela is a 51 year old female who presents today for diverticulitis follow-up. She was at the ER 10/27 and obtained CT scan which showed diverticulitis without perf. On cipro, flagyl, oxycodone. Her LLQ pain is not really changing. She is wondering if we can switch the cipro to levaquin as she feels like she tolerates that better. Having softer stools. No fever, weakness, dizziness, blood. Has not called GI yet to set up colonoscopy.    Past Medical History:   Diagnosis Date     Abdominal pain      Anxiety      Asthma     On Dupixent(Xolair) injections     C. difficile colitis      Chest discomfort      Colitis      Headache(784.0) 12/10/2014     High cholesterol      Hyperlipidemia      Liver disease     Being evalted for fatty liver disease. ABnormal LFT.     Migraines      PAC (premature atrial contraction)      PONV (postoperative nausea and vomiting)      PVC (premature ventricular contraction)      Sleep apnea     Doesn't use a CPAP     Syncope      Tobacco abuse      Tremor      Current Outpatient Medications   Medication Sig Dispense Refill     AMITRIPTYLINE HCL PO Take 20 mg by mouth At Bedtime       ciprofloxacin (CIPRO) 500 MG tablet Take 1 tablet (500 mg) by mouth 2 times daily for 10 days 20 tablet 0     diltiazem (CARDIZEM CD) 120 MG 24 hr capsule Take 1 capsule (120 mg) by mouth daily You are due to see the cardiologist- please call [phone] to schedule. 30 capsule 11     gabapentin (NEURONTIN) 100 MG capsule Take 100 mg in the morning and 400 mg at bedtime for one week, then increase to 100 mg in the morning and 600 mg at bedtime. (Patient taking differently: Take 400 mg in the morning and 600 mg at bedtime.) 210 capsule 1     levofloxacin (LEVAQUIN) 750 MG tablet Take 1 tablet (750 mg) by mouth daily for 10 days 10 tablet 0     loratadine (CLARITIN) 10 MG tablet Take 10 mg  "by mouth daily       metroNIDAZOLE (FLAGYL) 500 MG tablet Take 1 tablet (500 mg) by mouth 3 times daily for 10 days 30 tablet 0     omalizumab (XOLAIR) 150 MG injection Inject Subcutaneous every 14 days       OnabotulinumtoxinA (BOTOX IJ) As directed       oxyCODONE-acetaminophen (PERCOCET) 5-325 MG tablet Take 1 tablet by mouth every 6 hours as needed for moderate to severe pain 12 tablet 0     venlafaxine (EFFEXOR-XR) 150 MG 24 hr capsule Take 150 mg by mouth daily       Social History     Tobacco Use     Smoking status: Current Every Day Smoker     Packs/day: 0.50     Years: 20.00     Pack years: 10.00     Types: Cigarettes     Smokeless tobacco: Never Used   Substance Use Topics     Alcohol use: Yes     Alcohol/week: 0.0 standard drinks     Comment:  3 drinks weekly     Allergies   Allergen Reactions     Codeine Sulfate Nausea and Vomiting     Contrast Dye Hives     Patient is allergic to CT iodine contrast.       Scopolamine Visual Disturbance     Imitrex [Sumatriptan] Rash     Naproxen Rash     Penicillins Rash     Per patient. Tolerated cefazolin in the past     Sulfa Drugs Rash     Review of Systems   All systems negative except for those listed above in HPI.    OBJECTIVE:  /80 (BP Location: Right arm, Patient Position: Chair, Cuff Size: Adult Regular)   Pulse 75   Temp 98.7  F (37.1  C) (Oral)   Resp 16   Ht 1.626 m (5' 4\")   Wt 79.8 kg (176 lb)   LMP 07/11/2014   SpO2 99%   Breastfeeding No   BMI 30.21 kg/m       Physical Exam  Constitutional:       General: She is not in acute distress.     Appearance: She is well-developed. She is not diaphoretic.   Abdominal:      General: Abdomen is flat. Bowel sounds are normal. There is no distension.      Palpations: Abdomen is soft. There is no mass.      Tenderness: There is abdominal tenderness. There is no right CVA tenderness, left CVA tenderness, guarding or rebound.      Hernia: No hernia is present.   Musculoskeletal:         General: Normal " range of motion.   Skin:     General: Skin is warm and dry.      Capillary Refill: Capillary refill takes less than 2 seconds.      Coloration: Skin is not pale.   Neurological:      General: No focal deficit present.      Mental Status: She is alert and oriented to person, place, and time.   Psychiatric:         Mood and Affect: Mood normal.         Behavior: Behavior normal.       ASSESSMENT:    ICD-10-CM    1. Diverticulitis of colon  K57.32 levofloxacin (LEVAQUIN) 750 MG tablet   2. LLQ abdominal pain  R10.32      PLAN:    Discussed diverticulitis management  Went over labwork with her  Switched cipro to levaquin, discussed tendon risks  ER if fever, blood, severe pain, dizziness, weakness  Call GI to set up apt    Patient Instructions     Patient Education     Diverticulitis    Some people get pouches along the wall of the colon as they get older. These pouches are called diverticuli. They often cause no symptoms. If the pouches become blocked, you can get an infection. This infection is called diverticulitis. It causes pain in your lower belly (abdomen) and fever. It may also cause nausea, vomiting, diarrhea, or constipation. If not treated, it can become a serious health problem. It can cause an abscess to form inside the pouch. The abscess may block the intestinal tract. It may even rupture, spreading infection throughout the belly.   When treatment is started early, oral antibiotics alone may be enough to cure diverticulitis. This method is tried first. But if you don't improve or if your condition gets worse while using these medicines, you may need to be admitted to the hospital. There, you will get antibiotics through an IV. You may also have to rest your bowel. That means you won't eat or drink for a period of time. Severe cases may need surgery.   Home care  These guidelines will help you care for yourself at home:     During the acute illness, rest and follow your healthcare provider's instructions  about diet. Sometimes you will need to be on a clear liquid diet to rest your bowel. Once your symptoms are better, you may be told to eat a low-fiber diet for some time. You can eat foods such as:  ? Flake cereal  ? Mashed potatoes  ? Pancakes and waffles  ? Pasta  ? White bread  ? Rice  ? Applesauce  ? Bananas  ? Eggs  ? Fish  ? Poultry  ? Tofu  ? Cooked soft vegetables    Take antibiotics exactly as told. Don't miss any doses or stop taking the medicine, even if you feel better.    Monitor your temperature. Tell your healthcare provider if you have a rising temperature.  Preventing future attacks  Once you have an episode of diverticulitis, you are at risk for having it again. But you may be able to lower your risk by eating a high-fiber diet (20 g/day to 35 g/day of fiber). This cleans out the colon pouches that already exist. It may also prevent new ones from forming. Foods high in fiber include:     Fresh fruits and edible peelings    Raw or lightly cooked vegetables    Whole-grain cereals and breads    Dried beans and peas    Bran  Here are other steps you can take to help prevent future attacks:     Take your medicines, such as antibiotics, as your healthcare provider says.    Drink 6 to 8 glasses of water every day, unless told otherwise.    Use a heating pad or hot water bottle to help belly cramping or pain.    Start an exercise program. Ask your healthcare provider how to get started. You can benefit from simple activities such as walking or gardening.    Treat diarrhea with a bland diet. Start with liquids only. Then slowly add fiber over time.    Watch for changes in your bowel movements (constipation to diarrhea). Prevent constipation by eating a high-fiber diet and taking a stool softener if needed.    Get plenty of rest and sleep.  Follow-up care  Check in with your healthcare provider as advised or sooner if you are not getting better in the next 2 days.   When to call your healthcare provider    Call your healthcare provider right away if any of these occur:    Fever of 100.4 F (38 C) or higher, or as directed by your healthcare provider    Repeated vomiting or swelling of the belly    Weakness, dizziness, light-headedness    Pain in your belly that gets worse, is severe, or spreads to your back    Pain that moves to the right lower belly    Rectal bleeding (stools that are red, black, or maroon in color)    Unexpected vaginal bleeding  [employer] last reviewed this educational content on 8/1/2019 2000-2021 [employer]. All rights reserved. This information is not intended as a substitute for professional medical care. Always follow your healthcare professional's instructions.             Follow up with primary care provider with any problems, questions or concerns or if symptoms worsen or fail to improve. Patient agreed to plan and verbalized understanding.    [facility]

## 2021-11-01 NOTE — PROGRESS NOTES
Infusion Nursing Note:  Alba Varela presents today for Xolair.    Patient seen by provider today: No   present during visit today: Not Applicable.    Note: Two injections into L arm, and one into R arm.      Intravenous Access:  No Intravenous access/labs at this visit.    Treatment Conditions:  Not Applicable.      Post Infusion Assessment:  Patient tolerated injection without incident.  Patient observed for 30 minutes post xolair per protocol.  Site patent and intact, free from redness, edema or discomfort.       Discharge Plan:   [facility]  Patient will return 11/15 for next appointment.   Patient discharged in stable condition accompanied by: self.  Departure Mode: Ambulatory.      Sangita De La Cruz RN

## 2021-11-01 NOTE — PROGRESS NOTES
Infusion Nursing Note:  Alba Varela presents today for Xolair.    Patient seen by provider today: No   present during visit today: Not Applicable.    Note: N/A.    Intravenous Access:  No Intravenous access/labs at this visit.    Treatment Conditions:  Not Applicable.      Post Infusion Assessment:  Patient tolerated injection without incident.  Patient observed for 30 minutes post xolair per protocol.  Site patent and intact, free from redness, edema or discomfort.       Discharge Plan:   AVS to patient via MYCHART.  Patient will return 11/2 for next appointment.   Patient discharged in stable condition accompanied by: self.  Departure Mode: Ambulatory.    Sangita De La Cruz RN                         No

## 2021-11-15 ENCOUNTER — INFUSION THERAPY VISIT (OUTPATIENT)
Dept: INFUSION THERAPY | Facility: CLINIC | Age: 51
End: 2021-11-15
Attending: ALLERGY & IMMUNOLOGY
Payer: COMMERCIAL

## 2021-11-15 VITALS
TEMPERATURE: 97.8 F | RESPIRATION RATE: 16 BRPM | DIASTOLIC BLOOD PRESSURE: 76 MMHG | HEART RATE: 89 BPM | OXYGEN SATURATION: 98 % | SYSTOLIC BLOOD PRESSURE: 123 MMHG

## 2021-11-15 DIAGNOSIS — J45.50 SEVERE PERSISTENT ASTHMA WITHOUT COMPLICATION (H): Primary | ICD-10-CM

## 2021-11-15 PROCEDURE — 250N000011 HC RX IP 250 OP 636: Performed by: ALLERGY & IMMUNOLOGY

## 2021-11-15 PROCEDURE — 96372 THER/PROPH/DIAG INJ SC/IM: CPT | Performed by: ALLERGY & IMMUNOLOGY

## 2021-11-15 RX ADMIN — OMALIZUMAB 375 MG: 150 INJECTION, SOLUTION SUBCUTANEOUS at 08:37

## 2021-11-15 NOTE — PROGRESS NOTES
Infusion Nursing Note:  Alba Varela presents today for Xolair.    Patient seen by provider today: No   present during visit today: Not Applicable.    Note: N/A.      Intravenous Access:  No Intravenous access/labs at this visit.    Treatment Conditions:  Biological Infusion Checklist:  ~~~ NOTE: If the patient answers yes to any of the questions below, hold the infusion and contact ordering provider or on-call provider.    1. Have you recently had an elevated temperature, fever, chills, productive cough, coughing for 3 weeks or longer or hemoptysis, abnormal vital signs, night sweats,  chest pain or have you noticed a decrease in your appetite, unexplained weight loss or fatigue? No  2. Do you have any open wounds or new incisions? No  3. Do you have any recent or upcoming hospitalizations, surgeries or dental procedures? No  4. Do you currently have or recently have had any signs of illness or infection or are you on any antibiotics? No  5. Have you had any new, sudden or worsening abdominal pain? No  6. Have you or anyone in your household received a live vaccination in the past 4 weeks? Please note:  No live vaccines while on biologic/chemotherapy until 6 months after the last treatment.  Patient can receive the flu vaccine (shot only) and the pneumovax.  It is optimal for the patient to get these vaccines mid cycle, but they can be given at any time as long as it is not on the day of the infusion. No  7. Have you recently been diagnosed with any new nervous system diseases (ie. Multiple sclerosis, Guillain [place], seizures, neurological changes) or cancer diagnosis? No  8. Are you on any form of radiation or chemotherapy? No  9. Are you pregnant or breast feeding or do you have plans of pregnancy in the future? No  10. Have you been having any signs of worsening depression or suicidal ideations?  (benlysta only) No  11. Have there been any other new onset medical symptoms? No        Post Infusion  Assessment:  Patient tolerated injection without incident.  Patient observed for 30 minutes post Xolair per protocol.  Biologic Infusion Post Education: Call the triage nurse at your clinic or seek medical attention if you have chills and/or temperature greater than or equal to 100.5, uncontrolled nausea/vomiting, diarrhea, constipation, dizziness, shortness of breath, chest pain, heart palpitations, weakness or any other new or concerning symptoms, questions or concerns.  You cannot have any live virus vaccines prior to or during treatment or up to 6 months post infusion.  If you have an upcoming surgery, medical procedure or dental procedure during treatment, this should be discussed with your ordering physician and your surgeon/dentist.  If you are having any concerning symptom, if you are unsure if you should get your next infusion or wish to speak to a provider before your next infusion, please call your care coordinator or triage nurse at your clinic to notify them so we can adequately serve you.       Discharge Plan:   Discharge instructions reviewed with: Patient.  Patient and/or family verbalized understanding of discharge instructions and all questions answered.  AVS to patient via [employer].  Patient will return 11/29/2021 for next appointment.   Patient discharged in stable condition accompanied by: self.  Departure Mode: Ambulatory.      Lyn Bunn, RN

## 2021-11-29 ENCOUNTER — INFUSION THERAPY VISIT (OUTPATIENT)
Dept: INFUSION THERAPY | Facility: CLINIC | Age: 51
End: 2021-11-29
Attending: ALLERGY & IMMUNOLOGY
Payer: COMMERCIAL

## 2021-11-29 VITALS
DIASTOLIC BLOOD PRESSURE: 81 MMHG | RESPIRATION RATE: 16 BRPM | TEMPERATURE: 97.8 F | HEART RATE: 80 BPM | SYSTOLIC BLOOD PRESSURE: 117 MMHG | OXYGEN SATURATION: 97 %

## 2021-11-29 DIAGNOSIS — J45.50 SEVERE PERSISTENT ASTHMA WITHOUT COMPLICATION (H): Primary | ICD-10-CM

## 2021-11-29 PROCEDURE — 96372 THER/PROPH/DIAG INJ SC/IM: CPT | Performed by: ALLERGY & IMMUNOLOGY

## 2021-11-29 PROCEDURE — 250N000011 HC RX IP 250 OP 636: Performed by: ALLERGY & IMMUNOLOGY

## 2021-11-29 RX ADMIN — OMALIZUMAB 375 MG: 150 INJECTION, SOLUTION SUBCUTANEOUS at 08:14

## 2021-11-29 NOTE — PROGRESS NOTES
Infusion Nursing Note:  Alba Varela presents today for Xolair.    Patient seen by provider today: No   present during visit today: Not Applicable.    Note: N/A.      Intravenous Access:  No Intravenous access/labs at this visit.    Treatment Conditions:  Not Applicable.      Post Infusion Assessment:  Patient tolerated injection without incident.       Discharge Plan:   Discharge instructions reviewed with: Patient.  Patient and/or family verbalized understanding of discharge instructions and all questions answered.  Copy of AVS reviewed with patient and/or family.  Patient will return 12/13/21 for next appointment.  Patient discharged in stable condition accompanied by: self.  Departure Mode: Ambulatory.      Dannielle Valladares RN

## 2021-12-13 ENCOUNTER — INFUSION THERAPY VISIT (OUTPATIENT)
Dept: INFUSION THERAPY | Facility: CLINIC | Age: 51
End: 2021-12-13
Attending: ALLERGY & IMMUNOLOGY
Payer: COMMERCIAL

## 2021-12-13 VITALS
SYSTOLIC BLOOD PRESSURE: 125 MMHG | DIASTOLIC BLOOD PRESSURE: 81 MMHG | TEMPERATURE: 98.5 F | OXYGEN SATURATION: 96 % | RESPIRATION RATE: 16 BRPM | HEART RATE: 92 BPM

## 2021-12-13 DIAGNOSIS — J45.50 SEVERE PERSISTENT ASTHMA WITHOUT COMPLICATION (H): Primary | ICD-10-CM

## 2021-12-13 PROCEDURE — 96372 THER/PROPH/DIAG INJ SC/IM: CPT | Performed by: ALLERGY & IMMUNOLOGY

## 2021-12-13 PROCEDURE — 250N000011 HC RX IP 250 OP 636: Performed by: ALLERGY & IMMUNOLOGY

## 2021-12-13 RX ADMIN — OMALIZUMAB 375 MG: 150 INJECTION, SOLUTION SUBCUTANEOUS at 08:24

## 2021-12-13 ASSESSMENT — PAIN SCALES - GENERAL: PAINLEVEL: NO PAIN (0)

## 2021-12-13 NOTE — PROGRESS NOTES
Infusion Nursing Note:  Alba Varela presents today for Xolair.    Patient seen by provider today: No   present during visit today: Not Applicable.    Note: Denies recent asthma flare.      Intravenous Access:  No Intravenous access/labs at this visit.    Treatment Conditions:  Biological Infusion Checklist:  ~~~ NOTE: If the patient answers yes to any of the questions below, hold the infusion and contact ordering provider or on-call provider.    1. Have you recently had an elevated temperature, fever, chills, productive cough, coughing for 3 weeks or longer or hemoptysis, abnormal vital signs, night sweats,  chest pain or have you noticed a decrease in your appetite, unexplained weight loss or fatigue? No  2. Do you have any open wounds or new incisions? No  3. Do you have any recent or upcoming hospitalizations, surgeries or dental procedures? No  4. Do you currently have or recently have had any signs of illness or infection or are you on any antibiotics? No  5. Have you had any new, sudden or worsening abdominal pain? No  6. Have you or anyone in your household received a live vaccination in the past 4 weeks? Please note:  No live vaccines while on biologic/chemotherapy until 6 months after the last treatment.  Patient can receive the flu vaccine (shot only) and the pneumovax.  It is optimal for the patient to get these vaccines mid cycle, but they can be given at any time as long as it is not on the day of the infusion. No  7. Have you recently been diagnosed with any new nervous system diseases (ie. Multiple sclerosis, Guillain [place], seizures, neurological changes) or cancer diagnosis? No  8. Are you on any form of radiation or chemotherapy? No  9. Are you pregnant or breast feeding or do you have plans of pregnancy in the future? No  10. Have you been having any signs of worsening depression or suicidal ideations?  (benlysta only) No  11. Have there been any other new onset medical symptoms?  No        Post Infusion Assessment:  Patient tolerated injection without incident.   Observed patient 30 min. Post Xolair injection.      Discharge Plan:   Discharge instructions reviewed with: Patient.  Patient discharged in stable condition accompanied by: self.  Departure Mode: Ambulatory.  Next injection is scheduled for 12/27/21.      SIERRA RICKETTS RN

## 2021-12-27 ENCOUNTER — INFUSION THERAPY VISIT (OUTPATIENT)
Dept: INFUSION THERAPY | Facility: CLINIC | Age: 51
End: 2021-12-27
Attending: ALLERGY & IMMUNOLOGY
Payer: COMMERCIAL

## 2021-12-27 VITALS
DIASTOLIC BLOOD PRESSURE: 85 MMHG | OXYGEN SATURATION: 97 % | RESPIRATION RATE: 16 BRPM | TEMPERATURE: 98.4 F | SYSTOLIC BLOOD PRESSURE: 146 MMHG | HEART RATE: 85 BPM

## 2021-12-27 DIAGNOSIS — J45.50 SEVERE PERSISTENT ASTHMA WITHOUT COMPLICATION (H): Primary | ICD-10-CM

## 2021-12-27 PROCEDURE — 250N000011 HC RX IP 250 OP 636: Performed by: ALLERGY & IMMUNOLOGY

## 2021-12-27 PROCEDURE — 96372 THER/PROPH/DIAG INJ SC/IM: CPT | Performed by: ALLERGY & IMMUNOLOGY

## 2021-12-27 RX ADMIN — OMALIZUMAB 375 MG: 150 INJECTION, SOLUTION SUBCUTANEOUS at 08:27

## 2021-12-27 NOTE — PROGRESS NOTES
Infusion Nursing Note:  Alba Varela presents today for Xolair.    Patient seen by provider today: No   present during visit today: Not Applicable.    Note: N/A.      Intravenous Access:  No Intravenous access/labs at this visit.    Treatment Conditions:  Not Applicable.      Post Infusion Assessment:  Patient tolerated injection without incident.  Site patent and intact, free from redness, edema or discomfort.       Discharge Plan:   AVS to patient via MYCHART.  Patient will return 1/10 for next appointment.   Patient discharged in stable condition accompanied by: self.  Departure Mode: Ambulatory.      Sangita De La Cruz RN

## 2022-01-10 ENCOUNTER — INFUSION THERAPY VISIT (OUTPATIENT)
Dept: INFUSION THERAPY | Facility: CLINIC | Age: 52
End: 2022-01-10
Attending: ALLERGY & IMMUNOLOGY
Payer: COMMERCIAL

## 2022-01-10 VITALS
SYSTOLIC BLOOD PRESSURE: 125 MMHG | DIASTOLIC BLOOD PRESSURE: 88 MMHG | HEART RATE: 77 BPM | OXYGEN SATURATION: 100 % | TEMPERATURE: 96.7 F

## 2022-01-10 DIAGNOSIS — J45.50 SEVERE PERSISTENT ASTHMA WITHOUT COMPLICATION (H): Primary | ICD-10-CM

## 2022-01-10 PROCEDURE — 96372 THER/PROPH/DIAG INJ SC/IM: CPT | Performed by: ALLERGY & IMMUNOLOGY

## 2022-01-10 PROCEDURE — 250N000011 HC RX IP 250 OP 636: Performed by: ALLERGY & IMMUNOLOGY

## 2022-01-10 RX ADMIN — OMALIZUMAB 375 MG: 150 INJECTION, SOLUTION SUBCUTANEOUS at 09:41

## 2022-01-10 NOTE — PROGRESS NOTES
Infusion Nursing Note:  Alba Varela presents today for Xolair.    Patient seen by provider today: No   present during visit today: Not Applicable.    Note: N/A.      Intravenous Access:  No Intravenous access/labs at this visit.    Treatment Conditions:  Not Applicable.      Post Infusion Assessment:  Patient tolerated injection without incident.  Patient observed for 30 minutes post Xolair per protocol.  Site patent and intact, free from redness, edema or discomfort.       Discharge Plan:   AVS to patient via MYCHART.  Patient will return 1/24/22 for next dose of Xolair for next appointment.   Patient discharged in stable condition accompanied by: self.  Departure Mode: Ambulatory.      Mame Condon RN

## 2022-01-15 ENCOUNTER — HEALTH MAINTENANCE LETTER (OUTPATIENT)
Age: 52
End: 2022-01-15

## 2022-01-24 ENCOUNTER — INFUSION THERAPY VISIT (OUTPATIENT)
Dept: INFUSION THERAPY | Facility: CLINIC | Age: 52
End: 2022-01-24
Attending: ALLERGY & IMMUNOLOGY
Payer: COMMERCIAL

## 2022-01-24 VITALS
OXYGEN SATURATION: 98 % | SYSTOLIC BLOOD PRESSURE: 129 MMHG | RESPIRATION RATE: 16 BRPM | DIASTOLIC BLOOD PRESSURE: 90 MMHG | HEART RATE: 87 BPM | TEMPERATURE: 97.6 F

## 2022-01-24 DIAGNOSIS — J45.50 SEVERE PERSISTENT ASTHMA WITHOUT COMPLICATION (H): Primary | ICD-10-CM

## 2022-01-24 PROCEDURE — 96372 THER/PROPH/DIAG INJ SC/IM: CPT | Performed by: ALLERGY & IMMUNOLOGY

## 2022-01-24 PROCEDURE — 250N000011 HC RX IP 250 OP 636: Performed by: ALLERGY & IMMUNOLOGY

## 2022-01-24 RX ADMIN — OMALIZUMAB 375 MG: 150 INJECTION, SOLUTION SUBCUTANEOUS at 08:12

## 2022-01-24 NOTE — PROGRESS NOTES
Infusion Nursing Note:  Alba Varela presents today for xolair.    Patient seen by provider today: No   present during visit today: Not Applicable.    Note: N/A.      Intravenous Access:  No Intravenous access/labs at this visit.    Treatment Conditions:  Biological Infusion Checklist:  ~~~ NOTE: If the patient answers yes to any of the questions below, hold the infusion and contact ordering provider or on-call provider.    1. Have you recently had an elevated temperature, fever, chills, productive cough, coughing for 3 weeks or longer or hemoptysis, abnormal vital signs, night sweats,  chest pain or have you noticed a decrease in your appetite, unexplained weight loss or fatigue? No  2. Do you have any open wounds or new incisions? No  3. Do you have any recent or upcoming hospitalizations, surgeries or dental procedures? No  4. Do you currently have or recently have had any signs of illness or infection or are you on any antibiotics? No  5. Have you had any new, sudden or worsening abdominal pain? No  6. Have you or anyone in your household received a live vaccination in the past 4 weeks? Please note:  No live vaccines while on biologic/chemotherapy until 6 months after the last treatment.  Patient can receive the flu vaccine (shot only) and the pneumovax.  It is optimal for the patient to get these vaccines mid cycle, but they can be given at any time as long as it is not on the day of the infusion. No  7. Have you recently been diagnosed with any new nervous system diseases (ie. Multiple sclerosis, Guillain [place], seizures, neurological changes) or cancer diagnosis? No  8. Are you on any form of radiation or chemotherapy? No  9. Are you pregnant or breast feeding or do you have plans of pregnancy in the future? No  10. Have you been having any signs of worsening depression or suicidal ideations?  (benlysta only) No  11. Have there been any other new onset medical symptoms? No        Post Infusion  Assessment:  Patient tolerated injection without incident.  Patient observed for 30 minutes post xolair per protocol.       Discharge Plan:   [facility]  Patient will return 2/7/22 for next appointment.   Patient discharged in stable condition accompanied by: self.  Departure Mode: Ambulatory.      Sangita Edwards RN

## 2022-02-07 ENCOUNTER — INFUSION THERAPY VISIT (OUTPATIENT)
Dept: INFUSION THERAPY | Facility: CLINIC | Age: 52
End: 2022-02-07
Attending: ALLERGY & IMMUNOLOGY
Payer: COMMERCIAL

## 2022-02-07 VITALS
HEART RATE: 89 BPM | TEMPERATURE: 97.5 F | SYSTOLIC BLOOD PRESSURE: 138 MMHG | RESPIRATION RATE: 16 BRPM | DIASTOLIC BLOOD PRESSURE: 85 MMHG | OXYGEN SATURATION: 99 %

## 2022-02-07 DIAGNOSIS — J45.50 SEVERE PERSISTENT ASTHMA WITHOUT COMPLICATION (H): Primary | ICD-10-CM

## 2022-02-07 PROCEDURE — 96372 THER/PROPH/DIAG INJ SC/IM: CPT | Performed by: ALLERGY & IMMUNOLOGY

## 2022-02-07 PROCEDURE — 250N000011 HC RX IP 250 OP 636: Performed by: ALLERGY & IMMUNOLOGY

## 2022-02-07 RX ADMIN — OMALIZUMAB 375 MG: 150 INJECTION, SOLUTION SUBCUTANEOUS at 08:11

## 2022-02-07 ASSESSMENT — PAIN SCALES - GENERAL: PAINLEVEL: NO PAIN (0)

## 2022-02-07 NOTE — PROGRESS NOTES
Infusion Nursing Note:  Alba Varela presents today for Xolair.    Patient seen by provider today: No   present during visit today: Not Applicable.    Note: Denies change in asthma symptoms.      Intravenous Access:  No Intravenous access/labs at this visit.    Treatment Conditions:  Biological Infusion Checklist:  ~~~ NOTE: If the patient answers yes to any of the questions below, hold the infusion and contact ordering provider or on-call provider.    1. Have you recently had an elevated temperature, fever, chills, productive cough, coughing for 3 weeks or longer or hemoptysis, abnormal vital signs, night sweats,  chest pain or have you noticed a decrease in your appetite, unexplained weight loss or fatigue? No  2. Do you have any open wounds or new incisions? No  3. Do you have any recent or upcoming hospitalizations, surgeries or dental procedures? No  4. Do you currently have or recently have had any signs of illness or infection or are you on any antibiotics? No  5. Have you had any new, sudden or worsening abdominal pain? No  6. Have you or anyone in your household received a live vaccination in the past 4 weeks? Please note:  No live vaccines while on biologic/chemotherapy until 6 months after the last treatment.  Patient can receive the flu vaccine (shot only) and the pneumovax.  It is optimal for the patient to get these vaccines mid cycle, but they can be given at any time as long as it is not on the day of the infusion. No  7. Have you recently been diagnosed with any new nervous system diseases (ie. Multiple sclerosis, Guillain [place], seizures, neurological changes) or cancer diagnosis? No  8. Are you on any form of radiation or chemotherapy? No  9. Are you pregnant or breast feeding or do you have plans of pregnancy in the future? No  10. Have you been having any signs of worsening depression or suicidal ideations?  (benlysta only) No  11. Have there been any other new onset medical symptoms?  No        Post Infusion Assessment:  Patient tolerated injection without incident.   Observed for 30 minutes post Xolair injection.      Discharge Plan:   Discharge instructions reviewed with: Patient.  Patient discharged in stable condition accompanied by: self.  Departure Mode: Ambulatory.  Next injection is scheduled for 2/21/22.      SIERRA RICKETTS RN

## 2022-02-21 ENCOUNTER — INFUSION THERAPY VISIT (OUTPATIENT)
Dept: INFUSION THERAPY | Facility: CLINIC | Age: 52
End: 2022-02-21
Attending: ALLERGY & IMMUNOLOGY
Payer: COMMERCIAL

## 2022-02-21 VITALS
SYSTOLIC BLOOD PRESSURE: 129 MMHG | DIASTOLIC BLOOD PRESSURE: 83 MMHG | TEMPERATURE: 98 F | HEART RATE: 100 BPM | OXYGEN SATURATION: 99 %

## 2022-02-21 DIAGNOSIS — J45.50 SEVERE PERSISTENT ASTHMA WITHOUT COMPLICATION (H): Primary | ICD-10-CM

## 2022-02-21 PROCEDURE — 96372 THER/PROPH/DIAG INJ SC/IM: CPT | Performed by: ALLERGY & IMMUNOLOGY

## 2022-02-21 PROCEDURE — 250N000011 HC RX IP 250 OP 636: Performed by: ALLERGY & IMMUNOLOGY

## 2022-02-21 RX ADMIN — OMALIZUMAB 375 MG: 150 INJECTION, SOLUTION SUBCUTANEOUS at 08:07

## 2022-02-21 NOTE — PROGRESS NOTES
Infusion Nursing Note:  Alba Varela presents today for Xolair.    Patient seen by provider today: No   present during visit today: Not Applicable.    Note: N/A.      Intravenous Access:  No Intravenous access/labs at this visit.    Treatment Conditions:  Not Applicable.      Post Infusion Assessment:  Patient tolerated injection without incident.  Patient observed for 30 minutes post Xolair per protocol.  Site patent and intact, free from redness, edema or discomfort.       Discharge Plan:   AVS to patient via MYCHART.  Patient will return 3/7/22 for next Xolair injection for next appointment.   Patient discharged in stable condition accompanied by: self.  Departure Mode: Ambulatory.      Mame Condon RN

## 2022-03-07 ENCOUNTER — INFUSION THERAPY VISIT (OUTPATIENT)
Dept: INFUSION THERAPY | Facility: CLINIC | Age: 52
End: 2022-03-07
Attending: ALLERGY & IMMUNOLOGY
Payer: COMMERCIAL

## 2022-03-07 VITALS
OXYGEN SATURATION: 96 % | DIASTOLIC BLOOD PRESSURE: 83 MMHG | SYSTOLIC BLOOD PRESSURE: 149 MMHG | HEART RATE: 84 BPM | RESPIRATION RATE: 16 BRPM

## 2022-03-07 DIAGNOSIS — J45.50 SEVERE PERSISTENT ASTHMA WITHOUT COMPLICATION (H): Primary | ICD-10-CM

## 2022-03-07 PROCEDURE — 250N000011 HC RX IP 250 OP 636: Performed by: ALLERGY & IMMUNOLOGY

## 2022-03-07 PROCEDURE — 96372 THER/PROPH/DIAG INJ SC/IM: CPT | Performed by: ALLERGY & IMMUNOLOGY

## 2022-03-07 RX ADMIN — OMALIZUMAB 375 MG: 150 INJECTION, SOLUTION SUBCUTANEOUS at 08:17

## 2022-03-07 ASSESSMENT — PAIN SCALES - GENERAL: PAINLEVEL: NO PAIN (0)

## 2022-03-07 NOTE — PROGRESS NOTES
Infusion Nursing Note:  Alba Varela presents today for Xolair.    Patient seen by provider today: No   present during visit today: Not Applicable.    Note: Denies asthma flares.  Tolerating Xolair.      Intravenous Access:  No Intravenous access/labs at this visit.    Treatment Conditions:  Biological Infusion Checklist:  ~~~ NOTE: If the patient answers yes to any of the questions below, hold the infusion and contact ordering provider or on-call provider.    1. Have you recently had an elevated temperature, fever, chills, productive cough, coughing for 3 weeks or longer or hemoptysis, abnormal vital signs, night sweats,  chest pain or have you noticed a decrease in your appetite, unexplained weight loss or fatigue? No  2. Do you have any open wounds or new incisions? No  3. Do you have any recent or upcoming hospitalizations, surgeries or dental procedures? No  4. Do you currently have or recently have had any signs of illness or infection or are you on any antibiotics? No  5. Have you had any new, sudden or worsening abdominal pain? No  6. Have you or anyone in your household received a live vaccination in the past 4 weeks? Please note:  No live vaccines while on biologic/chemotherapy until 6 months after the last treatment.  Patient can receive the flu vaccine (shot only) and the pneumovax.  It is optimal for the patient to get these vaccines mid cycle, but they can be given at any time as long as it is not on the day of the infusion. No  7. Have you recently been diagnosed with any new nervous system diseases (ie. Multiple sclerosis, Guillain [place], seizures, neurological changes) or cancer diagnosis? No  8. Are you on any form of radiation or chemotherapy? No  9. Are you pregnant or breast feeding or do you have plans of pregnancy in the future? No  10. Have you been having any signs of worsening depression or suicidal ideations?  (benlysta only) No  11. Have there been any other new onset medical  symptoms? No        Post Infusion Assessment:  Patient tolerated injection without incident.       Discharge Plan:   Discharge instructions reviewed with: Patient.  Patient discharged in stable condition accompanied by: self.  Departure Mode: Ambulatory.  Next injection is scheduled for 3/21/22.      SIERRA RICKETTS RN

## 2022-03-21 ENCOUNTER — INFUSION THERAPY VISIT (OUTPATIENT)
Dept: INFUSION THERAPY | Facility: CLINIC | Age: 52
End: 2022-03-21
Attending: ALLERGY & IMMUNOLOGY
Payer: COMMERCIAL

## 2022-03-21 VITALS
TEMPERATURE: 98 F | DIASTOLIC BLOOD PRESSURE: 81 MMHG | HEART RATE: 86 BPM | SYSTOLIC BLOOD PRESSURE: 116 MMHG | OXYGEN SATURATION: 97 % | RESPIRATION RATE: 16 BRPM

## 2022-03-21 DIAGNOSIS — J45.50 SEVERE PERSISTENT ASTHMA WITHOUT COMPLICATION (H): Primary | ICD-10-CM

## 2022-03-21 PROCEDURE — 250N000011 HC RX IP 250 OP 636: Performed by: ALLERGY & IMMUNOLOGY

## 2022-03-21 PROCEDURE — 96372 THER/PROPH/DIAG INJ SC/IM: CPT | Performed by: ALLERGY & IMMUNOLOGY

## 2022-03-21 RX ADMIN — OMALIZUMAB 375 MG: 150 INJECTION, SOLUTION SUBCUTANEOUS at 08:51

## 2022-03-21 NOTE — PROGRESS NOTES
Infusion Nursing Note:  Alba Varela presents today for Xolair.    Patient seen by provider today: No   present during visit today: Not Applicable.    Note: N/A.      Intravenous Access:  No Intravenous access/labs at this visit.    Treatment Conditions:  Biological Infusion Checklist:  ~~~ NOTE: If the patient answers yes to any of the questions below, hold the infusion and contact ordering provider or on-call provider.    1. Have you recently had an elevated temperature, fever, chills, productive cough, coughing for 3 weeks or longer or hemoptysis, abnormal vital signs, night sweats,  chest pain or have you noticed a decrease in your appetite, unexplained weight loss or fatigue? No  2. Do you have any open wounds or new incisions? No  3. Do you have any recent or upcoming hospitalizations, surgeries or dental procedures? No  4. Do you currently have or recently have had any signs of illness or infection or are you on any antibiotics? No  5. Have you had any new, sudden or worsening abdominal pain? No  6. Have you or anyone in your household received a live vaccination in the past 4 weeks? Please note:  No live vaccines while on biologic/chemotherapy until 6 months after the last treatment.  Patient can receive the flu vaccine (shot only) and the pneumovax.  It is optimal for the patient to get these vaccines mid cycle, but they can be given at any time as long as it is not on the day of the infusion. No  7. Have you recently been diagnosed with any new nervous system diseases (ie. Multiple sclerosis, Guillain [place], seizures, neurological changes) or cancer diagnosis? No  8. Are you on any form of radiation or chemotherapy? No  9. Are you pregnant or breast feeding or do you have plans of pregnancy in the future? No  10. Have you been having any signs of worsening depression or suicidal ideations?  (benlysta only) No  11. Have there been any other new onset medical symptoms? No        Post Infusion  Assessment:  Patient tolerated injection without incident.  Site patent and intact, free from redness, edema or discomfort.   Patient observed for 30 minutes post Xolair per protocol.      Discharge Plan:   AVS to patient via MY[employer].  Patient will return 4/4 for next appointment.   Patient discharged in stable condition accompanied by: self.  Departure Mode: Ambulatory.      Mac Montero RN

## 2022-04-04 ENCOUNTER — INFUSION THERAPY VISIT (OUTPATIENT)
Dept: INFUSION THERAPY | Facility: CLINIC | Age: 52
End: 2022-04-04
Payer: COMMERCIAL

## 2022-04-04 VITALS
RESPIRATION RATE: 16 BRPM | TEMPERATURE: 97.5 F | DIASTOLIC BLOOD PRESSURE: 82 MMHG | OXYGEN SATURATION: 99 % | SYSTOLIC BLOOD PRESSURE: 119 MMHG | HEART RATE: 89 BPM

## 2022-04-04 DIAGNOSIS — J45.50 SEVERE PERSISTENT ASTHMA WITHOUT COMPLICATION (H): Primary | ICD-10-CM

## 2022-04-04 PROCEDURE — 96372 THER/PROPH/DIAG INJ SC/IM: CPT | Performed by: ALLERGY & IMMUNOLOGY

## 2022-04-04 PROCEDURE — 250N000011 HC RX IP 250 OP 636: Performed by: ALLERGY & IMMUNOLOGY

## 2022-04-04 RX ADMIN — OMALIZUMAB 375 MG: 150 INJECTION, SOLUTION SUBCUTANEOUS at 10:16

## 2022-04-04 NOTE — PROGRESS NOTES
Infusion Nursing Note:  Alba Varela presents today for Xolair.    Patient seen by provider today: No   present during visit today: Not Applicable.    Note: Feeling well. No cough/SOB.      Intravenous Access:  No Intravenous access/labs at this visit.    Treatment Conditions:  Biological Infusion Checklist:  ~~~ NOTE: If the patient answers yes to any of the questions below, hold the infusion and contact ordering provider or on-call provider.    1. Have you recently had an elevated temperature, fever, chills, productive cough, coughing for 3 weeks or longer or hemoptysis, abnormal vital signs, night sweats,  chest pain or have you noticed a decrease in your appetite, unexplained weight loss or fatigue? No  2. Do you have any open wounds or new incisions? No  3. Do you have any recent or upcoming hospitalizations, surgeries or dental procedures? No  4. Do you currently have or recently have had any signs of illness or infection or are you on any antibiotics? No  5. Have you had any new, sudden or worsening abdominal pain? No  6. Have you or anyone in your household received a live vaccination in the past 4 weeks? Please note:  No live vaccines while on biologic/chemotherapy until 6 months after the last treatment.  Patient can receive the flu vaccine (shot only) and the pneumovax.  It is optimal for the patient to get these vaccines mid cycle, but they can be given at any time as long as it is not on the day of the infusion. No  7. Have you recently been diagnosed with any new nervous system diseases (ie. Multiple sclerosis, Guillain [place], seizures, neurological changes) or cancer diagnosis? No  8. Are you on any form of radiation or chemotherapy? No  9. Are you pregnant or breast feeding or do you have plans of pregnancy in the future? No  10. Have you been having any signs of worsening depression or suicidal ideations?  (benlysta only) No  11. Have there been any other new onset medical symptoms?  No        Post Infusion Assessment:  Patient tolerated injection without incident.  Observed for 30min post Xolair injection.  Site patent and intact, free from redness, edema or discomfort.  Biologic Infusion Post Education: Call the triage nurse at your clinic or seek medical attention if you have chills and/or temperature greater than or equal to 100.5, uncontrolled nausea/vomiting, diarrhea, constipation, dizziness, shortness of breath, chest pain, heart palpitations, weakness or any other new or concerning symptoms, questions or concerns.  You cannot have any live virus vaccines prior to or during treatment or up to 6 months post infusion.  If you have an upcoming surgery, medical procedure or dental procedure during treatment, this should be discussed with your ordering physician and your surgeon/dentist.  If you are having any concerning symptom, if you are unsure if you should get your next infusion or wish to speak to a provider before your next infusion, please call your care coordinator or triage nurse at your clinic to notify them so we can adequately serve you.       Discharge Plan:   AVS to patient via [employer].  Patient will return 4/18 for next appointment.   Patient discharged in stable condition accompanied by: self.  Departure Mode: Ambulatory.      Mac Montero RN

## 2022-04-18 ENCOUNTER — INFUSION THERAPY VISIT (OUTPATIENT)
Dept: INFUSION THERAPY | Facility: CLINIC | Age: 52
End: 2022-04-18
Attending: ALLERGY & IMMUNOLOGY
Payer: COMMERCIAL

## 2022-04-18 VITALS
RESPIRATION RATE: 16 BRPM | HEART RATE: 81 BPM | SYSTOLIC BLOOD PRESSURE: 119 MMHG | TEMPERATURE: 98.2 F | OXYGEN SATURATION: 98 % | DIASTOLIC BLOOD PRESSURE: 74 MMHG

## 2022-04-18 DIAGNOSIS — J45.50 SEVERE PERSISTENT ASTHMA WITHOUT COMPLICATION (H): Primary | ICD-10-CM

## 2022-04-18 PROCEDURE — 250N000011 HC RX IP 250 OP 636: Performed by: ALLERGY & IMMUNOLOGY

## 2022-04-18 PROCEDURE — 96372 THER/PROPH/DIAG INJ SC/IM: CPT | Performed by: ALLERGY & IMMUNOLOGY

## 2022-04-18 RX ADMIN — OMALIZUMAB 375 MG: 150 INJECTION, SOLUTION SUBCUTANEOUS at 08:19

## 2022-04-18 ASSESSMENT — PAIN SCALES - GENERAL: PAINLEVEL: NO PAIN (0)

## 2022-04-18 NOTE — PROGRESS NOTES
Infusion Nursing Note:  Alba Varela presents today for Xolair.    Patient seen by provider today: No   present during visit today: Not Applicable.    Note: Denies recent flares.  Tolerating injections without SE.      Intravenous Access:  No Intravenous access/labs at this visit.    Treatment Conditions:  Biological Infusion Checklist:  ~~~ NOTE: If the patient answers yes to any of the questions below, hold the infusion and contact ordering provider or on-call provider.    1. Have you recently had an elevated temperature, fever, chills, productive cough, coughing for 3 weeks or longer or hemoptysis, abnormal vital signs, night sweats,  chest pain or have you noticed a decrease in your appetite, unexplained weight loss or fatigue? No  2. Do you have any open wounds or new incisions? No  3. Do you have any recent or upcoming hospitalizations, surgeries or dental procedures? No  4. Do you currently have or recently have had any signs of illness or infection or are you on any antibiotics? No  5. Have you had any new, sudden or worsening abdominal pain? No  6. Have you or anyone in your household received a live vaccination in the past 4 weeks? Please note:  No live vaccines while on biologic/chemotherapy until 6 months after the last treatment.  Patient can receive the flu vaccine (shot only) and the pneumovax.  It is optimal for the patient to get these vaccines mid cycle, but they can be given at any time as long as it is not on the day of the infusion. No  7. Have you recently been diagnosed with any new nervous system diseases (ie. Multiple sclerosis, Guillain [place], seizures, neurological changes) or cancer diagnosis? No  8. Are you on any form of radiation or chemotherapy? No  9. Are you pregnant or breast feeding or do you have plans of pregnancy in the future? No  10. Have you been having any signs of worsening depression or suicidal ideations?  (benlysta only) No  11. Have there been any other new  onset medical symptoms? No        Post Infusion Assessment:  Patient tolerated injection without incident.  Patient observed for 30 minutes post Xolair per protocol.       Discharge Plan:   Discharge instructions reviewed with: Patient.  Patient discharged in stable condition accompanied by: self.  Departure Mode: Ambulatory.  Next injection scheduled for 5/2/22.      SIERRA RICKETTS RN

## 2022-05-05 ENCOUNTER — MEDICAL CORRESPONDENCE (OUTPATIENT)
Dept: HEALTH INFORMATION MANAGEMENT | Facility: CLINIC | Age: 52
End: 2022-05-05
Payer: COMMERCIAL

## 2022-05-06 ENCOUNTER — INFUSION THERAPY VISIT (OUTPATIENT)
Dept: INFUSION THERAPY | Facility: CLINIC | Age: 52
End: 2022-05-06
Attending: ALLERGY & IMMUNOLOGY
Payer: COMMERCIAL

## 2022-05-06 VITALS
RESPIRATION RATE: 16 BRPM | HEART RATE: 95 BPM | DIASTOLIC BLOOD PRESSURE: 82 MMHG | SYSTOLIC BLOOD PRESSURE: 119 MMHG | OXYGEN SATURATION: 96 % | TEMPERATURE: 97.2 F

## 2022-05-06 DIAGNOSIS — J45.50 SEVERE PERSISTENT ASTHMA WITHOUT COMPLICATION (H): Primary | ICD-10-CM

## 2022-05-06 PROCEDURE — 96372 THER/PROPH/DIAG INJ SC/IM: CPT | Performed by: ALLERGY & IMMUNOLOGY

## 2022-05-06 PROCEDURE — 250N000011 HC RX IP 250 OP 636: Performed by: ALLERGY & IMMUNOLOGY

## 2022-05-06 RX ADMIN — OMALIZUMAB 375 MG: 150 INJECTION, SOLUTION SUBCUTANEOUS at 08:11

## 2022-05-06 NOTE — PROGRESS NOTES
Infusion Nursing Note:  Alba Varela presents today for Xolair.    Patient seen by provider today: No   present during visit today: Not Applicable.    Note: N/A.      Intravenous Access:  No Intravenous access/labs at this visit.    Treatment Conditions:  Not Applicable.      Post Infusion Assessment:  Patient tolerated injection without incident.  Patient observed for 30 minutes post xolair per protocol.       Discharge Plan:   Discharge instructions reviewed with: Patient.  Patient and/or family verbalized understanding of discharge instructions and all questions answered.  Copy of AVS reviewed with patient and/or family.  Patient will return 5/20/22 for next appointment.  Patient discharged in stable condition accompanied by: self.  Departure Mode: Ambulatory.      Dannielle Valladares RN

## 2022-05-20 ENCOUNTER — INFUSION THERAPY VISIT (OUTPATIENT)
Dept: INFUSION THERAPY | Facility: CLINIC | Age: 52
End: 2022-05-20
Attending: ALLERGY & IMMUNOLOGY
Payer: COMMERCIAL

## 2022-05-20 VITALS
OXYGEN SATURATION: 98 % | DIASTOLIC BLOOD PRESSURE: 83 MMHG | HEART RATE: 77 BPM | RESPIRATION RATE: 18 BRPM | SYSTOLIC BLOOD PRESSURE: 133 MMHG | TEMPERATURE: 96.8 F

## 2022-05-20 DIAGNOSIS — J45.50 SEVERE PERSISTENT ASTHMA WITHOUT COMPLICATION (H): Primary | ICD-10-CM

## 2022-05-20 PROCEDURE — 96372 THER/PROPH/DIAG INJ SC/IM: CPT | Performed by: ALLERGY & IMMUNOLOGY

## 2022-05-20 PROCEDURE — 250N000011 HC RX IP 250 OP 636: Performed by: ALLERGY & IMMUNOLOGY

## 2022-05-20 RX ADMIN — OMALIZUMAB 375 MG: 150 INJECTION, SOLUTION SUBCUTANEOUS at 08:46

## 2022-05-20 ASSESSMENT — PAIN SCALES - GENERAL: PAINLEVEL: NO PAIN (0)

## 2022-05-20 NOTE — PROGRESS NOTES
"Infusion Nursing Note:  Alba Varela presents today for Xolair.    Patient seen by provider today: No   present during visit today: Not Applicable.    Note: Patient denies any recent flares in her asthma, states she is \"doing well\".    Intravenous Access:  No Intravenous access/labs at this visit.    Treatment Conditions:  Biological Infusion Checklist:  ~~~ NOTE: If the patient answers yes to any of the questions below, hold the infusion and contact ordering provider or on-call provider.    1. Have you recently had an elevated temperature, fever, chills, productive cough, coughing for 3 weeks or longer or hemoptysis, abnormal vital signs, night sweats,  chest pain or have you noticed a decrease in your appetite, unexplained weight loss or fatigue? No  2. Do you have any open wounds or new incisions? No  3. Do you have any recent or upcoming hospitalizations, surgeries or dental procedures? No  4. Do you currently have or recently have had any signs of illness or infection or are you on any antibiotics? No  5. Have you had any new, sudden or worsening abdominal pain? No  6. Have you or anyone in your household received a live vaccination in the past 4 weeks? Please note:  No live vaccines while on biologic/chemotherapy until 6 months after the last treatment.  Patient can receive the flu vaccine (shot only) and the pneumovax.  It is optimal for the patient to get these vaccines mid cycle, but they can be given at any time as long as it is not on the day of the infusion. No  7. Have you recently been diagnosed with any new nervous system diseases (ie. Multiple sclerosis, Guillain [place], seizures, neurological changes) or cancer diagnosis? No  8. Are you on any form of radiation or chemotherapy? No  9. Are you pregnant or breast feeding or do you have plans of pregnancy in the future? No  10. Have you been having any signs of worsening depression or suicidal ideations?  (benlysta only) No  11. Have there " been any other new onset medical symptoms? No    Post Infusion Assessment:  Patient tolerated injection without incident.  Patient observed for 30 minutes post Xolair per protocol.  Site patent and intact, free from redness, edema or discomfort.     Discharge Plan:   [facility].  Patient will return 6/3/22 for next appointment.   Patient discharged in stable condition accompanied by: self.  Departure Mode: Ambulatory.      Susana Tenorio RN

## 2022-06-03 ENCOUNTER — INFUSION THERAPY VISIT (OUTPATIENT)
Dept: INFUSION THERAPY | Facility: CLINIC | Age: 52
End: 2022-06-03
Attending: ALLERGY & IMMUNOLOGY
Payer: COMMERCIAL

## 2022-06-03 VITALS
OXYGEN SATURATION: 100 % | SYSTOLIC BLOOD PRESSURE: 125 MMHG | TEMPERATURE: 98.5 F | DIASTOLIC BLOOD PRESSURE: 85 MMHG | HEART RATE: 88 BPM

## 2022-06-03 DIAGNOSIS — J45.50 SEVERE PERSISTENT ASTHMA WITHOUT COMPLICATION (H): Primary | ICD-10-CM

## 2022-06-03 PROCEDURE — 96372 THER/PROPH/DIAG INJ SC/IM: CPT | Performed by: ALLERGY & IMMUNOLOGY

## 2022-06-03 PROCEDURE — 250N000011 HC RX IP 250 OP 636: Performed by: ALLERGY & IMMUNOLOGY

## 2022-06-03 RX ADMIN — OMALIZUMAB 375 MG: 150 INJECTION, SOLUTION SUBCUTANEOUS at 08:36

## 2022-06-03 NOTE — PROGRESS NOTES
Infusion Nursing Note:  Alba Varela presents today for Xolair.    Patient seen by provider today: No   present during visit today: Not Applicable.    Note: N/A.      Intravenous Access:  No Intravenous access/labs at this visit.    Treatment Conditions:  Biological Infusion Checklist:  ~~~ NOTE: If the patient answers yes to any of the questions below, hold the infusion and contact ordering provider or on-call provider.    1. Have you recently had an elevated temperature, fever, chills, productive cough, coughing for 3 weeks or longer or hemoptysis, abnormal vital signs, night sweats,  chest pain or have you noticed a decrease in your appetite, unexplained weight loss or fatigue? No  2. Do you have any open wounds or new incisions? No  3. Do you have any recent or upcoming hospitalizations, surgeries or dental procedures? No  4. Do you currently have or recently have had any signs of illness or infection or are you on any antibiotics? No  5. Have you had any new, sudden or worsening abdominal pain? No  6. Have you or anyone in your household received a live vaccination in the past 4 weeks? Please note:  No live vaccines while on biologic/chemotherapy until 6 months after the last treatment.  Patient can receive the flu vaccine (shot only) and the pneumovax.  It is optimal for the patient to get these vaccines mid cycle, but they can be given at any time as long as it is not on the day of the infusion. No  7. Have you recently been diagnosed with any new nervous system diseases (ie. Multiple sclerosis, Guillain [place], seizures, neurological changes) or cancer diagnosis? No  8. Are you on any form of radiation or chemotherapy? No  9. Are you pregnant or breast feeding or do you have plans of pregnancy in the future? No  10. Have you been having any signs of worsening depression or suicidal ideations?  (benlysta only) No  11. Have there been any other new onset medical symptoms? No        Post Infusion  Assessment:  Patient tolerated injection without incident.  Patient observed for 30 minutes post Xolair per protocol.  Site patent and intact, free from redness, edema or discomfort.  No evidence of extravasations.       Discharge Plan:   Discharge instructions reviewed with: Patient.  Patient and/or family verbalized understanding of discharge instructions and all questions answered.  AVS to patient via [employer].  Patient will call to make next appointment.   Patient discharged in stable condition accompanied by: self.  Departure Mode: Ambulatory.      Joyce Mackenzie RN

## 2022-06-20 ENCOUNTER — INFUSION THERAPY VISIT (OUTPATIENT)
Dept: INFUSION THERAPY | Facility: CLINIC | Age: 52
End: 2022-06-20
Attending: ALLERGY & IMMUNOLOGY
Payer: COMMERCIAL

## 2022-06-20 VITALS
HEART RATE: 93 BPM | RESPIRATION RATE: 16 BRPM | DIASTOLIC BLOOD PRESSURE: 80 MMHG | OXYGEN SATURATION: 99 % | SYSTOLIC BLOOD PRESSURE: 134 MMHG | TEMPERATURE: 97.5 F

## 2022-06-20 DIAGNOSIS — J45.50 SEVERE PERSISTENT ASTHMA WITHOUT COMPLICATION (H): Primary | ICD-10-CM

## 2022-06-20 PROCEDURE — 250N000011 HC RX IP 250 OP 636: Performed by: ALLERGY & IMMUNOLOGY

## 2022-06-20 PROCEDURE — 96372 THER/PROPH/DIAG INJ SC/IM: CPT | Performed by: ALLERGY & IMMUNOLOGY

## 2022-06-20 RX ADMIN — OMALIZUMAB 375 MG: 150 INJECTION, SOLUTION SUBCUTANEOUS at 08:18

## 2022-06-20 NOTE — PROGRESS NOTES
Infusion Nursing Note:  Alba Varela presents today for Xolair.    Patient seen by provider today: No   present during visit today: Not Applicable.    Note: N/A.    Intravenous Access:  No Intravenous access/labs at this visit.    Treatment Conditions:  Not Applicable.    Post Infusion Assessment:  Patient tolerated injection without incident.  Patient observed for 30 minutes post xolair per protocol.     Discharge Plan:   Discharge instructions reviewed with: Patient.  Patient and/or family verbalized understanding of discharge instructions and all questions answered.  Copy of AVS reviewed with patient and/or family.  Patient will return 7/6/22 for next appointment.  Patient discharged in stable condition accompanied by: self.  Departure Mode: Ambulatory.      Dannielle Valladares RN

## 2022-07-06 ENCOUNTER — INFUSION THERAPY VISIT (OUTPATIENT)
Dept: INFUSION THERAPY | Facility: CLINIC | Age: 52
End: 2022-07-06
Attending: ALLERGY & IMMUNOLOGY
Payer: COMMERCIAL

## 2022-07-06 VITALS
HEART RATE: 78 BPM | SYSTOLIC BLOOD PRESSURE: 125 MMHG | TEMPERATURE: 97.7 F | DIASTOLIC BLOOD PRESSURE: 86 MMHG | OXYGEN SATURATION: 98 % | RESPIRATION RATE: 16 BRPM

## 2022-07-06 DIAGNOSIS — J45.50 SEVERE PERSISTENT ASTHMA WITHOUT COMPLICATION (H): Primary | ICD-10-CM

## 2022-07-06 PROCEDURE — 96372 THER/PROPH/DIAG INJ SC/IM: CPT | Performed by: ALLERGY & IMMUNOLOGY

## 2022-07-06 PROCEDURE — 250N000011 HC RX IP 250 OP 636: Performed by: ALLERGY & IMMUNOLOGY

## 2022-07-06 RX ADMIN — OMALIZUMAB 375 MG: 150 INJECTION, SOLUTION SUBCUTANEOUS at 08:51

## 2022-07-18 ENCOUNTER — INFUSION THERAPY VISIT (OUTPATIENT)
Dept: INFUSION THERAPY | Facility: CLINIC | Age: 52
End: 2022-07-18
Attending: ALLERGY & IMMUNOLOGY
Payer: COMMERCIAL

## 2022-07-18 VITALS
HEART RATE: 91 BPM | TEMPERATURE: 97 F | RESPIRATION RATE: 16 BRPM | DIASTOLIC BLOOD PRESSURE: 75 MMHG | SYSTOLIC BLOOD PRESSURE: 117 MMHG | OXYGEN SATURATION: 97 %

## 2022-07-18 DIAGNOSIS — J45.50 SEVERE PERSISTENT ASTHMA WITHOUT COMPLICATION (H): Primary | ICD-10-CM

## 2022-07-18 PROCEDURE — 250N000011 HC RX IP 250 OP 636: Performed by: ALLERGY & IMMUNOLOGY

## 2022-07-18 PROCEDURE — 96372 THER/PROPH/DIAG INJ SC/IM: CPT | Performed by: ALLERGY & IMMUNOLOGY

## 2022-07-18 RX ADMIN — OMALIZUMAB 375 MG: 150 INJECTION, SOLUTION SUBCUTANEOUS at 08:21

## 2022-07-18 NOTE — PROGRESS NOTES
Infusion Nursing Note:  Alba Varela presents today for Xolair.    Patient seen by provider today: No   present during visit today: Not Applicable.    Note: N/A.    Intravenous Access:  No Intravenous access/labs at this visit.    Treatment Conditions:  Not Applicable.    Post Infusion Assessment:  Patient tolerated injection without incident.  Patient observed for 30 minutes post xolair per protocol.     Discharge Plan:   Discharge instructions reviewed with: Patient.  Patient and/or family verbalized understanding of discharge instructions and all questions answered.  Copy of AVS reviewed with patient and/or family.  Patient will return 8/1/22 for next appointment.  Patient discharged in stable condition accompanied by: self.  Departure Mode: Ambulatory.      Dannielle Valladares RN

## 2022-08-01 ENCOUNTER — INFUSION THERAPY VISIT (OUTPATIENT)
Dept: INFUSION THERAPY | Facility: CLINIC | Age: 52
End: 2022-08-01
Attending: ALLERGY & IMMUNOLOGY
Payer: COMMERCIAL

## 2022-08-01 VITALS
RESPIRATION RATE: 16 BRPM | SYSTOLIC BLOOD PRESSURE: 132 MMHG | HEART RATE: 78 BPM | TEMPERATURE: 97.7 F | DIASTOLIC BLOOD PRESSURE: 85 MMHG | OXYGEN SATURATION: 98 %

## 2022-08-01 DIAGNOSIS — J45.50 SEVERE PERSISTENT ASTHMA WITHOUT COMPLICATION (H): Primary | ICD-10-CM

## 2022-08-01 PROCEDURE — 250N000011 HC RX IP 250 OP 636: Performed by: ALLERGY & IMMUNOLOGY

## 2022-08-01 PROCEDURE — 96372 THER/PROPH/DIAG INJ SC/IM: CPT | Performed by: ALLERGY & IMMUNOLOGY

## 2022-08-01 RX ADMIN — OMALIZUMAB 375 MG: 150 INJECTION, SOLUTION SUBCUTANEOUS at 08:17

## 2022-08-01 ASSESSMENT — PAIN SCALES - GENERAL: PAINLEVEL: NO PAIN (0)

## 2022-08-01 NOTE — PROGRESS NOTES
Infusion Nursing Note:  Alba Varela presents today for Xolair.    Patient seen by provider today: No   present during visit today: Not Applicable.    Note: Has c/o mild cough.  Dry tickle in throat.  Has tested for COVID and was negative.  Will plan to see PCP on Wed.  Denies recent asthma flares.      Intravenous Access:  No Intravenous access/labs at this visit.    Treatment Conditions:  Biological Infusion Checklist:  ~~~ NOTE: If the patient answers yes to any of the questions below, hold the infusion and contact ordering provider or on-call provider.    1. Have you recently had an elevated temperature, fever, chills, productive cough, coughing for 3 weeks or longer or hemoptysis, abnormal vital signs, night sweats,  chest pain or have you noticed a decrease in your appetite, unexplained weight loss or fatigue? No  2. Do you have any open wounds or new incisions? No  3. Do you have any recent or upcoming hospitalizations, surgeries or dental procedures? No  4. Do you currently have or recently have had any signs of illness or infection or are you on any antibiotics? No  5. Have you had any new, sudden or worsening abdominal pain? No  6. Have you or anyone in your household received a live vaccination in the past 4 weeks? Please note:  No live vaccines while on biologic/chemotherapy until 6 months after the last treatment.  Patient can receive the flu vaccine (shot only) and the pneumovax.  It is optimal for the patient to get these vaccines mid cycle, but they can be given at any time as long as it is not on the day of the infusion. No  7. Have you recently been diagnosed with any new nervous system diseases (ie. Multiple sclerosis, Guillain [place], seizures, neurological changes) or cancer diagnosis? No  8. Are you on any form of radiation or chemotherapy? No  9. Are you pregnant or breast feeding or do you have plans of pregnancy in the future? No  10. Have you been having any signs of worsening  depression or suicidal ideations?  (benlysta only) No  11. Have there been any other new onset medical symptoms? No      Post Infusion Assessment:  Patient tolerated injection without incident.     Discharge Plan:   Discharge instructions reviewed with: Patient.  Patient discharged in stable condition accompanied by: self.  Departure Mode: Ambulatory.  Next injection is scheduled for 8/15/22.    SIERRA RICKETTS RN

## 2022-08-03 ENCOUNTER — OFFICE VISIT (OUTPATIENT)
Dept: INTERNAL MEDICINE | Facility: CLINIC | Age: 52
End: 2022-08-03
Payer: COMMERCIAL

## 2022-08-03 VITALS
BODY MASS INDEX: 29.91 KG/M2 | DIASTOLIC BLOOD PRESSURE: 62 MMHG | OXYGEN SATURATION: 97 % | HEIGHT: 64 IN | WEIGHT: 175.2 LBS | HEART RATE: 95 BPM | SYSTOLIC BLOOD PRESSURE: 108 MMHG | RESPIRATION RATE: 15 BRPM | TEMPERATURE: 97.1 F

## 2022-08-03 DIAGNOSIS — R05.9 COUGH: Primary | ICD-10-CM

## 2022-08-03 DIAGNOSIS — Z12.31 VISIT FOR SCREENING MAMMOGRAM: ICD-10-CM

## 2022-08-03 DIAGNOSIS — R49.0 HOARSENESS OF VOICE: ICD-10-CM

## 2022-08-03 DIAGNOSIS — E78.2 MIXED HYPERLIPIDEMIA: ICD-10-CM

## 2022-08-03 DIAGNOSIS — Z00.00 ROUTINE HISTORY AND PHYSICAL EXAMINATION OF ADULT: ICD-10-CM

## 2022-08-03 LAB
ERYTHROCYTE [DISTWIDTH] IN BLOOD BY AUTOMATED COUNT: 12.5 % (ref 10–15)
HCT VFR BLD AUTO: 40.4 % (ref 35–47)
HGB BLD-MCNC: 13.4 G/DL (ref 11.7–15.7)
MCH RBC QN AUTO: 30.6 PG (ref 26.5–33)
MCHC RBC AUTO-ENTMCNC: 33.2 G/DL (ref 31.5–36.5)
MCV RBC AUTO: 92 FL (ref 78–100)
PLATELET # BLD AUTO: 280 10E3/UL (ref 150–450)
RBC # BLD AUTO: 4.38 10E6/UL (ref 3.8–5.2)
WBC # BLD AUTO: 5.6 10E3/UL (ref 4–11)

## 2022-08-03 PROCEDURE — 84443 ASSAY THYROID STIM HORMONE: CPT | Performed by: INTERNAL MEDICINE

## 2022-08-03 PROCEDURE — 85027 COMPLETE CBC AUTOMATED: CPT | Performed by: INTERNAL MEDICINE

## 2022-08-03 PROCEDURE — 80053 COMPREHEN METABOLIC PANEL: CPT | Performed by: INTERNAL MEDICINE

## 2022-08-03 PROCEDURE — 99396 PREV VISIT EST AGE 40-64: CPT | Performed by: INTERNAL MEDICINE

## 2022-08-03 PROCEDURE — 99213 OFFICE O/P EST LOW 20 MIN: CPT | Mod: 25 | Performed by: INTERNAL MEDICINE

## 2022-08-03 PROCEDURE — 36415 COLL VENOUS BLD VENIPUNCTURE: CPT | Performed by: INTERNAL MEDICINE

## 2022-08-03 PROCEDURE — 80061 LIPID PANEL: CPT | Performed by: INTERNAL MEDICINE

## 2022-08-03 ASSESSMENT — ENCOUNTER SYMPTOMS
HEADACHES: 1
SHORTNESS OF BREATH: 0
CHILLS: 0
JOINT SWELLING: 0
ARTHRALGIAS: 1
FEVER: 0
PALPITATIONS: 0
NERVOUS/ANXIOUS: 1
NAUSEA: 0
SORE THROAT: 0
ABDOMINAL PAIN: 0
DYSURIA: 0
MYALGIAS: 0
COUGH: 1
EYE PAIN: 0
FREQUENCY: 0
HEMATOCHEZIA: 0
DIARRHEA: 0
PARESTHESIAS: 0
HEMATURIA: 0
WEAKNESS: 0
BREAST MASS: 0
HEARTBURN: 0
DIZZINESS: 0

## 2022-08-03 ASSESSMENT — PATIENT HEALTH QUESTIONNAIRE - PHQ9
SUM OF ALL RESPONSES TO PHQ QUESTIONS 1-9: 13
10. IF YOU CHECKED OFF ANY PROBLEMS, HOW DIFFICULT HAVE THESE PROBLEMS MADE IT FOR YOU TO DO YOUR WORK, TAKE CARE OF THINGS AT HOME, OR GET ALONG WITH OTHER PEOPLE: NOT DIFFICULT AT ALL

## 2022-08-03 ASSESSMENT — ASTHMA QUESTIONNAIRES: ACT_TOTALSCORE: 25

## 2022-08-03 NOTE — COMMUNITY RESOURCES LIST (ENGLISH)
08/03/2022   [facility] - Outpatient Clinics  N/A  For questions about this resource list or additional care needs, please contact your primary care clinic or care manager.  Phone: [phone]   Email: N/A   Address: [address] [place] [id number]   Hours: N/A        Hotlines and Helplines       Hotline - Crisis help  1  [facility] - Child Protection - [facility] Crisis Response Unit Distance: 4.76 miles      COVID-19 Status: Phone/Virtual   [address] [place] [address]  Language: English  Hours: Mon - Sun Open 24 Hours   Phone: (552) 361-1437 Email: [email or web address]. Website: [email or web address]/[email or web address]/[email or web address]     2  [facility] Crisis Response Unit - Suicide and Crisis Response Hotline Distance: 10.19 miles      COVID-19 Status: Phone/Virtual   [address] [facility] [address] [place] [address]  Language: English  Hours: Mon - Sun Open 24 Hours   Phone: (777) 863-6535 Email: [email or web address] Website: [email or web address]://www.[facility]/HealthFamily/HandlingEmergencies/Help          Mental Health       Individual counseling  3  [facility] Distance: 1.27 miles      COVID-19 Status: Regular Operations, COVID-19 Status: Phone/Virtual   [address] [place] [address]  Language: English, Croatian  Hours: Mon - Thu 7:00 AM - 8:00 PM , Fri 7:00 AM - 6:00 PM  Fees: Insurance, Self Pay   Phone: (995) 121-9995 Website: [email or web address]     4  [facility] Distance: 4.39 miles      COVID-19 Status: Phone/Virtual   [address] [facility] [address] [place] [address]  Language: English  Hours: Mon - Thu 8:30 AM - 5:30 PM , Fri 8:30 AM - 4:00 PM  Fees: Insurance, Self Pay   Phone: (704) 799-8193 Email: [email or web address] Website: [email or web address]://[facility].[employer]/locations/[facility]/     Mental health crisis care  5  [facility]  [facility] Distance: 6.7 miles      COVID-19 Status: Regular Operations, COVID-19 Status: Phone/Virtual   [address] [place] [address]  Language: English  Hours: Mon - Thu 8:30 AM - 9:00 PM , Fri 8:30 AM - 5:00 PM , Sat 8:00 AM - 4:00 PM  Fees: Insurance, Self Pay, Sliding Fee   Phone: (414) 755-8075 Email: [email or web address] Website: [email or web address]     6  [employer] - 24-Hour Mental Health Practitioner Distance: 9.84 miles      COVID-19 Status: Regular Operations, COVID-19 Status: Phone/Virtual   [address] [place] [address]  Language: English, Hmong  Hours: Mon - Fri 8:00 AM - 4:30 PM  Fees: Insurance, Self Pay   Phone: (461) 811-3427 Email: info@[employer] Website: http://www.[employer]/     Mental health support group  7  [facility] Distance: 5.27 miles      COVID-19 Status: Phone/Virtual   [address] [address] [place] [address]  Language: English  Hours: Mon - Fri 9:00 AM - 5:00 PM Appt. Only  Fees: Free   Phone: (946) 575-5026 Email: [email or web address].org Website: [email or web address]://[email or web address].[email or web address]/     8  [facility] Community Support Member [place] Distance: 10.85 miles      COVID-19 Status: Regular Operations, COVID-19 Status: Phone/Virtual   [address] [place] [address]  Language: English  Hours: Mon - Fri 12:00 PM - 4:00 PM  Fees: Insurance   Phone: (314) 230-5283 Email: info@[employer] Website: [email or web address].org/mental-health/          Important Numbers & Websites       Emergency Services   911  City Services   311  Poison Control   (806) 662-4756  Suicide Prevention Lifeline   (671) 729-7963 (TALK)  Child Abuse Hotline   (129) 280-5464 (4-A-Child)  Sexual Assault Hotline   (418) 471-6226 (HOPE)  National Runaway Safeline   (828) 952-3437 (RUNAWAY)  All-Options Talkline   (157) 526-7053  Substance Abuse Referral   (158)  043-3569 (HELP)

## 2022-08-03 NOTE — Clinical Note
Please abstract the following data from this visit with this patient into the appropriate field in Epic:  Pap smear done on this date: 2020 (approximately), by this group: Dr Kasey Izquierdo, results were normal .
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
## 2022-08-04 LAB
ALBUMIN SERPL-MCNC: 3.9 G/DL (ref 3.4–5)
ALP SERPL-CCNC: 67 U/L (ref 40–150)
ALT SERPL W P-5'-P-CCNC: 26 U/L (ref 0–50)
ANION GAP SERPL CALCULATED.3IONS-SCNC: 3 MMOL/L (ref 3–14)
AST SERPL W P-5'-P-CCNC: 16 U/L (ref 0–45)
BILIRUB SERPL-MCNC: 0.3 MG/DL (ref 0.2–1.3)
BUN SERPL-MCNC: 13 MG/DL (ref 7–30)
CALCIUM SERPL-MCNC: 9.1 MG/DL (ref 8.5–10.1)
CHLORIDE BLD-SCNC: 110 MMOL/L (ref 94–109)
CHOLEST SERPL-MCNC: 252 MG/DL
CO2 SERPL-SCNC: 26 MMOL/L (ref 20–32)
CREAT SERPL-MCNC: 0.57 MG/DL (ref 0.52–1.04)
FASTING STATUS PATIENT QL REPORTED: YES
GFR SERPL CREATININE-BSD FRML MDRD: >90 ML/MIN/1.73M2
GLUCOSE BLD-MCNC: 95 MG/DL (ref 70–99)
HDLC SERPL-MCNC: 55 MG/DL
LDLC SERPL CALC-MCNC: 155 MG/DL
NONHDLC SERPL-MCNC: 197 MG/DL
POTASSIUM BLD-SCNC: 4.4 MMOL/L (ref 3.4–5.3)
PROT SERPL-MCNC: 7 G/DL (ref 6.8–8.8)
SODIUM SERPL-SCNC: 139 MMOL/L (ref 133–144)
TRIGL SERPL-MCNC: 210 MG/DL
TSH SERPL DL<=0.005 MIU/L-ACNC: 1.47 MU/L (ref 0.4–4)

## 2022-08-08 ENCOUNTER — VIRTUAL VISIT (OUTPATIENT)
Dept: INTERNAL MEDICINE | Facility: CLINIC | Age: 52
End: 2022-08-08
Payer: COMMERCIAL

## 2022-08-08 DIAGNOSIS — R05.3 CHRONIC COUGH: ICD-10-CM

## 2022-08-08 DIAGNOSIS — E78.2 MIXED HYPERLIPIDEMIA: Primary | ICD-10-CM

## 2022-08-08 PROCEDURE — 99213 OFFICE O/P EST LOW 20 MIN: CPT | Mod: 95 | Performed by: INTERNAL MEDICINE

## 2022-08-08 RX ORDER — ROSUVASTATIN CALCIUM 5 MG/1
5 TABLET, COATED ORAL DAILY
Qty: 90 TABLET | Refills: 0 | Status: SHIPPED | OUTPATIENT
Start: 2022-08-08 | End: 2022-11-08

## 2022-08-08 NOTE — PROGRESS NOTES
Alba is a 52 year old who is being evaluated via a billable video visit.      How would you like to obtain your AVS? MyChart  If the video visit is dropped, the invitation should be resent by: Text to cell phone: [phone]  Will anyone else be joining your video visit? No          Assessment & Plan     (E78.2) Mixed hyperlipidemia    Plan: Explained about the lipid results, patient states that she is following healthy diet and regular exercise and her lipids are increasing in the last few years.  Started on rosuvastatin (CRESTOR) 5 MG tablet 1 tablet daily as directed.explained clearly about the medication,insructions and side effects.  Continue to follow low-fat diet regular exercise repeat lipids in 3 months       (R05.3) Chronic cough  Plan: Patient does smoke but has been trying to quit and has cut down on smoking.  Referred to pulmonologist for further evaluation and management.  Adult Pulmonary Medicine Referral               Review of the result(s) of each unique test - Lipids, CBC, TSH, CMP  20  minutes spent on the date of the encounter doing chart review, history and exam, documentation and further activities per the note       Return in about 3 months (around 11/8/2022) for Lab Work.    Lion Allen MD  [facility]   Abla is a 52 year old, presenting for the following health issues:  Follow Up      History of Present Illness       She eats 2-3 servings of fruits and vegetables daily.She consumes 0 sweetened beverage(s) daily.She exercises with enough effort to increase her heart rate 30 to 60 minutes per day.  She exercises with enough effort to increase her heart rate 5 days per week.   She is taking medications regularly.       Pt is a 52 year old female who presents to virtual visit today to f/u on lab results.    Lab Results   Component Value Date    CHOL 252 08/03/2022    CHOL 236 03/09/2017    CHOL 182 09/19/2011     Lab Results   Component Value  Date    HDL 55 08/03/2022    HDL 76 03/09/2017    HDL 49 09/19/2011     Lab Results   Component Value Date     08/03/2022     03/09/2017     09/19/2011     Lab Results   Component Value Date    TRIG 210 08/03/2022    TRIG 111 03/09/2017    TRIG 151 09/19/2011     Lab Results   Component Value Date    CHOLHDLRATIO 3.7 09/19/2011    CHOLHDLRATIO 4.0 06/07/2011       Patient also would like to discuss chronic cough/chest xray- negative        Past Medical History:   Diagnosis Date     Abdominal pain      Anxiety      Asthma     On Dupixent(Xolair) injections     C. difficile colitis      Chest discomfort      Colitis      Headache(784.0) 12/10/2014     High cholesterol      Hyperlipidemia      Liver disease     Being evalted for fatty liver disease. ABnormal LFT.     Migraines      PAC (premature atrial contraction)      PONV (postoperative nausea and vomiting)      PVC (premature ventricular contraction)      Sleep apnea     Doesn't use a CPAP     Syncope      Tobacco abuse      Tremor           Review of Systems   CONSTITUTIONAL: NEGATIVE for fever, chills, change in weight  RESP: Chronic cough  CV: NEGATIVE for chest pain, palpitations or peripheral edema      Objective           Vitals:  No vitals were obtained today due to virtual visit.    Physical Exam   GENERAL: Healthy, alert and no distress  EYES: Eyes grossly normal to inspection.  No discharge or erythema, or obvious scleral/conjunctival abnormalities.  RESP: No audible wheeze, cough, or visible cyanosis.  No visible retractions or increased work of breathing.    PSYCH: Mentation appears normal, affect normal/bright, judgement and insight intact, normal speech and appearance well-groomed.            Video-Visit Details    Video Start Time: 1:43 PM    Type of service:  Video Visit    Video End Time:1:55 PM    Originating Location (pt. Location): Home    Distant Location (provider location):  [facility]      Platform used for Video Visit: Castro    .  ..

## 2022-08-15 ENCOUNTER — INFUSION THERAPY VISIT (OUTPATIENT)
Dept: INFUSION THERAPY | Facility: CLINIC | Age: 52
End: 2022-08-15
Attending: ALLERGY & IMMUNOLOGY
Payer: COMMERCIAL

## 2022-08-15 VITALS
TEMPERATURE: 97.4 F | SYSTOLIC BLOOD PRESSURE: 120 MMHG | OXYGEN SATURATION: 100 % | DIASTOLIC BLOOD PRESSURE: 78 MMHG | HEART RATE: 82 BPM | RESPIRATION RATE: 16 BRPM

## 2022-08-15 DIAGNOSIS — J45.50 SEVERE PERSISTENT ASTHMA WITHOUT COMPLICATION (H): Primary | ICD-10-CM

## 2022-08-15 PROCEDURE — 250N000011 HC RX IP 250 OP 636: Performed by: ALLERGY & IMMUNOLOGY

## 2022-08-15 PROCEDURE — 96372 THER/PROPH/DIAG INJ SC/IM: CPT | Performed by: ALLERGY & IMMUNOLOGY

## 2022-08-15 RX ADMIN — OMALIZUMAB 375 MG: 150 INJECTION, SOLUTION SUBCUTANEOUS at 08:20

## 2022-08-15 NOTE — PROGRESS NOTES
.Infusion Nursing Note:  Alba Varela presents today for xolair.     present during visit today: Not Applicable.    Note: N/A.    Intravenous Access:  No Intravenous access/labs at this visit.    Treatment Conditions:  NA    Post Lab Assessment:  Patient tolerated injection   Site patent and intact, free from redness, edema or discomfort.  No evidence of extravasations.      Discharge Plan:   Patient and/or family verbalized understanding of  instructions and all questions answered.  Observed for 30 minutes after injection  Patient discharged in stable condition accompanied by: self.  Patient to see provider today: No  Departure Mode: Ambulatory.  Vivi Rosado, RN, RN

## 2022-08-17 ENCOUNTER — HOSPITAL ENCOUNTER (OUTPATIENT)
Dept: MAMMOGRAPHY | Facility: CLINIC | Age: 52
Discharge: HOME | End: 2022-08-17
Attending: INTERNAL MEDICINE | Admitting: INTERNAL MEDICINE
Payer: COMMERCIAL

## 2022-08-17 DIAGNOSIS — Z12.31 VISIT FOR SCREENING MAMMOGRAM: ICD-10-CM

## 2022-08-17 DIAGNOSIS — Z00.00 ROUTINE HISTORY AND PHYSICAL EXAMINATION OF ADULT: ICD-10-CM

## 2022-08-17 PROCEDURE — 77067 SCR MAMMO BI INCL CAD: CPT

## 2022-08-24 ENCOUNTER — TRANSFERRED RECORDS (OUTPATIENT)
Dept: HEALTH INFORMATION MANAGEMENT | Facility: CLINIC | Age: 52
End: 2022-08-24

## 2022-08-29 ENCOUNTER — INFUSION THERAPY VISIT (OUTPATIENT)
Dept: INFUSION THERAPY | Facility: CLINIC | Age: 52
End: 2022-08-29
Attending: ALLERGY & IMMUNOLOGY
Payer: COMMERCIAL

## 2022-08-29 VITALS
TEMPERATURE: 97.8 F | SYSTOLIC BLOOD PRESSURE: 130 MMHG | HEART RATE: 89 BPM | OXYGEN SATURATION: 98 % | DIASTOLIC BLOOD PRESSURE: 84 MMHG

## 2022-08-29 DIAGNOSIS — J45.50 SEVERE PERSISTENT ASTHMA WITHOUT COMPLICATION (H): Primary | ICD-10-CM

## 2022-08-29 PROCEDURE — 250N000011 HC RX IP 250 OP 636: Performed by: ALLERGY & IMMUNOLOGY

## 2022-08-29 PROCEDURE — 96372 THER/PROPH/DIAG INJ SC/IM: CPT | Performed by: ALLERGY & IMMUNOLOGY

## 2022-08-29 RX ADMIN — OMALIZUMAB 375 MG: 150 INJECTION, SOLUTION SUBCUTANEOUS at 08:16

## 2022-08-29 NOTE — PROGRESS NOTES
Infusion Nursing Note:  Alba Varela presents today for Xolair.    Patient seen by provider today: No   present during visit today: Not Applicable.    Note: N/A.    Intravenous Access:  No Intravenous access/labs at this visit.    Treatment Conditions:  Biological Infusion Checklist:  ~~~ NOTE: If the patient answers yes to any of the questions below, hold the infusion and contact ordering provider or on-call provider.    1. Have you recently had an elevated temperature, fever, chills, productive cough, coughing for 3 weeks or longer or hemoptysis, abnormal vital signs, night sweats,  chest pain or have you noticed a decrease in your appetite, unexplained weight loss or fatigue? No  2. Do you have any open wounds or new incisions? No  3. Do you have any recent or upcoming hospitalizations, surgeries or dental procedures? No  4. Do you currently have or recently have had any signs of illness or infection or are you on any antibiotics? No  5. Have you had any new, sudden or worsening abdominal pain? No  6. Have you or anyone in your household received a live vaccination in the past 4 weeks? Please note:  No live vaccines while on biologic/chemotherapy until 6 months after the last treatment.  Patient can receive the flu vaccine (shot only) and the pneumovax.  It is optimal for the patient to get these vaccines mid cycle, but they can be given at any time as long as it is not on the day of the infusion. No  7. Have you recently been diagnosed with any new nervous system diseases (ie. Multiple sclerosis, Guillain [place], seizures, neurological changes) or cancer diagnosis? No  8. Are you on any form of radiation or chemotherapy? No  9. Are you pregnant or breast feeding or do you have plans of pregnancy in the future? No  10. Have you been having any signs of worsening depression or suicidal ideations?  (benlysta only) No  11. Have there been any other new onset medical symptoms? No      Post Infusion  Assessment:  Patient tolerated injection without incident.  Site patent and intact, free from redness, edema or discomfort.  No evidence of extravasations.     Discharge Plan:   Discharge instructions reviewed with: Patient.  Patient and/or family verbalized understanding of discharge instructions and all questions answered.  AVS to patient via [employer].  Patient will return 9/12 for next appointment.   Patient discharged in stable condition accompanied by: self.  Departure Mode: Ambulatory.      Joyce Mackenzie RN

## 2022-09-08 ENCOUNTER — TRANSFERRED RECORDS (OUTPATIENT)
Dept: HEALTH INFORMATION MANAGEMENT | Facility: CLINIC | Age: 52
End: 2022-09-08

## 2022-09-12 ENCOUNTER — INFUSION THERAPY VISIT (OUTPATIENT)
Dept: INFUSION THERAPY | Facility: CLINIC | Age: 52
End: 2022-09-12
Attending: ALLERGY & IMMUNOLOGY
Payer: COMMERCIAL

## 2022-09-12 VITALS
RESPIRATION RATE: 16 BRPM | OXYGEN SATURATION: 98 % | HEART RATE: 90 BPM | SYSTOLIC BLOOD PRESSURE: 113 MMHG | DIASTOLIC BLOOD PRESSURE: 76 MMHG | TEMPERATURE: 97.3 F

## 2022-09-12 DIAGNOSIS — J45.50 SEVERE PERSISTENT ASTHMA WITHOUT COMPLICATION (H): Primary | ICD-10-CM

## 2022-09-12 PROCEDURE — 250N000011 HC RX IP 250 OP 636: Performed by: ALLERGY & IMMUNOLOGY

## 2022-09-12 PROCEDURE — 96372 THER/PROPH/DIAG INJ SC/IM: CPT | Performed by: ALLERGY & IMMUNOLOGY

## 2022-09-12 RX ADMIN — OMALIZUMAB 375 MG: 150 INJECTION, SOLUTION SUBCUTANEOUS at 11:20

## 2022-09-12 ASSESSMENT — PAIN SCALES - GENERAL: PAINLEVEL: NO PAIN (0)

## 2022-09-12 NOTE — PROGRESS NOTES
Infusion Nursing Note:  Alba Varela presents today for Xolair.    Patient seen by provider today: No   present during visit today: Not Applicable.    Note: Patient reports no changes in allergies, or home medications since they were last reviewed; declined to review them individually today.    Intravenous Access:  No Intravenous access/labs at this visit.    Treatment Conditions:  Biological Infusion Checklist:  ~~~ NOTE: If the patient answers yes to any of the questions below, hold the infusion and contact ordering provider or on-call provider.    1. Have you recently had an elevated temperature, fever, chills, productive cough, coughing for 3 weeks or longer or hemoptysis, abnormal vital signs, night sweats,  chest pain or have you noticed a decrease in your appetite, unexplained weight loss or fatigue? No  2. Do you have any open wounds or new incisions? No  3. Do you have any recent or upcoming hospitalizations, surgeries or dental procedures? No  4. Do you currently have or recently have had any signs of illness or infection or are you on any antibiotics? No  5. Have you had any new, sudden or worsening abdominal pain? No  6. Have you or anyone in your household received a live vaccination in the past 4 weeks? Please note:  No live vaccines while on biologic/chemotherapy until 6 months after the last treatment.  Patient can receive the flu vaccine (shot only) and the pneumovax.  It is optimal for the patient to get these vaccines mid cycle, but they can be given at any time as long as it is not on the day of the infusion. No  7. Have you recently been diagnosed with any new nervous system diseases (ie. Multiple sclerosis, Guillain [place], seizures, neurological changes) or cancer diagnosis? No  8. Are you on any form of radiation or chemotherapy? No  9. Are you pregnant or breast feeding or do you have plans of pregnancy in the future? No  10. Have you been having any signs of worsening depression  or suicidal ideations?  (benlysta only) No  11. Have there been any other new onset medical symptoms? No  Post Infusion Assessment:  Patient tolerated injection without incident.  Patient observed for 20 minutes (unable to stay the full 30 minutes) post Xolair per protocol.  Site patent and intact, free from redness, edema or discomfort.     Discharge Plan:   AVS to patient via MYCHART.  Patient will return 9/26/22 for next appointment.   Patient discharged in stable condition accompanied by: self.  Departure Mode: Ambulatory.      Susana Tenorio RN

## 2022-09-26 ENCOUNTER — INFUSION THERAPY VISIT (OUTPATIENT)
Dept: INFUSION THERAPY | Facility: CLINIC | Age: 52
End: 2022-09-26
Attending: ALLERGY & IMMUNOLOGY
Payer: COMMERCIAL

## 2022-09-26 VITALS
DIASTOLIC BLOOD PRESSURE: 78 MMHG | TEMPERATURE: 98.6 F | OXYGEN SATURATION: 99 % | SYSTOLIC BLOOD PRESSURE: 115 MMHG | HEART RATE: 82 BPM

## 2022-09-26 DIAGNOSIS — J45.50 SEVERE PERSISTENT ASTHMA WITHOUT COMPLICATION (H): Primary | ICD-10-CM

## 2022-09-26 PROCEDURE — 250N000011 HC RX IP 250 OP 636: Performed by: ALLERGY & IMMUNOLOGY

## 2022-09-26 PROCEDURE — 96372 THER/PROPH/DIAG INJ SC/IM: CPT | Performed by: ALLERGY & IMMUNOLOGY

## 2022-09-26 RX ADMIN — OMALIZUMAB 375 MG: 150 INJECTION, SOLUTION SUBCUTANEOUS at 08:27

## 2022-09-26 NOTE — PROGRESS NOTES
Infusion Nursing Note:  Alba Varela presents today for Xolair.    Patient seen by provider today: No   present during visit today: Not Applicable.    Note: N/A.    Intravenous Access:  No Intravenous access/labs at this visit.    Treatment Conditions:  Biological Infusion Checklist:  ~~~ NOTE: If the patient answers yes to any of the questions below, hold the infusion and contact ordering provider or on-call provider.    1. Have you recently had an elevated temperature, fever, chills, productive cough, coughing for 3 weeks or longer or hemoptysis, abnormal vital signs, night sweats,  chest pain or have you noticed a decrease in your appetite, unexplained weight loss or fatigue? No  2. Do you have any open wounds or new incisions? No  3. Do you have any recent or upcoming hospitalizations, surgeries or dental procedures? No  4. Do you currently have or recently have had any signs of illness or infection or are you on any antibiotics? No  5. Have you had any new, sudden or worsening abdominal pain? No  6. Have you or anyone in your household received a live vaccination in the past 4 weeks? Please note:  No live vaccines while on biologic/chemotherapy until 6 months after the last treatment.  Patient can receive the flu vaccine (shot only) and the pneumovax.  It is optimal for the patient to get these vaccines mid cycle, but they can be given at any time as long as it is not on the day of the infusion. No  7. Have you recently been diagnosed with any new nervous system diseases (ie. Multiple sclerosis, Guillain [place], seizures, neurological changes) or cancer diagnosis? No  8. Are you on any form of radiation or chemotherapy? No  9. Are you pregnant or breast feeding or do you have plans of pregnancy in the future? No  10. Have you been having any signs of worsening depression or suicidal ideations?  (benlysta only) No  11. Have there been any other new onset medical symptoms? No      Post Infusion  Assessment:  Patient tolerated injection without incident.  Site patent and intact, free from redness, edema or discomfort.  No evidence of extravasations.  Biologic Infusion Post Education: Call the triage nurse at your clinic or seek medical attention if you have chills and/or temperature greater than or equal to 100.5, uncontrolled nausea/vomiting, diarrhea, constipation, dizziness, shortness of breath, chest pain, heart palpitations, weakness or any other new or concerning symptoms, questions or concerns.  You cannot have any live virus vaccines prior to or during treatment or up to 6 months post infusion.  If you have an upcoming surgery, medical procedure or dental procedure during treatment, this should be discussed with your ordering physician and your surgeon/dentist.  If you are having any concerning symptom, if you are unsure if you should get your next infusion or wish to speak to a provider before your next infusion, please call your care coordinator or triage nurse at your clinic to notify them so we can adequately serve you.   Patient observed for 30 minutes post Xolair per protocol.    Discharge Plan:   Discharge instructions reviewed with: Patient.  Patient and/or family verbalized understanding of discharge instructions and all questions answered.  AVS to patient via [employer].  Patient will return 10/10 for next appointment.   Patient discharged in stable condition accompanied by: self.  Departure Mode: Ambulatory.      Joyce Mackenzie RN

## 2022-10-10 ENCOUNTER — INFUSION THERAPY VISIT (OUTPATIENT)
Dept: INFUSION THERAPY | Facility: CLINIC | Age: 52
End: 2022-10-10
Attending: ALLERGY & IMMUNOLOGY
Payer: COMMERCIAL

## 2022-10-10 VITALS
SYSTOLIC BLOOD PRESSURE: 115 MMHG | OXYGEN SATURATION: 98 % | RESPIRATION RATE: 16 BRPM | HEART RATE: 80 BPM | TEMPERATURE: 97.5 F | DIASTOLIC BLOOD PRESSURE: 76 MMHG

## 2022-10-10 DIAGNOSIS — J45.50 SEVERE PERSISTENT ASTHMA WITHOUT COMPLICATION (H): Primary | ICD-10-CM

## 2022-10-10 PROCEDURE — 96372 THER/PROPH/DIAG INJ SC/IM: CPT | Performed by: ALLERGY & IMMUNOLOGY

## 2022-10-10 PROCEDURE — 250N000011 HC RX IP 250 OP 636: Performed by: ALLERGY & IMMUNOLOGY

## 2022-10-10 RX ADMIN — OMALIZUMAB 375 MG: 150 INJECTION, SOLUTION SUBCUTANEOUS at 08:25

## 2022-10-10 NOTE — PROGRESS NOTES
Infusion Nursing Note:  Alba Varela presents today for Xolair.    Patient seen by provider today: No   present during visit today: Not Applicable.    Note: N/A.    Intravenous Access:  No Intravenous access/labs at this visit.    Treatment Conditions:  Biological Infusion Checklist:  ~~~ NOTE: If the patient answers yes to any of the questions below, hold the infusion and contact ordering provider or on-call provider.    1. Have you recently had an elevated temperature, fever, chills, productive cough, coughing for 3 weeks or longer or hemoptysis, abnormal vital signs, night sweats,  chest pain or have you noticed a decrease in your appetite, unexplained weight loss or fatigue? No  2. Do you have any open wounds or new incisions? No  3. Do you have any recent or upcoming hospitalizations, surgeries or dental procedures? No  4. Do you currently have or recently have had any signs of illness or infection or are you on any antibiotics? No  5. Have you had any new, sudden or worsening abdominal pain? No  6. Have you or anyone in your household received a live vaccination in the past 4 weeks? Please note:  No live vaccines while on biologic/chemotherapy until 6 months after the last treatment.  Patient can receive the flu vaccine (shot only) and the pneumovax.  It is optimal for the patient to get these vaccines mid cycle, but they can be given at any time as long as it is not on the day of the infusion. No  7. Have you recently been diagnosed with any new nervous system diseases (ie. Multiple sclerosis, Guillain [place], seizures, neurological changes) or cancer diagnosis? No  8. Are you on any form of radiation or chemotherapy? No  9. Are you pregnant or breast feeding or do you have plans of pregnancy in the future? No  10. Have you been having any signs of worsening depression or suicidal ideations?  (benlysta only) No  11. Have there been any other new onset medical symptoms? No      Post Infusion  Assessment:  Patient tolerated injection without incident.  Patient observed for 30 minutes post xolair per protocol.  Site patent and intact, free from redness, edema or discomfort.  Biologic Infusion Post Education: Call the triage nurse at your clinic or seek medical attention if you have chills and/or temperature greater than or equal to 100.5, uncontrolled nausea/vomiting, diarrhea, constipation, dizziness, shortness of breath, chest pain, heart palpitations, weakness or any other new or concerning symptoms, questions or concerns.  You cannot have any live virus vaccines prior to or during treatment or up to 6 months post infusion.  If you have an upcoming surgery, medical procedure or dental procedure during treatment, this should be discussed with your ordering physician and your surgeon/dentist.  If you are having any concerning symptom, if you are unsure if you should get your next infusion or wish to speak to a provider before your next infusion, please call your care coordinator or triage nurse at your clinic to notify them so we can adequately serve you.     Discharge Plan:   Discharge instructions reviewed with: Patient.  Patient and/or family verbalized understanding of discharge instructions and all questions answered.  AVS to patient via [employer].  Patient will return 10/24/2022 for next appointment.   Patient discharged in stable condition accompanied by: self.  Departure Mode: Ambulatory.      Lyn Bunn RN

## 2022-10-25 ENCOUNTER — INFUSION THERAPY VISIT (OUTPATIENT)
Dept: INFUSION THERAPY | Facility: CLINIC | Age: 52
End: 2022-10-25
Attending: ALLERGY & IMMUNOLOGY
Payer: COMMERCIAL

## 2022-10-25 VITALS
SYSTOLIC BLOOD PRESSURE: 130 MMHG | DIASTOLIC BLOOD PRESSURE: 82 MMHG | TEMPERATURE: 96.5 F | OXYGEN SATURATION: 99 % | RESPIRATION RATE: 16 BRPM | HEART RATE: 85 BPM

## 2022-10-25 DIAGNOSIS — J45.50 SEVERE PERSISTENT ASTHMA WITHOUT COMPLICATION (H): Primary | ICD-10-CM

## 2022-10-25 PROCEDURE — 96372 THER/PROPH/DIAG INJ SC/IM: CPT

## 2022-10-25 PROCEDURE — 250N000011 HC RX IP 250 OP 636

## 2022-10-25 RX ADMIN — OMALIZUMAB 375 MG: 150 INJECTION, SOLUTION SUBCUTANEOUS at 08:45

## 2022-10-25 ASSESSMENT — PAIN SCALES - GENERAL: PAINLEVEL: NO PAIN (0)

## 2022-10-25 NOTE — PROGRESS NOTES
Infusion Nursing Note:  Alba Varela presents today for Xolair.    Patient seen by provider today: No   present during visit today: Not Applicable.    Note: Tolerating Xolair.  Denies recent increase in SOB or cough.    Intravenous Access:  No Intravenous access/labs at this visit.    Treatment Conditions:  Biological Infusion Checklist:  ~~~ NOTE: If the patient answers yes to any of the questions below, hold the infusion and contact ordering provider or on-call provider.    1. Have you recently had an elevated temperature, fever, chills, productive cough, coughing for 3 weeks or longer or hemoptysis, abnormal vital signs, night sweats,  chest pain or have you noticed a decrease in your appetite, unexplained weight loss or fatigue? No  2. Do you have any open wounds or new incisions? No  3. Do you have any recent or upcoming hospitalizations, surgeries or dental procedures? No  4. Do you currently have or recently have had any signs of illness or infection or are you on any antibiotics? No  5. Have you had any new, sudden or worsening abdominal pain? No  6. Have you or anyone in your household received a live vaccination in the past 4 weeks? Please note:  No live vaccines while on biologic/chemotherapy until 6 months after the last treatment.  Patient can receive the flu vaccine (shot only) and the pneumovax.  It is optimal for the patient to get these vaccines mid cycle, but they can be given at any time as long as it is not on the day of the infusion. No  7. Have you recently been diagnosed with any new nervous system diseases (ie. Multiple sclerosis, Guillain [place], seizures, neurological changes) or cancer diagnosis? No  8. Are you on any form of radiation or chemotherapy? No  9. Are you pregnant or breast feeding or do you have plans of pregnancy in the future? No  10. Have you been having any signs of worsening depression or suicidal ideations?  (benlysta only) No  11. Have there been any other new  onset medical symptoms? No      Post Infusion Assessment:  Patient tolerated injection without incident.  Patient observed for 30 minutes post Xolair per protocol.     Discharge Plan:   Discharge instructions reviewed with: Patient.  Patient discharged in stable condition accompanied by: self.  Departure Mode: Ambulatory.  Next injection is scheduled for 11/7/22.      SIERRA RICKETTS RN

## 2022-10-27 ENCOUNTER — MEDICAL CORRESPONDENCE (OUTPATIENT)
Dept: INFUSION THERAPY | Facility: CLINIC | Age: 52
End: 2022-10-27

## 2022-11-07 ENCOUNTER — INFUSION THERAPY VISIT (OUTPATIENT)
Dept: INFUSION THERAPY | Facility: CLINIC | Age: 52
End: 2022-11-07
Attending: ALLERGY & IMMUNOLOGY
Payer: COMMERCIAL

## 2022-11-07 VITALS
SYSTOLIC BLOOD PRESSURE: 120 MMHG | HEART RATE: 86 BPM | RESPIRATION RATE: 16 BRPM | TEMPERATURE: 96.8 F | DIASTOLIC BLOOD PRESSURE: 79 MMHG

## 2022-11-07 DIAGNOSIS — J45.50 SEVERE PERSISTENT ASTHMA WITHOUT COMPLICATION (H): Primary | ICD-10-CM

## 2022-11-07 PROCEDURE — 250N000011 HC RX IP 250 OP 636: Performed by: STUDENT IN AN ORGANIZED HEALTH CARE EDUCATION/TRAINING PROGRAM

## 2022-11-07 PROCEDURE — 96372 THER/PROPH/DIAG INJ SC/IM: CPT | Performed by: STUDENT IN AN ORGANIZED HEALTH CARE EDUCATION/TRAINING PROGRAM

## 2022-11-07 RX ADMIN — OMALIZUMAB 375 MG: 150 INJECTION, SOLUTION SUBCUTANEOUS at 08:43

## 2022-11-07 ASSESSMENT — PAIN SCALES - GENERAL: PAINLEVEL: NO PAIN (0)

## 2022-11-07 NOTE — PROGRESS NOTES
Infusion Nursing Note:  Alba Varela presents today for Xolair.    Patient seen by provider today: No   present during visit today: Not Applicable.    Note: N/A.    Intravenous Access:  No Intravenous access/labs at this visit.    Treatment Conditions:  Per Dr. Sanderson: Biologic Checklist no longer needed prior to Xolair injections. Pharmacy to update plan.     Post Infusion Assessment:  Patient tolerated injection without incident.  Patient observed for 30 minutes post Xolair per protocol.  Site patent and intact, free from redness, edema or discomfort.     Discharge Plan:   [facility]  Patient will return 11/21/22 for next appointment.   Patient discharged in stable condition accompanied by: self.  Departure Mode: Ambulatory.      Susana Tenorio RN

## 2022-11-21 ENCOUNTER — INFUSION THERAPY VISIT (OUTPATIENT)
Dept: INFUSION THERAPY | Facility: CLINIC | Age: 52
End: 2022-11-21
Attending: ALLERGY & IMMUNOLOGY
Payer: COMMERCIAL

## 2022-11-21 VITALS
OXYGEN SATURATION: 97 % | SYSTOLIC BLOOD PRESSURE: 121 MMHG | DIASTOLIC BLOOD PRESSURE: 84 MMHG | TEMPERATURE: 97.6 F | RESPIRATION RATE: 16 BRPM | HEART RATE: 73 BPM

## 2022-11-21 DIAGNOSIS — J45.50 SEVERE PERSISTENT ASTHMA WITHOUT COMPLICATION (H): Primary | ICD-10-CM

## 2022-11-21 DIAGNOSIS — E78.2 MIXED HYPERLIPIDEMIA: ICD-10-CM

## 2022-11-21 LAB
ALT SERPL W P-5'-P-CCNC: 26 U/L (ref 10–35)
CHOLEST SERPL-MCNC: 198 MG/DL
HDLC SERPL-MCNC: 52 MG/DL
LDLC SERPL CALC-MCNC: 93 MG/DL
NONHDLC SERPL-MCNC: 146 MG/DL
TRIGL SERPL-MCNC: 263 MG/DL

## 2022-11-21 PROCEDURE — 96372 THER/PROPH/DIAG INJ SC/IM: CPT | Performed by: STUDENT IN AN ORGANIZED HEALTH CARE EDUCATION/TRAINING PROGRAM

## 2022-11-21 PROCEDURE — 250N000011 HC RX IP 250 OP 636: Performed by: STUDENT IN AN ORGANIZED HEALTH CARE EDUCATION/TRAINING PROGRAM

## 2022-11-21 PROCEDURE — 36415 COLL VENOUS BLD VENIPUNCTURE: CPT

## 2022-11-21 PROCEDURE — 84460 ALANINE AMINO (ALT) (SGPT): CPT | Performed by: INTERNAL MEDICINE

## 2022-11-21 PROCEDURE — 80061 LIPID PANEL: CPT | Performed by: INTERNAL MEDICINE

## 2022-11-21 RX ADMIN — OMALIZUMAB 375 MG: 150 INJECTION, SOLUTION SUBCUTANEOUS at 08:16

## 2022-11-21 NOTE — PROGRESS NOTES
Infusion Nursing Note:  Alba Varela presents today for Xolair, labs.    Patient seen by provider today: No   present during visit today: Not Applicable.    Note: N/A.    Intravenous Access:  Lab draw site right AC, Needle type butterfly, Gauge 23.  Labs drawn without difficulty.    Treatment Conditions:  Not Applicable.    Post Infusion Assessment:  Patient tolerated injection without incident.  Patient observed for 30 minutes post xolair per protocol.     Discharge Plan:   Discharge instructions reviewed with: Patient.  Patient and/or family verbalized understanding of discharge instructions and all questions answered.  Copy of AVS reviewed with patient and/or family.  Patient will return 12/5/22 for next appointment.  Patient discharged in stable condition accompanied by: self.  Departure Mode: Ambulatory.      Dannielle Valladares RN

## 2022-12-05 ENCOUNTER — INFUSION THERAPY VISIT (OUTPATIENT)
Dept: INFUSION THERAPY | Facility: CLINIC | Age: 52
End: 2022-12-05
Attending: ALLERGY & IMMUNOLOGY
Payer: COMMERCIAL

## 2022-12-05 VITALS
RESPIRATION RATE: 16 BRPM | TEMPERATURE: 97.7 F | SYSTOLIC BLOOD PRESSURE: 139 MMHG | HEART RATE: 91 BPM | DIASTOLIC BLOOD PRESSURE: 83 MMHG | OXYGEN SATURATION: 98 %

## 2022-12-05 DIAGNOSIS — J45.50 SEVERE PERSISTENT ASTHMA WITHOUT COMPLICATION (H): Primary | ICD-10-CM

## 2022-12-05 PROCEDURE — 96372 THER/PROPH/DIAG INJ SC/IM: CPT | Performed by: STUDENT IN AN ORGANIZED HEALTH CARE EDUCATION/TRAINING PROGRAM

## 2022-12-05 PROCEDURE — 250N000011 HC RX IP 250 OP 636: Performed by: STUDENT IN AN ORGANIZED HEALTH CARE EDUCATION/TRAINING PROGRAM

## 2022-12-05 RX ADMIN — OMALIZUMAB 375 MG: 150 INJECTION, SOLUTION SUBCUTANEOUS at 08:25

## 2022-12-05 NOTE — PROGRESS NOTES
Infusion Nursing Note:  Alba Varela presents today for Xolair.    Patient seen by provider today: No   present during visit today: Not Applicable.    Note: N/A.    Intravenous Access:  No Intravenous access/labs at this visit.    Treatment Conditions:  Not Applicable.    Post Infusion Assessment:  Patient tolerated injection without incident.  Patient observed for 30 minutes post xolair per protocol.     Discharge Plan:   Discharge instructions reviewed with: Patient.  Copy of AVS reviewed with patient and/or family.  Patient will return 12/19/2022 for next appointment.  Patient discharged in stable condition accompanied by: self.  Departure Mode: Ambulatory.      Lyn Bunn RN

## 2022-12-19 ENCOUNTER — INFUSION THERAPY VISIT (OUTPATIENT)
Dept: INFUSION THERAPY | Facility: CLINIC | Age: 52
End: 2022-12-19
Attending: ALLERGY & IMMUNOLOGY
Payer: COMMERCIAL

## 2022-12-19 VITALS
DIASTOLIC BLOOD PRESSURE: 78 MMHG | SYSTOLIC BLOOD PRESSURE: 128 MMHG | HEART RATE: 78 BPM | RESPIRATION RATE: 16 BRPM | TEMPERATURE: 97.8 F | OXYGEN SATURATION: 99 %

## 2022-12-19 DIAGNOSIS — J45.50 SEVERE PERSISTENT ASTHMA WITHOUT COMPLICATION (H): Primary | ICD-10-CM

## 2022-12-19 PROCEDURE — 96372 THER/PROPH/DIAG INJ SC/IM: CPT | Performed by: STUDENT IN AN ORGANIZED HEALTH CARE EDUCATION/TRAINING PROGRAM

## 2022-12-19 PROCEDURE — 250N000011 HC RX IP 250 OP 636: Performed by: STUDENT IN AN ORGANIZED HEALTH CARE EDUCATION/TRAINING PROGRAM

## 2022-12-19 RX ADMIN — OMALIZUMAB 375 MG: 150 INJECTION, SOLUTION SUBCUTANEOUS at 08:28

## 2022-12-19 NOTE — PROGRESS NOTES
Infusion Nursing Note:  Alba Varela presents today for Xolair.    Patient seen by provider today: No   present during visit today: Not Applicable.    Note: Alba reports she is feeling well today.  She denies any concerning signs or symptoms and states she is tolerating Xolair without any issues.    Intravenous Access:  No Intravenous access/labs at this visit.    Treatment Conditions:  Not Applicable.    Post Infusion Assessment:  Patient tolerated injection without incident.  Patient observed for 30 minutes post Xolair per protocol.     Discharge Plan:   Discharge instructions reviewed with: Patient.  Patient and/or family verbalized understanding of discharge instructions and all questions answered.  AVS to patient via [employer].  Patient will return 1/2 for next appointment.   Patient discharged in stable condition accompanied by: self.  Departure Mode: Ambulatory.      Jake Marquis RN

## 2023-01-02 ENCOUNTER — INFUSION THERAPY VISIT (OUTPATIENT)
Dept: INFUSION THERAPY | Facility: CLINIC | Age: 53
End: 2023-01-02
Attending: ALLERGY & IMMUNOLOGY
Payer: COMMERCIAL

## 2023-01-02 VITALS
TEMPERATURE: 98.1 F | SYSTOLIC BLOOD PRESSURE: 115 MMHG | OXYGEN SATURATION: 98 % | DIASTOLIC BLOOD PRESSURE: 80 MMHG | HEART RATE: 85 BPM | RESPIRATION RATE: 16 BRPM

## 2023-01-02 DIAGNOSIS — J45.50 SEVERE PERSISTENT ASTHMA WITHOUT COMPLICATION (H): Primary | ICD-10-CM

## 2023-01-02 PROCEDURE — 250N000011 HC RX IP 250 OP 636: Performed by: STUDENT IN AN ORGANIZED HEALTH CARE EDUCATION/TRAINING PROGRAM

## 2023-01-02 PROCEDURE — 96372 THER/PROPH/DIAG INJ SC/IM: CPT | Performed by: STUDENT IN AN ORGANIZED HEALTH CARE EDUCATION/TRAINING PROGRAM

## 2023-01-02 RX ADMIN — OMALIZUMAB 375 MG: 150 INJECTION, SOLUTION SUBCUTANEOUS at 09:50

## 2023-01-02 NOTE — PROGRESS NOTES
Nursing Note:  Alba Varela presents today for xolair.    Patient seen by provider today: No   present during visit today: Not Applicable.    Note: feeling well.    Intravenous Access:  No Intravenous access/labs at this visit.    Discharge Plan:   Observed for 30 minutes post Xolair injections. Discharged to home    Vivi Rosado RN

## 2023-01-07 ENCOUNTER — HEALTH MAINTENANCE LETTER (OUTPATIENT)
Age: 53
End: 2023-01-07

## 2023-01-16 ENCOUNTER — INFUSION THERAPY VISIT (OUTPATIENT)
Dept: INFUSION THERAPY | Facility: CLINIC | Age: 53
End: 2023-01-16
Attending: ALLERGY & IMMUNOLOGY
Payer: COMMERCIAL

## 2023-01-16 VITALS
HEART RATE: 89 BPM | RESPIRATION RATE: 16 BRPM | SYSTOLIC BLOOD PRESSURE: 127 MMHG | DIASTOLIC BLOOD PRESSURE: 85 MMHG | TEMPERATURE: 97.4 F | OXYGEN SATURATION: 100 %

## 2023-01-16 DIAGNOSIS — J45.50 SEVERE PERSISTENT ASTHMA WITHOUT COMPLICATION (H): Primary | ICD-10-CM

## 2023-01-16 PROCEDURE — 96372 THER/PROPH/DIAG INJ SC/IM: CPT | Performed by: STUDENT IN AN ORGANIZED HEALTH CARE EDUCATION/TRAINING PROGRAM

## 2023-01-16 PROCEDURE — 250N000011 HC RX IP 250 OP 636: Performed by: STUDENT IN AN ORGANIZED HEALTH CARE EDUCATION/TRAINING PROGRAM

## 2023-01-16 RX ADMIN — OMALIZUMAB 375 MG: 150 INJECTION, SOLUTION SUBCUTANEOUS at 08:14

## 2023-01-16 NOTE — PROGRESS NOTES
Infusion Nursing Note:  Alba Varela presents today for Xolair.    Patient seen by provider today: No   present during visit today: Not Applicable.    Note: N/A.    Intravenous Access:  No Intravenous access/labs at this visit.    Treatment Conditions:  Not Applicable.    Post Infusion Assessment:  Patient tolerated injection without incident.  Patient observed for 30 minutes post xolair per protocol.     Discharge Plan:   Discharge instructions reviewed with: Patient.  Patient and/or family verbalized understanding of discharge instructions and all questions answered.  AVS to patient via [employer].  Patient will return 1/30/2023 for next appointment.   Patient discharged in stable condition accompanied by: self.  Departure Mode: Ambulatory.      Lyn Bunn RN

## 2023-01-25 ENCOUNTER — VIRTUAL VISIT (OUTPATIENT)
Dept: INTERNAL MEDICINE | Facility: CLINIC | Age: 53
End: 2023-01-25
Payer: COMMERCIAL

## 2023-01-25 DIAGNOSIS — J45.50 SEVERE PERSISTENT ASTHMA WITHOUT COMPLICATION (H): ICD-10-CM

## 2023-01-25 DIAGNOSIS — E66.811 CLASS 1 OBESITY WITH BODY MASS INDEX (BMI) OF 30.0 TO 30.9 IN ADULT, UNSPECIFIED OBESITY TYPE, UNSPECIFIED WHETHER SERIOUS COMORBIDITY PRESENT: Primary | ICD-10-CM

## 2023-01-25 PROCEDURE — 99213 OFFICE O/P EST LOW 20 MIN: CPT | Mod: 95 | Performed by: INTERNAL MEDICINE

## 2023-01-25 ASSESSMENT — ASTHMA QUESTIONNAIRES: ACT_TOTALSCORE: 24

## 2023-01-25 NOTE — PROGRESS NOTES
"Alba is a 52 year old who is being evaluated via a billable video visit.      How would you like to obtain your AVS? MyChart  If the video visit is dropped, the invitation should be resent by: Text to cell phone: [phone]  Will anyone else be joining your video visit? No          Assessment & Plan       (E66.9,  Z68.30) Class 1 obesity with body mass index (BMI) of 30.0 to 30.9 in adult, unspecified obesity type, unspecified whether serious comorbidity present  Plan:. Discussed in detail about Diet,calorie intake,and importance of regular exercise .  Patient was referred to comprehensive weight management clinic for further evaluation and management.  Comprehensive Weight Management           (J45.50) Severe persistent asthma without complication  Plan: Stable, follows up with allergy specialist and is on Xolair injections twice a month        22 minutes spent on the date of the encounter doing chart review, history and exam, documentation and further activities per the note       BMI:   Estimated body mass index is 30.07 kg/m  as calculated from the following:    Height as of 8/3/22: 1.626 m (5' 4\").    Weight as of 8/3/22: 79.5 kg (175 lb 3.2 oz).   Weight management plan: Discussed healthy diet and exercise guidelines      Return in about 6 months (around 8/7/2023) for Physical Exam.    Lion Allen MD  [facility]    Subjective   Alba is a 52 year old presenting for the following health issues:  No chief complaint on file.      HPI       Pt is a 52 year old female who is seen here to day to discuss wt issues, patient does states she is eating healthy diet and but much exercise other than walking and has not been losing weight.  No history of thyroid disease, patient states her current height is 5 feet 4 inches and weight 178 pounds body mass index calculated at 30.35.    Patient follows up with the [facility] neurology for migraine headaches and gait gets Botox injection and is " also on amitriptyline and gabapentin.    Has history of asthma and follows up with the allergy and asthma specialist and on XOLAIR injection every 2 weeks      Past Medical History:   Diagnosis Date     Abdominal pain      Anxiety      Asthma     On Dupixent(Xolair) injections     C. difficile colitis      Chest discomfort      Colitis      Headache(784.0) 12/10/2014     High cholesterol      Hyperlipidemia      Liver disease     Being evalted for fatty liver disease. ABnormal LFT.     Migraines      PAC (premature atrial contraction)      PONV (postoperative nausea and vomiting)      PVC (premature ventricular contraction)      Sleep apnea     Doesn't use a CPAP     Syncope      Tobacco abuse      Tremor        Current Outpatient Medications   Medication Sig Dispense Refill     AMITRIPTYLINE HCL PO Take 20 mg by mouth At Bedtime Through [facility]       diltiazem (CARDIZEM CD) 120 MG 24 hr capsule Take 1 capsule (120 mg) by mouth daily You are due to see the cardiologist- please call [phone] to schedule. (Patient taking differently: Take 120 mg by mouth daily Through cardiologist Dr. Byrd- you are due to see the cardiologist- please call [phone] to schedule.) 30 capsule 11     gabapentin (NEURONTIN) 100 MG capsule Take 100 mg in the morning and 400 mg at bedtime for one week, then increase to 100 mg in the morning and 600 mg at bedtime. (Patient taking differently: Through [facility] -  Take 400 mg in the morning and 600 mg at bedtime.  Pt report 100 mg in and 900 mg at HS) 210 capsule 1     loratadine (CLARITIN) 10 MG tablet Take 10 mg by mouth daily       omalizumab (XOLAIR) 150 MG injection Inject Subcutaneous every 14 days Through  asthma specialist       OnabotulinumtoxinA (BOTOX IJ) As directed-pt reports every 60 days       rosuvastatin (CRESTOR) 5 MG tablet TAKE ONE TABLET BY MOUTH EVERY DAY 90 tablet 0     venlafaxine (EFFEXOR-XR) 150 MG 24 hr capsule Take 150 mg by mouth daily Through  [facility]            Review of Systems   CONSTITUTIONAL: NEGATIVE for fever, chills, change in weight  RESP: NEGATIVE for significant cough or SOB  CV: NEGATIVE for chest pain, palpitations or peripheral edema  NEURO: History of migraines      Objective           Vitals:  No vitals were obtained today due to virtual visit.    Physical Exam   GENERAL: Healthy, alert and no distress  EYES: Eyes grossly normal to inspection.  No discharge or erythema, or obvious scleral/conjunctival abnormalities.  RESP: No audible wheeze, cough, or visible cyanosis.  No visible retractions or increased work of breathing.    PSYCH: Mentation appears normal, affect normal/bright, judgement and insight intact, normal speech and appearance well-groomed.       Video-Visit Details    Type of service:  Video Visit   Video Start Time: 11:50 AM  Video End Time:12:05 PM    Originating Location (pt. Location): Home    Distant Location (provider location):  On-site  Platform used for Video Visit: Castro

## 2023-01-30 ENCOUNTER — INFUSION THERAPY VISIT (OUTPATIENT)
Dept: INFUSION THERAPY | Facility: CLINIC | Age: 53
End: 2023-01-30
Attending: ALLERGY & IMMUNOLOGY
Payer: COMMERCIAL

## 2023-01-30 VITALS
DIASTOLIC BLOOD PRESSURE: 83 MMHG | SYSTOLIC BLOOD PRESSURE: 121 MMHG | OXYGEN SATURATION: 98 % | HEART RATE: 75 BPM | TEMPERATURE: 97.4 F

## 2023-01-30 DIAGNOSIS — J45.50 SEVERE PERSISTENT ASTHMA WITHOUT COMPLICATION (H): Primary | ICD-10-CM

## 2023-01-30 PROCEDURE — 250N000011 HC RX IP 250 OP 636: Performed by: STUDENT IN AN ORGANIZED HEALTH CARE EDUCATION/TRAINING PROGRAM

## 2023-01-30 PROCEDURE — 96372 THER/PROPH/DIAG INJ SC/IM: CPT | Performed by: STUDENT IN AN ORGANIZED HEALTH CARE EDUCATION/TRAINING PROGRAM

## 2023-01-30 RX ADMIN — OMALIZUMAB 375 MG: 150 INJECTION, SOLUTION SUBCUTANEOUS at 09:04

## 2023-01-30 NOTE — PROGRESS NOTES
Infusion Nursing Note:  Alba Varela presents today for Xolair.    Patient seen by provider today: No   present during visit today: Not Applicable.    Note: N/A.    Intravenous Access:  No Intravenous access/labs at this visit.    Treatment Conditions:  Not Applicable.    Post Infusion Assessment:  Patient tolerated injection without incident.  Site patent and intact, free from redness, edema or discomfort.  No evidence of extravasations.   Patient observed for 30 minutes post Xolair injection.     Discharge Plan:   Discharge instructions reviewed with: Patient.  Patient and/or family verbalized understanding of discharge instructions and all questions answered.  AVS to patient via [employer].  Patient will return 2/13 for next appointment.   Patient discharged in stable condition accompanied by: self.  Departure Mode: Ambulatory.      Joyce Mackenzie RN

## 2023-02-08 DIAGNOSIS — E78.2 MIXED HYPERLIPIDEMIA: ICD-10-CM

## 2023-02-10 RX ORDER — ROSUVASTATIN CALCIUM 5 MG/1
TABLET, COATED ORAL
Qty: 90 TABLET | Refills: 1 | Status: SHIPPED | OUTPATIENT
Start: 2023-02-10 | End: 2023-06-21

## 2023-02-13 ENCOUNTER — INFUSION THERAPY VISIT (OUTPATIENT)
Dept: INFUSION THERAPY | Facility: CLINIC | Age: 53
End: 2023-02-13
Attending: ALLERGY & IMMUNOLOGY
Payer: COMMERCIAL

## 2023-02-13 VITALS
TEMPERATURE: 97.7 F | SYSTOLIC BLOOD PRESSURE: 111 MMHG | RESPIRATION RATE: 16 BRPM | OXYGEN SATURATION: 98 % | DIASTOLIC BLOOD PRESSURE: 76 MMHG | HEART RATE: 83 BPM

## 2023-02-13 DIAGNOSIS — J45.50 SEVERE PERSISTENT ASTHMA WITHOUT COMPLICATION (H): Primary | ICD-10-CM

## 2023-02-13 PROCEDURE — 250N000011 HC RX IP 250 OP 636: Performed by: STUDENT IN AN ORGANIZED HEALTH CARE EDUCATION/TRAINING PROGRAM

## 2023-02-13 PROCEDURE — 96372 THER/PROPH/DIAG INJ SC/IM: CPT | Performed by: STUDENT IN AN ORGANIZED HEALTH CARE EDUCATION/TRAINING PROGRAM

## 2023-02-13 RX ADMIN — OMALIZUMAB 375 MG: 150 INJECTION, SOLUTION SUBCUTANEOUS at 08:27

## 2023-02-13 NOTE — PROGRESS NOTES
Infusion Nursing Note:  Alba Varela presents today for Xolair.    Patient seen by provider today: No   present during visit today: Not Applicable.    Note: Patient is aware her orders are good for three more appointments and she will contact Dr. Sanderson.    Intravenous Access:  No Intravenous access/labs at this visit.    Treatment Conditions:  Not Applicable.    Post Infusion Assessment:  Patient tolerated injection without incident.  Patient observed for 30 minutes post xolair per protocol.     Discharge Plan:   AVS to patient via MYCHART.    Patient discharged in stable condition accompanied by: self.  Departure Mode: Ambulatory.      Sangita Edwards RN

## 2023-02-27 ENCOUNTER — INFUSION THERAPY VISIT (OUTPATIENT)
Dept: INFUSION THERAPY | Facility: CLINIC | Age: 53
End: 2023-02-27
Attending: ALLERGY & IMMUNOLOGY
Payer: COMMERCIAL

## 2023-02-27 VITALS
SYSTOLIC BLOOD PRESSURE: 131 MMHG | OXYGEN SATURATION: 97 % | TEMPERATURE: 98.2 F | RESPIRATION RATE: 16 BRPM | DIASTOLIC BLOOD PRESSURE: 87 MMHG | HEART RATE: 87 BPM

## 2023-02-27 DIAGNOSIS — J45.50 SEVERE PERSISTENT ASTHMA WITHOUT COMPLICATION (H): Primary | ICD-10-CM

## 2023-02-27 PROCEDURE — 96372 THER/PROPH/DIAG INJ SC/IM: CPT | Performed by: STUDENT IN AN ORGANIZED HEALTH CARE EDUCATION/TRAINING PROGRAM

## 2023-02-27 PROCEDURE — 250N000011 HC RX IP 250 OP 636: Performed by: STUDENT IN AN ORGANIZED HEALTH CARE EDUCATION/TRAINING PROGRAM

## 2023-02-27 RX ADMIN — OMALIZUMAB 375 MG: 150 INJECTION, SOLUTION SUBCUTANEOUS at 15:04

## 2023-02-27 NOTE — PROGRESS NOTES
Infusion Nursing Note:  Alba Varela presents today for Xolair.    Patient seen by provider today: No   present during visit today: Not Applicable.    Note: Injections given 2 in right arm, 1 in left arm.    Intravenous Access:  No Intravenous access/labs at this visit.    Treatment Conditions:  Not Applicable.    Post Infusion Assessment:  Patient tolerated injection without incident.  Patient observed for 30 minutes post xolair injection per protocol.     Discharge Plan:   Discharge instructions reviewed with: Patient.  Patient and/or family verbalized understanding of discharge instructions and all questions answered.  AVS to patient via [employer].  Patient will return 3/13 for next appointment.   Patient discharged in stable condition accompanied by: self.  Departure Mode: Ambulatory.      Viri Vidales RN

## 2023-03-13 ENCOUNTER — INFUSION THERAPY VISIT (OUTPATIENT)
Dept: INFUSION THERAPY | Facility: CLINIC | Age: 53
End: 2023-03-13
Attending: ALLERGY & IMMUNOLOGY
Payer: COMMERCIAL

## 2023-03-13 VITALS
OXYGEN SATURATION: 97 % | DIASTOLIC BLOOD PRESSURE: 78 MMHG | HEART RATE: 81 BPM | TEMPERATURE: 96.9 F | SYSTOLIC BLOOD PRESSURE: 120 MMHG

## 2023-03-13 DIAGNOSIS — J45.50 SEVERE PERSISTENT ASTHMA WITHOUT COMPLICATION (H): Primary | ICD-10-CM

## 2023-03-13 PROCEDURE — 96372 THER/PROPH/DIAG INJ SC/IM: CPT | Performed by: STUDENT IN AN ORGANIZED HEALTH CARE EDUCATION/TRAINING PROGRAM

## 2023-03-13 PROCEDURE — 250N000011 HC RX IP 250 OP 636: Performed by: STUDENT IN AN ORGANIZED HEALTH CARE EDUCATION/TRAINING PROGRAM

## 2023-03-13 RX ADMIN — OMALIZUMAB 375 MG: 150 INJECTION, SOLUTION SUBCUTANEOUS at 08:26

## 2023-03-13 NOTE — PROGRESS NOTES
Infusion Nursing Note:  Alba Varela presents today for Xolair.    Patient seen by provider today: No   present during visit today: Not Applicable.    Note: N/A.    Intravenous Access:  No Intravenous access/labs at this visit.    Treatment Conditions:  Not Applicable.    Post Infusion Assessment:  Patient tolerated injection without incident.  Patient observed for 30 minutes post Xolair per protocol.  Site patent and intact, free from redness, edema or discomfort.  No evidence of extravasations.     Discharge Plan:   Discharge instructions reviewed with: Patient.  Patient and/or family verbalized understanding of discharge instructions and all questions answered.  AVS to patient via [employer].  Patient will return 3/27 for next appointment.   Patient discharged in stable condition accompanied by: self.  Departure Mode: Ambulatory.      Joyce Mackenzie RN

## 2023-03-27 ENCOUNTER — INFUSION THERAPY VISIT (OUTPATIENT)
Dept: INFUSION THERAPY | Facility: CLINIC | Age: 53
End: 2023-03-27
Attending: ALLERGY & IMMUNOLOGY
Payer: COMMERCIAL

## 2023-03-27 VITALS
OXYGEN SATURATION: 98 % | HEART RATE: 89 BPM | TEMPERATURE: 98.4 F | DIASTOLIC BLOOD PRESSURE: 80 MMHG | SYSTOLIC BLOOD PRESSURE: 127 MMHG

## 2023-03-27 DIAGNOSIS — J45.50 SEVERE PERSISTENT ASTHMA WITHOUT COMPLICATION (H): Primary | ICD-10-CM

## 2023-03-27 PROCEDURE — 250N000011 HC RX IP 250 OP 636: Performed by: STUDENT IN AN ORGANIZED HEALTH CARE EDUCATION/TRAINING PROGRAM

## 2023-03-27 PROCEDURE — 96372 THER/PROPH/DIAG INJ SC/IM: CPT | Performed by: STUDENT IN AN ORGANIZED HEALTH CARE EDUCATION/TRAINING PROGRAM

## 2023-03-27 RX ADMIN — OMALIZUMAB 375 MG: 150 INJECTION, SOLUTION SUBCUTANEOUS at 08:25

## 2023-03-27 NOTE — PROGRESS NOTES
Infusion Nursing Note:  Alba Varela presents today for Xolair.    Patient seen by provider today: No   present during visit today: Not Applicable.    Note: Patient is aware to contact her provider to obtain more orders for Xolair as they ran out today.    Intravenous Access:  No Intravenous access/labs at this visit.    Treatment Conditions:  Not Applicable.    Post Infusion Assessment:  Patient tolerated injection without incident.  Patient observed for 30 minutes post xolair per protocol.     Discharge Plan:   AVS to patient via [employer].  Patient will return 4/10/23 for next appointment.   Patient discharged in stable condition accompanied by: self.  Departure Mode: Ambulatory.      Sangita Edwards RN

## 2023-04-05 ENCOUNTER — TELEPHONE (OUTPATIENT)
Dept: INTERNAL MEDICINE | Facility: CLINIC | Age: 53
End: 2023-04-05
Payer: COMMERCIAL

## 2023-04-05 NOTE — TELEPHONE ENCOUNTER
Call placed to patient. Patient no longer wants to attend appointment tomorrow. Patient to send e-visit today for testing as soon as possible.

## 2023-04-05 NOTE — TELEPHONE ENCOUNTER
Patient calls about future appointment.    Appointments in Next Year    Apr 06, 2023  3:30 PM  (Arrive by 3:10 PM)  Provider Visit with Lion Allen MD  [facility] ([facility] ) [phone]   Apr 10, 2023  8:00 AM  Infusion Visit with  FAST TRACK PLUS 2  [facility] ([facility] ) [phone]        Patient is immunocompromised. Patient's  tested positive for Strep yesterday. Patient wanted an in person appointment to test for strep but took first available visit with Dr. Allen.     Would provider want to change appointment to in person or could patient submit an e-visit?    Please advise.

## 2023-04-10 ENCOUNTER — INFUSION THERAPY VISIT (OUTPATIENT)
Dept: INFUSION THERAPY | Facility: CLINIC | Age: 53
End: 2023-04-10
Attending: ALLERGY & IMMUNOLOGY
Payer: COMMERCIAL

## 2023-04-10 VITALS
SYSTOLIC BLOOD PRESSURE: 119 MMHG | OXYGEN SATURATION: 98 % | HEART RATE: 84 BPM | TEMPERATURE: 97.7 F | DIASTOLIC BLOOD PRESSURE: 80 MMHG

## 2023-04-10 DIAGNOSIS — J45.50 SEVERE PERSISTENT ASTHMA WITHOUT COMPLICATION (H): Primary | ICD-10-CM

## 2023-04-10 PROCEDURE — 250N000011 HC RX IP 250 OP 636: Performed by: ALLERGY & IMMUNOLOGY

## 2023-04-10 PROCEDURE — 96372 THER/PROPH/DIAG INJ SC/IM: CPT | Performed by: ALLERGY & IMMUNOLOGY

## 2023-04-10 RX ADMIN — OMALIZUMAB 375 MG: 150 INJECTION, SOLUTION SUBCUTANEOUS at 08:10

## 2023-04-10 NOTE — PROGRESS NOTES
Infusion Nursing Note:  Alba Varela presents today for Xolair.    Patient seen by provider today: No   present during visit today: Not Applicable.    Note: N/A.    Intravenous Access:  No Intravenous access/labs at this visit.    Treatment Conditions:  Not Applicable.    Post Infusion Assessment:  Patient tolerated injection without incident.  Patient observed for 30 minutes post Xolair per protocol.  Site patent and intact, free from redness, edema or discomfort.  No evidence of extravasations.  Access discontinued per protocol.     Discharge Plan:   Discharge instructions reviewed with: Patient.  Patient and/or family verbalized understanding of discharge instructions and all questions answered.  AVS to patient via [employer].  Patient will return 4/24 for next appointment.   Patient discharged in stable condition accompanied by: self.  Departure Mode: Ambulatory.      Joyce Mackenzie RN

## 2023-04-24 ENCOUNTER — INFUSION THERAPY VISIT (OUTPATIENT)
Dept: INFUSION THERAPY | Facility: CLINIC | Age: 53
End: 2023-04-24
Attending: ALLERGY & IMMUNOLOGY
Payer: COMMERCIAL

## 2023-04-24 VITALS — SYSTOLIC BLOOD PRESSURE: 124 MMHG | DIASTOLIC BLOOD PRESSURE: 80 MMHG | HEART RATE: 84 BPM | OXYGEN SATURATION: 99 %

## 2023-04-24 DIAGNOSIS — J45.50 SEVERE PERSISTENT ASTHMA WITHOUT COMPLICATION (H): Primary | ICD-10-CM

## 2023-04-24 PROCEDURE — 250N000011 HC RX IP 250 OP 636: Performed by: ALLERGY & IMMUNOLOGY

## 2023-04-24 PROCEDURE — 96372 THER/PROPH/DIAG INJ SC/IM: CPT | Performed by: ALLERGY & IMMUNOLOGY

## 2023-04-24 RX ORDER — DIVALPROEX SODIUM 500 MG/1
500 TABLET, DELAYED RELEASE ORAL DAILY
COMMUNITY
End: 2024-07-17

## 2023-04-24 RX ADMIN — OMALIZUMAB 375 MG: 150 INJECTION, SOLUTION SUBCUTANEOUS at 08:22

## 2023-04-24 NOTE — PROGRESS NOTES
Infusion Nursing Note:  Alba Varela presents today for Xolair.    Patient seen by provider today: No   present during visit today: Not Applicable.    Note: Pt reports asthma well controlled.      Intravenous Access:  No Intravenous access/labs at this visit.    Treatment Conditions:  Not Applicable.      Post Infusion Assessment:  Patient tolerated injection without incident.  Patient observed for 30 minutes post Xolair per protocol.  Site patent and intact, free from redness, edema or discomfort.       Discharge Plan:   AVS to patient via MYCHART.  Patient will return 5/8/23 for next Xolair injection for next appointment.   Patient discharged in stable condition accompanied by: self.  Departure Mode: Ambulatory.      Mame Condon RN

## 2023-05-08 ENCOUNTER — INFUSION THERAPY VISIT (OUTPATIENT)
Dept: INFUSION THERAPY | Facility: CLINIC | Age: 53
End: 2023-05-08
Attending: ALLERGY & IMMUNOLOGY
Payer: COMMERCIAL

## 2023-05-08 VITALS
TEMPERATURE: 97.9 F | SYSTOLIC BLOOD PRESSURE: 132 MMHG | DIASTOLIC BLOOD PRESSURE: 86 MMHG | OXYGEN SATURATION: 100 % | HEART RATE: 95 BPM

## 2023-05-08 DIAGNOSIS — J45.50 SEVERE PERSISTENT ASTHMA WITHOUT COMPLICATION (H): Primary | ICD-10-CM

## 2023-05-08 PROCEDURE — 96372 THER/PROPH/DIAG INJ SC/IM: CPT | Performed by: ALLERGY & IMMUNOLOGY

## 2023-05-08 PROCEDURE — 250N000011 HC RX IP 250 OP 636: Performed by: ALLERGY & IMMUNOLOGY

## 2023-05-08 RX ADMIN — OMALIZUMAB 375 MG: 150 INJECTION, SOLUTION SUBCUTANEOUS at 12:31

## 2023-05-08 NOTE — PROGRESS NOTES
Infusion Nursing Note:  Alba Varela presents today for Xolair.    Patient seen by provider today: No   present during visit today: Not Applicable.    Note: N/A.      Intravenous Access:  No Intravenous access/labs at this visit.    Treatment Conditions:  Not Applicable.      Post Infusion Assessment:  Patient tolerated injection without incident.   Patient observed for 30 minutes post Xolair per protocol.      Discharge Plan:   Discharge instructions reviewed with: Patient.  Patient and/or family verbalized understanding of discharge instructions and all questions answered.  AVS to patient via [employer].  Patient will return 5/22 for next appointment.   Patient discharged in stable condition accompanied by: self.  Departure Mode: Ambulatory.      Joyce Mackenzie RN

## 2023-05-22 ENCOUNTER — INFUSION THERAPY VISIT (OUTPATIENT)
Dept: INFUSION THERAPY | Facility: CLINIC | Age: 53
End: 2023-05-22
Attending: ALLERGY & IMMUNOLOGY
Payer: COMMERCIAL

## 2023-05-22 VITALS
HEART RATE: 81 BPM | OXYGEN SATURATION: 97 % | DIASTOLIC BLOOD PRESSURE: 67 MMHG | TEMPERATURE: 98.1 F | SYSTOLIC BLOOD PRESSURE: 96 MMHG

## 2023-05-22 DIAGNOSIS — J45.50 SEVERE PERSISTENT ASTHMA WITHOUT COMPLICATION (H): Primary | ICD-10-CM

## 2023-05-22 DIAGNOSIS — F33.1 MAJOR DEPRESSIVE DISORDER, RECURRENT EPISODE, MODERATE (H): ICD-10-CM

## 2023-05-22 DIAGNOSIS — F41.9 ANXIETY DISORDER, UNSPECIFIED TYPE: ICD-10-CM

## 2023-05-22 LAB
ALBUMIN SERPL BCG-MCNC: 4.2 G/DL (ref 3.5–5.2)
ALP SERPL-CCNC: 65 U/L (ref 35–104)
ALT SERPL W P-5'-P-CCNC: 27 U/L (ref 10–35)
AMMONIA PLAS-SCNC: 34 UMOL/L (ref 11–51)
ANION GAP SERPL CALCULATED.3IONS-SCNC: 11 MMOL/L (ref 7–15)
AST SERPL W P-5'-P-CCNC: 21 U/L (ref 10–35)
BASOPHILS # BLD AUTO: 0.1 10E3/UL (ref 0–0.2)
BASOPHILS NFR BLD AUTO: 1 %
BILIRUB SERPL-MCNC: <0.2 MG/DL
BUN SERPL-MCNC: 30.7 MG/DL (ref 6–20)
CALCIUM SERPL-MCNC: 8.4 MG/DL (ref 8.6–10)
CHLORIDE SERPL-SCNC: 107 MMOL/L (ref 98–107)
CREAT SERPL-MCNC: 0.88 MG/DL (ref 0.51–0.95)
DEPRECATED HCO3 PLAS-SCNC: 22 MMOL/L (ref 22–29)
EOSINOPHIL # BLD AUTO: 0.2 10E3/UL (ref 0–0.7)
EOSINOPHIL NFR BLD AUTO: 4 %
ERYTHROCYTE [DISTWIDTH] IN BLOOD BY AUTOMATED COUNT: 12.5 % (ref 10–15)
GFR SERPL CREATININE-BSD FRML MDRD: 79 ML/MIN/1.73M2
GLUCOSE SERPL-MCNC: 113 MG/DL (ref 70–99)
HCT VFR BLD AUTO: 38.9 % (ref 35–47)
HGB BLD-MCNC: 12.6 G/DL (ref 11.7–15.7)
IMM GRANULOCYTES # BLD: 0 10E3/UL
IMM GRANULOCYTES NFR BLD: 0 %
LYMPHOCYTES # BLD AUTO: 2.6 10E3/UL (ref 0.8–5.3)
LYMPHOCYTES NFR BLD AUTO: 43 %
MCH RBC QN AUTO: 31 PG (ref 26.5–33)
MCHC RBC AUTO-ENTMCNC: 32.4 G/DL (ref 31.5–36.5)
MCV RBC AUTO: 96 FL (ref 78–100)
MONOCYTES # BLD AUTO: 0.4 10E3/UL (ref 0–1.3)
MONOCYTES NFR BLD AUTO: 8 %
NEUTROPHILS # BLD AUTO: 2.6 10E3/UL (ref 1.6–8.3)
NEUTROPHILS NFR BLD AUTO: 44 %
NRBC # BLD AUTO: 0 10E3/UL
NRBC BLD AUTO-RTO: 0 /100
PLATELET # BLD AUTO: 215 10E3/UL (ref 150–450)
POTASSIUM SERPL-SCNC: 4.3 MMOL/L (ref 3.4–5.3)
PROT SERPL-MCNC: 6.4 G/DL (ref 6.4–8.3)
RBC # BLD AUTO: 4.07 10E6/UL (ref 3.8–5.2)
SODIUM SERPL-SCNC: 140 MMOL/L (ref 136–145)
VALPROATE SERPL-MCNC: 60.9 UG/ML
WBC # BLD AUTO: 5.9 10E3/UL (ref 4–11)

## 2023-05-22 PROCEDURE — 80164 ASSAY DIPROPYLACETIC ACD TOT: CPT | Performed by: NURSE PRACTITIONER

## 2023-05-22 PROCEDURE — 250N000011 HC RX IP 250 OP 636: Performed by: ALLERGY & IMMUNOLOGY

## 2023-05-22 PROCEDURE — 85014 HEMATOCRIT: CPT | Performed by: NURSE PRACTITIONER

## 2023-05-22 PROCEDURE — 36415 COLL VENOUS BLD VENIPUNCTURE: CPT

## 2023-05-22 PROCEDURE — 82140 ASSAY OF AMMONIA: CPT | Performed by: NURSE PRACTITIONER

## 2023-05-22 PROCEDURE — 80053 COMPREHEN METABOLIC PANEL: CPT | Performed by: NURSE PRACTITIONER

## 2023-05-22 PROCEDURE — 96372 THER/PROPH/DIAG INJ SC/IM: CPT | Performed by: ALLERGY & IMMUNOLOGY

## 2023-05-22 RX ADMIN — OMALIZUMAB 375 MG: 150 INJECTION, SOLUTION SUBCUTANEOUS at 08:22

## 2023-05-22 NOTE — PROGRESS NOTES
Infusion Nursing Note:  Alba Varela presents today for xolair.    Patient seen by provider today: No   present during visit today: Not Applicable.    Note: NA.      Intravenous Access:  Lab draw site left AC, Needle type butterfly, Gauge 23. Outside orders.     Treatment Conditions:  Not Applicable.      Post Infusion Assessment:  Patient tolerated injection without incident.  Patient observed for 30 minutes post Xolair per protocol.       Discharge Plan:   Discharge instructions reviewed with: Patient.  Patient and/or family verbalized understanding of discharge instructions and all questions answered.  AVS to patient via [employer] [place] [employer].  Patient will return 2 weeks for next appointment.   Patient discharged in stable condition accompanied by: self.  Departure Mode: Ambulatory.      Carlos Ramirez RN

## 2023-06-02 NOTE — MR AVS SNAPSHOT
After Visit Summary   6/26/2018    Alba Varela    MRN: [id number]           Patient Information     Date Of Birth          1970        Visit Information        Provider Department      6/26/2018 9:00 AM RH INFUSION CHAIR 3 [facility] Infusion [facility]        Today's Diagnoses     Severe persistent asthma without complication    -  1       Follow-ups after your visit        Your next 10 appointments already scheduled     Jul 09, 2018  9:00 AM CDT   Level 1 with RH INFUSION CHAIR 7   [facility] Infusion [facility] ([facility])    [facility]  [address]  [place] [address]   [phone]            Jul 23, 2018  9:00 AM CDT   Level 1 with RH INFUSION CHAIR 6   [facility] Infusion [facility] ([facility])    [facility] [place] [facility]  [address]  [place] [address]   [phone]            Aug 06, 2018  9:00 AM CDT   Level 1 with RH INFUSION CHAIR 10   [facility] Infusion [facility] ([facility])    [facility]  [address]  [place] [address]   [phone]            Aug 20, 2018  9:00 AM CDT   Level 1 with RH INFUSION CHAIR 10   [facility] Infusion [facility] ([facility])    [facility]  [address]  [place] [address]   [phone]            Sep 04, 2018  9:00 AM CDT   Level 1 with RH INFUSION CHAIR 9   [facility] Infusion [facility] ([facility])    [facility] [place] [facility]  [address]  [place] [address]   [phone]              Who to contact     If you have questions or need follow up information about today's clinic visit or your schedule please contact [facility] INF[facility]ION [facility] directly at [phone].  Normal or non-critical  Dashawn Davis MD  Internal Medicine Staff     lab and imaging results will be communicated to you by Masterbranchhart, letter or phone within 4 business days after the clinic has received the results. If you do not hear from us within 7 days, please contact the clinic through [employer] or phone. If you have a critical or abnormal lab result, we will notify you by phone as soon as possible.  Submit refill requests through [employer] or call your pharmacy and they will forward the refill request to us. Please allow 3 business days for your refill to be completed.          Additional Information About Your Visit        MasterbranchharSav[employer]Systems Information     [employer] gives you secure access to your electronic health record. If you see a primary care provider, you can also send messages to your care team and make appointments. If you have questions, please call your primary care clinic.  If you do not have a primary care provider, please call [phone] and they will assist you.        Care EveryWhere ID     This is your Care EveryWhere ID. This could be used by other organizations to access your [facility] medical records  [id number]        Your Vitals Were     Temperature Respirations Last Period Pulse Oximetry          98.3  F (36.8  C) (Tympanic) 16 07/11/2014 98%         Blood Pressure from Last 3 Encounters:   06/26/18 118/82   06/12/18 119/83   05/29/18 123/85    Weight from Last 3 Encounters:   03/23/18 77.1 kg (170 lb)   11/03/17 75.8 kg (167 lb)   10/24/17 75.4 kg (166 lb 3.2 oz)              Today, you had the following     No orders found for display         Today's Medication Changes          These changes are accurate as of 6/26/18 10:04 AM.  If you have any questions, ask your nurse or doctor.               These medicines have changed or have updated prescriptions.        Dose/Directions    gabapentin 100 MG capsule   Commonly known as:  NEURONTIN   This may have changed:  additional instructions   Used for:  Cervicalgia        Take 100 mg in the morning and 400 mg at bedtime  for one week, then increase to 100 mg in the morning and 600 mg at bedtime.   Quantity:  210 capsule   Refills:  1                Primary Care Provider Office Phone # Fax #    Bright [facility]elo [phone] [address] [address]        Equal Access to Services     [facility] : Hadii aad ku hadasho Soomaali, waaxda luqadaha, qaybta kaalmada adeegyada, waxay idiin hayaan adeeg khurvashish la'aan . So [facility] [phone].    ATENCIÓN: Si habla español, tiene a rojas disposición servicios gratuitos de asistencia lingüística. Llame al [phone].    We comply with applicable federal civil rights laws and [place] laws. We do not discriminate on the basis of race, color, national origin, age, disability, sex, sexual orientation, or gender identity.            Thank you!     Thank you for choosing [facility] INFUSION [facility]  for your care. Our goal is always to provide you with excellent care. Hearing back from our patients is one way we can continue to improve our services. Please take a few minutes to complete the written survey that you may receive in the mail after your visit with us. Thank you!             Your Updated Medication List - Protect others around you: Learn how to safely use, store and throw away your medicines at www.disposemymeds.org.          This list is accurate as of 6/26/18 10:04 AM.  Always use your most recent med list.                   Brand Name Dispense Instructions for use Diagnosis    acetaminophen 325 MG tablet    TYLENOL    100 tablet    Take 2 tablets (650 mg) by mouth every 4 hours as needed for other (surgical pain)    Status post total replacement of right hip       BOTOX IJ      As directed        diltiazem 120 MG 24 hr capsule    CARDIZEM CD    30 capsule    Take 1 capsule (120 mg) by mouth daily You are due to see the cardiologist- please call [phone] to schedule.    Chest pressure       gabapentin 100 MG capsule     NEURONTIN    210 capsule    Take 100 mg in the morning and 400 mg at bedtime for one week, then increase to 100 mg in the morning and 600 mg at bedtime.    Cervicalgia       hydrOXYzine 25 MG tablet    ATARAX    60 tablet    Take 1 tablet (25 mg) by mouth every 6 hours as needed for itching (pain)    Status post total replacement of right hip       loratadine 10 MG tablet    CLARITIN     Take 10 mg by mouth daily        NORTRIPTYLINE HCL PO      Take 200 mg by mouth At Bedtime        oxyCODONE IR 5 MG tablet    ROXICODONE    80 tablet    Take 1-2 tablets (5-10 mg) by mouth every 3 hours as needed for moderate to severe pain    Status post total replacement of right hip       PAROXETINE HCL PO      Take 20 mg by mouth At Bedtime        senna-docusate 8.6-50 MG per tablet    SENOKOT-S;PERICOLACE    30 tablet    Take 1-2 tablets by mouth 2 times daily    Status post total replacement of right hip       XOLAIR 150 MG injection   Generic drug:  omalizumab      Inject Subcutaneous every 14 days    Severe persistent asthma without complication

## 2023-06-05 ENCOUNTER — INFUSION THERAPY VISIT (OUTPATIENT)
Dept: INFUSION THERAPY | Facility: CLINIC | Age: 53
End: 2023-06-05
Attending: ALLERGY & IMMUNOLOGY
Payer: COMMERCIAL

## 2023-06-05 VITALS
SYSTOLIC BLOOD PRESSURE: 121 MMHG | OXYGEN SATURATION: 98 % | HEART RATE: 82 BPM | TEMPERATURE: 98.1 F | DIASTOLIC BLOOD PRESSURE: 73 MMHG

## 2023-06-05 DIAGNOSIS — J45.50 SEVERE PERSISTENT ASTHMA WITHOUT COMPLICATION (H): Primary | ICD-10-CM

## 2023-06-05 PROCEDURE — 250N000011 HC RX IP 250 OP 636: Performed by: ALLERGY & IMMUNOLOGY

## 2023-06-05 PROCEDURE — 96372 THER/PROPH/DIAG INJ SC/IM: CPT | Performed by: ALLERGY & IMMUNOLOGY

## 2023-06-05 RX ADMIN — OMALIZUMAB 375 MG: 150 INJECTION, SOLUTION SUBCUTANEOUS at 08:27

## 2023-06-05 NOTE — PROGRESS NOTES
Nursing Note:  Alba Varela presents today for Xolair.    Patient seen by provider today: No   present during visit today: NA    Note: 2 into left arm, 1 into right arm. Patient observed for 30 minutes post Xolair per protocol.    Intravenous Access:  No Intravenous access/labs at this visit.    Discharge Plan:   Patient was sent to home.    Joyce Mackenzie RN

## 2023-06-07 ENCOUNTER — MYC MEDICAL ADVICE (OUTPATIENT)
Dept: INTERNAL MEDICINE | Facility: CLINIC | Age: 53
End: 2023-06-07
Payer: COMMERCIAL

## 2023-06-07 DIAGNOSIS — R73.09 ELEVATED GLUCOSE: Primary | ICD-10-CM

## 2023-06-19 ENCOUNTER — INFUSION THERAPY VISIT (OUTPATIENT)
Dept: INFUSION THERAPY | Facility: CLINIC | Age: 53
End: 2023-06-19
Attending: ALLERGY & IMMUNOLOGY
Payer: COMMERCIAL

## 2023-06-19 VITALS
DIASTOLIC BLOOD PRESSURE: 86 MMHG | OXYGEN SATURATION: 98 % | TEMPERATURE: 97.5 F | SYSTOLIC BLOOD PRESSURE: 124 MMHG | HEART RATE: 84 BPM

## 2023-06-19 DIAGNOSIS — R73.09 ELEVATED GLUCOSE: ICD-10-CM

## 2023-06-19 DIAGNOSIS — J45.50 SEVERE PERSISTENT ASTHMA WITHOUT COMPLICATION (H): Primary | ICD-10-CM

## 2023-06-19 LAB — HBA1C MFR BLD: 5.7 %

## 2023-06-19 PROCEDURE — 250N000011 HC RX IP 250 OP 636: Performed by: ALLERGY & IMMUNOLOGY

## 2023-06-19 PROCEDURE — 36415 COLL VENOUS BLD VENIPUNCTURE: CPT

## 2023-06-19 PROCEDURE — 96372 THER/PROPH/DIAG INJ SC/IM: CPT | Performed by: ALLERGY & IMMUNOLOGY

## 2023-06-19 PROCEDURE — 83036 HEMOGLOBIN GLYCOSYLATED A1C: CPT | Performed by: INTERNAL MEDICINE

## 2023-06-19 RX ADMIN — OMALIZUMAB 375 MG: 150 INJECTION, SOLUTION SUBCUTANEOUS at 08:42

## 2023-06-19 NOTE — PROGRESS NOTES
Infusion Nursing Note:  Alba Varela presents today for labs, Xoliar.    Patient seen by provider today: No   present during visit today: Not Applicable.    Note: Pt requesting Hgb A1C be drawn today (order in chart from Dr Allen)    2 injections to left arm, 1 injection to right arm.      Intravenous Access:  Lab draw site right hand, Needle type butterfly, Gauge 23.  Labs drawn without difficulty.    Treatment Conditions:  Not Applicable.      Post Infusion Assessment:  Patient tolerated injection without incident.  Patient observed for 30 minutes post Xolair per protocol.  Site patent and intact, free from redness, edema or discomfort.       Discharge Plan:   [facility]  Patient will return 7/3/23 for next Xolair injection for next appointment.   Patient discharged in stable condition accompanied by: self.  Departure Mode: Ambulatory.      Mame Condon RN

## 2023-06-21 DIAGNOSIS — E78.2 MIXED HYPERLIPIDEMIA: ICD-10-CM

## 2023-06-21 RX ORDER — ROSUVASTATIN CALCIUM 5 MG/1
TABLET, COATED ORAL
Qty: 90 TABLET | Refills: 0 | Status: SHIPPED | OUTPATIENT
Start: 2023-06-21 | End: 2023-11-27

## 2023-06-21 NOTE — TELEPHONE ENCOUNTER
Prescription approved per [facility] Refill Protocol.  Joan Moser RN

## 2023-07-03 ENCOUNTER — INFUSION THERAPY VISIT (OUTPATIENT)
Dept: INFUSION THERAPY | Facility: CLINIC | Age: 53
End: 2023-07-03
Attending: ALLERGY & IMMUNOLOGY
Payer: COMMERCIAL

## 2023-07-03 VITALS
DIASTOLIC BLOOD PRESSURE: 79 MMHG | TEMPERATURE: 98 F | SYSTOLIC BLOOD PRESSURE: 129 MMHG | OXYGEN SATURATION: 99 % | RESPIRATION RATE: 16 BRPM | HEART RATE: 89 BPM

## 2023-07-03 DIAGNOSIS — J45.50 SEVERE PERSISTENT ASTHMA WITHOUT COMPLICATION (H): Primary | ICD-10-CM

## 2023-07-03 PROCEDURE — 96372 THER/PROPH/DIAG INJ SC/IM: CPT | Performed by: ALLERGY & IMMUNOLOGY

## 2023-07-03 PROCEDURE — 250N000011 HC RX IP 250 OP 636: Mod: JZ | Performed by: ALLERGY & IMMUNOLOGY

## 2023-07-03 RX ADMIN — OMALIZUMAB 375 MG: 150 INJECTION, SOLUTION SUBCUTANEOUS at 09:20

## 2023-07-03 NOTE — PROGRESS NOTES
Infusion Nursing Note:  Alba Varela presents today for Xolair.    Patient seen by provider today: No   present during visit today: Not Applicable.    Note: N/A.      Intravenous Access: No IV access.    Treatment Conditions:  Not Applicable.      Post Infusion Assessment:  Patient tolerated injection without incident.  Patient observed for 30 minutes post Xolair per protocol.       Discharge Plan:   Discharge instructions reviewed with: Patient.  Copy of AVS reviewed with patient and/or family.  Patient will return 7/17/2023 for next appointment.  Patient discharged in stable condition accompanied by: self.  Departure Mode: Ambulatory.      Lyn Bunn RN

## 2023-07-05 ENCOUNTER — PATIENT OUTREACH (OUTPATIENT)
Dept: CARE COORDINATION | Facility: CLINIC | Age: 53
End: 2023-07-05
Payer: COMMERCIAL

## 2023-07-10 ENCOUNTER — TELEPHONE (OUTPATIENT)
Dept: INTERNAL MEDICINE | Facility: CLINIC | Age: 53
End: 2023-07-10
Payer: COMMERCIAL

## 2023-07-10 ENCOUNTER — TRANSFERRED RECORDS (OUTPATIENT)
Dept: HEALTH INFORMATION MANAGEMENT | Facility: CLINIC | Age: 53
End: 2023-07-10

## 2023-07-10 NOTE — TELEPHONE ENCOUNTER
Fax received from [facility] for review and signature.  Put in Dr. Wong's in basket.

## 2023-07-17 ENCOUNTER — INFUSION THERAPY VISIT (OUTPATIENT)
Dept: INFUSION THERAPY | Facility: CLINIC | Age: 53
End: 2023-07-17
Attending: ALLERGY & IMMUNOLOGY
Payer: COMMERCIAL

## 2023-07-17 VITALS
SYSTOLIC BLOOD PRESSURE: 130 MMHG | OXYGEN SATURATION: 96 % | RESPIRATION RATE: 16 BRPM | DIASTOLIC BLOOD PRESSURE: 85 MMHG | HEART RATE: 91 BPM | TEMPERATURE: 97.7 F

## 2023-07-17 DIAGNOSIS — J45.50 SEVERE PERSISTENT ASTHMA WITHOUT COMPLICATION (H): Primary | ICD-10-CM

## 2023-07-17 PROCEDURE — 250N000011 HC RX IP 250 OP 636: Mod: JZ | Performed by: ALLERGY & IMMUNOLOGY

## 2023-07-17 PROCEDURE — 96372 THER/PROPH/DIAG INJ SC/IM: CPT | Performed by: ALLERGY & IMMUNOLOGY

## 2023-07-17 RX ADMIN — OMALIZUMAB 375 MG: 150 INJECTION, SOLUTION SUBCUTANEOUS at 14:54

## 2023-07-17 ASSESSMENT — PAIN SCALES - GENERAL: PAINLEVEL: NO PAIN (0)

## 2023-07-17 NOTE — PROGRESS NOTES
Infusion Nursing Note:  Alba Varela presents today for Xolair.    Patient seen by provider today: No   present during visit today: Not Applicable.    Note: N/A.      Intravenous Access:  No Intravenous access/labs at this visit.    Treatment Conditions:  Not Applicable.      Post Infusion Assessment:  Patient tolerated injection without incident.  Patient observed for 30 minutes post xolair injection per protocol.       Discharge Plan:   Discharge instructions reviewed with: Patient.  Patient and/or family verbalized understanding of discharge instructions and all questions answered.  AVS to patient via [employer].  Patient will return 7/31 for next appointment.   Patient discharged in stable condition accompanied by: self.  Departure Mode: Ambulatory.      Viri Vidales RN

## 2023-07-18 ENCOUNTER — PATIENT OUTREACH (OUTPATIENT)
Dept: CARE COORDINATION | Facility: CLINIC | Age: 53
End: 2023-07-18
Payer: COMMERCIAL

## 2023-07-19 ENCOUNTER — PATIENT OUTREACH (OUTPATIENT)
Dept: CARE COORDINATION | Facility: CLINIC | Age: 53
End: 2023-07-19
Payer: COMMERCIAL

## 2023-07-31 ENCOUNTER — INFUSION THERAPY VISIT (OUTPATIENT)
Dept: INFUSION THERAPY | Facility: CLINIC | Age: 53
End: 2023-07-31
Attending: INTERNAL MEDICINE
Payer: COMMERCIAL

## 2023-07-31 VITALS
SYSTOLIC BLOOD PRESSURE: 106 MMHG | OXYGEN SATURATION: 99 % | HEART RATE: 81 BPM | DIASTOLIC BLOOD PRESSURE: 71 MMHG | RESPIRATION RATE: 16 BRPM | TEMPERATURE: 97.2 F

## 2023-07-31 DIAGNOSIS — J45.50 SEVERE PERSISTENT ASTHMA WITHOUT COMPLICATION (H): Primary | ICD-10-CM

## 2023-07-31 PROCEDURE — 96372 THER/PROPH/DIAG INJ SC/IM: CPT | Performed by: ALLERGY & IMMUNOLOGY

## 2023-07-31 PROCEDURE — 250N000011 HC RX IP 250 OP 636: Mod: JZ | Performed by: ALLERGY & IMMUNOLOGY

## 2023-07-31 RX ADMIN — OMALIZUMAB 375 MG: 150 INJECTION, SOLUTION SUBCUTANEOUS at 08:16

## 2023-07-31 ASSESSMENT — PAIN SCALES - GENERAL: PAINLEVEL: NO PAIN (0)

## 2023-07-31 NOTE — PROGRESS NOTES
Infusion Nursing Note:  Alba Varela presents today for Xolair.    Patient seen by provider today: No   present during visit today: Not Applicable.    Note: Tolerating Xolair.  Denies recent flares      Intravenous Access:  No Intravenous access/labs at this visit.    Treatment Conditions:  Not Applicable.      Post Infusion Assessment:  Patient tolerated injection without incident.   Observed patient for 30min. Post Xolair      Discharge Plan:   Discharge instructions reviewed with: Patient.  Patient discharged in stable condition accompanied by: self.  Departure Mode: Ambulatory.  Next injection is scheduled for 8/14/23.      SIERRA RICKETTS RN

## 2023-08-09 ENCOUNTER — TRANSFERRED RECORDS (OUTPATIENT)
Dept: HEALTH INFORMATION MANAGEMENT | Facility: CLINIC | Age: 53
End: 2023-08-09
Payer: COMMERCIAL

## 2023-08-14 ENCOUNTER — INFUSION THERAPY VISIT (OUTPATIENT)
Dept: INFUSION THERAPY | Facility: CLINIC | Age: 53
End: 2023-08-14
Attending: ALLERGY & IMMUNOLOGY
Payer: COMMERCIAL

## 2023-08-14 VITALS
SYSTOLIC BLOOD PRESSURE: 115 MMHG | HEART RATE: 91 BPM | OXYGEN SATURATION: 98 % | TEMPERATURE: 96.2 F | DIASTOLIC BLOOD PRESSURE: 79 MMHG

## 2023-08-14 DIAGNOSIS — J45.50 SEVERE PERSISTENT ASTHMA WITHOUT COMPLICATION (H): Primary | ICD-10-CM

## 2023-08-14 PROCEDURE — 250N000011 HC RX IP 250 OP 636: Mod: JZ | Performed by: ALLERGY & IMMUNOLOGY

## 2023-08-14 PROCEDURE — 96372 THER/PROPH/DIAG INJ SC/IM: CPT | Performed by: ALLERGY & IMMUNOLOGY

## 2023-08-14 RX ADMIN — OMALIZUMAB 375 MG: 150 INJECTION, SOLUTION SUBCUTANEOUS at 08:30

## 2023-08-14 NOTE — PROGRESS NOTES
Infusion Nursing Note:  Alba Varela presents today for Xolair.    Patient seen by provider today: No   present during visit today: Not Applicable.    Note: Pt reports asthma well controlled at this time.      Intravenous Access:  No Intravenous access/labs at this visit.    Treatment Conditions:  Not Applicable.      Post Infusion Assessment:  Patient tolerated injection without incident.  Patient observed for 30 minutes post Xolair per protocol.  Site patent and intact, free from redness, edema or discomfort.       Discharge Plan:   [facility]  Patient will return 8/28/23 for next Xolair injection for next appointment.   Patient discharged in stable condition accompanied by: self.  Departure Mode: Ambulatory.      Mame Condon RN

## 2023-08-15 ENCOUNTER — PATIENT OUTREACH (OUTPATIENT)
Dept: CARE COORDINATION | Facility: CLINIC | Age: 53
End: 2023-08-15
Payer: COMMERCIAL

## 2023-08-28 ENCOUNTER — INFUSION THERAPY VISIT (OUTPATIENT)
Dept: INFUSION THERAPY | Facility: CLINIC | Age: 53
End: 2023-08-28
Attending: ALLERGY & IMMUNOLOGY
Payer: COMMERCIAL

## 2023-08-28 VITALS
HEART RATE: 83 BPM | OXYGEN SATURATION: 100 % | DIASTOLIC BLOOD PRESSURE: 76 MMHG | SYSTOLIC BLOOD PRESSURE: 118 MMHG | TEMPERATURE: 97.4 F

## 2023-08-28 DIAGNOSIS — J45.50 SEVERE PERSISTENT ASTHMA WITHOUT COMPLICATION (H): Primary | ICD-10-CM

## 2023-08-28 PROCEDURE — 96372 THER/PROPH/DIAG INJ SC/IM: CPT | Performed by: ALLERGY & IMMUNOLOGY

## 2023-08-28 PROCEDURE — 250N000011 HC RX IP 250 OP 636: Mod: JZ | Performed by: ALLERGY & IMMUNOLOGY

## 2023-08-28 RX ADMIN — OMALIZUMAB 375 MG: 150 INJECTION, SOLUTION SUBCUTANEOUS at 10:26

## 2023-08-28 NOTE — PROGRESS NOTES
Infusion Nursing Note:  Alba Varela presents today for Xolair.    Patient seen by provider today: No   present during visit today: Not Applicable.    Note: N/A.      Intravenous Access:  No Intravenous access/labs at this visit.    Treatment Conditions:  Not Applicable.      Post Infusion Assessment:  Patient tolerated injection without incident.  Patient observed for 30 minutes post Xolair per protocol.  Site patent and intact, free from redness, edema or discomfort.       Discharge Plan:   AVS to patient via MYCHART.  Patient will return 9/11/23 for next dose of Xolair for next appointment.   Patient discharged in stable condition accompanied by: self.  Departure Mode: Ambulatory.      Mame Condon RN

## 2023-09-11 ENCOUNTER — INFUSION THERAPY VISIT (OUTPATIENT)
Dept: INFUSION THERAPY | Facility: CLINIC | Age: 53
End: 2023-09-11
Attending: ALLERGY & IMMUNOLOGY
Payer: COMMERCIAL

## 2023-09-11 VITALS
HEART RATE: 87 BPM | RESPIRATION RATE: 16 BRPM | TEMPERATURE: 97.2 F | SYSTOLIC BLOOD PRESSURE: 130 MMHG | DIASTOLIC BLOOD PRESSURE: 80 MMHG | OXYGEN SATURATION: 98 %

## 2023-09-11 DIAGNOSIS — J45.50 SEVERE PERSISTENT ASTHMA WITHOUT COMPLICATION (H): Primary | ICD-10-CM

## 2023-09-11 PROCEDURE — 250N000011 HC RX IP 250 OP 636: Mod: JZ | Performed by: ALLERGY & IMMUNOLOGY

## 2023-09-11 PROCEDURE — 96372 THER/PROPH/DIAG INJ SC/IM: CPT | Performed by: ALLERGY & IMMUNOLOGY

## 2023-09-11 RX ADMIN — OMALIZUMAB 375 MG: 150 INJECTION, SOLUTION SUBCUTANEOUS at 10:25

## 2023-09-11 NOTE — PROGRESS NOTES
Infusion Nursing Note:  Alba Varela presents today for Xolair.    Patient seen by provider today: No   present during visit today: Not Applicable.    Note: N/A.      Intravenous Access:  No Intravenous access/labs at this visit.    Treatment Conditions:  Not Applicable.      Post Infusion Assessment:  Patient tolerated injection without incident.  Patient observed for 30 minutes post Xolair per protocol.       Discharge Plan:   Discharge instructions reviewed with: Patient.  Patient and/or family verbalized understanding of discharge instructions and all questions answered.  AVS to patient via [employer].  Patient will return 09/25/2023 for next appointment.   Patient discharged in stable condition accompanied by: self.  Departure Mode: Ambulatory.      Lyn Bunn RN

## 2023-09-23 ENCOUNTER — HEALTH MAINTENANCE LETTER (OUTPATIENT)
Age: 53
End: 2023-09-23

## 2023-09-25 ENCOUNTER — INFUSION THERAPY VISIT (OUTPATIENT)
Dept: INFUSION THERAPY | Facility: CLINIC | Age: 53
End: 2023-09-25
Attending: ALLERGY & IMMUNOLOGY
Payer: COMMERCIAL

## 2023-09-25 VITALS
RESPIRATION RATE: 16 BRPM | SYSTOLIC BLOOD PRESSURE: 124 MMHG | OXYGEN SATURATION: 99 % | HEART RATE: 79 BPM | DIASTOLIC BLOOD PRESSURE: 84 MMHG | TEMPERATURE: 97.6 F

## 2023-09-25 DIAGNOSIS — J45.50 SEVERE PERSISTENT ASTHMA WITHOUT COMPLICATION (H): Primary | ICD-10-CM

## 2023-09-25 PROCEDURE — 96372 THER/PROPH/DIAG INJ SC/IM: CPT | Performed by: ALLERGY & IMMUNOLOGY

## 2023-09-25 PROCEDURE — 250N000011 HC RX IP 250 OP 636: Mod: JZ | Performed by: ALLERGY & IMMUNOLOGY

## 2023-09-25 RX ADMIN — OMALIZUMAB 375 MG: 150 INJECTION, SOLUTION SUBCUTANEOUS at 08:45

## 2023-09-25 ASSESSMENT — PAIN SCALES - GENERAL: PAINLEVEL: NO PAIN (0)

## 2023-09-25 NOTE — PROGRESS NOTES
Infusion Nursing Note:  Alba Varela presents today for Xolair.    Patient seen by provider today: No   present during visit today: Not Applicable.    Note: Denies SE to Xolair.  Denies recent asthma flares.       Intravenous Access:  No Intravenous access/labs at this visit.    Treatment Conditions:  Not Applicable.      Post Infusion Assessment:  Patient tolerated injection without incident.  Patient observed for 30 minutes post Xolair per protocol.       Discharge Plan:   Discharge instructions reviewed with: Patient.  Patient discharged in stable condition accompanied by: self.  Departure Mode: Ambulatory.  Next injection is scheduled for 10/9/23.    SIERRA RICKETTS RN

## 2023-10-09 ENCOUNTER — INFUSION THERAPY VISIT (OUTPATIENT)
Dept: INFUSION THERAPY | Facility: CLINIC | Age: 53
End: 2023-10-09
Attending: ALLERGY & IMMUNOLOGY
Payer: COMMERCIAL

## 2023-10-09 VITALS
TEMPERATURE: 98.1 F | SYSTOLIC BLOOD PRESSURE: 124 MMHG | OXYGEN SATURATION: 98 % | DIASTOLIC BLOOD PRESSURE: 82 MMHG | HEART RATE: 101 BPM | RESPIRATION RATE: 18 BRPM

## 2023-10-09 DIAGNOSIS — J45.50 SEVERE PERSISTENT ASTHMA WITHOUT COMPLICATION (H): Primary | ICD-10-CM

## 2023-10-09 PROCEDURE — 250N000011 HC RX IP 250 OP 636: Mod: JZ | Performed by: ALLERGY & IMMUNOLOGY

## 2023-10-09 PROCEDURE — 96372 THER/PROPH/DIAG INJ SC/IM: CPT | Performed by: ALLERGY & IMMUNOLOGY

## 2023-10-09 RX ADMIN — OMALIZUMAB 375 MG: 150 INJECTION, SOLUTION SUBCUTANEOUS at 09:50

## 2023-10-09 NOTE — PROGRESS NOTES
Infusion Nursing Note:  Alba Varela presents today for Xolair.    Patient seen by provider today: No   present during visit today: Not Applicable.    Note: Alba reports asthma is in good control.  She denies any new or concerning symptoms.    Intravenous Access:  No Intravenous access/labs at this visit.    Treatment Conditions:  Not Applicable.    Post Infusion Assessment:  Patient tolerated injection without incident.  Patient observed for 30 minutes post Xolair per protocol.     Discharge Plan:   Discharge instructions reviewed with: Patient.  Patient and/or family verbalized understanding of discharge instructions and all questions answered.  AVS to patient via [employer].  Patient will return 10/23 for next appointment.   Patient discharged in stable condition accompanied by: self.  Departure Mode: Ambulatory.      Jake Marquis RN

## 2023-10-23 ENCOUNTER — INFUSION THERAPY VISIT (OUTPATIENT)
Dept: INFUSION THERAPY | Facility: CLINIC | Age: 53
End: 2023-10-23
Attending: ALLERGY & IMMUNOLOGY
Payer: COMMERCIAL

## 2023-10-23 VITALS
DIASTOLIC BLOOD PRESSURE: 91 MMHG | SYSTOLIC BLOOD PRESSURE: 142 MMHG | TEMPERATURE: 97.6 F | OXYGEN SATURATION: 98 % | HEART RATE: 89 BPM

## 2023-10-23 DIAGNOSIS — J45.50 SEVERE PERSISTENT ASTHMA WITHOUT COMPLICATION (H): Primary | ICD-10-CM

## 2023-10-23 PROCEDURE — 250N000011 HC RX IP 250 OP 636: Mod: JZ | Performed by: ALLERGY & IMMUNOLOGY

## 2023-10-23 PROCEDURE — 96372 THER/PROPH/DIAG INJ SC/IM: CPT | Performed by: ALLERGY & IMMUNOLOGY

## 2023-10-23 RX ADMIN — OMALIZUMAB 375 MG: 150 INJECTION, SOLUTION SUBCUTANEOUS at 08:16

## 2023-10-23 NOTE — PROGRESS NOTES
Infusion Nursing Note:  Alba Varela presents today for Xolair.    Patient seen by provider today: No   present during visit today: Not Applicable.    Note: Pt reports asthma has been well controlled.      Intravenous Access:  No Intravenous access/labs at this visit.    Treatment Conditions:  Not Applicable.      Post Infusion Assessment:  Patient tolerated injection without incident.  Patient observed for 30 minutes post Xolair per protocol.  Site patent and intact, free from redness, edema or discomfort.       Discharge Plan:   AVS to patient via MYCHART.  Patient will return 11/6/23 for next Xolair injection for next appointment.   Patient discharged in stable condition accompanied by: self.  Departure Mode: Ambulatory.      Mame Condon RN

## 2023-10-26 ENCOUNTER — TRANSFERRED RECORDS (OUTPATIENT)
Dept: HEALTH INFORMATION MANAGEMENT | Facility: CLINIC | Age: 53
End: 2023-10-26
Payer: COMMERCIAL

## 2023-11-06 ENCOUNTER — INFUSION THERAPY VISIT (OUTPATIENT)
Dept: INFUSION THERAPY | Facility: CLINIC | Age: 53
End: 2023-11-06
Attending: ALLERGY & IMMUNOLOGY
Payer: COMMERCIAL

## 2023-11-06 VITALS
OXYGEN SATURATION: 98 % | HEART RATE: 91 BPM | TEMPERATURE: 97.9 F | SYSTOLIC BLOOD PRESSURE: 122 MMHG | DIASTOLIC BLOOD PRESSURE: 83 MMHG

## 2023-11-06 DIAGNOSIS — F33.1 MAJOR DEPRESSIVE DISORDER, RECURRENT EPISODE, MODERATE (H): ICD-10-CM

## 2023-11-06 DIAGNOSIS — J45.50 SEVERE PERSISTENT ASTHMA WITHOUT COMPLICATION (H): Primary | ICD-10-CM

## 2023-11-06 LAB
ALBUMIN SERPL BCG-MCNC: 4.1 G/DL (ref 3.5–5.2)
ALP SERPL-CCNC: 53 U/L (ref 35–104)
ALT SERPL W P-5'-P-CCNC: 24 U/L (ref 0–50)
AMMONIA PLAS-SCNC: 57 UMOL/L (ref 11–51)
ANION GAP SERPL CALCULATED.3IONS-SCNC: 11 MMOL/L (ref 7–15)
AST SERPL W P-5'-P-CCNC: 20 U/L (ref 0–45)
BASOPHILS # BLD AUTO: 0 10E3/UL (ref 0–0.2)
BASOPHILS NFR BLD AUTO: 1 %
BILIRUB SERPL-MCNC: <0.2 MG/DL
BUN SERPL-MCNC: 23 MG/DL (ref 6–20)
CALCIUM SERPL-MCNC: 9.1 MG/DL (ref 8.6–10)
CHLORIDE SERPL-SCNC: 108 MMOL/L (ref 98–107)
CHOLEST SERPL-MCNC: 179 MG/DL
CREAT SERPL-MCNC: 0.72 MG/DL (ref 0.51–0.95)
DEPRECATED HCO3 PLAS-SCNC: 23 MMOL/L (ref 22–29)
EGFRCR SERPLBLD CKD-EPI 2021: >90 ML/MIN/1.73M2
EOSINOPHIL # BLD AUTO: 0.2 10E3/UL (ref 0–0.7)
EOSINOPHIL NFR BLD AUTO: 3 %
ERYTHROCYTE [DISTWIDTH] IN BLOOD BY AUTOMATED COUNT: 12.3 % (ref 10–15)
GLUCOSE SERPL-MCNC: 111 MG/DL (ref 70–99)
HBA1C MFR BLD: 5.8 %
HCT VFR BLD AUTO: 35.5 % (ref 35–47)
HDLC SERPL-MCNC: 50 MG/DL
HGB BLD-MCNC: 11.6 G/DL (ref 11.7–15.7)
IMM GRANULOCYTES # BLD: 0 10E3/UL
IMM GRANULOCYTES NFR BLD: 0 %
LDLC SERPL CALC-MCNC: 96 MG/DL
LYMPHOCYTES # BLD AUTO: 3.1 10E3/UL (ref 0.8–5.3)
LYMPHOCYTES NFR BLD AUTO: 51 %
MCH RBC QN AUTO: 31.2 PG (ref 26.5–33)
MCHC RBC AUTO-ENTMCNC: 32.7 G/DL (ref 31.5–36.5)
MCV RBC AUTO: 95 FL (ref 78–100)
MONOCYTES # BLD AUTO: 0.5 10E3/UL (ref 0–1.3)
MONOCYTES NFR BLD AUTO: 8 %
NEUTROPHILS # BLD AUTO: 2.2 10E3/UL (ref 1.6–8.3)
NEUTROPHILS NFR BLD AUTO: 37 %
NONHDLC SERPL-MCNC: 129 MG/DL
NRBC # BLD AUTO: 0 10E3/UL
NRBC BLD AUTO-RTO: 0 /100
PLATELET # BLD AUTO: 218 10E3/UL (ref 150–450)
POTASSIUM SERPL-SCNC: 3.9 MMOL/L (ref 3.4–5.3)
PROT SERPL-MCNC: 6.3 G/DL (ref 6.4–8.3)
RBC # BLD AUTO: 3.72 10E6/UL (ref 3.8–5.2)
SODIUM SERPL-SCNC: 142 MMOL/L (ref 135–145)
TRIGL SERPL-MCNC: 163 MG/DL
TSH SERPL DL<=0.005 MIU/L-ACNC: 3.09 UIU/ML (ref 0.3–4.2)
VALPROATE SERPL-MCNC: 73 UG/ML
WBC # BLD AUTO: 5.9 10E3/UL (ref 4–11)

## 2023-11-06 PROCEDURE — 80164 ASSAY DIPROPYLACETIC ACD TOT: CPT

## 2023-11-06 PROCEDURE — 96372 THER/PROPH/DIAG INJ SC/IM: CPT | Performed by: ALLERGY & IMMUNOLOGY

## 2023-11-06 PROCEDURE — 250N000011 HC RX IP 250 OP 636: Mod: JZ | Performed by: ALLERGY & IMMUNOLOGY

## 2023-11-06 PROCEDURE — 83036 HEMOGLOBIN GLYCOSYLATED A1C: CPT

## 2023-11-06 PROCEDURE — 36415 COLL VENOUS BLD VENIPUNCTURE: CPT

## 2023-11-06 PROCEDURE — 80053 COMPREHEN METABOLIC PANEL: CPT

## 2023-11-06 PROCEDURE — 84443 ASSAY THYROID STIM HORMONE: CPT

## 2023-11-06 PROCEDURE — 82140 ASSAY OF AMMONIA: CPT

## 2023-11-06 PROCEDURE — 80061 LIPID PANEL: CPT

## 2023-11-06 PROCEDURE — 85025 COMPLETE CBC W/AUTO DIFF WBC: CPT

## 2023-11-06 RX ADMIN — OMALIZUMAB: 150 INJECTION, SOLUTION SUBCUTANEOUS at 08:15

## 2023-11-06 NOTE — PROGRESS NOTES
Infusion Nursing Note:  Alba Varela presents today for labs/Xolair.    Patient seen by provider today: No   present during visit today: Not Applicable.    Note: Pt requesting labs to be drawn from outside provider. Has order with her via personal email. Labs drawn per written order from Dr Jerald Ravi. Pt will stop at  prior to discharge from clinic to have email copy faxed to us for scanning.      Intravenous Access:  Lab draw site left hand, Needle type butterfly, Gauge 23.  Labs drawn without difficulty.    Treatment Conditions:  Not Applicable.      Post Infusion Assessment:  Patient tolerated injection without incident.  Patient observed for 30 minutes post Xolair per protocol.  Site patent and intact, free from redness, edema or discomfort.       Discharge Plan:   [facility]  Patient will return 11/20/23 for next Xolair injection for next appointment.   Patient discharged in stable condition accompanied by: self.  Departure Mode: Ambulatory.      Mame Condon RN

## 2023-11-13 ENCOUNTER — TRANSFERRED RECORDS (OUTPATIENT)
Dept: HEALTH INFORMATION MANAGEMENT | Facility: CLINIC | Age: 53
End: 2023-11-13

## 2023-11-20 ENCOUNTER — INFUSION THERAPY VISIT (OUTPATIENT)
Dept: INFUSION THERAPY | Facility: CLINIC | Age: 53
End: 2023-11-20
Attending: ALLERGY & IMMUNOLOGY
Payer: COMMERCIAL

## 2023-11-20 VITALS
SYSTOLIC BLOOD PRESSURE: 120 MMHG | DIASTOLIC BLOOD PRESSURE: 74 MMHG | OXYGEN SATURATION: 97 % | RESPIRATION RATE: 16 BRPM | TEMPERATURE: 97.3 F | HEART RATE: 89 BPM

## 2023-11-20 DIAGNOSIS — J45.50 SEVERE PERSISTENT ASTHMA WITHOUT COMPLICATION (H): Primary | ICD-10-CM

## 2023-11-20 PROCEDURE — 250N000011 HC RX IP 250 OP 636: Mod: JZ | Performed by: ALLERGY & IMMUNOLOGY

## 2023-11-20 PROCEDURE — 96372 THER/PROPH/DIAG INJ SC/IM: CPT | Performed by: ALLERGY & IMMUNOLOGY

## 2023-11-20 RX ADMIN — OMALIZUMAB: 150 INJECTION, SOLUTION SUBCUTANEOUS at 08:19

## 2023-11-20 ASSESSMENT — PAIN SCALES - GENERAL: PAINLEVEL: NO PAIN (0)

## 2023-11-20 NOTE — PROGRESS NOTES
Infusion Nursing Note:  Alba Varela presents today for Xolair.    Patient seen by provider today: No   present during visit today: Not Applicable.    Note: N/A.      Intravenous Access:  No Intravenous access/labs at this visit.    Treatment Conditions:  Not Applicable.      Post Infusion Assessment:  Patient tolerated injection without incident.  Patient observed for 30 minutes post injection per protocol.       Discharge Plan:   Discharge instructions reviewed with: Patient.  Patient and/or family verbalized understanding of discharge instructions and all questions answered.  AVS to patient via [employer].  Patient will return 12/4 for next appointment.   Patient discharged in stable condition accompanied by: self.  Departure Mode: Ambulatory.      Viri Vidales RN

## 2023-11-27 ENCOUNTER — MYC REFILL (OUTPATIENT)
Dept: INTERNAL MEDICINE | Facility: CLINIC | Age: 53
End: 2023-11-27
Payer: COMMERCIAL

## 2023-11-27 DIAGNOSIS — E78.2 MIXED HYPERLIPIDEMIA: ICD-10-CM

## 2023-11-27 RX ORDER — ROSUVASTATIN CALCIUM 5 MG/1
5 TABLET, COATED ORAL DAILY
Qty: 90 TABLET | Refills: 0 | Status: SHIPPED | OUTPATIENT
Start: 2023-11-27 | End: 2024-03-06

## 2023-12-02 ENCOUNTER — HEALTH MAINTENANCE LETTER (OUTPATIENT)
Age: 53
End: 2023-12-02

## 2023-12-04 ENCOUNTER — INFUSION THERAPY VISIT (OUTPATIENT)
Dept: INFUSION THERAPY | Facility: CLINIC | Age: 53
End: 2023-12-04
Attending: ALLERGY & IMMUNOLOGY
Payer: COMMERCIAL

## 2023-12-04 VITALS
RESPIRATION RATE: 16 BRPM | OXYGEN SATURATION: 98 % | HEART RATE: 96 BPM | TEMPERATURE: 97.1 F | DIASTOLIC BLOOD PRESSURE: 88 MMHG | SYSTOLIC BLOOD PRESSURE: 138 MMHG

## 2023-12-04 DIAGNOSIS — J45.50 SEVERE PERSISTENT ASTHMA WITHOUT COMPLICATION (H): Primary | ICD-10-CM

## 2023-12-04 PROCEDURE — 250N000011 HC RX IP 250 OP 636: Mod: JZ | Performed by: ALLERGY & IMMUNOLOGY

## 2023-12-04 PROCEDURE — 96372 THER/PROPH/DIAG INJ SC/IM: CPT | Performed by: ALLERGY & IMMUNOLOGY

## 2023-12-04 RX ADMIN — OMALIZUMAB: 150 INJECTION, SOLUTION SUBCUTANEOUS at 08:37

## 2023-12-04 NOTE — PROGRESS NOTES
Infusion Nursing Note:  Alba Varela presents today for Xolair.    Patient seen by provider today: No   present during visit today: Not Applicable.    Note: n/a.      Intravenous Access:  No Intravenous access/labs at this visit.    Treatment Conditions:  Not Applicable.      Post Infusion Assessment:  Patient tolerated injection without incident.  Patient observed for 20 minutes (left at her own discretion) post injection per protocol.       Discharge Plan:   Discharge instructions reviewed with: Patient.  Patient and/or family verbalized understanding of discharge instructions and all questions answered.  AVS to patient via [employer].  Patient will return 12/18 for next appointment.   Patient discharged in stable condition accompanied by: self.  Departure Mode: Ambulatory.      Dannielle Cohen RN

## 2023-12-18 ENCOUNTER — INFUSION THERAPY VISIT (OUTPATIENT)
Dept: INFUSION THERAPY | Facility: CLINIC | Age: 53
End: 2023-12-18
Attending: ALLERGY & IMMUNOLOGY
Payer: COMMERCIAL

## 2023-12-18 VITALS
HEART RATE: 94 BPM | OXYGEN SATURATION: 96 % | RESPIRATION RATE: 16 BRPM | SYSTOLIC BLOOD PRESSURE: 129 MMHG | DIASTOLIC BLOOD PRESSURE: 82 MMHG | TEMPERATURE: 98.5 F

## 2023-12-18 DIAGNOSIS — J45.50 SEVERE PERSISTENT ASTHMA WITHOUT COMPLICATION (H): Primary | ICD-10-CM

## 2023-12-18 PROCEDURE — 250N000011 HC RX IP 250 OP 636: Mod: JZ | Performed by: ALLERGY & IMMUNOLOGY

## 2023-12-18 PROCEDURE — 96372 THER/PROPH/DIAG INJ SC/IM: CPT | Performed by: ALLERGY & IMMUNOLOGY

## 2023-12-18 RX ADMIN — OMALIZUMAB: 150 INJECTION, SOLUTION SUBCUTANEOUS at 08:31

## 2023-12-18 NOTE — PROGRESS NOTES
Infusion Nursing Note:  Alba Varela presents today for xolair injection.    Patient seen by provider today: No   present during visit today: Not Applicable.    Note: pt does not wait for the entire 30 minute obs period. Observed for approx. 15 minutes today.      Intravenous Access:  No Intravenous access/labs at this visit.    Treatment Conditions:  Not Applicable.      Post Infusion Assessment:  Patient tolerated injection without incident.  Patient observed for 15 minutes post xolair per protocol.       Discharge Plan:   Discharge instructions reviewed with: Patient.  Patient and/or family verbalized understanding of discharge instructions and all questions answered.  AVS to patient via [employer].  Patient will return 1/15/24 for next appointment.   Patient discharged in stable condition accompanied by: self.  Departure Mode: Ambulatory.      Dannielle Cohen RN

## 2024-01-15 ENCOUNTER — INFUSION THERAPY VISIT (OUTPATIENT)
Dept: INFUSION THERAPY | Facility: CLINIC | Age: 54
End: 2024-01-15
Attending: ALLERGY & IMMUNOLOGY
Payer: COMMERCIAL

## 2024-01-15 VITALS
DIASTOLIC BLOOD PRESSURE: 79 MMHG | TEMPERATURE: 96.8 F | OXYGEN SATURATION: 98 % | SYSTOLIC BLOOD PRESSURE: 115 MMHG | HEART RATE: 87 BPM | RESPIRATION RATE: 18 BRPM

## 2024-01-15 DIAGNOSIS — J45.50 SEVERE PERSISTENT ASTHMA WITHOUT COMPLICATION (H): Primary | ICD-10-CM

## 2024-01-15 PROCEDURE — 250N000011 HC RX IP 250 OP 636: Mod: JZ | Performed by: ALLERGY & IMMUNOLOGY

## 2024-01-15 PROCEDURE — 96372 THER/PROPH/DIAG INJ SC/IM: CPT | Performed by: ALLERGY & IMMUNOLOGY

## 2024-01-15 RX ADMIN — OMALIZUMAB: 150 INJECTION, SOLUTION SUBCUTANEOUS at 08:22

## 2024-01-15 NOTE — PROGRESS NOTES
Infusion Nursing Note:  Alba Varela presents today for Xolair.    Patient seen by provider today: No   present during visit today: Not Applicable.    Note: pt opted to not wait the 30 minute observation period today.       Intravenous Access:  No Intravenous access/labs at this visit.    Treatment Conditions:  Not Applicable.      Post Infusion Assessment:  Patient tolerated injection without incident.  Site patent and intact, free from redness, edema or discomfort.       Discharge Plan:   Discharge instructions reviewed with: Patient.  Patient and/or family verbalized understanding of discharge instructions and all questions answered.  AVS to patient via [employer].  Patient will schedule next appt on her own, is trying to get approved to do injections at home.  Patient discharged in stable condition accompanied by: self.  Departure Mode: Ambulatory.      Gracy Zhao RN

## 2024-01-29 ENCOUNTER — INFUSION THERAPY VISIT (OUTPATIENT)
Dept: INFUSION THERAPY | Facility: CLINIC | Age: 54
End: 2024-01-29
Attending: ALLERGY & IMMUNOLOGY
Payer: COMMERCIAL

## 2024-01-29 VITALS
DIASTOLIC BLOOD PRESSURE: 86 MMHG | SYSTOLIC BLOOD PRESSURE: 131 MMHG | OXYGEN SATURATION: 95 % | TEMPERATURE: 97.5 F | RESPIRATION RATE: 16 BRPM | HEART RATE: 93 BPM

## 2024-01-29 DIAGNOSIS — J45.50 SEVERE PERSISTENT ASTHMA WITHOUT COMPLICATION (H): Primary | ICD-10-CM

## 2024-01-29 PROCEDURE — 250N000011 HC RX IP 250 OP 636: Mod: JZ | Performed by: ALLERGY & IMMUNOLOGY

## 2024-01-29 PROCEDURE — 96372 THER/PROPH/DIAG INJ SC/IM: CPT | Performed by: ALLERGY & IMMUNOLOGY

## 2024-01-29 RX ADMIN — OMALIZUMAB: 150 INJECTION, SOLUTION SUBCUTANEOUS at 08:27

## 2024-01-29 NOTE — PROGRESS NOTES
Infusion Nursing Note:  Alba Varela presents today for Xolair.    Patient seen by provider today: No   present during visit today: Not Applicable.    Note: N/A.      Intravenous Access:  No Intravenous access/labs at this visit.    Treatment Conditions:  Not Applicable.      Post Infusion Assessment:  Patient tolerated injection without incident in left and right posterior arms.  Patient declined to be observed for 30 minutes post Xolair per protocol.  Sites patent and intact, free from redness, edema or discomfort.       Discharge Plan:   AVS to patient via [employer].  Patient will return 2/12/24 for next appointment.   Patient discharged in stable condition accompanied by: self.  Departure Mode: Ambulatory.      Viri Narayan RN

## 2024-01-30 ENCOUNTER — TRANSFERRED RECORDS (OUTPATIENT)
Dept: HEALTH INFORMATION MANAGEMENT | Facility: CLINIC | Age: 54
End: 2024-01-30
Payer: COMMERCIAL

## 2024-02-05 ENCOUNTER — LAB (OUTPATIENT)
Dept: LAB | Facility: CLINIC | Age: 54
End: 2024-02-05
Payer: COMMERCIAL

## 2024-02-05 ENCOUNTER — OFFICE VISIT (OUTPATIENT)
Dept: INTERNAL MEDICINE | Facility: CLINIC | Age: 54
End: 2024-02-05
Payer: COMMERCIAL

## 2024-02-05 VITALS
DIASTOLIC BLOOD PRESSURE: 83 MMHG | OXYGEN SATURATION: 99 % | SYSTOLIC BLOOD PRESSURE: 126 MMHG | WEIGHT: 195.2 LBS | HEART RATE: 85 BPM | BODY MASS INDEX: 33.51 KG/M2

## 2024-02-05 DIAGNOSIS — Z12.11 SPECIAL SCREENING FOR MALIGNANT NEOPLASMS, COLON: ICD-10-CM

## 2024-02-05 DIAGNOSIS — R63.5 WEIGHT GAIN: Primary | ICD-10-CM

## 2024-02-05 DIAGNOSIS — R63.5 WEIGHT GAIN: ICD-10-CM

## 2024-02-05 DIAGNOSIS — J02.9 SORE THROAT: ICD-10-CM

## 2024-02-05 DIAGNOSIS — M35.00 SICCA, UNSPECIFIED TYPE (H): ICD-10-CM

## 2024-02-05 DIAGNOSIS — R74.8 ELEVATED LIVER ENZYMES: ICD-10-CM

## 2024-02-05 LAB
ALBUMIN SERPL BCG-MCNC: 4.5 G/DL (ref 3.5–5.2)
ALP SERPL-CCNC: 54 U/L (ref 40–150)
ALT SERPL W P-5'-P-CCNC: 25 U/L (ref 0–50)
AMMONIA PLAS-SCNC: 18 UMOL/L (ref 11–51)
ANION GAP SERPL CALCULATED.3IONS-SCNC: 10 MMOL/L (ref 7–15)
AST SERPL W P-5'-P-CCNC: 24 U/L (ref 0–45)
BASOPHILS # BLD AUTO: 0 10E3/UL (ref 0–0.2)
BASOPHILS NFR BLD AUTO: 1 %
BILIRUB SERPL-MCNC: 0.2 MG/DL
BUN SERPL-MCNC: 17.8 MG/DL (ref 6–20)
CALCIUM SERPL-MCNC: 9.3 MG/DL (ref 8.6–10)
CHLORIDE SERPL-SCNC: 104 MMOL/L (ref 98–107)
CHOLEST SERPL-MCNC: 182 MG/DL
CREAT SERPL-MCNC: 0.77 MG/DL (ref 0.51–0.95)
DEPRECATED HCO3 PLAS-SCNC: 25 MMOL/L (ref 22–29)
DEPRECATED S PYO AG THROAT QL EIA: NEGATIVE
EGFRCR SERPLBLD CKD-EPI 2021: >90 ML/MIN/1.73M2
EOSINOPHIL # BLD AUTO: 0.2 10E3/UL (ref 0–0.7)
EOSINOPHIL NFR BLD AUTO: 3 %
ERYTHROCYTE [DISTWIDTH] IN BLOOD BY AUTOMATED COUNT: 11.9 % (ref 10–15)
FASTING STATUS PATIENT QL REPORTED: ABNORMAL
GLUCOSE SERPL-MCNC: 78 MG/DL (ref 70–99)
GROUP A STREP BY PCR: NOT DETECTED
HBA1C MFR BLD: 6.1 %
HCT VFR BLD AUTO: 37.6 % (ref 35–47)
HDLC SERPL-MCNC: 51 MG/DL
HGB BLD-MCNC: 12.8 G/DL (ref 11.7–15.7)
IMM GRANULOCYTES # BLD: 0 10E3/UL
IMM GRANULOCYTES NFR BLD: 0 %
LDLC SERPL CALC-MCNC: 98 MG/DL
LYMPHOCYTES # BLD AUTO: 2.6 10E3/UL (ref 0.8–5.3)
LYMPHOCYTES NFR BLD AUTO: 46 %
MCH RBC QN AUTO: 31.3 PG (ref 26.5–33)
MCHC RBC AUTO-ENTMCNC: 34 G/DL (ref 31.5–36.5)
MCV RBC AUTO: 92 FL (ref 78–100)
MONOCYTES # BLD AUTO: 0.4 10E3/UL (ref 0–1.3)
MONOCYTES NFR BLD AUTO: 7 %
NEUTROPHILS # BLD AUTO: 2.4 10E3/UL (ref 1.6–8.3)
NEUTROPHILS NFR BLD AUTO: 43 %
NONHDLC SERPL-MCNC: 131 MG/DL
NRBC # BLD AUTO: 0 10E3/UL
NRBC BLD AUTO-RTO: 0 /100
PLATELET # BLD AUTO: 248 10E3/UL (ref 150–450)
POTASSIUM SERPL-SCNC: 4.4 MMOL/L (ref 3.4–5.3)
PROT SERPL-MCNC: 7 G/DL (ref 6.4–8.3)
RBC # BLD AUTO: 4.09 10E6/UL (ref 3.8–5.2)
SODIUM SERPL-SCNC: 139 MMOL/L (ref 135–145)
TRIGL SERPL-MCNC: 165 MG/DL
TSH SERPL DL<=0.005 MIU/L-ACNC: 2.11 UIU/ML (ref 0.3–4.2)
VALPROATE SERPL-MCNC: 65 UG/ML
WBC # BLD AUTO: 5.6 10E3/UL (ref 4–11)

## 2024-02-05 PROCEDURE — 99215 OFFICE O/P EST HI 40 MIN: CPT | Mod: 25 | Performed by: INTERNAL MEDICINE

## 2024-02-05 PROCEDURE — 36415 COLL VENOUS BLD VENIPUNCTURE: CPT | Performed by: PATHOLOGY

## 2024-02-05 PROCEDURE — 80061 LIPID PANEL: CPT | Performed by: PATHOLOGY

## 2024-02-05 PROCEDURE — 86235 NUCLEAR ANTIGEN ANTIBODY: CPT | Performed by: INTERNAL MEDICINE

## 2024-02-05 PROCEDURE — 87651 STREP A DNA AMP PROBE: CPT | Performed by: INTERNAL MEDICINE

## 2024-02-05 PROCEDURE — 80164 ASSAY DIPROPYLACETIC ACD TOT: CPT | Performed by: INTERNAL MEDICINE

## 2024-02-05 PROCEDURE — 82140 ASSAY OF AMMONIA: CPT | Performed by: PATHOLOGY

## 2024-02-05 PROCEDURE — 83036 HEMOGLOBIN GLYCOSYLATED A1C: CPT | Performed by: INTERNAL MEDICINE

## 2024-02-05 PROCEDURE — 99000 SPECIMEN HANDLING OFFICE-LAB: CPT | Performed by: PATHOLOGY

## 2024-02-05 PROCEDURE — 90471 IMMUNIZATION ADMIN: CPT | Performed by: INTERNAL MEDICINE

## 2024-02-05 PROCEDURE — 85025 COMPLETE CBC W/AUTO DIFF WBC: CPT | Performed by: PATHOLOGY

## 2024-02-05 PROCEDURE — 80053 COMPREHEN METABOLIC PANEL: CPT | Performed by: PATHOLOGY

## 2024-02-05 PROCEDURE — 84443 ASSAY THYROID STIM HORMONE: CPT | Performed by: PATHOLOGY

## 2024-02-05 PROCEDURE — 90715 TDAP VACCINE 7 YRS/> IM: CPT | Performed by: INTERNAL MEDICINE

## 2024-02-05 ASSESSMENT — ANXIETY QUESTIONNAIRES
2. NOT BEING ABLE TO STOP OR CONTROL WORRYING: NEARLY EVERY DAY
GAD7 TOTAL SCORE: 19
5. BEING SO RESTLESS THAT IT IS HARD TO SIT STILL: SEVERAL DAYS
GAD7 TOTAL SCORE: 19
6. BECOMING EASILY ANNOYED OR IRRITABLE: NEARLY EVERY DAY
1. FEELING NERVOUS, ANXIOUS, OR ON EDGE: NEARLY EVERY DAY
3. WORRYING TOO MUCH ABOUT DIFFERENT THINGS: NEARLY EVERY DAY
IF YOU CHECKED OFF ANY PROBLEMS ON THIS QUESTIONNAIRE, HOW DIFFICULT HAVE THESE PROBLEMS MADE IT FOR YOU TO DO YOUR WORK, TAKE CARE OF THINGS AT HOME, OR GET ALONG WITH OTHER PEOPLE: EXTREMELY DIFFICULT
7. FEELING AFRAID AS IF SOMETHING AWFUL MIGHT HAPPEN: NEARLY EVERY DAY

## 2024-02-05 ASSESSMENT — PATIENT HEALTH QUESTIONNAIRE - PHQ9
5. POOR APPETITE OR OVEREATING: NEARLY EVERY DAY
SUM OF ALL RESPONSES TO PHQ QUESTIONS 1-9: 12

## 2024-02-05 ASSESSMENT — ASTHMA QUESTIONNAIRES: ACT_TOTALSCORE: 24

## 2024-02-05 NOTE — PROGRESS NOTES
HPI:    Ms. Varela comes in for follow up today. She has sore throat for about 3 days.No swallowing concerns.  No sick contacts. She has 18 year old daughter with depression issues. Alba does smoke and is trying to cut back. She does not want meet with [facility] pharmacy at this time. She follows with outside psychiatry for depression and remains on Valproic acid. She has increased L facial swelling/parotid gland swelling and has been followed by [facility]. She states a surgical procedure in the near future. She has gained some weight on Valproic acid and would like to be seen in the weight management clinic. She is due for a colonoscopy and mammogram.     Past Medical History:   Diagnosis Date    Abdominal pain     Anxiety     Asthma     On Dupixent(Xolair) injections    C. difficile colitis     Chest discomfort     Colitis     Headache(784.0) 12/10/2014    High cholesterol     Hyperlipidemia     Liver disease     Being evalted for fatty liver disease. ABnormal LFT.    Migraines     PAC (premature atrial contraction)     PONV (postoperative nausea and vomiting)     PVC (premature ventricular contraction)     Sleep apnea     Doesn't use a CPAP    Syncope     Tobacco abuse     Tremor      Past Surgical History:   Procedure Laterality Date    ARTHROPLASTY HIP Right 11/3/2017    Procedure: ARTHROPLASTY HIP;  Right total hip arthroplasty    ;  Surgeon: Shahriar Gamble MD;  Location: [facility] OR     SECTION      [facility]    ENDOSCOPIC ULTRASOUND, ESOPHAGOSCOPY, GASTROSCOPY, DUODENOSCOPY (EGD), COMBINED  13    ESOPHAGOSCOPY, GASTROSCOPY, DUODENOSCOPY (EGD), COMBINED  10/21/2013    Procedure: COMBINED ESOPHAGOSCOPY, GASTROSCOPY, DUODENOSCOPY (EGD), BIOPSY SINGLE OR MULTIPLE;;  Surgeon: Rito Gupta MD;  Location:  GI    FOOT SURGERY      Bilateral foot reconstruction.    FOOT SURGERY       UGI ENDOSCOPY W EUS  2013    Procedure: COMBINED ENDOSCOPIC ULTRASOUND, ESOPHAGOSCOPY, GASTROSCOPY, DUODENOSCOPY  (EGD);  Surgeon: Emre Campbell MD;  Location:  GI    HYSTERECTOMY      HYSTERECTOMY      JOINT REPLACEMENT Right     hip    LAPAROSCOPIC HYSTERECTOMY SUPRACERVICAL, BILATERAL SALPINGO-OOPHORECTOMY, COMBINED  3/6/2013    Procedure: COMBINED LAPAROSCOPIC HYSTERECTOMY SUPRACERVICAL, SALPINGO-OOPHORECTOMY;  Laparoscopic Supracervical Hysterectomy, Right salpingo-oopherectomy;  Surgeon: Tanika Jones MD;  Location: [facility] OR    NASAL SEPTUM SURGERY      UVULOPALATOPHARYNGOPLASTY      Plus Septoplasy.    ZZC REVISE TOTAL HIP REPLACEMENT Right 9/28/2018    Procedure: RIGHT REVISION OF TOTAL HIP ARTHROPLASTY, BOTH COMPONENTS;  Surgeon: Hiro Mayes MD;  Location: [facility];  Service: Orthopedics     PE:    Vitals noted, gen, nad, cooperative, alert, oropharynx with no exudate but mild erythema, neck supple l rom, lungs with good air movement, RRR, S1, S2, no MRG, abdomen, no acute findings, Grossly normal neurological exam.       Results for orders placed or performed in visit on 02/05/24   Comprehensive metabolic panel     Status: Normal   Result Value Ref Range    Sodium 139 135 - 145 mmol/L    Potassium 4.4 3.4 - 5.3 mmol/L    Carbon Dioxide (CO2) 25 22 - 29 mmol/L    Anion Gap 10 7 - 15 mmol/L    Urea Nitrogen 17.8 6.0 - 20.0 mg/dL    Creatinine 0.77 0.51 - 0.95 mg/dL    GFR Estimate >90 >60 mL/min/1.73m2    Calcium 9.3 8.6 - 10.0 mg/dL    Chloride 104 98 - 107 mmol/L    Glucose 78 70 - 99 mg/dL    Alkaline Phosphatase 54 40 - 150 U/L    AST 24 0 - 45 U/L    ALT 25 0 - 50 U/L    Protein Total 7.0 6.4 - 8.3 g/dL    Albumin 4.5 3.5 - 5.2 g/dL    Bilirubin Total 0.2 <=1.2 mg/dL   TSH with free T4 reflex     Status: Normal   Result Value Ref Range    TSH 2.11 0.30 - 4.20 uIU/mL   Hemoglobin A1c     Status: Abnormal   Result Value Ref Range    Hemoglobin A1C 6.1 (H) <5.7 %   Ammonia     Status: Normal   Result Value Ref Range    Ammonia 18 11 - 51 umol/L   Valproic acid     Status: Normal   Result  Value Ref Range    Valproic acid 65.0   ug/mL   Lipid panel reflex to direct LDL Fasting     Status: Abnormal   Result Value Ref Range    Cholesterol 182 <200 mg/dL    Triglycerides 165 (H) <150 mg/dL    Direct Measure HDL 51 >=50 mg/dL    LDL Cholesterol Calculated 98 <=100 mg/dL    Non HDL Cholesterol 131 (H) <130 mg/dL    Patient Fasting > 8hrs? Unknown     Narrative    Cholesterol  Desirable:  <200 mg/dL    Triglycerides  Normal:  Less than 150 mg/dL  Borderline High:  150-199 mg/dL  High:  200-499 mg/dL  Very High:  Greater than or equal to 500 mg/dL    Direct Measure HDL  Female:  Greater than or equal to 50 mg/dL   Male:  Greater than or equal to 40 mg/dL    LDL Cholesterol  Desirable:  <100mg/dL  Above Desirable:  100-129 mg/dL   Borderline High:  130-159 mg/dL   High:  160-189 mg/dL   Very High:  >= 190 mg/dL    Non HDL Cholesterol  Desirable:  130 mg/dL  Above Desirable:  130-159 mg/dL  Borderline High:  160-189 mg/dL  High:  190-219 mg/dL  Very High:  Greater than or equal to 220 mg/dL   CBC with platelets and differential     Status: None   Result Value Ref Range    WBC Count 5.6 4.0 - 11.0 10e3/uL    RBC Count 4.09 3.80 - 5.20 10e6/uL    Hemoglobin 12.8 11.7 - 15.7 g/dL    Hematocrit 37.6 35.0 - 47.0 %    MCV 92 78 - 100 fL    MCH 31.3 26.5 - 33.0 pg    MCHC 34.0 31.5 - 36.5 g/dL    RDW 11.9 10.0 - 15.0 %    Platelet Count 248 150 - 450 10e3/uL    % Neutrophils 43 %    % Lymphocytes 46 %    % Monocytes 7 %    % Eosinophils 3 %    % Basophils 1 %    % Immature Granulocytes 0 %    NRBCs per 100 WBC 0 <1 /100    Absolute Neutrophils 2.4 1.6 - 8.3 10e3/uL    Absolute Lymphocytes 2.6 0.8 - 5.3 10e3/uL    Absolute Monocytes 0.4 0.0 - 1.3 10e3/uL    Absolute Eosinophils 0.2 0.0 - 0.7 10e3/uL    Absolute Basophils 0.0 0.0 - 0.2 10e3/uL    Absolute Immature Granulocytes 0.0 <=0.4 10e3/uL    Absolute NRBCs 0.0 10e3/uL   CBC with platelets and differential     Status: None    Narrative    The following orders were  created for panel order CBC with platelets and differential.  Procedure                               Abnormality         Status                     ---------                               -----------         ------                     CBC with platelets and d...[[id number]]                      Final result                 Please view results for these tests on the individual orders.   Results for orders placed or performed in visit on 02/05/24   Streptococcus A Rapid Screen w/Reflex to PCR - Clinic Collect     Status: Normal    Specimen: Throat; Swab   Result Value Ref Range    Group A Strep antigen Negative Negative   Group A Streptococcus PCR Throat Swab     Status: Normal    Specimen: Throat; Swab   Result Value Ref Range    Group A strep by PCR Not Detected Not Detected    Narrative    The Xpert Xpress Strep A test, performed on the [employer], is a rapid, qualitative in vitro diagnostic test for the detection of Streptococcus pyogenes (Group A ß-hemolytic Streptococcus, Strep A) in throat swab specimens from patients with signs and symptoms of pharyngitis. The Xpert Xpress Strep A test can be used as an aid in the diagnosis of Group A Streptococcal pharyngitis. The assay is not intended to monitor treatment for Group A Streptococcus infections. The Xpert Xpress Strep A test utilizes an automated real-time polymerase chain reaction (PCR) to detect Streptococcus pyogenes DNA.     A/P:    1. Immunizations; Pfizer COVID x 2. Tdap today; 2/5/2024.   2. Colonoscopy; 6/1/2015. Ordered future colonoscopy today   3. Lipids; TG's 163, HDL 50 and LDL 96. Ordered lipids today.   4. Mammogram; 8/17/2022. Ordered Mammogram today   5. Dermatology; no concerning issues.   6. GYN clinic; she has h/o of a hysterectomy   7. A1c 5.8% on 11/6/2023.   8. On Valproic acid for depression  9. She was seen 1/30/2024 [facility] for sialoadenitis. Ordered labs for Sjogren's   10. [facility]. She gets occipital botox and remains on Gabapentin and Amitriptyline.   11. On Xolair injections for allergies and asthma and this is beneficial. She states her ENT providers prescribe this.   12. Wt. Management referral   13. H/o hepatic steatosis. Ordered liver tests and abdominal ultrasound   14. Smoking; she is cutting back. Wants to hold on smoking cessation  MTM evaluation   15. Sore throat ordered rapid strep test today.         40 minutes spent on the date of the encounter doing chart review, history and exam, documentation and further activities as noted above exclusive of procedures and other billable interpretations

## 2024-02-06 ENCOUNTER — TELEPHONE (OUTPATIENT)
Dept: INTERNAL MEDICINE | Facility: CLINIC | Age: 54
End: 2024-02-06
Payer: COMMERCIAL

## 2024-02-06 ENCOUNTER — TRANSFERRED RECORDS (OUTPATIENT)
Dept: HEALTH INFORMATION MANAGEMENT | Facility: CLINIC | Age: 54
End: 2024-02-06
Payer: COMMERCIAL

## 2024-02-06 LAB
ENA SS-A AB SER IA-ACNC: 0.5 U/ML
ENA SS-A AB SER IA-ACNC: NEGATIVE
ENA SS-B IGG SER IA-ACNC: <0.6 U/ML
ENA SS-B IGG SER IA-ACNC: NEGATIVE

## 2024-02-06 NOTE — PROGRESS NOTES
Alba is a 53 year old that presents in clinic today for the following:     Chief Complaint   Patient presents with    Follow Up     Follow up    Establish Care     Pt re-establishing care, [place]. Pt has outside documentation with her    Refill Request     Pt would like refill for Rosuvastatin, would like a 3 month supply    Pharyngitis     Pt reports sore throat beginning 3 days ago, mild cough, no fever associated            2/5/2024     6:00 PM   Additional Questions   Roomed by [facility]     Screenings from encounters over the past 10 days    No data recorded       Prem Romero at 6:06 PM on 2/5/2024

## 2024-02-09 ENCOUNTER — INFUSION THERAPY VISIT (OUTPATIENT)
Dept: INFUSION THERAPY | Facility: CLINIC | Age: 54
End: 2024-02-09
Attending: ALLERGY & IMMUNOLOGY
Payer: COMMERCIAL

## 2024-02-09 VITALS
SYSTOLIC BLOOD PRESSURE: 143 MMHG | OXYGEN SATURATION: 98 % | TEMPERATURE: 97.8 F | RESPIRATION RATE: 16 BRPM | HEART RATE: 94 BPM | DIASTOLIC BLOOD PRESSURE: 95 MMHG

## 2024-02-09 DIAGNOSIS — J45.50 SEVERE PERSISTENT ASTHMA WITHOUT COMPLICATION (H): Primary | ICD-10-CM

## 2024-02-09 PROCEDURE — 96372 THER/PROPH/DIAG INJ SC/IM: CPT | Performed by: ALLERGY & IMMUNOLOGY

## 2024-02-09 PROCEDURE — 250N000011 HC RX IP 250 OP 636: Mod: JZ | Performed by: ALLERGY & IMMUNOLOGY

## 2024-02-09 RX ADMIN — OMALIZUMAB: 150 INJECTION, SOLUTION SUBCUTANEOUS at 10:39

## 2024-02-09 NOTE — PROGRESS NOTES
Infusion Nursing Note:  Alba Varela presents today for Xolair .    Patient seen by provider today: No   present during visit today: Not Applicable.    Note: No asthma flares, no SOB or chest pain. Prefers to not stay for the whole 30 minutes. She stayed for about 15 minutes today.    2 injections given in right arm, 1 given in left arm.       Intravenous Access:  No Intravenous access/labs at this visit.    Treatment Conditions:  Not Applicable.      Post Infusion Assessment:  Patient tolerated injection without incident.       Discharge Plan:   Discharge instructions reviewed with: Patient.  Patient and/or family verbalized understanding of discharge instructions and all questions answered.  AVS to patient via [employer].  Patient will return 2/29/24 for next appointment.   Patient discharged in stable condition accompanied by: self.  Departure Mode: Ambulatory.      Dannielle Cohen RN

## 2024-02-13 ENCOUNTER — TELEPHONE (OUTPATIENT)
Dept: ENDOCRINOLOGY | Facility: CLINIC | Age: 54
End: 2024-02-13
Payer: COMMERCIAL

## 2024-02-13 NOTE — TELEPHONE ENCOUNTER
Left Voicemail (1st Attempt) for the patient to call back and schedule the following:    Appointment type: NEW [facility]   Provider: MD or PA   Return date: Next Available  Specialty phone number: [phone]  Additional appointment(s) needed: no   Additonal Notes: Pt being referred by Dr. Sotelo for Medical Weight Management

## 2024-02-15 NOTE — TELEPHONE ENCOUNTER
Patient Contacted and scheduled the following:    Appointment type: NEW [facility]  Provider: Juanis Ferreira PA-C  Return date: 05/24/2024  Specialty phone number: [phone]  Additional appointment(s) needed: no   Additonal Notes: Pt being referred by Dr. Sotelo for Medical Weight Management

## 2024-02-20 ENCOUNTER — HOSPITAL ENCOUNTER (OUTPATIENT)
Dept: ULTRASOUND IMAGING | Facility: CLINIC | Age: 54
Discharge: HOME | End: 2024-02-20
Attending: INTERNAL MEDICINE
Payer: COMMERCIAL

## 2024-02-20 ENCOUNTER — HOSPITAL ENCOUNTER (OUTPATIENT)
Dept: MAMMOGRAPHY | Facility: CLINIC | Age: 54
Discharge: HOME | End: 2024-02-20
Attending: INTERNAL MEDICINE
Payer: COMMERCIAL

## 2024-02-20 DIAGNOSIS — Z12.11 SPECIAL SCREENING FOR MALIGNANT NEOPLASMS, COLON: ICD-10-CM

## 2024-02-20 DIAGNOSIS — M35.00 SICCA, UNSPECIFIED TYPE (H): ICD-10-CM

## 2024-02-20 DIAGNOSIS — R74.8 ELEVATED LIVER ENZYMES: ICD-10-CM

## 2024-02-20 DIAGNOSIS — R63.5 WEIGHT GAIN: ICD-10-CM

## 2024-02-20 PROCEDURE — 76700 US EXAM ABDOM COMPLETE: CPT

## 2024-02-20 PROCEDURE — 77063 BREAST TOMOSYNTHESIS BI: CPT

## 2024-02-26 ENCOUNTER — INFUSION THERAPY VISIT (OUTPATIENT)
Dept: INFUSION THERAPY | Facility: CLINIC | Age: 54
End: 2024-02-26
Attending: ALLERGY & IMMUNOLOGY
Payer: COMMERCIAL

## 2024-02-26 VITALS
HEART RATE: 92 BPM | TEMPERATURE: 97.7 F | RESPIRATION RATE: 16 BRPM | DIASTOLIC BLOOD PRESSURE: 83 MMHG | SYSTOLIC BLOOD PRESSURE: 116 MMHG | OXYGEN SATURATION: 98 %

## 2024-02-26 DIAGNOSIS — J45.50 SEVERE PERSISTENT ASTHMA WITHOUT COMPLICATION (H): Primary | ICD-10-CM

## 2024-02-26 PROCEDURE — 250N000011 HC RX IP 250 OP 636: Mod: JZ | Performed by: ALLERGY & IMMUNOLOGY

## 2024-02-26 PROCEDURE — 96372 THER/PROPH/DIAG INJ SC/IM: CPT | Performed by: ALLERGY & IMMUNOLOGY

## 2024-02-26 RX ADMIN — OMALIZUMAB: 150 INJECTION, SOLUTION SUBCUTANEOUS at 08:15

## 2024-02-26 NOTE — PROGRESS NOTES
Infusion Nursing Note:  Alba Varela presents today for Xolair.    Patient seen by provider today: No   present during visit today: Not Applicable.    Note: 2 injections to the left arm, 1 to the right.    Patient states she will be getting Xolair injections at home starting in a few weeks.      Intravenous Access:  No Intravenous access/labs at this visit.    Treatment Conditions:  Not Applicable.      Post Infusion Assessment:  Patient tolerated injection without incident.   Patient did not stay for 30 minute observation.      Discharge Plan:   Discharge instructions reviewed with: Patient.  Patient and/or family verbalized understanding of discharge instructions and all questions answered.  AVS to patient via [employer].  Patient will return in 2 weeks for next appointment if she doesn't start getting these at home by then- per pt TBD.  Patient discharged in stable condition accompanied by: self.  Departure Mode: Ambulatory.      Joyce Mackenzie RN

## 2024-03-06 DIAGNOSIS — E78.2 MIXED HYPERLIPIDEMIA: ICD-10-CM

## 2024-03-06 RX ORDER — ROSUVASTATIN CALCIUM 5 MG/1
5 TABLET, COATED ORAL DAILY
Qty: 90 TABLET | Refills: 0 | Status: SHIPPED | OUTPATIENT
Start: 2024-03-06 | End: 2024-06-14

## 2024-03-08 ENCOUNTER — TRANSFERRED RECORDS (OUTPATIENT)
Dept: HEALTH INFORMATION MANAGEMENT | Facility: CLINIC | Age: 54
End: 2024-03-08
Payer: COMMERCIAL

## 2024-03-11 ENCOUNTER — TRANSFERRED RECORDS (OUTPATIENT)
Dept: HEALTH INFORMATION MANAGEMENT | Facility: CLINIC | Age: 54
End: 2024-03-11

## 2024-03-11 ENCOUNTER — INFUSION THERAPY VISIT (OUTPATIENT)
Dept: INFUSION THERAPY | Facility: CLINIC | Age: 54
End: 2024-03-11
Attending: ALLERGY & IMMUNOLOGY
Payer: COMMERCIAL

## 2024-03-11 VITALS
SYSTOLIC BLOOD PRESSURE: 118 MMHG | TEMPERATURE: 97.9 F | OXYGEN SATURATION: 97 % | HEART RATE: 89 BPM | DIASTOLIC BLOOD PRESSURE: 81 MMHG

## 2024-03-11 DIAGNOSIS — J45.50 SEVERE PERSISTENT ASTHMA WITHOUT COMPLICATION (H): Primary | ICD-10-CM

## 2024-03-11 PROCEDURE — 250N000011 HC RX IP 250 OP 636: Mod: JZ | Performed by: ALLERGY & IMMUNOLOGY

## 2024-03-11 PROCEDURE — 96372 THER/PROPH/DIAG INJ SC/IM: CPT | Performed by: ALLERGY & IMMUNOLOGY

## 2024-03-11 RX ADMIN — OMALIZUMAB: 150 INJECTION, SOLUTION SUBCUTANEOUS at 09:24

## 2024-03-11 NOTE — PROGRESS NOTES
Infusion Nursing Note:  Alba Varela presents today for Xolair.    Patient seen by provider today: No   present during visit today: Not Applicable.    Note: 2 injections to the right arm, 1 in left       Intravenous Access:  No Intravenous access/labs at this visit.    Treatment Conditions:  Not Applicable.      Post Infusion Assessment:  Patient tolerated injection without incident.       Discharge Plan:   Discharge instructions reviewed with: Patient.  Patient and/or family verbalized understanding of discharge instructions and all questions answered.  AVS to patient via [employer].  Patient will return 3/25 for next appointment.   Patient discharged in stable condition accompanied by: self.  Departure Mode: Ambulatory.      Joyce Mackenzie RN

## 2024-03-14 ENCOUNTER — HOSPITAL ENCOUNTER (OUTPATIENT)
Dept: MRI IMAGING | Facility: CLINIC | Age: 54
Discharge: HOME | End: 2024-03-14
Attending: INTERNAL MEDICINE | Admitting: INTERNAL MEDICINE
Payer: COMMERCIAL

## 2024-03-14 DIAGNOSIS — R93.5 ABNORMAL US (ULTRASOUND) OF ABDOMEN: ICD-10-CM

## 2024-03-14 PROCEDURE — 255N000002 HC RX 255 OP 636: Performed by: INTERNAL MEDICINE

## 2024-03-14 PROCEDURE — A9585 GADOBUTROL INJECTION: HCPCS | Performed by: INTERNAL MEDICINE

## 2024-03-14 PROCEDURE — 74183 MRI ABD W/O CNTR FLWD CNTR: CPT

## 2024-03-14 RX ORDER — GADOBUTROL 604.72 MG/ML
9 INJECTION INTRAVENOUS ONCE
Status: COMPLETED | OUTPATIENT
Start: 2024-03-14 | End: 2024-03-14

## 2024-03-14 RX ADMIN — GADOBUTROL 9 ML: 604.72 INJECTION INTRAVENOUS at 08:15

## 2024-03-25 ENCOUNTER — INFUSION THERAPY VISIT (OUTPATIENT)
Dept: INFUSION THERAPY | Facility: CLINIC | Age: 54
End: 2024-03-25
Attending: ALLERGY & IMMUNOLOGY
Payer: COMMERCIAL

## 2024-03-25 VITALS
HEART RATE: 85 BPM | TEMPERATURE: 97.7 F | SYSTOLIC BLOOD PRESSURE: 126 MMHG | DIASTOLIC BLOOD PRESSURE: 87 MMHG | RESPIRATION RATE: 16 BRPM | OXYGEN SATURATION: 98 %

## 2024-03-25 DIAGNOSIS — J45.50 SEVERE PERSISTENT ASTHMA WITHOUT COMPLICATION (H): Primary | ICD-10-CM

## 2024-03-25 PROCEDURE — 250N000011 HC RX IP 250 OP 636: Mod: JZ | Performed by: ALLERGY & IMMUNOLOGY

## 2024-03-25 PROCEDURE — 96372 THER/PROPH/DIAG INJ SC/IM: CPT | Performed by: ALLERGY & IMMUNOLOGY

## 2024-03-25 RX ADMIN — OMALIZUMAB: 300 INJECTION, SOLUTION SUBCUTANEOUS at 09:05

## 2024-03-25 NOTE — PROGRESS NOTES
Infusion Nursing Note:  Alba Varela presents today for Xolair.    Patient seen by provider today: No   present during visit today: Not Applicable.    Note: N/A.      Intravenous Access:  No Intravenous access/labs at this visit.    Treatment Conditions:  Not Applicable.      Post Infusion Assessment:  Patient tolerated injection without incident.  Patient observed for 30 minutes post Xolair per protocol.       Discharge Plan:   [facility]  Patient will return 4/8/24 for next appointment.   Patient discharged in stable condition accompanied by: self.  Departure Mode: Ambulatory.      Sangita Edwards RN

## 2024-03-29 ENCOUNTER — DOCUMENTATION ONLY (OUTPATIENT)
Dept: LAB | Facility: CLINIC | Age: 54
End: 2024-03-29
Payer: COMMERCIAL

## 2024-03-29 DIAGNOSIS — K76.0 FATTY LIVER: Primary | ICD-10-CM

## 2024-03-29 NOTE — PROGRESS NOTES
Patient has a lab appointment on 4/17/24, with no orders.  Please place orders if needed.  Thank you.

## 2024-04-08 ENCOUNTER — INFUSION THERAPY VISIT (OUTPATIENT)
Dept: INFUSION THERAPY | Facility: CLINIC | Age: 54
End: 2024-04-08
Attending: ALLERGY & IMMUNOLOGY
Payer: COMMERCIAL

## 2024-04-08 VITALS
TEMPERATURE: 97.4 F | SYSTOLIC BLOOD PRESSURE: 129 MMHG | DIASTOLIC BLOOD PRESSURE: 84 MMHG | RESPIRATION RATE: 16 BRPM | HEART RATE: 86 BPM | OXYGEN SATURATION: 97 %

## 2024-04-08 DIAGNOSIS — J45.50 SEVERE PERSISTENT ASTHMA WITHOUT COMPLICATION (H): Primary | ICD-10-CM

## 2024-04-08 PROCEDURE — 96372 THER/PROPH/DIAG INJ SC/IM: CPT | Performed by: ALLERGY & IMMUNOLOGY

## 2024-04-08 PROCEDURE — 250N000011 HC RX IP 250 OP 636: Mod: JZ | Performed by: ALLERGY & IMMUNOLOGY

## 2024-04-08 RX ADMIN — OMALIZUMAB: 150 INJECTION, SOLUTION SUBCUTANEOUS at 08:14

## 2024-04-08 NOTE — PROGRESS NOTES
Infusion Nursing Note:  Alba Varela presents today for Xolair.    Patient seen by provider today: No   present during visit today: Not Applicable.    Note: Patient observed for 20 minutes post injections.      Intravenous Access:  No Intravenous access/labs at this visit.    Treatment Conditions:  Not Applicable.      Post Infusion Assessment:  Patient tolerated injection without incident in right and left arm.  Patient refused to be observed for 30 minutes post Xolair per protocol.       Discharge Plan:   [facility]  Patient will return 4/22/24 for next appointment.   Patient discharged in stable condition accompanied by: self.  Departure Mode: Ambulatory.      Viri Narayan RN

## 2024-04-09 ENCOUNTER — MYC MEDICAL ADVICE (OUTPATIENT)
Dept: INTERNAL MEDICINE | Facility: CLINIC | Age: 54
End: 2024-04-09
Payer: COMMERCIAL

## 2024-04-10 NOTE — TELEPHONE ENCOUNTER
Called pt to discuss 4/15/24 visit- explained PCP schedule is booked out a few weeks. Pt should keep scheduled follow up or could reschedule with Padmaja Andrea NP who has been working closely with PCP. Provided PCC number for patient to call back if requesting to reschedule.

## 2024-04-17 ENCOUNTER — LAB (OUTPATIENT)
Dept: LAB | Facility: CLINIC | Age: 54
End: 2024-04-17
Payer: COMMERCIAL

## 2024-04-17 DIAGNOSIS — K76.0 FATTY LIVER: ICD-10-CM

## 2024-04-17 LAB
ERYTHROCYTE [DISTWIDTH] IN BLOOD BY AUTOMATED COUNT: 12.3 % (ref 10–15)
HCT VFR BLD AUTO: 37.3 % (ref 35–47)
HGB BLD-MCNC: 12.1 G/DL (ref 11.7–15.7)
INR PPP: 0.92 (ref 0.85–1.15)
MCH RBC QN AUTO: 30.6 PG (ref 26.5–33)
MCHC RBC AUTO-ENTMCNC: 32.4 G/DL (ref 31.5–36.5)
MCV RBC AUTO: 94 FL (ref 78–100)
PLATELET # BLD AUTO: 238 10E3/UL (ref 150–450)
RBC # BLD AUTO: 3.95 10E6/UL (ref 3.8–5.2)
WBC # BLD AUTO: 5.4 10E3/UL (ref 4–11)

## 2024-04-17 PROCEDURE — 86706 HEP B SURFACE ANTIBODY: CPT

## 2024-04-17 PROCEDURE — 80053 COMPREHEN METABOLIC PANEL: CPT

## 2024-04-17 PROCEDURE — 85027 COMPLETE CBC AUTOMATED: CPT

## 2024-04-17 PROCEDURE — 87340 HEPATITIS B SURFACE AG IA: CPT

## 2024-04-17 PROCEDURE — 82248 BILIRUBIN DIRECT: CPT

## 2024-04-17 PROCEDURE — 36415 COLL VENOUS BLD VENIPUNCTURE: CPT

## 2024-04-17 PROCEDURE — 85610 PROTHROMBIN TIME: CPT

## 2024-04-17 PROCEDURE — 86803 HEPATITIS C AB TEST: CPT

## 2024-04-17 PROCEDURE — 86704 HEP B CORE ANTIBODY TOTAL: CPT

## 2024-04-17 NOTE — PROGRESS NOTES
Hepatology Clinic note  Alba Varela   Date of Birth 1970    REASON FOR CONSULTATION: hepatic steatosis   REFERRING PROVIDER: Bright Sotelo MD          Assessment/plan:   Alba Varela is a 54 year old female with history of imaging findings of hepatic steatosis. Risk factors for fatty liver disease includes: obesity, hyperlipidemia, CORIN, prediabetes. Transaminases have been historically normal. Liver function also normal without stigmata of advanced liver disease. Given historically normal transaminases, do not feel that extensive hepatobiliary work-up indicated at this time.    #Metabolic associated fatty liver disease (MALFD):   - Patient has multiple risk factors making her at risk for fibrosis in time. We discussed the pathophysiology and natural history of nonalcoholic fatty liver disease and fibrosis development.   - Will obtain a fibrosis scan to evaluate any fibrosis, which is important in risk stratification of likelihood of on-going fibrosis without weight loss. Recommend follow up with non-invasive elastography or FibroScan in 3-5 years   - Recommend FIB-4 yearly with PCP. FIB-4 Calculation: 1.01 at 2/5/2024  6:45 PM  - Recommend slow gradual weight loss. Agree with weight management consult which is scheduled in a few weeks.   - Maintain good control of cholesterol (No contraindication to starting a statin with LFT elevations)   - Optimize blood glucose as needed. Could consider GLP-1 inhibitor for both insulin resistance and help with weight loss.   - Improve nutrition: continue limiting carbohydrates, especially simple carbohydrates, and following Mediterranean eating patten  - Increase physical activity as able, ideally 150 minutes weekly  - Limit alcohol intake to not more than 3 drinks a week (currently less than 4 drinks a month).     # Focal nodular area in the liver consistent with focal fatty sparing on MRI, no further evaluation.     # Hepatitis B serologies pending today     # Follow-up  in clinic as needed if found to have advanced fibrosis     Reid Vivar PA-C   [facility] Hepatology     Total time for E/M services performed on the date of the encounter 45 minutes.  This included review of previous: clinic visits, hospital records, lab results, imaging studies, and procedural documentation. Time also includes patient visit, documentation and discussion with other providers.  The findings from this review are summarized in the above note.   -----------------------------------------------------       HPI:   Alba Varela is a 54 year old female presenting for the evaluation of fatty liver disease.     Saw PCP within the past year and had ultrasound in evaluation of weight gain which showed hepatic steatosis and focal hypoechoic region near gallbladder fossa. Follow up MRI on 3/14/2024 showing findings consistent with focal fatty sparing.     Appetite is okay. No particular diet . Combination of meals out to eat and at home, 70% at home.   Dinner: sphaghetti, chicken and rice, brats/grill , asian and mexican foods. Try to avoid sugar.     Weight is trending up (20 lbs) starting to plateau, tends to happen rapidly with variety psych medications. Stopped valproic acid due to concerns about liver. Still on venlafaxine. Mental health is okay currently.     No abdomen pain. Regular bowel movements.   Patient denies jaundice, lower extremity edema, abdominal distension or confusion.    Patient also denies melena, hematochezia or hematemesis.  Patient denies weight loss, fevers, sweats or chills (menopausal).     PMH:    has a past medical history of Abdominal pain, Anxiety, Asthma, C. difficile colitis, Chest discomfort, Colitis, Headache(784.0) (12/10/2014), High cholesterol, Hyperlipidemia, Liver disease, Migraines, PAC (premature atrial contraction), PONV (postoperative nausea and vomiting), PVC (premature ventricular contraction), Sleep apnea, Syncope, Tobacco abuse, and Tremor.    SMH:     "has a past surgical history that includes Foot surgery;  section; Laparoscopic hysterectomy supracervical, bilateral salpingo-oophorectomy, combined (3/6/2013); Uvulopalatopharyngoplasty; Esophagoscopy, gastroscopy, duodenoscopy (EGD), combined (10/21/2013); Hysterectomy; Endoscopic ultrasound, esophagoscopy, gastroscopy, duodenoscopy (EGD), combined (13); UGI ENDOSCOPY W EUS (2013); Arthroplasty hip (Right, 11/3/2017); joint replacement (Right); Nasal Septum Surgery; Foot surgery; Hysterectomy; and REVISE TOTAL HIP REPLACEMENT (Right, 2018).     Medications:   AMITRIPTYLINE HCL PO  BOTOX IJ  diltiazem ER COATED BEADS  divalproex sodium delayed-release  gabapentin  loratadine  omalizumab  rosuvastatin  venlafaxine     Currently smoking, less than 1/2 pack a day. In regards to alcohol, may have 4 beer throughout month. No previous IV/IN drug use.  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 Patient currently lives with rosalio and daughter anil. Adopted, unknown family history of liver disease/liver cancer.     Previous work-up:   Lab Results   Component Value Date    HCVAB  2015     Nonreactive   Assay performance characteristics have not been established for newborns,   infants, and children      MASOOD 76 2015    IRONSAT 53 (H) 10/24/2017    TTG 1.0 2015    TTGG 3.8 2015     2019    TSH 2.11 2024    CHOL 182 2024    HDL 51 2024    LDL 98 2024    TRIG 165 (H) 2024    A1C 6.1 (H) 2024      No results found for: \"SPECDES\", \"LDRESULTS\"       Recent Labs   Lab Test 24  1845 23  0830 23  0819 22  0809 22  1135 21  0225 19  1535 10/05/16  1440   ALKPHOS 54 53 65  --  67 63 66 71   ALT 25 24 27 26 26 35 27 42   AST 24 20 21  --  16 38 27 20   BILITOTAL 0.2 <0.2 <0.2  --  0.3 0.3 0.2 0.4             Allergies:     Allergies   Allergen Reactions    Bromocriptine     Codeine Sulfate " Nausea and Vomiting    Scopolamine Visual Disturbance    Contrast Dye Hives and Rash     Patient is allergic to CT iodine contrast.      Imitrex [Sumatriptan] Rash    Naproxen Rash    Penicillins Rash     Per patient. Tolerated cefazolin in the past    Sulfa Antibiotics Rash            Social History:     Social History     Socioeconomic History    Marital status:      Spouse name: Not on file    Number of children: Not on file    Years of education: Not on file    Highest education level: Not on file   Occupational History    Not on file   Tobacco Use    Smoking status: Every Day     Current packs/day: 0.50     Average packs/day: 0.5 packs/day for 20.0 years (10.0 ttl pk-yrs)     Types: Cigarettes    Smokeless tobacco: Never   Vaping Use    Vaping status: Never Used   Substance and Sexual Activity    Alcohol use: Yes     Alcohol/week: 0.0 standard drinks of alcohol     Comment:  3 drinks weekly    Drug use: No    Sexual activity: Yes     Partners: Male   Other Topics Concern    Parent/sibling w/ CABG, MI or angioplasty before 65F 55M? Not Asked   Social History Narrative    Not on file     Social Determinants of Health     Financial Resource Strain: Not on file   Food Insecurity: Not on file   Transportation Needs: Not on file   Physical Activity: Not on file   Stress: Not on file   Social Connections: Not on file   Interpersonal Safety: Not on file   Housing Stability: Not on file            Family History:     Family History   Problem Relation Age of Onset    Unknown/Adopted Mother     Unknown/Adopted Father             Review of Systems:   Gen: See HPI     HEENT: No change in vision or hearing, mouth sores, dysphagia, lymph nodes  Resp: No shortness of breath, coughing, hx of asthma  CV: No chest pain, palpitations, syncope   GI: See HPI  : No dysuria, history of stones, urine color    Skin: No rash; no pruritus or psoriasis  MS: No arthralgias, myalgias, joint swelling  Neuro: No memory changes,  "confusion, numbness    Heme: No difficulty clotting, bruising, bleeding  Psych:  No anxiety, depression, agitation          Physical Exam:   /85   Pulse 85   Wt 88.2 kg (194 lb 8 oz)   LMP 07/11/2014   SpO2 99%   BMI 33.39 kg/m      Gen: A&Ox3, NAD, well developed  HEENT: non-icteric   CV: RRR, no overt murmurs  Lung: CTA Bilatererally, no wheezing or crackles.   Lym- no palpable lymphadenopathy  Abd: soft, NT, ND, no palpable splenomegaly, liver is not palpable.   Ext: no edema, intact pulses.   Skin: No rash, no palmar erythema, telangiectasias or jaundice  Neuro: grossly intact, no asterixis   Psych: appropriate mood and affects         Data:   Reviewed in person and significant for:    Lab Results   Component Value Date     02/05/2024     06/27/2021      Lab Results   Component Value Date    POTASSIUM 4.4 02/05/2024    POTASSIUM 4.4 08/03/2022    POTASSIUM 4.2 06/27/2021     Lab Results   Component Value Date    CHLORIDE 104 02/05/2024    CHLORIDE 110 08/03/2022    CHLORIDE 113 06/27/2021     Lab Results   Component Value Date    CO2 25 02/05/2024    CO2 26 08/03/2022    CO2 26 06/27/2021     Lab Results   Component Value Date    BUN 17.8 02/05/2024    BUN 13 08/03/2022    BUN 10 06/27/2021     Lab Results   Component Value Date    CR 0.77 02/05/2024    CR 0.62 06/27/2021       Lab Results   Component Value Date    WBC 5.4 04/17/2024    WBC 6.8 06/27/2021     Lab Results   Component Value Date    HGB 12.1 04/17/2024    HGB 12.5 06/27/2021     Lab Results   Component Value Date    HCT 37.3 04/17/2024    HCT 38.6 06/27/2021     Lab Results   Component Value Date    MCV 94 04/17/2024    MCV 93 06/27/2021     Lab Results   Component Value Date     04/17/2024     06/27/2021       Lab Results   Component Value Date    AST 24 02/05/2024    AST 38 06/27/2021     Lab Results   Component Value Date    ALT 25 02/05/2024    ALT 35 06/27/2021     No results found for: \"BILICONJ\"   Lab " Results   Component Value Date    BILITOTAL 0.2 02/05/2024    BILITOTAL 0.3 06/27/2021       Lab Results   Component Value Date    ALBUMIN 4.5 02/05/2024    ALBUMIN 3.9 08/03/2022    ALBUMIN 4.1 06/27/2021     Lab Results   Component Value Date    PROTTOTAL 7.0 02/05/2024    PROTTOTAL 7.5 06/27/2021      Lab Results   Component Value Date    ALKPHOS 54 02/05/2024    ALKPHOS 63 06/27/2021       Lab Results   Component Value Date    INR 0.92 04/17/2024    INR 0.88 03/09/2017         Imaging:        MR ABDOMEN WITHOUT AND WITH CONTRAST   3/14/2024 8:50 AM      HISTORY: Abnormal US (ultrasound) of abdomen.     TECHNIQUE: Multiplanar, multiecho MRI was performed using T1, T2, and  in- and out-of-phase imaging. Pre- and postcontrast T1 images were  acquired after the administration of 9 mL Gadavist IV.     COMPARISON: Ultrasound 2/20/2024, CT 10/27/2021.     FINDINGS:  HEPATOBILIARY: Homogeneous appearance of the liver without cirrhotic  configuration. No biliary ductal dilatation. No choledocholithiasis.  No gallstones seen in the gallbladder lumen. There is fatty  infiltration of the liver. A focal nodular region without fatty  infiltration is suggested lying superior to the gallbladder fossa  region measuring 5 x 2.8 cm series 4 image 37. It shows intrinsic  isointense T1 signal at nonfat-suppressed T1 imaging compared to the  liver, no significant T2 signal abnormality, and subtraction imaging  shows no significant enhancement. No restricted diffusion. This is  favored to represent fatty sparing. No worrisome hepatic observation.     PANCREAS: Normal.     SPLEEN: Normal.     ADRENAL GLANDS: Normal.     KIDNEYS: No significant mass, or hydronephrosis. There are simple or  benign cysts. No follow up is needed.     BOWEL: Normal.     ADDITIONAL FINDINGS: None.     MUSCULOSKELETAL: Normal.                                                                      IMPRESSION:   1.  Fatty infiltration of the liver.  2.  Focal  nodular region lying superior to the gallbladder fossa has  signal characteristics most consistent with focal fatty sparing.

## 2024-04-18 ENCOUNTER — OFFICE VISIT (OUTPATIENT)
Dept: GASTROENTEROLOGY | Facility: CLINIC | Age: 54
End: 2024-04-18
Payer: COMMERCIAL

## 2024-04-18 VITALS
HEART RATE: 85 BPM | BODY MASS INDEX: 33.39 KG/M2 | SYSTOLIC BLOOD PRESSURE: 128 MMHG | OXYGEN SATURATION: 99 % | WEIGHT: 194.5 LBS | DIASTOLIC BLOOD PRESSURE: 85 MMHG

## 2024-04-18 DIAGNOSIS — E66.811 CLASS 1 OBESITY WITH BODY MASS INDEX (BMI) OF 30.0 TO 30.9 IN ADULT, UNSPECIFIED OBESITY TYPE, UNSPECIFIED WHETHER SERIOUS COMORBIDITY PRESENT: Primary | ICD-10-CM

## 2024-04-18 DIAGNOSIS — E78.2 MIXED HYPERLIPIDEMIA: ICD-10-CM

## 2024-04-18 DIAGNOSIS — R73.03 PREDIABETES: ICD-10-CM

## 2024-04-18 DIAGNOSIS — K76.0 HEPATIC STEATOSIS: ICD-10-CM

## 2024-04-18 LAB
ALBUMIN SERPL BCG-MCNC: 4.3 G/DL (ref 3.5–5.2)
ALP SERPL-CCNC: 62 U/L (ref 40–150)
ALT SERPL W P-5'-P-CCNC: 34 U/L (ref 0–50)
ANION GAP SERPL CALCULATED.3IONS-SCNC: 14 MMOL/L (ref 7–15)
AST SERPL W P-5'-P-CCNC: 29 U/L (ref 0–45)
BILIRUB DIRECT SERPL-MCNC: <0.2 MG/DL (ref 0–0.3)
BILIRUB SERPL-MCNC: 0.2 MG/DL
BUN SERPL-MCNC: 14.4 MG/DL (ref 6–20)
CALCIUM SERPL-MCNC: 9.1 MG/DL (ref 8.6–10)
CHLORIDE SERPL-SCNC: 107 MMOL/L (ref 98–107)
CREAT SERPL-MCNC: 0.74 MG/DL (ref 0.51–0.95)
DEPRECATED HCO3 PLAS-SCNC: 23 MMOL/L (ref 22–29)
EGFRCR SERPLBLD CKD-EPI 2021: >90 ML/MIN/1.73M2
GLUCOSE SERPL-MCNC: 96 MG/DL (ref 70–99)
HBV CORE AB SERPL QL IA: NONREACTIVE
HBV SURFACE AB SERPL IA-ACNC: <3.5 M[IU]/ML
HBV SURFACE AB SERPL IA-ACNC: NONREACTIVE M[IU]/ML
HBV SURFACE AG SERPL QL IA: NONREACTIVE
HCV AB SERPL QL IA: NONREACTIVE
POTASSIUM SERPL-SCNC: 4.1 MMOL/L (ref 3.4–5.3)
PROT SERPL-MCNC: 6.5 G/DL (ref 6.4–8.3)
SODIUM SERPL-SCNC: 144 MMOL/L (ref 135–145)

## 2024-04-18 PROCEDURE — 99204 OFFICE O/P NEW MOD 45 MIN: CPT | Performed by: PHYSICIAN ASSISTANT

## 2024-04-18 ASSESSMENT — PAIN SCALES - GENERAL: PAINLEVEL: NO PAIN (0)

## 2024-04-18 NOTE — LETTER
4/18/2024         RE: Alba Varela  [address]  [facility] [id number]        Dear Colleague,    Thank you for referring your patient, Alba Varela, to the [facility] [place]. Please see a copy of my visit note below.    Hepatology Clinic note  Alba Varela   Date of Birth 1970    REASON FOR CONSULTATION: hepatic steatosis   REFERRING PROVIDER: Bright Sotelo MD          Assessment/plan:   Alba Varela is a 54 year old female with history of imaging findings of hepatic steatosis. Risk factors for fatty liver disease includes: obesity, hyperlipidemia, CORIN, prediabetes. Transaminases have been historically normal. Liver function also normal without stigmata of advanced liver disease. Given historically normal transaminases, do not feel that extensive hepatobiliary work-up indicated at this time.    #Metabolic associated fatty liver disease (MALFD):   - Patient has multiple risk factors making her at risk for fibrosis in time. We discussed the pathophysiology and natural history of nonalcoholic fatty liver disease and fibrosis development.   - Will obtain a fibrosis scan to evaluate any fibrosis, which is important in risk stratification of likelihood of on-going fibrosis without weight loss. Recommend follow up with non-invasive elastography or FibroScan in 3-5 years   - Recommend FIB-4 yearly with PCP. FIB-4 Calculation: 1.01 at 2/5/2024  6:45 PM  - Recommend slow gradual weight loss. Agree with weight management consult which is scheduled in a few weeks.   - Maintain good control of cholesterol (No contraindication to starting a statin with LFT elevations)   - Optimize blood glucose as needed. Could consider GLP-1 inhibitor for both insulin resistance and help with weight loss.   - Improve nutrition: continue limiting carbohydrates, especially simple carbohydrates, and following Mediterranean eating patten  - Increase physical activity as able, ideally 150 minutes weekly  -  Limit alcohol intake to not more than 3 drinks a week (currently less than 4 drinks a month).     # Focal nodular area in the liver consistent with focal fatty sparing on MRI, no further evaluation.     # Hepatitis B serologies pending today     # Follow-up in clinic as needed if found to have advanced fibrosis     Reid Vivar PA-C   [facility] Hepatology     Total time for E/M services performed on the date of the encounter 45 minutes.  This included review of previous: clinic visits, hospital records, lab results, imaging studies, and procedural documentation. Time also includes patient visit, documentation and discussion with other providers.  The findings from this review are summarized in the above note.   -----------------------------------------------------       HPI:   Alba Varela is a 54 year old female presenting for the evaluation of fatty liver disease.     Saw PCP within the past year and had ultrasound in evaluation of weight gain which showed hepatic steatosis and focal hypoechoic region near gallbladder fossa. Follow up MRI on 3/14/2024 showing findings consistent with focal fatty sparing.     Appetite is okay. No particular diet . Combination of meals out to eat and at home, 70% at home.   Dinner: sphaghetti, chicken and rice, brats/grill , asian and mexican foods. Try to avoid sugar.     Weight is trending up (20 lbs) starting to plateau, tends to happen rapidly with variety psych medications. Stopped valproic acid due to concerns about liver. Still on venlafaxine. Mental health is okay currently.     No abdomen pain. Regular bowel movements.   Patient denies jaundice, lower extremity edema, abdominal distension or confusion.    Patient also denies melena, hematochezia or hematemesis.  Patient denies weight loss, fevers, sweats or chills (menopausal).     PMH:    has a past medical history of Abdominal pain, Anxiety, Asthma, C. difficile colitis, Chest discomfort, Colitis,  "Headache(784.0) (12/10/2014), High cholesterol, Hyperlipidemia, Liver disease, Migraines, PAC (premature atrial contraction), PONV (postoperative nausea and vomiting), PVC (premature ventricular contraction), Sleep apnea, Syncope, Tobacco abuse, and Tremor.    SMH:    has a past surgical history that includes Foot surgery;  section; Laparoscopic hysterectomy supracervical, bilateral salpingo-oophorectomy, combined (3/6/2013); Uvulopalatopharyngoplasty; Esophagoscopy, gastroscopy, duodenoscopy (EGD), combined (10/21/2013); Hysterectomy; Endoscopic ultrasound, esophagoscopy, gastroscopy, duodenoscopy (EGD), combined (13); UGI ENDOSCOPY W EUS (2013); Arthroplasty hip (Right, 11/3/2017); joint replacement (Right); Nasal Septum Surgery; Foot surgery; Hysterectomy; and REVISE TOTAL HIP REPLACEMENT (Right, 2018).     Medications:   AMITRIPTYLINE HCL PO  BOTOX IJ  diltiazem ER COATED BEADS  divalproex sodium delayed-release  gabapentin  loratadine  omalizumab  rosuvastatin  venlafaxine     Currently smoking, less than 1/2 pack a day. In regards to alcohol, may have 4 beer throughout month. No previous IV/IN drug use.  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 Patient currently lives with rosalio and daughter anil. Adopted, unknown family history of liver disease/liver cancer.     Previous work-up:   Lab Results   Component Value Date    HCVAB  2015     Nonreactive   Assay performance characteristics have not been established for newborns,   infants, and children      MASOOD 76 2015    IRONSAT 53 (H) 10/24/2017    TTG 1.0 2015    TTGG 3.8 2015     2019    TSH 2.11 2024    CHOL 182 2024    HDL 51 2024    LDL 98 2024    TRIG 165 (H) 2024    A1C 6.1 (H) 2024      No results found for: \"SPECDES\", \"LDRESULTS\"       Recent Labs   Lab Test 24  1845 23  0830 23  0819 22  0809 22  1135 21  0225 " 04/01/19  1535 10/05/16  1440   ALKPHOS 54 53 65  --  67 63 66 71   ALT 25 24 27 26 26 35 27 42   AST 24 20 21  --  16 38 27 20   BILITOTAL 0.2 <0.2 <0.2  --  0.3 0.3 0.2 0.4             Allergies:     Allergies   Allergen Reactions     Bromocriptine      Codeine Sulfate Nausea and Vomiting     Scopolamine Visual Disturbance     Contrast Dye Hives and Rash     Patient is allergic to CT iodine contrast.       Imitrex [Sumatriptan] Rash     Naproxen Rash     Penicillins Rash     Per patient. Tolerated cefazolin in the past     Sulfa Antibiotics Rash            Social History:     Social History     Socioeconomic History     Marital status:      Spouse name: Not on file     Number of children: Not on file     Years of education: Not on file     Highest education level: Not on file   Occupational History     Not on file   Tobacco Use     Smoking status: Every Day     Current packs/day: 0.50     Average packs/day: 0.5 packs/day for 20.0 years (10.0 ttl pk-yrs)     Types: Cigarettes     Smokeless tobacco: Never   Vaping Use     Vaping status: Never Used   Substance and Sexual Activity     Alcohol use: Yes     Alcohol/week: 0.0 standard drinks of alcohol     Comment:  3 drinks weekly     Drug use: No     Sexual activity: Yes     Partners: Male   Other Topics Concern     Parent/sibling w/ CABG, MI or angioplasty before 65F 55M? Not Asked   Social History Narrative     Not on file     Social Determinants of Health     Financial Resource Strain: Not on file   Food Insecurity: Not on file   Transportation Needs: Not on file   Physical Activity: Not on file   Stress: Not on file   Social Connections: Not on file   Interpersonal Safety: Not on file   Housing Stability: Not on file            Family History:     Family History   Problem Relation Age of Onset     Unknown/Adopted Mother      Unknown/Adopted Father             Review of Systems:   Gen: See HPI     HEENT: No change in vision or hearing, mouth sores,  dysphagia, lymph nodes  Resp: No shortness of breath, coughing, hx of asthma  CV: No chest pain, palpitations, syncope   GI: See HPI  : No dysuria, history of stones, urine color    Skin: No rash; no pruritus or psoriasis  MS: No arthralgias, myalgias, joint swelling  Neuro: No memory changes, confusion, numbness    Heme: No difficulty clotting, bruising, bleeding  Psych:  No anxiety, depression, agitation          Physical Exam:   /85   Pulse 85   Wt 88.2 kg (194 lb 8 oz)   LMP 07/11/2014   SpO2 99%   BMI 33.39 kg/m      Gen: A&Ox3, NAD, well developed  HEENT: non-icteric   CV: RRR, no overt murmurs  Lung: CTA Bilatererally, no wheezing or crackles.   Lym- no palpable lymphadenopathy  Abd: soft, NT, ND, no palpable splenomegaly, liver is not palpable.   Ext: no edema, intact pulses.   Skin: No rash, no palmar erythema, telangiectasias or jaundice  Neuro: grossly intact, no asterixis   Psych: appropriate mood and affects         Data:   Reviewed in person and significant for:    Lab Results   Component Value Date     02/05/2024     06/27/2021      Lab Results   Component Value Date    POTASSIUM 4.4 02/05/2024    POTASSIUM 4.4 08/03/2022    POTASSIUM 4.2 06/27/2021     Lab Results   Component Value Date    CHLORIDE 104 02/05/2024    CHLORIDE 110 08/03/2022    CHLORIDE 113 06/27/2021     Lab Results   Component Value Date    CO2 25 02/05/2024    CO2 26 08/03/2022    CO2 26 06/27/2021     Lab Results   Component Value Date    BUN 17.8 02/05/2024    BUN 13 08/03/2022    BUN 10 06/27/2021     Lab Results   Component Value Date    CR 0.77 02/05/2024    CR 0.62 06/27/2021       Lab Results   Component Value Date    WBC 5.4 04/17/2024    WBC 6.8 06/27/2021     Lab Results   Component Value Date    HGB 12.1 04/17/2024    HGB 12.5 06/27/2021     Lab Results   Component Value Date    HCT 37.3 04/17/2024    HCT 38.6 06/27/2021     Lab Results   Component Value Date    MCV 94 04/17/2024    MCV 93  "06/27/2021     Lab Results   Component Value Date     04/17/2024     06/27/2021       Lab Results   Component Value Date    AST 24 02/05/2024    AST 38 06/27/2021     Lab Results   Component Value Date    ALT 25 02/05/2024    ALT 35 06/27/2021     No results found for: \"BILICONJ\"   Lab Results   Component Value Date    BILITOTAL 0.2 02/05/2024    BILITOTAL 0.3 06/27/2021       Lab Results   Component Value Date    ALBUMIN 4.5 02/05/2024    ALBUMIN 3.9 08/03/2022    ALBUMIN 4.1 06/27/2021     Lab Results   Component Value Date    PROTTOTAL 7.0 02/05/2024    PROTTOTAL 7.5 06/27/2021      Lab Results   Component Value Date    ALKPHOS 54 02/05/2024    ALKPHOS 63 06/27/2021       Lab Results   Component Value Date    INR 0.92 04/17/2024    INR 0.88 03/09/2017         Imaging:        MR ABDOMEN WITHOUT AND WITH CONTRAST   3/14/2024 8:50 AM      HISTORY: Abnormal US (ultrasound) of abdomen.     TECHNIQUE: Multiplanar, multiecho MRI was performed using T1, T2, and  in- and out-of-phase imaging. Pre- and postcontrast T1 images were  acquired after the administration of 9 mL Gadavist IV.     COMPARISON: Ultrasound 2/20/2024, CT 10/27/2021.     FINDINGS:  HEPATOBILIARY: Homogeneous appearance of the liver without cirrhotic  configuration. No biliary ductal dilatation. No choledocholithiasis.  No gallstones seen in the gallbladder lumen. There is fatty  infiltration of the liver. A focal nodular region without fatty  infiltration is suggested lying superior to the gallbladder fossa  region measuring 5 x 2.8 cm series 4 image 37. It shows intrinsic  isointense T1 signal at nonfat-suppressed T1 imaging compared to the  liver, no significant T2 signal abnormality, and subtraction imaging  shows no significant enhancement. No restricted diffusion. This is  favored to represent fatty sparing. No worrisome hepatic observation.     PANCREAS: Normal.     SPLEEN: Normal.     ADRENAL GLANDS: Normal.     KIDNEYS: No " significant mass, or hydronephrosis. There are simple or  benign cysts. No follow up is needed.     BOWEL: Normal.     ADDITIONAL FINDINGS: None.     MUSCULOSKELETAL: Normal.                                                                      IMPRESSION:   1.  Fatty infiltration of the liver.  2.  Focal nodular region lying superior to the gallbladder fossa has  signal characteristics most consistent with focal fatty sparing.           Again, thank you for allowing me to participate in the care of your patient.        Sincerely,        Reid Vivar PA-C

## 2024-04-19 ENCOUNTER — TELEPHONE (OUTPATIENT)
Dept: GASTROENTEROLOGY | Facility: CLINIC | Age: 54
End: 2024-04-19
Payer: COMMERCIAL

## 2024-04-22 ENCOUNTER — INFUSION THERAPY VISIT (OUTPATIENT)
Dept: INFUSION THERAPY | Facility: CLINIC | Age: 54
End: 2024-04-22
Attending: ALLERGY & IMMUNOLOGY
Payer: COMMERCIAL

## 2024-04-22 VITALS
DIASTOLIC BLOOD PRESSURE: 82 MMHG | SYSTOLIC BLOOD PRESSURE: 125 MMHG | OXYGEN SATURATION: 97 % | TEMPERATURE: 98.2 F | HEART RATE: 94 BPM | RESPIRATION RATE: 16 BRPM

## 2024-04-22 DIAGNOSIS — J45.50 SEVERE PERSISTENT ASTHMA WITHOUT COMPLICATION (H): Primary | ICD-10-CM

## 2024-04-22 PROCEDURE — 96372 THER/PROPH/DIAG INJ SC/IM: CPT | Performed by: ALLERGY & IMMUNOLOGY

## 2024-04-22 PROCEDURE — 250N000011 HC RX IP 250 OP 636: Mod: JZ | Performed by: ALLERGY & IMMUNOLOGY

## 2024-04-22 RX ADMIN — OMALIZUMAB: 300 INJECTION, SOLUTION SUBCUTANEOUS at 08:12

## 2024-04-22 NOTE — PROGRESS NOTES
Infusion Nursing Note:  Alba Vaerla presents today for Xolair.    Patient seen by provider today: No   present during visit today: Not Applicable.    Note: Alba reports she is feeling well today.  She denies any new or concerning symptoms.    Intravenous Access:  No Intravenous access/labs at this visit.    Treatment Conditions:  Not Applicable.    Post Infusion Assessment:  Patient tolerated injection without incident.  Patient declined 30 minute observation post Xolair.     Discharge Plan:   Discharge instructions reviewed with: Patient.  Patient and/or family verbalized understanding of discharge instructions and all questions answered.  AVS to patient via [employer].  Patient will return 5/6 for next appointment.   Patient discharged in stable condition accompanied by: self.  Departure Mode: Ambulatory.      Jake Marquis RN

## 2024-05-06 ENCOUNTER — INFUSION THERAPY VISIT (OUTPATIENT)
Dept: INFUSION THERAPY | Facility: CLINIC | Age: 54
End: 2024-05-06
Attending: ALLERGY & IMMUNOLOGY
Payer: COMMERCIAL

## 2024-05-06 VITALS
DIASTOLIC BLOOD PRESSURE: 93 MMHG | TEMPERATURE: 96.2 F | HEART RATE: 84 BPM | OXYGEN SATURATION: 97 % | SYSTOLIC BLOOD PRESSURE: 137 MMHG

## 2024-05-06 DIAGNOSIS — J45.50 SEVERE PERSISTENT ASTHMA WITHOUT COMPLICATION (H): Primary | ICD-10-CM

## 2024-05-06 PROCEDURE — 250N000011 HC RX IP 250 OP 636: Mod: JZ | Performed by: ALLERGY & IMMUNOLOGY

## 2024-05-06 PROCEDURE — 96372 THER/PROPH/DIAG INJ SC/IM: CPT | Performed by: ALLERGY & IMMUNOLOGY

## 2024-05-06 RX ADMIN — OMALIZUMAB: 300 INJECTION, SOLUTION SUBCUTANEOUS at 08:14

## 2024-05-06 NOTE — PROGRESS NOTES
Infusion Nursing Note:  Alba Varela presents today for Xolair.    Patient seen by provider today: No   present during visit today: Not Applicable.    Note: Pt reports her asthma has been well controlled despite the recent change in weather/blooming plants and trees.      Intravenous Access:  No Intravenous access/labs at this visit.    Treatment Conditions:  Not Applicable.      Post Infusion Assessment:  Patient tolerated injection without incident.  Site patent and intact, free from redness, edema or discomfort.  Pt declined 30 minute observation period today.       Discharge Plan:   [facility]  Patient will return 5/20/24 for next Xolair injection for next appointment.   Patient discharged in stable condition accompanied by: self.  Departure Mode: Ambulatory.      Mame Condon RN

## 2024-05-07 DIAGNOSIS — R73.03 PREDIABETES: ICD-10-CM

## 2024-05-07 DIAGNOSIS — E78.2 MIXED HYPERLIPIDEMIA: ICD-10-CM

## 2024-05-07 DIAGNOSIS — K76.0 HEPATIC STEATOSIS: ICD-10-CM

## 2024-05-07 DIAGNOSIS — E66.811 CLASS 1 OBESITY WITH BODY MASS INDEX (BMI) OF 30.0 TO 30.9 IN ADULT, UNSPECIFIED OBESITY TYPE, UNSPECIFIED WHETHER SERIOUS COMORBIDITY PRESENT: ICD-10-CM

## 2024-05-07 PROCEDURE — 91200 LIVER ELASTOGRAPHY: CPT | Mod: 26 | Performed by: PHYSICIAN ASSISTANT

## 2024-05-08 ENCOUNTER — TRANSFERRED RECORDS (OUTPATIENT)
Dept: HEALTH INFORMATION MANAGEMENT | Facility: CLINIC | Age: 54
End: 2024-05-08

## 2024-05-17 ENCOUNTER — APPOINTMENT (OUTPATIENT)
Dept: GENERAL RADIOLOGY | Facility: CLINIC | Age: 54
End: 2024-05-17
Attending: STUDENT IN AN ORGANIZED HEALTH CARE EDUCATION/TRAINING PROGRAM
Payer: COMMERCIAL

## 2024-05-17 ENCOUNTER — HOSPITAL ENCOUNTER (EMERGENCY)
Facility: CLINIC | Age: 54
Discharge: HOME | End: 2024-05-17
Attending: STUDENT IN AN ORGANIZED HEALTH CARE EDUCATION/TRAINING PROGRAM | Admitting: STUDENT IN AN ORGANIZED HEALTH CARE EDUCATION/TRAINING PROGRAM
Payer: COMMERCIAL

## 2024-05-17 ENCOUNTER — APPOINTMENT (OUTPATIENT)
Dept: CT IMAGING | Facility: CLINIC | Age: 54
End: 2024-05-17
Attending: STUDENT IN AN ORGANIZED HEALTH CARE EDUCATION/TRAINING PROGRAM
Payer: COMMERCIAL

## 2024-05-17 VITALS
DIASTOLIC BLOOD PRESSURE: 77 MMHG | HEIGHT: 64 IN | RESPIRATION RATE: 19 BRPM | TEMPERATURE: 97.7 F | BODY MASS INDEX: 32.78 KG/M2 | WEIGHT: 192 LBS | OXYGEN SATURATION: 97 % | SYSTOLIC BLOOD PRESSURE: 105 MMHG | HEART RATE: 86 BPM
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
## 2024-05-17 DIAGNOSIS — S52.572A OTHER CLOSED INTRA-ARTICULAR FRACTURE OF DISTAL END OF LEFT RADIUS, INITIAL ENCOUNTER: ICD-10-CM

## 2024-05-17 PROCEDURE — 96375 TX/PRO/DX INJ NEW DRUG ADDON: CPT

## 2024-05-17 PROCEDURE — 73200 CT UPPER EXTREMITY W/O DYE: CPT | Mod: LT

## 2024-05-17 PROCEDURE — 96376 TX/PRO/DX INJ SAME DRUG ADON: CPT

## 2024-05-17 PROCEDURE — 250N000013 HC RX MED GY IP 250 OP 250 PS 637: Performed by: STUDENT IN AN ORGANIZED HEALTH CARE EDUCATION/TRAINING PROGRAM

## 2024-05-17 PROCEDURE — 73030 X-RAY EXAM OF SHOULDER: CPT | Mod: LT

## 2024-05-17 PROCEDURE — 99285 EMERGENCY DEPT VISIT HI MDM: CPT | Mod: 25

## 2024-05-17 PROCEDURE — 29125 APPL SHORT ARM SPLINT STATIC: CPT | Mod: LT

## 2024-05-17 PROCEDURE — 73090 X-RAY EXAM OF FOREARM: CPT | Mod: LT

## 2024-05-17 PROCEDURE — 96374 THER/PROPH/DIAG INJ IV PUSH: CPT

## 2024-05-17 PROCEDURE — 250N000011 HC RX IP 250 OP 636: Performed by: STUDENT IN AN ORGANIZED HEALTH CARE EDUCATION/TRAINING PROGRAM

## 2024-05-17 PROCEDURE — 73060 X-RAY EXAM OF HUMERUS: CPT | Mod: LT

## 2024-05-17 PROCEDURE — 73110 X-RAY EXAM OF WRIST: CPT | Mod: LT

## 2024-05-17 RX ORDER — HYDROMORPHONE HCL IN WATER/PF 6 MG/30 ML
0.2 PATIENT CONTROLLED ANALGESIA SYRINGE INTRAVENOUS ONCE
Status: COMPLETED | OUTPATIENT
Start: 2024-05-17 | End: 2024-05-17

## 2024-05-17 RX ORDER — ACETAMINOPHEN 500 MG
1000 TABLET ORAL ONCE
Status: COMPLETED | OUTPATIENT
Start: 2024-05-17 | End: 2024-05-17

## 2024-05-17 RX ORDER — MORPHINE SULFATE 4 MG/ML
4 INJECTION, SOLUTION INTRAMUSCULAR; INTRAVENOUS ONCE
Status: COMPLETED | OUTPATIENT
Start: 2024-05-17 | End: 2024-05-17

## 2024-05-17 RX ORDER — OXYCODONE HYDROCHLORIDE 5 MG/1
5-10 TABLET ORAL EVERY 6 HOURS PRN
Qty: 10 TABLET | Refills: 0 | Status: SHIPPED | OUTPATIENT
Start: 2024-05-17 | End: 2024-05-20

## 2024-05-17 RX ORDER — KETOROLAC TROMETHAMINE 15 MG/ML
15 INJECTION, SOLUTION INTRAMUSCULAR; INTRAVENOUS ONCE
Status: COMPLETED | OUTPATIENT
Start: 2024-05-17 | End: 2024-05-17

## 2024-05-17 RX ADMIN — ACETAMINOPHEN 1000 MG: 500 TABLET, FILM COATED ORAL at 12:51

## 2024-05-17 RX ADMIN — HYDROMORPHONE HYDROCHLORIDE 0.2 MG: 0.2 INJECTION, SOLUTION INTRAMUSCULAR; INTRAVENOUS; SUBCUTANEOUS at 14:04

## 2024-05-17 RX ADMIN — MORPHINE SULFATE 4 MG: 4 INJECTION, SOLUTION INTRAMUSCULAR; INTRAVENOUS at 12:51

## 2024-05-17 RX ADMIN — HYDROMORPHONE HYDROCHLORIDE 0.2 MG: 0.2 INJECTION, SOLUTION INTRAMUSCULAR; INTRAVENOUS; SUBCUTANEOUS at 14:46

## 2024-05-17 RX ADMIN — KETOROLAC TROMETHAMINE 15 MG: 15 INJECTION, SOLUTION INTRAMUSCULAR; INTRAVENOUS at 14:04

## 2024-05-17 ASSESSMENT — ACTIVITIES OF DAILY LIVING (ADL)
ADLS_ACUITY_SCORE: 40

## 2024-05-17 ASSESSMENT — COLUMBIA-SUICIDE SEVERITY RATING SCALE - C-SSRS
6. HAVE YOU EVER DONE ANYTHING, STARTED TO DO ANYTHING, OR PREPARED TO DO ANYTHING TO END YOUR LIFE?: NO
1. IN THE PAST MONTH, HAVE YOU WISHED YOU WERE DEAD OR WISHED YOU COULD GO TO SLEEP AND NOT WAKE UP?: NO
2. HAVE YOU ACTUALLY HAD ANY THOUGHTS OF KILLING YOURSELF IN THE PAST MONTH?: NO

## 2024-05-17 NOTE — DISCHARGE INSTRUCTIONS
Take 600 mg of ibuprofen every 6-8 hours, do not exceed 2400 mg in a 24-hour period  Take 1000 mg of Tylenol every 6 hours, do not exceed 4000 mg in a 24-hour period.  Use opiate prescription pain medication only as needed for severe breakthrough pain  Do not drive, drink alcohol, operate machinery, or swim while on this medication  You need to follow-up with orthopedics next week ideally Monday, Tuesday at the latest.  [facility] was already aware of you and will likely be contacting you on Monday to schedule an appointment.  Return to the ED should you develop significant increase in pain or swelling, numbness of your fingers, fevers, or further emergent concerns.
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
## 2024-05-17 NOTE — ED NOTES
Bed: [facility]  Expected date:   Expected time:   Means of arrival:   Comments:  MAKAYLA MEDEL- EMS. Finger Deformity.
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
## 2024-05-20 ENCOUNTER — VIRTUAL VISIT (OUTPATIENT)
Dept: ENDOCRINOLOGY | Facility: CLINIC | Age: 54
End: 2024-05-20
Payer: COMMERCIAL

## 2024-05-20 ENCOUNTER — INFUSION THERAPY VISIT (OUTPATIENT)
Dept: INFUSION THERAPY | Facility: CLINIC | Age: 54
End: 2024-05-20
Attending: ALLERGY & IMMUNOLOGY
Payer: COMMERCIAL

## 2024-05-20 VITALS
SYSTOLIC BLOOD PRESSURE: 127 MMHG | TEMPERATURE: 97.9 F | RESPIRATION RATE: 20 BRPM | OXYGEN SATURATION: 96 % | DIASTOLIC BLOOD PRESSURE: 81 MMHG | HEART RATE: 93 BPM

## 2024-05-20 VITALS — HEIGHT: 64 IN | WEIGHT: 196 LBS | BODY MASS INDEX: 33.46 KG/M2

## 2024-05-20 DIAGNOSIS — R73.03 PREDIABETES: ICD-10-CM

## 2024-05-20 DIAGNOSIS — E78.00 HIGH CHOLESTEROL: ICD-10-CM

## 2024-05-20 DIAGNOSIS — E66.1 CLASS 1 DRUG-INDUCED OBESITY WITH SERIOUS COMORBIDITY AND BODY MASS INDEX (BMI) OF 34.0 TO 34.9 IN ADULT: Primary | ICD-10-CM

## 2024-05-20 DIAGNOSIS — K76.0 HEPATIC STEATOSIS: ICD-10-CM

## 2024-05-20 DIAGNOSIS — M54.2 CERVICALGIA: ICD-10-CM

## 2024-05-20 DIAGNOSIS — J45.50 SEVERE PERSISTENT ASTHMA WITHOUT COMPLICATION (H): Primary | ICD-10-CM

## 2024-05-20 DIAGNOSIS — E78.2 MIXED HYPERLIPIDEMIA: ICD-10-CM

## 2024-05-20 DIAGNOSIS — E66.811 CLASS 1 DRUG-INDUCED OBESITY WITH SERIOUS COMORBIDITY AND BODY MASS INDEX (BMI) OF 34.0 TO 34.9 IN ADULT: Primary | ICD-10-CM

## 2024-05-20 PROCEDURE — 99205 OFFICE O/P NEW HI 60 MIN: CPT | Mod: 95

## 2024-05-20 PROCEDURE — 96372 THER/PROPH/DIAG INJ SC/IM: CPT | Performed by: ALLERGY & IMMUNOLOGY

## 2024-05-20 PROCEDURE — 250N000011 HC RX IP 250 OP 636: Mod: JZ | Performed by: ALLERGY & IMMUNOLOGY

## 2024-05-20 PROCEDURE — 99417 PROLNG OP E/M EACH 15 MIN: CPT | Mod: 95

## 2024-05-20 RX ORDER — SEMAGLUTIDE 0.25 MG/.5ML
0.25 INJECTION, SOLUTION SUBCUTANEOUS WEEKLY
Qty: 2 ML | Refills: 0 | Status: SHIPPED | OUTPATIENT
Start: 2024-05-20 | End: 2024-07-17

## 2024-05-20 RX ORDER — QUETIAPINE FUMARATE 25 MG/1
TABLET, FILM COATED ORAL
COMMUNITY
Start: 2023-11-06 | End: 2024-07-17

## 2024-05-20 RX ORDER — LAMOTRIGINE 25 MG/1
100 TABLET ORAL DAILY
COMMUNITY
Start: 2024-05-16 | End: 2024-07-17

## 2024-05-20 RX ORDER — SEMAGLUTIDE 0.5 MG/.5ML
0.5 INJECTION, SOLUTION SUBCUTANEOUS WEEKLY
Qty: 2 ML | Refills: 0 | Status: SHIPPED | OUTPATIENT
Start: 2024-05-20

## 2024-05-20 RX ADMIN — OMALIZUMAB: 300 INJECTION, SOLUTION SUBCUTANEOUS at 08:15

## 2024-05-20 ASSESSMENT — PAIN SCALES - GENERAL: PAINLEVEL: EXTREME PAIN (8)

## 2024-05-20 NOTE — PATIENT INSTRUCTIONS
"Thank you for allowing us the privilege of caring for you. We hope we provided you with the excellent service you deserve.   Please let us know if there is anything else we can do for you so that we can be sure you are completely satisfied with your care experience.    To ensure the quality of our services you may be receiving a patient satisfaction survey from an independent patient satisfaction monitoring company.    The greatest compliment you can give is a \"Likely to Recommend\"    Your visit was with Padmaja Ferreira PA-C today.    Instructions per today's visit:     Nasir Varela, it was great to visit with you today.  Here is a review of our visit.  If our clinic scheduler is not able to reach you please call [phone] to schedule your next appointments.    Plan  Start Wegovy 0.25mg once weekly for 4 weeks, then increase to 0.5mg once weekly.   Alternatives discussed: zepbound, metformin, naltrexone (once no longer taking opioid medications)  Goals we discussed today:   Eating 3 meals a day or getting protein shakes in at breakfast and lunch. Popular brands are Premier protein, fairlife protein   Cutting out all added sugar in beverages (finding sugar free alternative to coffee mate, some patients like using protein shakes in their coffee)  Working towards 6 hours of sleep minimum nightly (7 is ideal)  No more than 2 drinks in a sitting when out with friends   Labs ordered today: calcium, pth, vitamin d   [facility] pharmacy in 6 weeks to check in on wegovy start, okay to cancel if not starting wegovy   Follow up with Padmaja in 3 months   Dietician appointment to be scheduled asa      Information about Video Visits with [facility]: video visit information  _________________________________________________________________________________________________________________________________________________________  If you are asked by your clinic team to have your blood pressure checked:  [facility] do " offer several locations for blood pressure checks. Please follow the below link to schedule an appointment. Scheduling an appointment at the pharmacy for a blood pressure check is now preferred.    Appointment Plus (appointment-plus.[employer])  _________________________________________________________________________________________________________________________________________________________  Important contact and scheduling information:  Please call our contact center at [phone] to schedule your next appointments.  To find a lab location near you, please call [phone].  For any nursing questions or concerns call Yesenia Singer LPN at [phone] or Alba Eubanks RN at [phone]  Please call during clinic hours Monday through Friday 8:00a - 4:00p if you have questions or you can contact us via [employer] at anytime and we will reply during clinic hours.    Lab results will be communicated through My Chart or letter (if My Chart not used). Please call the clinic if you have not received communication after 1 week or if you have any questions.?  Clinic Fax: [phone]    _________________________________________________________________________________________________________________________________________________________  Meal Replacement Products:    Here is the link to our new e-store where you can purchase our meal replacement products    [facility] E-Store  [facility]/store    The one week starter kit is a great way to sample a variety of products and see what works for you.    If you want more information about the product go to: [employer]    If you are an employee or [facility] or [facility] please contact your care team for a 10% estore discount    Free Shipping for orders over $75     Benefits of meal replacements products:    Portion and calorie control  Improved nutrition  Structured eating  Simplified food choices  Avoid  contact with trigger foods  _________________________________________________________________________________________________________________________________________________________  Interested in working with a health ?  Health coaches work with you to improve your overall health and wellbeing.  They look at the whole person, and may involve discussion of different areas of life, including, but not limited to the four pillars of health (sleep, exercise, nutrition, and stress management). Discuss with your care team if you would like to start working a health .  Health Coaching-3 Pack: Schedule by calling [phone]    $99 for three health coaching visits    Visits may be done in person or via phone    Coaching is a partnership between the  and the client; Coaches do not prescribe or diagnose    Coaching helps inspire the client to reach his/her personal goals   _________________________________________________________________________________________________________________________________________________________  24 Week Healthy Lifestyle Plan:    Our mission in the 24-week Healthy Lifestyle Plan is to provide you with individualized care by giving you the tools, education and support you need to lose weight and maintain a healthy lifestyle. In your 24-week journey, you ll be supported by a dedicated weight loss team that includes registered dietitians, medical weight management providers, health coaches, and nurses -- all with special expertise in weight loss -- to help you every step of the way.     Monthly meetings with your registered dietician or medical weight management provider help to review your progress, update your care plan, and make any adjustments needed to ensure success. Between these visits, weekly and bi-weekly health  visits will help you focus on the four pillars of weight loss -- stress, sleep, nutrition, and exercise -- and how you can best adapt each to achieve  sustainable weight loss results.    In addition, you will be given exclusive access to online wellbeing classes through [employer].  Your initial visit will be with a medical weight management provider who will help to understand your weight loss goals and ensure this program is the right fit for you. Please let our team know if you are interested in the 24 week plan by sending a message to your care team or calling [phone] to schedule.  _________________________________________________________________________________________________________________________________________________________  __________  [facility] Get Moving Program  Our team of physical therapists is trained to help you understand and take control of your condition. They will perform a thorough evaluation to determine your ability for activity and develop a customized plan to fit your goals and physical ability.  Scheduling: Unsure if the Get Moving program is right for you? Discuss the program with your medical provider or diabetes educator. You can also call us at [phone] to ask questions or schedule an appointment.   [facility] Get Moving Program  ____________________________________________________________________________________________________________________________________________________________________________  [facility] Diabetes Prevention Program (DPP)  If you have prediabetes and Medicare please contact us via [employer] to learn more about the Diabetes Prevention Program (DPP)  Program Details:  [facility] offers the year-long Diabetes Prevention Program (DPP). The program helps you to make lifestyle changes that prevent or delay type 2 diabetes by supporting healthy eating, increased physical activity, stress reduction and use of coping skills.   On average, previous [facility] DPP cohorts have lost and maintained at least 5% of their starting weight throughout the program and averaged more than  150 minutes of physical activity per week.  Participants meet weekly for one-hour group sessions over sixteen weeks, every other week for the next 8 weeks, and monthly for the last six months.   A year-long maintenance program is also available for participants who complete the first year.   Location & Cost:   During the COVID-19 Public Health Emergency, the program is offered virtually. When in-person classes can resume, they will be held at [facility].  For people with Medicare, the program is covered in full. A self-pay option will also be available for those with non-Medicare insurance plans.   ______________________________________________________________________________________________________________________________________________________________________________________________________________________________    To work with a Behavioral Health Psychologist:    Call to schedule:    Kannan Toth - [phone]  Lety Parnell - [phone]  Lucía Menezes - [phone]  Chioma Enriquez - [phone]   Candi Espinoza PhD (cannot accept Medicare) [phone]        Thank you,   [facility] Comprehensive Weight Management Team

## 2024-05-20 NOTE — PROGRESS NOTES
Virtual Visit Details    Type of service:  Video Visit   Video Start Time:  11:04am  Video End Time:12:13 PM    Originating Location (pt. Location): Home    Distant Location (provider location):  Off-site  Platform used for Video Visit: Cliff
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
## 2024-05-22 ENCOUNTER — TELEPHONE (OUTPATIENT)
Dept: ENDOCRINOLOGY | Facility: CLINIC | Age: 54
End: 2024-05-22
Payer: COMMERCIAL

## 2024-05-22 NOTE — TELEPHONE ENCOUNTER
PA Initiation    Medication: WEGOVY 0.25 MG/0.5ML SC SOAJ  Insurance Company: [employer] - Phone [phone] Fax [phone]  Pharmacy Filling the Rx:    Filling Pharmacy Phone:    Filling Pharmacy Fax:    Start Date: 5/20/2024

## 2024-05-23 ENCOUNTER — TRANSFERRED RECORDS (OUTPATIENT)
Dept: HEALTH INFORMATION MANAGEMENT | Facility: CLINIC | Age: 54
End: 2024-05-23
Payer: COMMERCIAL

## 2024-05-24 ENCOUNTER — TELEPHONE (OUTPATIENT)
Dept: ENDOCRINOLOGY | Facility: CLINIC | Age: 54
End: 2024-05-24

## 2024-05-24 NOTE — TELEPHONE ENCOUNTER
Left Voicemail (1st Attempt) and Sent Mychart (1st Attempt) for the patient to call back and schedule the following:    Appointment type: RET [facility]   Provider: Juanis Ferreira PA-C  Return date: 3 mos   Specialty phone number: [phone]  Additional appointment(s) needed: yes   Additonal Notes: NEW [facility] NUTRITION, Next available RD

## 2024-05-28 NOTE — TELEPHONE ENCOUNTER
Prior Authorization Approval    Medication: WEGOVY 0.25 MG/0.5ML SC SOAJ  Authorization Effective Date: 2/25/2024  Authorization Expiration Date: 5/25/2025  Approved Dose/Quantity: 2  Reference #: [id number]   Insurance Company: [employer] - Phone [phone] Fax [phone]  Expected CoPay: $    CoPay Card Available:      Financial Assistance Needed:    Which Pharmacy is filling the prescription:    Pharmacy Notified:    Patient Notified:

## 2024-06-03 ENCOUNTER — INFUSION THERAPY VISIT (OUTPATIENT)
Dept: INFUSION THERAPY | Facility: CLINIC | Age: 54
End: 2024-06-03
Attending: ALLERGY & IMMUNOLOGY
Payer: COMMERCIAL

## 2024-06-03 VITALS
RESPIRATION RATE: 16 BRPM | SYSTOLIC BLOOD PRESSURE: 131 MMHG | OXYGEN SATURATION: 97 % | HEART RATE: 91 BPM | DIASTOLIC BLOOD PRESSURE: 85 MMHG | TEMPERATURE: 98.2 F

## 2024-06-03 DIAGNOSIS — J45.50 SEVERE PERSISTENT ASTHMA WITHOUT COMPLICATION (H): Primary | ICD-10-CM

## 2024-06-03 PROCEDURE — 96372 THER/PROPH/DIAG INJ SC/IM: CPT | Performed by: ALLERGY & IMMUNOLOGY

## 2024-06-03 PROCEDURE — 250N000011 HC RX IP 250 OP 636: Mod: JZ | Performed by: ALLERGY & IMMUNOLOGY

## 2024-06-03 RX ADMIN — OMALIZUMAB: 150 INJECTION, SOLUTION SUBCUTANEOUS at 08:13

## 2024-06-03 NOTE — PROGRESS NOTES
Infusion Nursing Note:  Alba Varela presents today for Xolair.    Patient seen by provider today: No   present during visit today: Not Applicable.    Note: Patient states her wrist pain is better today. She had surgery and pins put in wrist.      Intravenous Access:  No Intravenous access/labs at this visit.    Treatment Conditions:  Not Applicable.      Post Infusion Assessment:  Patient tolerated injection without incident.  Patient observed for 30 minutes post Xolair per protocol.  Site patent and intact, free from redness, edema or discomfort.       Discharge Plan:   [facility]  Patient will return in 2 weeks for next appointment.   Patient discharged in stable condition accompanied by: self.  Departure Mode: Ambulatory.      Viri Narayan RN

## 2024-06-06 ENCOUNTER — TRANSFERRED RECORDS (OUTPATIENT)
Dept: HEALTH INFORMATION MANAGEMENT | Facility: CLINIC | Age: 54
End: 2024-06-06
Payer: COMMERCIAL

## 2024-06-10 DIAGNOSIS — E78.2 MIXED HYPERLIPIDEMIA: ICD-10-CM

## 2024-06-12 NOTE — PROGRESS NOTES
"Video-Visit Details    Type of service:  Video Visit    Video Start Time: 11:27 AM    Video End Time: 11:58 AM    Originating Location (pt. Location): Home    Distant Location (provider location):  Offsite (providers home) [facility] [place]     Platform used for Video Visit: [employer]    New Weight Management Nutrition Consultation    Alba Varela is a 54 year old female presents today for new weight management nutrition consultation.  Patient referred by JHONATAN Linder on May 20, 2024.    Patient with Co-morbidities of obesity includin/20/2024    10:40 AM   --   I have the following health issues associated with obesity Pre-Diabetes    High Cholesterol    Sleep Apnea    Fatty Liver    Asthma    Stress Incontinence   I have the following symptoms associated with obesity Depression    Fatigue    Hip Pain     Anthropometrics:  Initial consult weight: 196 lb on 24   Estimated body mass index is 32.1 kg/m  as calculated from the following:    Height as of this encounter: 1.626 m (5' 4\").    Weight as of this encounter: 84.8 kg (187 lb).  Current Weight: 187 lb per patient report      Medications for Weight Loss:  Wegovy 0.25 mg - no negative side effects, suppressing appetite.     NUTRITION HISTORY  Food allergies: NKFA  Food intolerances: None  Vitamin/Mineral Supplements: None   Previous methods of diet modification for weight loss: watching calories/portion control   RD before: None     Doesn't drink milk.     Typically eats 1 big meal a day (dinner), doesn't generally snack, will drink coffee (with sweetened powdered creamer) and iced tea (unsweetened) throughout the day. Craves bread, mashed potatoes . Is able to get full. Can stay full until next meal. Does sometimes struggle with portion control when it's a food she's craving. Does not experience food noise. Does experience emotional eating (when feeling really depressed she wants to surround herself with " friends which often means more eating, such as at a restaurant for lunch). Does not experience a loss of control around eating.     Eats out/ gets take out 2x a week. Drinks coffee with powdered creamer, sparkling water, unsweetened iced tea. Will have 1-2 diet cokes a day. ETOH 1-2 times a month, 3-4 beers in a sitting, feels that ETOH is not too important to her quality of life.      Goals discussed with Padmaja:  Eating 3 meals a day or getting protein shakes in at breakfast and lunch. Popular brands are Premier protein, fairlife protein   Cutting out all added sugar in beverages (finding sugar free alternative to coffee mate, some patients like using protein shakes in their coffee)  Working towards 6 hours of sleep minimum nightly (7 is ideal)  No more than 2 drinks in a sitting when out with friends     Diet recall:   Skips breakfast and lunch. Eats dinner   Hydration: Zero sugar Gingerale, coffee with splash half and half, unsweetened iced tea, water.         5/20/2024    10:40 AM   Diet Recall Review with Patient   If you do eat supper, what types of food do you typically eat? Protein, veg,bread,   How many glasses of juice do you drink in a typical day? 0   How many of glasses of milk do you drink in a typical day? 0   How many 8oz glasses of sugar containing drinks such as Corbin-Aid/sweet tea do you drink in a day? 0   How many cans/bottles of sugar pop/soda/tea/sports drinks do you drink in a day? 0   How many cans/bottles of diet pop/soda/tea or sports drink do you drink in a day? 2   How often do you have a drink of alcohol? 2-4 Times a Month   If you do drink, how many drinks might you have in a day? 3-4           5/20/2024    10:40 AM   Eating Habits   Generally, my meals include foods like these bread, pasta, rice, potatoes, corn, crackers, sweet dessert, pop, or juice A Few Times a Week   Generally, my meals include foods like these fried meats, brats, burgers, french fries, pizza, cheese, chips, or  ice cream Once a Week   Eat fast food (like McDonalds, Burger Michael, Taco Bell) Less Than Weekly   Eat at a buffet or sit-down restaurant Less Than Weekly   Eat most of my meals in front of the TV or computer Almost Everyday   Often skip meals, eat at random times, have no regular eating times Everyday   Rarely sit down for a meal but snack or graze throughout Less Than Weekly   Eat extra snacks between meals Never   Eat most of my food at the end of the day Almost Everyday   Eat in the middle of the night or wake up at night to eat Never   Eat extra snacks to prevent or correct low blood sugar A Few Times a Week   Eat to prevent acid reflux or stomach pain Never   Worry about not having enough food to eat Never   I eat when I am depressed Less Than Weekly   I eat when I am stressed Less Than Weekly   I eat when I am bored Less Than Weekly   I eat when I am anxious Once a Week   I eat when I am happy or as a reward Never   I feel hungry all the time even if I just have eaten Never   Feeling full is important to me Never   I finish all the food on my plate even if I am already full Almost Everyday   I can't resist eating delicious food or walk past the good food/smell Less Than Weekly   I eat/snack without noticing that I am eating Never   I eat when I am preparing the meal Never   I eat more than usual when I see others eating Never   I have trouble not eating sweets, ice cream, cookies, or chips if they are around the house Never   I think about food all day Never   What foods, if any, do you crave? 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 In the past enjoyed swimming, playing tennis, skiing. Has had to stop these things due to hip pain. Has 8 cats, will take them on a walk. Likes wall yoga.            5/20/2024    10:40 AM   Activity/Exercise History   How much of a typical 12 hour day do you spend sitting? Less Than Half the Day   How much of a typical 12 hour day do you spend lying down? Less Than Half the Day   How much of a typical day do you spend walking/standing? Half the Day   How many hours (not including work) do you spend on the TV/Video Games/Computer/Tablet/Phone? 2-3 Hours   How many times a week are you active for the purpose of exercise? Once a Week   What keeps you from being more active? Pain    Too tired   How many total minutes do you spend doing some activity for the purpose of exercising when you exercise? More Than 30 Minutes     Nutrition Prescription  Recommended energy/nutrient modification.    Nutrition Diagnosis  Obesity r/t long history of positive energy balance aeb BMI >30.    Nutrition Intervention  Provided education on nutrition considerations when starting GLP1 medication including, changes in meal/snack volumes; meeting protein, hydration and micronutrient needs; limiting high-fat foods; and diet/lifestyle strategies for preventing constipation.  Patient demonstrates understanding. Co-developed goals to work towards.   Provided pt with list of goals and resources below via [employer].     Expected Engagement: good    Nutrition Goals  1) Have a good source of protein 3x per day. Aim for a minimum 60 grams of protein daily.   2) Increase fiber containing foods in diet by having more fruits, vegetables and whole grains.   3) Aim for 64 oz of water daily.     The Plate Method  [email or web address]    Protein Sources   [email or web address]  "    Quick/Easy Protein Sources:  Hard boiled eggs  Part-skim cheese sticks  Baby Bell cheese rounds  Low-fat/low-sugar Greek yogurt  Low-fat cottage cheese  Lean deli meat (chicken/turkey/ham)  Roasted chickpeas or lentils  Nuts   Turkey meat stick  Protein shake/bar  \"P3\" snack (cheese, nuts, deli meat)  Aldi's \"Protein Bread\"   \"Egglife\" egg white wrap    Tuna/salmon can/packet     Carbohydrates  [email or web address]     Mindful Eating  [email or web address]     Summary of Volumetrics Eating Plan  [email or web address]     Follow-Up: July 25.     Time spent with patient: 31 minutes.  Aicha Flores RD, LD    "

## 2024-06-14 ENCOUNTER — MYC REFILL (OUTPATIENT)
Dept: INTERNAL MEDICINE | Facility: CLINIC | Age: 54
End: 2024-06-14

## 2024-06-14 ENCOUNTER — VIRTUAL VISIT (OUTPATIENT)
Dept: ENDOCRINOLOGY | Facility: CLINIC | Age: 54
End: 2024-06-14
Payer: COMMERCIAL

## 2024-06-14 VITALS — HEIGHT: 64 IN | WEIGHT: 187 LBS | BODY MASS INDEX: 31.92 KG/M2

## 2024-06-14 DIAGNOSIS — Z71.3 NUTRITIONAL COUNSELING: Primary | ICD-10-CM

## 2024-06-14 DIAGNOSIS — E66.9 OBESITY: ICD-10-CM

## 2024-06-14 DIAGNOSIS — E78.2 MIXED HYPERLIPIDEMIA: ICD-10-CM

## 2024-06-14 PROCEDURE — 97802 MEDICAL NUTRITION INDIV IN: CPT | Mod: 95

## 2024-06-14 PROCEDURE — 99207 PR NO CHARGE LOS: CPT | Mod: 95

## 2024-06-14 RX ORDER — ROSUVASTATIN CALCIUM 5 MG/1
5 TABLET, COATED ORAL DAILY
Qty: 90 TABLET | Refills: 1 | Status: SHIPPED | OUTPATIENT
Start: 2024-06-14

## 2024-06-14 ASSESSMENT — PAIN SCALES - GENERAL: PAINLEVEL: MODERATE PAIN (4)

## 2024-06-14 NOTE — LETTER
"2024       RE: Abla Varela  [id number]  [place] [address]     Dear Colleague,    Thank you for referring your patient, Alba Varela, to the [facility] [place] at [facility]. Please see a copy of my visit note below.    Video-Visit Details    Type of service:  Video Visit    Video Start Time: 11:27 AM    Video End Time: 11:58 AM    Originating Location (pt. Location): Home    Distant Location (provider location):  Offsite (providers home) [facility] [place]     Platform used for Video Visit: [employer] Journal[employer]    New Weight Management Nutrition Consultation    Alba Varela is a 54 year old female presents today for new weight management nutrition consultation.  Patient referred by JHONATAN Linder on May 20, 2024.    Patient with Co-morbidities of obesity includin/20/2024    10:40 AM   --   I have the following health issues associated with obesity Pre-Diabetes    High Cholesterol    Sleep Apnea    Fatty Liver    Asthma    Stress Incontinence   I have the following symptoms associated with obesity Depression    Fatigue    Hip Pain     Anthropometrics:  Initial consult weight: 196 lb on 24   Estimated body mass index is 32.1 kg/m  as calculated from the following:    Height as of this encounter: 1.626 m (5' 4\").    Weight as of this encounter: 84.8 kg (187 lb).  Current Weight: 187 lb per patient report      Medications for Weight Loss:  Wegovy 0.25 mg - no negative side effects, suppressing appetite.     NUTRITION HISTORY  Food allergies: NKFA  Food intolerances: None  Vitamin/Mineral Supplements: None   Previous methods of diet modification for weight loss: watching calories/portion control   RD before: None     Doesn't drink milk.     Typically eats 1 big meal a day (dinner), doesn't generally snack, will drink coffee (with sweetened powdered creamer) and iced tea (unsweetened) " throughout the day. Craves bread, mashed potatoes . Is able to get full. Can stay full until next meal. Does sometimes struggle with portion control when it's a food she's craving. Does not experience food noise. Does experience emotional eating (when feeling really depressed she wants to surround herself with friends which often means more eating, such as at a restaurant for lunch). Does not experience a loss of control around eating.     Eats out/ gets take out 2x a week. Drinks coffee with powdered creamer, sparkling water, unsweetened iced tea. Will have 1-2 diet cokes a day. ETOH 1-2 times a month, 3-4 beers in a sitting, feels that ETOH is not too important to her quality of life.      Goals discussed with Padmaja:  Eating 3 meals a day or getting protein shakes in at breakfast and lunch. Popular brands are Premier protein, fairlife protein   Cutting out all added sugar in beverages (finding sugar free alternative to coffee mate, some patients like using protein shakes in their coffee)  Working towards 6 hours of sleep minimum nightly (7 is ideal)  No more than 2 drinks in a sitting when out with friends     Diet recall:   Skips breakfast and lunch. Eats dinner   Hydration: Zero sugar Gingerale, coffee with splash half and half, unsweetened iced tea, water.         5/20/2024    10:40 AM   Diet Recall Review with Patient   If you do eat supper, what types of food do you typically eat? Protein, veg,bread,   How many glasses of juice do you drink in a typical day? 0   How many of glasses of milk do you drink in a typical day? 0   How many 8oz glasses of sugar containing drinks such as Corbin-Aid/sweet tea do you drink in a day? 0   How many cans/bottles of sugar pop/soda/tea/sports drinks do you drink in a day? 0   How many cans/bottles of diet pop/soda/tea or sports drink do you drink in a day? 2   How often do you have a drink of alcohol? 2-4 Times a Month   If you do drink, how many drinks might you have in a  day? 3-4           5/20/2024    10:40 AM   Eating Habits   Generally, my meals include foods like these bread, pasta, rice, potatoes, corn, crackers, sweet dessert, pop, or juice A Few Times a Week   Generally, my meals include foods like these fried meats, brats, burgers, french fries, pizza, cheese, chips, or ice cream Once a Week   Eat fast food (like McDonalds, Burger Michael, Taco Bell) Less Than Weekly   Eat at a buffet or sit-down restaurant Less Than Weekly   Eat most of my meals in front of the TV or computer Almost Everyday   Often skip meals, eat at random times, have no regular eating times Everyday   Rarely sit down for a meal but snack or graze throughout Less Than Weekly   Eat extra snacks between meals Never   Eat most of my food at the end of the day Almost Everyday   Eat in the middle of the night or wake up at night to eat Never   Eat extra snacks to prevent or correct low blood sugar A Few Times a Week   Eat to prevent acid reflux or stomach pain Never   Worry about not having enough food to eat Never   I eat when I am depressed Less Than Weekly   I eat when I am stressed Less Than Weekly   I eat when I am bored Less Than Weekly   I eat when I am anxious Once a Week   I eat when I am happy or as a reward Never   I feel hungry all the time even if I just have eaten Never   Feeling full is important to me Never   I finish all the food on my plate even if I am already full Almost Everyday   I can't resist eating delicious food or walk past the good food/smell Less Than Weekly   I eat/snack without noticing that I am eating Never   I eat when I am preparing the meal Never   I eat more than usual when I see others eating Never   I have trouble not eating sweets, ice cream, cookies, or chips if they are around the house Never   I think about food all day Never   What foods, if any, do you crave? None           5/20/2024    10:40 AM   Amount of Food   I feel out of control when eating Never   I eat a large  amount of food, like a loaf of bread, a box of cookies, a pint/quart of ice cream, all at once Never   I eat a large amount of food even when I am not hungry Never   I eat rapidly Never   I eat alone because I feel embarrassed and do not want others to see how much I have eaten Never   I eat until I am uncomfortably full Never   I feel bad, disgusted, or guilty after I overeat Never     Physical Activity:   works in a [employer] so is on her feet all day. In the past enjoyed swimming, playing tennis, skiing. Has had to stop these things due to hip pain. Has 8 cats, will take them on a walk. Likes wall yoga.            5/20/2024    10:40 AM   Activity/Exercise History   How much of a typical 12 hour day do you spend sitting? Less Than Half the Day   How much of a typical 12 hour day do you spend lying down? Less Than Half the Day   How much of a typical day do you spend walking/standing? Half the Day   How many hours (not including work) do you spend on the TV/Video Games/Computer/Tablet/Phone? 2-3 Hours   How many times a week are you active for the purpose of exercise? Once a Week   What keeps you from being more active? Pain    Too tired   How many total minutes do you spend doing some activity for the purpose of exercising when you exercise? More Than 30 Minutes     Nutrition Prescription  Recommended energy/nutrient modification.    Nutrition Diagnosis  Obesity r/t long history of positive energy balance aeb BMI >30.    Nutrition Intervention  Provided education on nutrition considerations when starting GLP1 medication including, changes in meal/snack volumes; meeting protein, hydration and micronutrient needs; limiting high-fat foods; and diet/lifestyle strategies for preventing constipation.  Patient demonstrates understanding. Co-developed goals to work towards.   Provided pt with list of goals and resources below via [employer].     Expected Engagement: good    Nutrition Goals  1) Have a good source of protein  "3x per day. Aim for a minimum 60 grams of protein daily.   2) Increase fiber containing foods in diet by having more fruits, vegetables and whole grains.   3) Aim for 64 oz of water daily.     The Plate Method  [email or web address]    Protein Sources   [email or web address]     Quick/Easy Protein Sources:  Hard boiled eggs  Part-skim cheese sticks  Baby Bell cheese rounds  Low-fat/low-sugar Greek yogurt  Low-fat cottage cheese  Lean deli meat (chicken/turkey/ham)  Roasted chickpeas or lentils  Nuts   Turkey meat stick  Protein shake/bar  \"P3\" snack (cheese, nuts, deli meat)  Aldi's \"Protein Bread\"   \"Egglife\" egg white wrap    Tuna/salmon can/packet     Carbohydrates  [email or web address]     Mindful Eating  [email or web address]     Summary of Volumetrics Eating Plan  [email or web address]     Follow-Up: July 25.     Time spent with patient: 31 minutes.  Aicha Flores RD, LD    "

## 2024-06-14 NOTE — TELEPHONE ENCOUNTER
LDL Cholesterol Calculated   Date Value Ref Range Status   02/05/2024 98 <=100 mg/dL Final   03/09/2017 138 (H) <100 mg/dL Final     Comment:     Above desirable:  100-129 mg/dl   Borderline High:  130-159 mg/dL   High:             160-189 mg/dL   Very high:       >189 mg/dl

## 2024-06-14 NOTE — PATIENT INSTRUCTIONS
"Nasir Willis,     Follow-up with RD on July 25.     Thank you,    Aicha Flores, RD, LD  If you would like to schedule or reschedule an appointment with the RD, please call [phone]    Nutrition Goals  1) Have a good source of protein 3x per day. Aim for a minimum 60 grams of protein daily.   2) Increase fiber containing foods in diet by having more fruits, vegetables and whole grains.   3) Aim for 64 oz of water daily.     The Plate Method  [email or web address]    Protein Sources   [email or web address]     Quick/Easy Protein Sources:  Hard boiled eggs  Part-skim cheese sticks  Baby Bell cheese rounds  Low-fat/low-sugar Greek yogurt  Low-fat cottage cheese  Lean deli meat (chicken/turkey/ham)  Roasted chickpeas or lentils  Nuts   Turkey meat stick  Protein shake/bar  \"P3\" snack (cheese, nuts, deli meat)  [employer]i's \"Protein Bread\"   \"Egglife\" egg white wrap    Tuna/salmon can/packet     Carbohydrates  [email or web address]     Mindful Eating  [email or web address]     Summary of Volumetrics Eating Plan  [email or web address]     COMPREHENSIVE WEIGHT MANAGEMENT PROGRAM  VIRTUAL SUPPORT GROUPS    At [facility], our Comprehensive Weight Management program offers on-line support groups for patients who are working on weight loss and considering, preparing for, or have had weight loss surgery.     There is no cost for this opportunity.  You are invited to attend the?Virtual Support Groups?provided by any of the following locations:    [facility] via [employer] Teams with Elyse Fuentes RN  2.   [place] via [employer] Teams with Huber Delgadillo, PhD, LP  3.   [place] via LuckyCal with Emily Wilson RN  4.   [facility] via a Zoom Meeting with ROSITA Riggins    The following Support Group information can also be found on our website:  [email or web address]      [facility] " "[facility] Weight Loss Surgery Support Group  The support group is a patient-lead forum that meets monthly to share experiences, encouragement and education. It is open to those who have had weight loss surgery, are scheduled for surgery, or are considering surgery.   WHEN: This group meets on the 3rd Wednesday of each month from 5:00PM - 6:00PM virtually using Microsoft Teams.   FACILITATOR: Led by Elyse Muñoz RD, LD, RN, the program's Clinical Coordinator.   TO REGISTER: Please contact the clinic via [employer] or call the nurse line directly at [phone] to inform our staff that you would like an invite sent to you and to let us know the email you would like the invite sent to. Prior to the meeting, a link with directions on how to join the meeting will be sent to you.    2024 Meetings   January 17  February 21  March 20  April 17  May 15  Moon 19      [facility] Bariatric Care Support Group?  This is open to all pre- and post- operative bariatric surgery patients as well as their support system.   WHEN: This group meets the 3rd Tuesday of each month from 6:30 PM - 8:00 PM virtually using Microsoft Teams.   FACILITATOR: Led by Huber Delgadillo, Ph.D who is a Licensed Psychologist with the [facility] Comprehensive Weight Management Program.   TO REGISTER: Please send an email to Huber Delgadillo, Ph.D., LP at?[email or web address]?if you would like an invitation to the group. Prior to the meeting, a link with directions on how to join the meeting will be sent to you.    2024 Meetings January 16: \"Medication Management and Bariatric Surgery\", Heaven Hui, PharmD, Pharmacy Resident at [facility]  February 20: \"A Bariatric Surgery Patient's Perspective\", AGUILAR Ballard, [facility], Behavioral Health Clinician at [facility]  March 19  April 16  May 21  Moon 18: \"Nutritional Labeling\", Dietitian speaker to be " "announced.  November 19: \"Holiday Eating\", Dietitian speaker to be announced.    [facility] Post-Operative Bariatric Surgery Support Group  This is a support group for [facility] bariatric patients (and those external to [facility]) who have had bariatric surgery and are at least 3 months post-surgery.  WHEN: This support group meets the 4th Wednesday of the month from 11:00 AM - 12:00 PM virtually using Microsoft Teams.   FACILITATOR: Led by Certified Bariatric Nurse, Emily Wilson RN.   TO REGISTER: Please send an email to  at [email or web address] if you would like an invitation to the group.  Prior to the meeting, a link with directions on how to join the meeting will be sent to you.    2024 Meetings  January 24  February 28  March 27  April 24  May 22  Moon 26    [facility] Healthy Lifestyle Group?  This is a 60 minute virtual coaching group for those who want to lead a healthier lifestyle. Come together to set goals and overcome barriers in a supportive group environment. We will address the four pillars of health: nutrition, exercise, sleep and emotional well-being.  This group is highly recommended for those who are participating in the 24 week Healthy Lifestyle Plan and our Health Coaching sessions.  WHEN: This group meets the 1st Friday of the month, 12:30 PM - 1:30 PM online, via a zoom meeting.    FACILITATOR: Led by National Board Certified Health and , Emily Callahan [facility].   TO REGISTER: Please call the Call Center at [phone] to register. You will get an appointment to attend in [facility]. Fifteen minutes prior to the meeting, complete the e-check in and you will get the link to join the meeting.    There is no charge to attend this group and space is limited.     2024 Meetings  January 5: \"New Years Vision: Manifest your Best 2024!\" (guided imagery,  " "journaling and discussion)  February 2: \"Let's Talk\"  March 1: \"10 Percent Happier\" by Fish Joseph (Book Bites - a guided discussion on the nuggets of wisdom from favorite wellness books, no need to read the book but highly encouraged)  April 5: \"Let's Talk\"  May 3: \"Essentialism: The Disciplined Pursuit of Less\" by Gary Zamarripa (Book Bites discussion)  June 7: \"Let's Talk\"  July 5: NO MEETING, off for the 4th of July Holiday  August 2: \"The Blue Zones, Secrets for Living a Longer Life\" by Fish Meade (Book Bites discussion)        "
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
## 2024-06-15 RX ORDER — ROSUVASTATIN CALCIUM 5 MG/1
5 TABLET, COATED ORAL DAILY
Qty: 90 TABLET | Refills: 0 | OUTPATIENT
Start: 2024-06-15

## 2024-06-17 ENCOUNTER — INFUSION THERAPY VISIT (OUTPATIENT)
Dept: INFUSION THERAPY | Facility: CLINIC | Age: 54
End: 2024-06-17
Attending: ALLERGY & IMMUNOLOGY
Payer: COMMERCIAL

## 2024-06-17 VITALS
TEMPERATURE: 97.4 F | SYSTOLIC BLOOD PRESSURE: 122 MMHG | OXYGEN SATURATION: 100 % | HEART RATE: 89 BPM | DIASTOLIC BLOOD PRESSURE: 91 MMHG

## 2024-06-17 DIAGNOSIS — J45.50 SEVERE PERSISTENT ASTHMA WITHOUT COMPLICATION (H): Primary | ICD-10-CM

## 2024-06-17 DIAGNOSIS — E78.00 HIGH CHOLESTEROL: ICD-10-CM

## 2024-06-17 DIAGNOSIS — E78.2 MIXED HYPERLIPIDEMIA: ICD-10-CM

## 2024-06-17 DIAGNOSIS — K76.0 HEPATIC STEATOSIS: ICD-10-CM

## 2024-06-17 DIAGNOSIS — E66.811 CLASS 1 DRUG-INDUCED OBESITY WITH SERIOUS COMORBIDITY AND BODY MASS INDEX (BMI) OF 34.0 TO 34.9 IN ADULT: ICD-10-CM

## 2024-06-17 DIAGNOSIS — R73.03 PREDIABETES: ICD-10-CM

## 2024-06-17 DIAGNOSIS — E66.1 CLASS 1 DRUG-INDUCED OBESITY WITH SERIOUS COMORBIDITY AND BODY MASS INDEX (BMI) OF 34.0 TO 34.9 IN ADULT: ICD-10-CM

## 2024-06-17 LAB
CALCIUM SERPL-MCNC: 9.6 MG/DL (ref 8.6–10)
PTH-INTACT SERPL-MCNC: 44 PG/ML (ref 15–65)
VIT D+METAB SERPL-MCNC: 17 NG/ML (ref 20–50)

## 2024-06-17 PROCEDURE — 83970 ASSAY OF PARATHORMONE: CPT

## 2024-06-17 PROCEDURE — 250N000011 HC RX IP 250 OP 636: Mod: JZ | Performed by: ALLERGY & IMMUNOLOGY

## 2024-06-17 PROCEDURE — 82306 VITAMIN D 25 HYDROXY: CPT

## 2024-06-17 PROCEDURE — 36415 COLL VENOUS BLD VENIPUNCTURE: CPT

## 2024-06-17 PROCEDURE — 82310 ASSAY OF CALCIUM: CPT

## 2024-06-17 PROCEDURE — 96372 THER/PROPH/DIAG INJ SC/IM: CPT | Performed by: ALLERGY & IMMUNOLOGY

## 2024-06-17 RX ADMIN — OMALIZUMAB: 150 INJECTION, SOLUTION SUBCUTANEOUS at 15:15

## 2024-06-17 NOTE — PROGRESS NOTES
Infusion Nursing Note:  Alba Varela presents today for labs and Xolair.    Patient seen by provider today: No   present during visit today: Not Applicable.    Note: N/A.      Intravenous Access:  Lab draw site RAC, Needle type butterfly, Gauge 23.  Labs drawn without difficulty.    Treatment Conditions:  Not Applicable.      Post Infusion Assessment:  Patient tolerated injection without incident.  Site patent and intact, free from redness, edema or discomfort.   Pt declines post observation       Discharge Plan:   AVS to patient via [employer].  Patient will return 7/1/24 for next Xolair injection   Patient discharged in stable condition accompanied by: self.  Departure Mode: Ambulatory.      Mame Condon RN

## 2024-07-01 ENCOUNTER — INFUSION THERAPY VISIT (OUTPATIENT)
Dept: INFUSION THERAPY | Facility: CLINIC | Age: 54
End: 2024-07-01
Attending: ALLERGY & IMMUNOLOGY
Payer: COMMERCIAL

## 2024-07-01 VITALS
HEART RATE: 79 BPM | DIASTOLIC BLOOD PRESSURE: 84 MMHG | RESPIRATION RATE: 16 BRPM | SYSTOLIC BLOOD PRESSURE: 127 MMHG | OXYGEN SATURATION: 98 % | TEMPERATURE: 97.4 F

## 2024-07-01 DIAGNOSIS — J45.50 SEVERE PERSISTENT ASTHMA WITHOUT COMPLICATION (H): Primary | ICD-10-CM

## 2024-07-01 PROCEDURE — 250N000011 HC RX IP 250 OP 636: Performed by: ALLERGY & IMMUNOLOGY

## 2024-07-01 PROCEDURE — 96372 THER/PROPH/DIAG INJ SC/IM: CPT | Performed by: ALLERGY & IMMUNOLOGY

## 2024-07-01 RX ADMIN — OMALIZUMAB: 150 INJECTION, SOLUTION SUBCUTANEOUS at 13:46

## 2024-07-15 ENCOUNTER — INFUSION THERAPY VISIT (OUTPATIENT)
Dept: INFUSION THERAPY | Facility: CLINIC | Age: 54
End: 2024-07-15
Attending: ALLERGY & IMMUNOLOGY
Payer: COMMERCIAL

## 2024-07-15 VITALS
RESPIRATION RATE: 16 BRPM | OXYGEN SATURATION: 98 % | HEART RATE: 87 BPM | TEMPERATURE: 97.6 F | SYSTOLIC BLOOD PRESSURE: 113 MMHG | DIASTOLIC BLOOD PRESSURE: 79 MMHG

## 2024-07-15 DIAGNOSIS — J45.50 SEVERE PERSISTENT ASTHMA WITHOUT COMPLICATION (H): Primary | ICD-10-CM

## 2024-07-15 PROCEDURE — 250N000011 HC RX IP 250 OP 636: Performed by: ALLERGY & IMMUNOLOGY

## 2024-07-15 PROCEDURE — 96372 THER/PROPH/DIAG INJ SC/IM: CPT | Performed by: ALLERGY & IMMUNOLOGY

## 2024-07-15 RX ADMIN — OMALIZUMAB: 150 INJECTION, SOLUTION SUBCUTANEOUS at 11:33

## 2024-07-15 NOTE — PROGRESS NOTES
Infusion Nursing Note:  Alba Varela presents today for Xolair.    Patient seen by provider today: No   present during visit today: Not Applicable.    Note:  Patient reports is feeling well today.  Patient denies fevers, chills or signs of infection.      Intravenous Access:  No Intravenous access/labs at this visit.    Treatment Conditions:  Not Applicable.      Post Infusion Assessment:  Patient tolerated injection without incident.   Xolair subcutaneous 1 injection right arm + 1 injection left arm.  Patient refused to stay for 30 minute observation period per her normal routine.      Discharge Plan:   Discharge instructions reviewed with: Patient.  Patient and/or family verbalized understanding of discharge instructions and all questions answered.  Patient discharged in stable condition accompanied by: self.  Departure Mode: Ambulatory.  7/29/24: Xolair.    Maddi Hyde RN, BSN, OCN on 7/15/2024 at 11:48 AM

## 2024-07-17 ENCOUNTER — VIRTUAL VISIT (OUTPATIENT)
Dept: CARDIOLOGY | Facility: CLINIC | Age: 54
End: 2024-07-17
Payer: COMMERCIAL

## 2024-07-17 VITALS — HEIGHT: 64 IN | WEIGHT: 180 LBS | BODY MASS INDEX: 30.73 KG/M2

## 2024-07-17 DIAGNOSIS — E66.811 CLASS 1 OBESITY WITH BODY MASS INDEX (BMI) OF 30.0 TO 30.9 IN ADULT, UNSPECIFIED OBESITY TYPE, UNSPECIFIED WHETHER SERIOUS COMORBIDITY PRESENT: Primary | ICD-10-CM

## 2024-07-17 RX ORDER — LAMOTRIGINE 100 MG/1
100 TABLET ORAL DAILY
COMMUNITY

## 2024-07-17 ASSESSMENT — PAIN SCALES - GENERAL: PAINLEVEL: NO PAIN (0)

## 2024-07-17 NOTE — PROGRESS NOTES
"Virtual Visit Details    Type of service:  Video Visit   Video Start Time: {video visit start/end time for provider to select:210303}  Video End Time:{video visit start/end time for provider to select:888125}    Originating Location (pt. Location): {video visit patient location:961539::\"Home\"}  {PROVIDER LOCATION On-site should be selected for visits conducted from your clinic location or adjoining [facility] hospital, academic office, or other nearby [facility] building. Off-site should be selected for all other provider locations, including home:258909}  Distant Location (provider location):  {virtual location provider:332830}  Platform used for Video Visit: {Virtual Visit Platforms:632191::\"Re.Mu\"}        "

## 2024-07-17 NOTE — PATIENT INSTRUCTIONS
"Recommendations from today's MTM visit:                                                    MTM (medication therapy management) is a service provided by a clinical pharmacist designed to help you get the most of out of your medicines.       Complete your supply of Wegovy 0.5 mg once weekly then increase to 1 mg once weekly for at least 4 weeks.  You may increase to 1.7 mg once weekly thereafter if needed.     Remain at the lowest effective dose that you are tolerating.  You do not need to increase the dose if you are having a adequate weight response.     Follow-up with Padmaja Ferreira PA-C as scheduled in September.    It was great speaking with you today.  I value your experience and would be very thankful for your time in providing feedback in our clinic survey. In the next few days, you may receive an email or text message from [employer] with a link to a survey related to your  clinical pharmacist.\"     To schedule another MTM appointment, please call the clinic directly or you may call the MTM scheduling line at [phone].    My Clinical Pharmacist's contact information:                                                      Please feel free to contact me with any questions or concerns you have.      Narendra Mcgregor, PharmD, BCACP  Medication Therapy Management Pharmacist  [facility]    "

## 2024-07-17 NOTE — Clinical Note
7/17/2024      RE: Alba Varela  [address]  [facility] [place] [facility] [address]       Dear Colleague,    Thank you for the opportunity to participate in the care of your patient, Alba Varela, at the [facility] HEART [facility]. Please see a copy of my visit note below.    Medication Therapy Management (MTM) Encounter    ASSESSMENT:                            Medication Adherence/Access: No issues identified    Weight management: Subtle, modest weight change with initiation of Wegovy titrated to 0.5 mg weekly.  Notable, appreciable effects on hunger and satiety.  Therapy is well tolerated with minimal adverse effects.  Advise continued titration to 1 mg and subsequently 1.7 mg weekly if needed based off of effect.    Mood stability/Anxiety: Improved with relatively recent transition from Depakote to lamotrigine.  Continues to work with psychiatry.    Tremor: Unchanged.    Lipids: Unchanged.    PLAN:                            Complete your supply of Wegovy 0.5 mg once weekly then increase to 1 mg once weekly for at least 4 weeks.  You may increase to 1.7 mg once weekly thereafter if needed.    Remain at the lowest effective dose that you are tolerating.  You do not need to increase the dose if you are having a adequate weight response.    Follow-up with Padmaja Ferreira PA-C as scheduled in September.    SUBJECTIVE/OBJECTIVE:                          Alba Varela is a 54 year old female contacted via secure video for an initial visit. She was referred to me from Padmaja Ferreira .      Reason for visit: Wegovy follow up.    Allergies/ADRs: Reviewed in chart  Past Medical History: Reviewed in chart  Tobacco: She reports that she has been smoking cigarettes. She has a 10 pack-year smoking history. She has never used smokeless tobacco.Nicotine/Tobacco Cessation Plan  Information offered: Patient not interested at this time      Medication Adherence/Access:  "no issues reported    Weight management:  Wegovy 0.5 mg weekly    Has administered a total of 7 injections, now at 0.5 mg weekly. Due Thursday. Things going well, denies generally adverse effects. Denies nausea, vomiting. Notes some very mild constipation, has been using Miralax 1/2 capful daily prn, 2-3 days/week preventatively. No loose stools, acid reflux. No abdominal pain, discomfort. Comments on reduction in hunger broadly, sometimes has to remind herself to eat. Is also noting early satiety, sustained satiety. Has observed maybe 10 lbs of weight loss, though is unsure of reliability of scale. Otherwise, unsure of any other direct measurable or subjective changes from a weight change. Does not feel she is over-restricted from a calorie intake standpoint. Historically had been more of 1 meal/day person, has introduced protein intake throughout the day per Aicha Flores RD. Also trying to eat dinner earlier, instead of around 8-9 pm, trying for 6-7pm.     AOM History: Negative   Water intake: Actively increasing, estimates 32-40 oz daily.     Wt Readings from Last 4 Encounters:   07/17/24 81.6 kg (180 lb)   06/14/24 84.8 kg (187 lb)   05/20/24 88.9 kg (196 lb)   05/17/24 87.1 kg (192 lb)     Body Mass Index (BMI) Body mass index is 30.88 kg/m .    Today's Vitals: Ht 1.626 m (5' 4.02\")   Wt 81.6 kg (180 lb)   LMP 07/11/2014   BMI 30.88 kg/m      Lab Results   Component Value Date    A1C 6.1 02/05/2024    A1C 5.8 11/06/2023    A1C 5.7 06/19/2023       Mood stability:  Lamotrigine 100 mg every day   Amitriptyline 10 mg 2 tabs at bedtime   Venlafaxine 150 mg ER cap at bedtime     Working with psychiatry, switched from depakote this past February. Anxiety has improved since transition.      Tremor:  Gabapentin 400 mg qam, 800 mg at bedtime     No fog or mental acuity difficulties with gabapentin.     Lipids:  Rosuvastatin 5 mg every day    No concerns.   ----------------      I spent 42 minutes with this patient " today. All changes were made via collaborative practice agreement with Padmaja Ferreira. A copy of the visit note was provided to the patient's provider(s).    A summary of these recommendations was sent via [employer].    Narendra Mcgregor, PharmD, BCACP  Medication Therapy Management Pharmacist  [facility]     Telemedicine Visit Details  Type of service:  Nichol  Joined the call at 7/17/2024, 3:01:46 pm.  Left the call at 7/17/2024, 3:44:35 pm.  You were on the call for 42 minutes 49 seconds .     Medication Therapy Recommendations  No medication therapy recommendations to display           Please do not hesitate to contact me if you have any questions/concerns.     Sincerely,     NARENDRA MCGREGOR RPH

## 2024-07-17 NOTE — PROGRESS NOTES
Medication Therapy Management (MTM) Encounter    ASSESSMENT:                            Medication Adherence/Access: No issues identified    Weight management: Subtle, modest weight change with initiation of Wegovy titrated to 0.5 mg weekly.  Notable, appreciable effects on hunger and satiety.  Therapy is well tolerated with minimal adverse effects.  Advise continued titration to 1 mg and subsequently 1.7 mg weekly if needed based off of effect.    Mood stability/Anxiety: Improved with relatively recent transition from Depakote to lamotrigine.  Continues to work with psychiatry.    Tremor: Unchanged.    Lipids: Unchanged.    PLAN:                            Complete your supply of Wegovy 0.5 mg once weekly then increase to 1 mg once weekly for at least 4 weeks.  You may increase to 1.7 mg once weekly thereafter if needed.    Remain at the lowest effective dose that you are tolerating.  You do not need to increase the dose if you are having a adequate weight response.    Follow-up with Padmaja Ferreira PA-C as scheduled in September.    SUBJECTIVE/OBJECTIVE:                          Alba Varela is a 54 year old female contacted via secure video for an initial visit. She was referred to me from Padmaja Ferreira .      Reason for visit: Wegovy follow up.    Allergies/ADRs: Reviewed in chart  Past Medical History: Reviewed in chart  Tobacco: She reports that she has been smoking cigarettes. She has a 10 pack-year smoking history. She has never used smokeless tobacco.Nicotine/Tobacco Cessation Plan  Information offered: Patient not interested at this time      Medication Adherence/Access: no issues reported    Weight management:  Wegovy 0.5 mg weekly    Has administered a total of 7 injections, now at 0.5 mg weekly. Due Thursday. Things going well, denies generally adverse effects. Denies nausea, vomiting. Notes some very mild constipation, has been using Miralax 1/2 capful daily prn, 2-3 days/week preventatively. No  "loose stools, acid reflux. No abdominal pain, discomfort. Comments on reduction in hunger broadly, sometimes has to remind herself to eat. Is also noting early satiety, sustained satiety. Has observed maybe 10 lbs of weight loss, though is unsure of reliability of scale. Otherwise, unsure of any other direct measurable or subjective changes from a weight change. Does not feel she is over-restricted from a calorie intake standpoint. Historically had been more of 1 meal/day person, has introduced protein intake throughout the day per Aicha Flores RD. Also trying to eat dinner earlier, instead of around 8-9 pm, trying for 6-7pm.     AOM History: Negative   Water intake: Actively increasing, estimates 32-40 oz daily.     Wt Readings from Last 4 Encounters:   07/17/24 81.6 kg (180 lb)   06/14/24 84.8 kg (187 lb)   05/20/24 88.9 kg (196 lb)   05/17/24 87.1 kg (192 lb)     Body Mass Index (BMI) Body mass index is 30.88 kg/m .    Today's Vitals: Ht 1.626 m (5' 4.02\")   Wt 81.6 kg (180 lb)   LMP 07/11/2014   BMI 30.88 kg/m      Lab Results   Component Value Date    A1C 6.1 02/05/2024    A1C 5.8 11/06/2023    A1C 5.7 06/19/2023       Mood stability:  Lamotrigine 100 mg every day   Amitriptyline 10 mg 2 tabs at bedtime   Venlafaxine 150 mg ER cap at bedtime     Working with psychiatry, switched from depakote this past February. Anxiety has improved since transition.      Tremor:  Gabapentin 400 mg qam, 800 mg at bedtime     No fog or mental acuity difficulties with gabapentin.     Lipids:  Rosuvastatin 5 mg every day    No concerns.   ----------------      I spent 42 minutes with this patient today. All changes were made via collaborative practice agreement with Padmaja Ferreira. A copy of the visit note was provided to the patient's provider(s).    A summary of these recommendations was sent via [employer].    Narendra Mcgregor, PharmD, BCACP  Medication Therapy Management Pharmacist  [facility] "     Telemedicine Visit Details  Type of service:  Nichol  Joined the call at 7/17/2024, 3:01:46 pm.  Left the call at 7/17/2024, 3:44:35 pm.  You were on the call for 42 minutes 49 seconds .     Medication Therapy Recommendations  No medication therapy recommendations to display

## 2024-07-17 NOTE — NURSING NOTE
Current patient location: [facility] [address]  [facility] [address]    Is the patient currently in the state of [place]? YES    Visit mode:VIDEO    If the visit is dropped, the patient can be reconnected by: VIDEO VISIT: Text to cell phone:   Telephone Information:   Mobile [phone]       Will anyone else be joining the visit? NO  (If patient encounters technical issues they should call [phone])    How would you like to obtain your AVS? MyChart    Are changes needed to the allergy or medication list? No and Pt stated no changes to allergies    Are refills needed on medications prescribed by this physician? YES  Wegovy up dose    Reason for visit: Consult    Yolanda VALENTIN

## 2024-07-24 ENCOUNTER — TRANSFERRED RECORDS (OUTPATIENT)
Dept: HEALTH INFORMATION MANAGEMENT | Facility: CLINIC | Age: 54
End: 2024-07-24
Payer: COMMERCIAL

## 2024-07-25 ENCOUNTER — VIRTUAL VISIT (OUTPATIENT)
Dept: ENDOCRINOLOGY | Facility: CLINIC | Age: 54
End: 2024-07-25
Payer: COMMERCIAL

## 2024-07-25 VITALS — BODY MASS INDEX: 30.54 KG/M2 | WEIGHT: 178 LBS

## 2024-07-25 DIAGNOSIS — E66.9 OBESITY: ICD-10-CM

## 2024-07-25 DIAGNOSIS — Z71.3 NUTRITIONAL COUNSELING: Primary | ICD-10-CM

## 2024-07-25 PROCEDURE — 97803 MED NUTRITION INDIV SUBSEQ: CPT | Mod: 95

## 2024-07-25 PROCEDURE — 99207 PR NO CHARGE LOS: CPT | Mod: 95

## 2024-07-25 NOTE — NURSING NOTE
Current patient location:  [place]    Is the patient currently in the state of [place]? YES    Visit mode:VIDEO    If the visit is dropped, the patient can be reconnected by: VIDEO VISIT: Text to cell phone:   Telephone Information:   Mobile [phone]       Will anyone else be joining the visit? NO  (If patient encounters technical issues they should call [phone])    How would you like to obtain your AVS? MyChart    Are changes needed to the allergy or medication list? N/A    Are refills needed on medications prescribed by this physician? NO    Reason for visit: Video Visit and RECHECK    Devika VALENTIN

## 2024-07-25 NOTE — PATIENT INSTRUCTIONS
Nasir Willis,     Follow-up with RD on September 12.     Thank you,    Aicha Flores, BETO, LD  If you would like to schedule or reschedule an appointment with the RD, please call [phone]    Nutrition Goals  1) Have a good source of protein 3x per day. Aim for a minimum 60 grams of protein daily.   2) Increase fiber containing foods in diet by having more fruits, vegetables and whole grains.   3) Aim for 64 oz of water daily.     COMPREHENSIVE WEIGHT MANAGEMENT PROGRAM  VIRTUAL SUPPORT GROUPS    At [facility], our Comprehensive Weight Management program offers on-line support groups for patients who are working on weight loss and considering, preparing for, or have had weight loss surgery.     There is no cost for this opportunity.  You are invited to attend the?Virtual Support Groups?provided by any of the following locations:    [facility] via Microsoft Teams with Elyse Fuentes RN  2.   [place] via Invincea with Huber Delgadillo, PhD, LP  3.   [place] via Invincea with Emily Wilson RN  4.   [facility] via a Zoom Meeting with ROSITA Riggins    The following Support Group information can also be found on our website:  [email or web address]      [facility] Weight Loss Surgery Support Group  The support group is a patient-lead forum that meets monthly to share experiences, encouragement and education. It is open to those who have had weight loss surgery, are scheduled for surgery, or are considering surgery.   WHEN: This group meets on the 3rd Wednesday of each month from 5:00PM - 6:00PM virtually using Microsoft Teams.   FACILITATOR: Led by Elyse Muñoz RD, LD, RN, the program's Clinical Coordinator.   TO REGISTER: Please contact the clinic via [employer] or call the nurse line directly at [phone] to inform our staff that you would like an invite sent to you and to let us know the email  "you would like the invite sent to. Prior to the meeting, a link with directions on how to join the meeting will be sent to you.    2024 Meetings   January 17  February 21  March 20  April 17  May 15  Moon 19      [facility] Bariatric Care Support Group?  This is open to all pre- and post- operative bariatric surgery patients as well as their support system.   WHEN: This group meets the 3rd Tuesday of each month from 6:30 PM - 8:00 PM virtually using Microsoft Teams.   FACILITATOR: Led by Huber Delgadillo, Ph.D who is a Licensed Psychologist with the [facility] Comprehensive Weight Management Program.   TO REGISTER: Please send an email to Huber Delgadillo, Ph.D., LP [email or web address]?if you would like an invitation to the group. Prior to the meeting, a link with directions on how to join the meeting will be sent to you.    2024 Meetings January 16: \"Medication Management and Bariatric Surgery\", Heaven Hui, PharmD, Pharmacy Resident at [facility]  February 20: \"A Bariatric Surgery Patient's Perspective\", AGUILAR Ballard, [facility], Behavioral Health Clinician at [facility]  March 19  April 16  May 21  Moon 18: \"Nutritional Labeling\", Dietitian speaker to be announced.  November 19: \"Holiday Eating\", Dietitian speaker to be announced.    [facility] Post-Operative Bariatric Surgery Support Group  This is a support group for [facility] bariatric patients (and those external to [facility]) who have had bariatric surgery and are at least 3 months post-surgery.  WHEN: This support group meets the 4th Wednesday of the month from 11:00 AM - 12:00 PM virtually using Microsoft Teams.   FACILITATOR: Led by Certified Bariatric Nurse, Emily Wilson RN.   TO REGISTER: Please send an email to  at [email or web address] if you would like an " "invitation to the group.  Prior to the meeting, a link with directions on how to join the meeting will be sent to you.    2024 Meetings  January 24  February 28  March 27  April 24  May 22  Moon 26    [facility] Healthy Lifestyle Group?  This is a 60 minute virtual coaching group for those who want to lead a healthier lifestyle. Come together to set goals and overcome barriers in a supportive group environment. We will address the four pillars of health: nutrition, exercise, sleep and emotional well-being.  This group is highly recommended for those who are participating in the 24 week Healthy Lifestyle Plan and our Health Coaching sessions.  WHEN: This group meets the 1st Friday of the month, 12:30 PM - 1:30 PM online, via a zoom meeting.    FACILITATOR: Led by National Board Certified Health and , Emily Callahan [facility].   TO REGISTER: Please call the Call Center at [phone] to register. You will get an appointment to attend in [facility]. Fifteen minutes prior to the meeting, complete the e-check in and you will get the link to join the meeting.    There is no charge to attend this group and space is limited.     2024 Meetings  January 5: \"New Years Vision: Manifest your Best 2024!\" (guided imagery,  journaling and discussion)  February 2: \"Let's Talk\"  March 1: \"10 Percent Happier\" by Fish Joseph (Book Bites - a guided discussion on the nuggets of wisdom from favorite wellness books, no need to read the book but highly encouraged)  April 5: \"Let's Talk\"  May 3: \"Essentialism: The Disciplined Pursuit of Less\" by Gary Zamarripa (Book Bites discussion)  June 7: \"Let's Talk\"  July 5: NO MEETING, off for the 4th of July Holiday  August 2: \"The Blue Zones, Secrets for Living a Longer Life\" by Fish Meade (Book Bites discussion)        "

## 2024-07-25 NOTE — LETTER
"2024       RE: Alba Varela  [id number]  [place] [facility] [place] [address]     Dear Colleague,    Thank you for referring your patient, Alba Varela, to the [facility] [place] at [facility]. Please see a copy of my visit note below.    Video-Visit Details    Type of service:  Video Visit    Video Start Time: 9:00 AM    Video End Time: 9:17 AM     Originating Location (pt. Location): Home    Distant Location (provider location):  Offsite (providers home) [facility] [place]     Platform used for Video Visit: [employer]    Return Weight Management Nutrition Consultation    Alba Varela is a 54 year old female presents today for return weight management nutrition consultation.  Patient referred by JHONATAN Linder on May 20, 2024.    Patient with Co-morbidities of obesity includin/20/2024    10:40 AM   --   I have the following health issues associated with obesity Pre-Diabetes    High Cholesterol    Sleep Apnea    Fatty Liver    Asthma    Stress Incontinence   I have the following symptoms associated with obesity Depression    Fatigue    Hip Pain     Anthropometrics:  Initial consult weight: 196 lb on 24   Estimated body mass index is 30.54 kg/m  as calculated from the following:    Height as of 24: 1.626 m (5' 4.02\").    Weight as of this encounter: 80.7 kg (178 lb).  Current Weight: 178 lb per patient report      Medications for Weight Loss:  Wegovy 0.5 mg - no negative side effects, suppressing appetite, not thinking bout food as often.     NUTRITION HISTORY  Food allergies: NKFA  Food intolerances: None  Vitamin/Mineral Supplements: None   Previous methods of diet modification for weight loss: watching calories/portion control   RD before: None     Doesn't drink milk.     Typically eats 1 big meal a day (dinner), doesn't generally snack, will drink coffee (with sweetened powdered " creamer) and iced tea (unsweetened) throughout the day. Craves bread, mashed potatoes . Is able to get full. Can stay full until next meal. Does sometimes struggle with portion control when it's a food she's craving. Does not experience food noise. Does experience emotional eating (when feeling really depressed she wants to surround herself with friends which often means more eating, such as at a restaurant for lunch). Does not experience a loss of control around eating.     Eats out/ gets take out 2x a week. Drinks coffee with powdered creamer, sparkling water, unsweetened iced tea. Will have 1-2 diet cokes a day. ETOH 1-2 times a month, 3-4 beers in a sitting, feels that ETOH is not too important to her quality of life.      Goals discussed with Padmaja:  Eating 3 meals a day or getting protein shakes in at breakfast and lunch. Popular brands are Premier protein, fairlife protein   Cutting out all added sugar in beverages (finding sugar free alternative to coffee mate, some patients like using protein shakes in their coffee)  Working towards 6 hours of sleep minimum nightly (7 is ideal)  No more than 2 drinks in a sitting when out with friends     Diet recall:   Skips breakfast and lunch. Eats dinner   Hydration: Zero sugar Gingerale, coffee with splash half and half, unsweetened iced tea, water.     July 2024: Protein shake or protein bar in the morning. More mindful of portion sizes and the types of foods she is eating. Dinner - varies, the other night had a caesar salad with chicken. Drinking a lot more water. Getting in at least 60 oz. No bowel changes or negative side effects.     Discussed trying to incorporate at least 1 more eating episode midday so she can get in adequate amounts of protein desired.         5/20/2024    10:40 AM   Diet Recall Review with Patient   If you do eat supper, what types of food do you typically eat? Protein, veg,bread,   How many glasses of juice do you drink in a typical day? 0    How many of glasses of milk do you drink in a typical day? 0   How many 8oz glasses of sugar containing drinks such as Corbin-Aid/sweet tea do you drink in a day? 0   How many cans/bottles of sugar pop/soda/tea/sports drinks do you drink in a day? 0   How many cans/bottles of diet pop/soda/tea or sports drink do you drink in a day? 2   How often do you have a drink of alcohol? 2-4 Times a Month   If you do drink, how many drinks might you have in a day? 3-4           5/20/2024    10:40 AM   Eating Habits   Generally, my meals include foods like these bread, pasta, rice, potatoes, corn, crackers, sweet dessert, pop, or juice A Few Times a Week   Generally, my meals include foods like these fried meats, brats, burgers, french fries, pizza, cheese, chips, or ice cream Once a Week   Eat fast food (like [employer], [employer], Taco Bell) Less Than Weekly   Eat at a buffet or sit-down restaurant Less Than Weekly   Eat most of my meals in front of the TV or computer Almost Everyday   Often skip meals, eat at random times, have no regular eating times Everyday   Rarely sit down for a meal but snack or graze throughout Less Than Weekly   Eat extra snacks between meals Never   Eat most of my food at the end of the day Almost Everyday   Eat in the middle of the night or wake up at night to eat Never   Eat extra snacks to prevent or correct low blood sugar A Few Times a Week   Eat to prevent acid reflux or stomach pain Never   Worry about not having enough food to eat Never   I eat when I am depressed Less Than Weekly   I eat when I am stressed Less Than Weekly   I eat when I am bored Less Than Weekly   I eat when I am anxious Once a Week   I eat when I am happy or as a reward Never   I feel hungry all the time even if I just have eaten Never   Feeling full is important to me Never   I finish all the food on my plate even if I am already full Almost Everyday   I can't resist eating delicious food or walk past the good  food/smell Less Than Weekly   I eat/snack without noticing that I am eating Never   I eat when I am preparing the meal Never   I eat more than usual when I see others eating Never   I have trouble not eating sweets, ice cream, cookies, or chips if they are around the house Never   I think about food all day Never   What foods, if any, do you crave? None           5/20/2024    10:40 AM   Amount of Food   I feel out of control when eating Never   I eat a large amount of food, like a loaf of bread, a box of cookies, a pint/quart of ice cream, all at once Never   I eat a large amount of food even when I am not hungry Never   I eat rapidly Never   I eat alone because I feel embarrassed and do not want others to see how much I have eaten Never   I eat until I am uncomfortably full Never   I feel bad, disgusted, or guilty after I overeat Never     Physical Activity:  Works in a [employer] so is on her feet all day. In the past enjoyed swimming, playing tennis, skiing. Has had to stop these things due to hip pain. Has 8 cats, will take them on a walk. Likes wall yoga.            5/20/2024    10:40 AM   Activity/Exercise History   How much of a typical 12 hour day do you spend sitting? Less Than Half the Day   How much of a typical 12 hour day do you spend lying down? Less Than Half the Day   How much of a typical day do you spend walking/standing? Half the Day   How many hours (not including work) do you spend on the TV/Video Games/Computer/Tablet/Phone? 2-3 Hours   How many times a week are you active for the purpose of exercise? Once a Week   What keeps you from being more active? Pain    Too tired   How many total minutes do you spend doing some activity for the purpose of exercising when you exercise? More Than 30 Minutes     Progress Towards Previous Goals  1) Have a good source of protein 3x per day. Aim for a minimum 60 grams of protein daily. - not consistently met, continues   2) Increase fiber containing foods in  diet by having more fruits, vegetables and whole grains. - not consistently met, continues   3) Aim for 64 oz of water daily. - not met, continues    Nutrition Prescription  Recommended energy/nutrient modification.    Nutrition Diagnosis  Obesity r/t long history of positive energy balance aeb BMI >30 - improving    Nutrition Intervention  Reviewed current dietary habits and pts history   Answered pt questions  Coordination of care   Nutrition education   AVS and handouts via [employer]    Expected Engagement: good    Nutrition Goals  1) Have a good source of protein 3x per day. Aim for a minimum 60 grams of protein daily.   2) Increase fiber containing foods in diet by having more fruits, vegetables and whole grains.   3) Aim for 64 oz of water daily.     Follow-Up: September 12.     Time spent with patient: 17 minutes.  Aicha Flores RD, LD

## 2024-07-29 ENCOUNTER — INFUSION THERAPY VISIT (OUTPATIENT)
Dept: INFUSION THERAPY | Facility: CLINIC | Age: 54
End: 2024-07-29
Attending: ALLERGY & IMMUNOLOGY
Payer: COMMERCIAL

## 2024-07-29 VITALS
OXYGEN SATURATION: 98 % | HEART RATE: 85 BPM | TEMPERATURE: 98.2 F | RESPIRATION RATE: 16 BRPM | WEIGHT: 178 LBS | DIASTOLIC BLOOD PRESSURE: 82 MMHG | SYSTOLIC BLOOD PRESSURE: 116 MMHG | BODY MASS INDEX: 30.54 KG/M2

## 2024-07-29 DIAGNOSIS — J45.50 SEVERE PERSISTENT ASTHMA WITHOUT COMPLICATION (H): Primary | ICD-10-CM

## 2024-07-29 PROCEDURE — 250N000011 HC RX IP 250 OP 636: Performed by: ALLERGY & IMMUNOLOGY

## 2024-07-29 PROCEDURE — 96372 THER/PROPH/DIAG INJ SC/IM: CPT | Performed by: ALLERGY & IMMUNOLOGY

## 2024-07-29 RX ADMIN — OMALIZUMAB: 300 INJECTION, SOLUTION SUBCUTANEOUS at 11:10

## 2024-07-29 NOTE — PROGRESS NOTES
Infusion Nursing Note:  Alba Varela presents today for Xolair.    Patient seen by provider today: No   present during visit today: Not Applicable.    Note: N/A.      Intravenous Access:  No Intravenous access/labs at this visit.    Treatment Conditions:  Not Applicable.      Post Infusion Assessment:  Patient tolerated injection without incident.  Site patent and intact, free from redness, edema or discomfort.   Patient refused to be observed for 30 minutes post Xolair per protocol.     Discharge Plan:   AVS to patient via MYCHART.  Patient will return in 2 weeks for next appointment.   Patient discharged in stable condition accompanied by: self.  Departure Mode: Ambulatory.      Viri Narayan RN
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
## 2024-08-01 ASSESSMENT — PATIENT HEALTH QUESTIONNAIRE - PHQ9
SUM OF ALL RESPONSES TO PHQ QUESTIONS 1-9: 4
10. IF YOU CHECKED OFF ANY PROBLEMS, HOW DIFFICULT HAVE THESE PROBLEMS MADE IT FOR YOU TO DO YOUR WORK, TAKE CARE OF THINGS AT HOME, OR GET ALONG WITH OTHER PEOPLE: SOMEWHAT DIFFICULT
SUM OF ALL RESPONSES TO PHQ QUESTIONS 1-9: 4

## 2024-08-01 NOTE — PROGRESS NOTES
HPI:    Ms. Varela comes in for follow up and Preoperative evaluation for L shoulder surgery on 2024. She has chronic L shoulder pain. She states she wakes up from anesthesia slowly. She does smoke but denies any current breathing issues. She has used an inhaler in the past but has note needed to do this recently. She denies any bleeding issues. She specifically denies any rest/exertional CP or SOB. She denies any recent or current COVID symptoms. Otherwise, no additional HEENT, cardiopulmonary, abdominal, , GYN, neurological, systemic, psychiatric, lymphatic, endocrine, vascular complaints.     Past Medical History:   Diagnosis Date    Abdominal pain     Anxiety     Asthma     On Dupixent(Xolair) injections    C. difficile colitis     Chest discomfort     Colitis     Headache(784.0) 12/10/2014    High cholesterol     Hyperlipidemia     Liver disease     Being evalted for fatty liver disease. ABnormal LFT.    Migraines     PAC (premature atrial contraction)     PONV (postoperative nausea and vomiting)     PVC (premature ventricular contraction)     Sleep apnea     Doesn't use a CPAP    Syncope     Tobacco abuse     Tremor      ,  Past Surgical History:   Procedure Laterality Date    ARTHROPLASTY HIP Right 11/3/2017    Procedure: ARTHROPLASTY HIP;  Right total hip arthroplasty    ;  Surgeon: Shahriar Gamble MD;  Location: [facility] OR     SECTION      [facility]    ENDOSCOPIC ULTRASOUND, ESOPHAGOSCOPY, GASTROSCOPY, DUODENOSCOPY (EGD), COMBINED  13    ESOPHAGOSCOPY, GASTROSCOPY, DUODENOSCOPY (EGD), COMBINED  10/21/2013    Procedure: COMBINED ESOPHAGOSCOPY, GASTROSCOPY, DUODENOSCOPY (EGD), BIOPSY SINGLE OR MULTIPLE;;  Surgeon: Rito Gupta MD;  Location:  GI    FOOT SURGERY      Bilateral foot reconstruction.    FOOT SURGERY      [facility] ENDOSCOPY W EUS  2013    Procedure: COMBINED ENDOSCOPIC ULTRASOUND, ESOPHAGOSCOPY, GASTROSCOPY, DUODENOSCOPY (EGD);  Surgeon: Emre Campbell MD;   Location: [facility]    HYSTERECTOMY      HYSTERECTOMY      JOINT REPLACEMENT Right     hip    LAPAROSCOPIC HYSTERECTOMY SUPRACERVICAL, BILATERAL SALPINGO-OOPHORECTOMY, COMBINED  3/6/2013    Procedure: COMBINED LAPAROSCOPIC HYSTERECTOMY SUPRACERVICAL, SALPINGO-OOPHORECTOMY;  Laparoscopic Supracervical Hysterectomy, Right salpingo-oopherectomy;  Surgeon: Tanika Jones MD;  Location: [facility] OR    NASAL SEPTUM SURGERY      UVULOPALATOPHARYNGOPLASTY      Plus Septoplasy.    ZC REVISE TOTAL HIP REPLACEMENT Right 9/28/2018    Procedure: RIGHT REVISION OF TOTAL HIP ARTHROPLASTY, BOTH COMPONENTS;  Surgeon: Hiro Mayes MD;  Location: [facility];  Service: Orthopedics     PE:    Vitals noted, gen nad, cooperative, alert, neck supple nl rom, no B carotid bruits, lungs with good air movement, clear, no wheezing, RRR, S1, S2, no MRG, abdomen, no acute findings. Grossly normal neurological exam.     Results for orders placed or performed in visit on 08/02/24   Comprehensive metabolic panel     Status: Normal   Result Value Ref Range    Sodium 141 135 - 145 mmol/L    Potassium 3.9 3.4 - 5.3 mmol/L    Carbon Dioxide (CO2) 25 22 - 29 mmol/L    Anion Gap 10 7 - 15 mmol/L    Urea Nitrogen 12.8 6.0 - 20.0 mg/dL    Creatinine 0.79 0.51 - 0.95 mg/dL    GFR Estimate 89 >60 mL/min/1.73m2    Calcium 9.2 8.8 - 10.4 mg/dL    Chloride 106 98 - 107 mmol/L    Glucose 89 70 - 99 mg/dL    Alkaline Phosphatase 68 40 - 150 U/L    AST 27 0 - 45 U/L    ALT 23 0 - 50 U/L    Protein Total 6.9 6.4 - 8.3 g/dL    Albumin 4.6 3.5 - 5.2 g/dL    Bilirubin Total 0.3 <=1.2 mg/dL   CBC with platelets and differential     Status: None   Result Value Ref Range    WBC Count 7.0 4.0 - 11.0 10e3/uL    RBC Count 4.01 3.80 - 5.20 10e6/uL    Hemoglobin 12.3 11.7 - 15.7 g/dL    Hematocrit 37.4 35.0 - 47.0 %    MCV 93 78 - 100 fL    MCH 30.7 26.5 - 33.0 pg    MCHC 32.9 31.5 - 36.5 g/dL    RDW 12.4 10.0 - 15.0 %    Platelet Count 246 150 - 450 10e3/uL    %  Neutrophils 51 %    % Lymphocytes 40 %    % Monocytes 6 %    % Eosinophils 2 %    % Basophils 1 %    % Immature Granulocytes 0 %    NRBCs per 100 WBC 0 <1 /100    Absolute Neutrophils 3.7 1.6 - 8.3 10e3/uL    Absolute Lymphocytes 2.8 0.8 - 5.3 10e3/uL    Absolute Monocytes 0.4 0.0 - 1.3 10e3/uL    Absolute Eosinophils 0.1 0.0 - 0.7 10e3/uL    Absolute Basophils 0.0 0.0 - 0.2 10e3/uL    Absolute Immature Granulocytes 0.0 <=0.4 10e3/uL    Absolute NRBCs 0.0 10e3/uL   CBC with platelets and differential     Status: None    Narrative    The following orders were created for panel order CBC with platelets and differential.  Procedure                               Abnormality         Status                     ---------                               -----------         ------                     CBC with platelets and d...[[id number]]                      Final result                 Please view results for these tests on the individual orders.     Echocardiogram Complete  [id number]  [id number]  [id number]  [id number]^MARYANA^PITO^RADHA     [facility]  Echocardiography Laboratory  [address] [place] [address]     Name: KEISHA WEIR  MRN: [id number]  : 1970  Study Date: 2024 01:39 PM  Age: 54 yrs  Gender: Female  Patient Location: [facility]  Reason For Study: Salivary gland disease, Nonspecific abnormal  electrocardiogram (  Ordering Physician: PITO MIJARES  Referring Physician: PITO MIJARES  Performed By: Candace Mcintyre     BSA: 1.8 m2  Height: 64 in  Weight: 168 lb  BP: 131/82 mmHg  ______________________________________________________________________________  Procedure  Complete Echo Adult.  ______________________________________________________________________________  Interpretation Summary     Normal transthoracic echocardiogram.  ______________________________________________________________________________  Left Ventricle  The left ventricle is normal in structure, function  and size. Left ventricular  diastolic function is normal.     Right Ventricle  The right ventricle is normal in structure, function and size.     Atria  Normal left atrial size. Right atrial size is normal.     Mitral Valve  The mitral valve is normal in structure and function.     Tricuspid Valve  Normal tricuspid valve.     Aortic Valve  The aortic valve is normal in structure and function.     Pulmonic Valve  The pulmonic valve is not well visualized.     Vessels  The aortic root is normal size. The inferior vena cava is normal.     Pericardium  There is no pericardial effusion.     Rhythm  Sinus rhythm was noted.  ______________________________________________________________________________  MMode/2D Measurements & Calculations     IVSd: 0.85 cm  LVIDd: 4.7 cm  LVIDs: 3.3 cm  LVPWd: 0.76 cm  FS: 29.7 %  LV mass(C)d: 121.2 grams  LV mass(C)dI: 66.7 grams/m2  Ao root diam: 3.2 cm  asc Aorta Diam: 3.4 cm  LVOT diam: 2.1 cm  LVOT area: 3.5 cm2  Ao root diam index Ht(cm/m): 2.0  Ao root diam index BSA (cm/m2): 1.7  Asc Ao diam index BSA (cm/m2): 1.9  Asc Ao diam index Ht(cm/m): 2.1     LA Volume Index (BP): 27.6 ml/m2  RWT: 0.33     Doppler Measurements & Calculations  MV E max lit: 57.5 cm/sec  MV A max lit: 66.1 cm/sec  MV E/A: 0.87  MV dec slope: 323.9 cm/sec2  MV dec time: 0.18 sec  PA acc time: 0.17 sec  E/E': 7.0  Peak E' Lit: 8.2 cm/sec     ______________________________________________________________________________  Report approved by: Johny Carter 08/26/2024 03:30 PM              A/P:    Low risk for planned L shoulder surgery. She will avoid ASA and NSAIDS before surgery. She can hold one dose of Wegovy before her surgery. Her blood glucose values should be followed perioperatively carefully. She has no cardiopulmonary symptoms. EKG with premature atrial complexes and ordered resting cardiac echo before surgery.      1. Immunizations; Pfizer COVID x 2. Tdap today.   2. Colonoscopy; 6/1/2015. Ordered  future colonoscopy   3. Lipids; 2/5/2024; LDL 98, HDL 51 and TG' 165 She is on crestor  4. Mammogram; 8/17/2024  5. Dermatology; no concerning issues.   6. GYN clinic; she has h/o of a hysterectomy   7. A1c 6.1% on 2/5/2024  8. On Lamictal for anxiety   9. She was seen 7/24/2024 [facility] for sialoadenitis. She had surgery 2/21/2024. She has a lacrimal gland surgery scheduled in ophthalmology on 9/3/2024. Placed ENT referral for our providers today (8/2/2024)   10. Scanned in note 9/8/2021 from [facility] for Migraines. She gets occipital botox and remains on Gabapentin and Amitriptyline.   11. On Xolair injections for allergies and asthma and this is beneficial. She states her ENT providers prescribe this.   12. Wt. Management clinic last visit 7/25/2024 and 7/17/2024 and next 9/11/2024. She is on Wegovy  13. H/o hepatic steatosis. She was seen by Ms. Vivar, Hepatology 4/18/2024.   14. Smoking; she is cutting back. Wants to hold on smoking cessation  MTM evaluation   15. Low Vitamin D level 17 on 6/17/2024        40 minutes spent on the date of the encounter doing chart review, history and exam, documentation and further activities as noted above exclusive of procedures and other billable interpretations    Addendum 8/28/2024; Ms. Varela had a normal resting cardiac echo 8/26/2024. She has no contraindications for her upcoming surgery on 9/3/2024     BIJAN Sotelo MD

## 2024-08-02 ENCOUNTER — OFFICE VISIT (OUTPATIENT)
Dept: INTERNAL MEDICINE | Facility: CLINIC | Age: 54
End: 2024-08-02
Payer: COMMERCIAL

## 2024-08-02 ENCOUNTER — LAB (OUTPATIENT)
Dept: LAB | Facility: CLINIC | Age: 54
End: 2024-08-02
Payer: COMMERCIAL

## 2024-08-02 VITALS
DIASTOLIC BLOOD PRESSURE: 86 MMHG | OXYGEN SATURATION: 100 % | WEIGHT: 176.8 LBS | HEART RATE: 81 BPM | BODY MASS INDEX: 30.33 KG/M2 | SYSTOLIC BLOOD PRESSURE: 134 MMHG

## 2024-08-02 DIAGNOSIS — K11.9 SALIVARY GLAND DISEASE: Primary | ICD-10-CM

## 2024-08-02 DIAGNOSIS — K11.9 SALIVARY GLAND DISEASE: ICD-10-CM

## 2024-08-02 DIAGNOSIS — R94.31 NONSPECIFIC ABNORMAL ELECTROCARDIOGRAM (ECG) (EKG): ICD-10-CM

## 2024-08-02 LAB
ALBUMIN SERPL BCG-MCNC: 4.6 G/DL (ref 3.5–5.2)
ALP SERPL-CCNC: 68 U/L (ref 40–150)
ALT SERPL W P-5'-P-CCNC: 23 U/L (ref 0–50)
ANION GAP SERPL CALCULATED.3IONS-SCNC: 10 MMOL/L (ref 7–15)
AST SERPL W P-5'-P-CCNC: 27 U/L (ref 0–45)
BASOPHILS # BLD AUTO: 0 10E3/UL (ref 0–0.2)
BASOPHILS NFR BLD AUTO: 1 %
BILIRUB SERPL-MCNC: 0.3 MG/DL
BUN SERPL-MCNC: 12.8 MG/DL (ref 6–20)
CALCIUM SERPL-MCNC: 9.2 MG/DL (ref 8.8–10.4)
CHLORIDE SERPL-SCNC: 106 MMOL/L (ref 98–107)
CREAT SERPL-MCNC: 0.79 MG/DL (ref 0.51–0.95)
EGFRCR SERPLBLD CKD-EPI 2021: 89 ML/MIN/1.73M2
EOSINOPHIL # BLD AUTO: 0.1 10E3/UL (ref 0–0.7)
EOSINOPHIL NFR BLD AUTO: 2 %
ERYTHROCYTE [DISTWIDTH] IN BLOOD BY AUTOMATED COUNT: 12.4 % (ref 10–15)
GLUCOSE SERPL-MCNC: 89 MG/DL (ref 70–99)
HCO3 SERPL-SCNC: 25 MMOL/L (ref 22–29)
HCT VFR BLD AUTO: 37.4 % (ref 35–47)
HGB BLD-MCNC: 12.3 G/DL (ref 11.7–15.7)
IMM GRANULOCYTES # BLD: 0 10E3/UL
IMM GRANULOCYTES NFR BLD: 0 %
LYMPHOCYTES # BLD AUTO: 2.8 10E3/UL (ref 0.8–5.3)
LYMPHOCYTES NFR BLD AUTO: 40 %
MCH RBC QN AUTO: 30.7 PG (ref 26.5–33)
MCHC RBC AUTO-ENTMCNC: 32.9 G/DL (ref 31.5–36.5)
MCV RBC AUTO: 93 FL (ref 78–100)
MONOCYTES # BLD AUTO: 0.4 10E3/UL (ref 0–1.3)
MONOCYTES NFR BLD AUTO: 6 %
NEUTROPHILS # BLD AUTO: 3.7 10E3/UL (ref 1.6–8.3)
NEUTROPHILS NFR BLD AUTO: 51 %
NRBC # BLD AUTO: 0 10E3/UL
NRBC BLD AUTO-RTO: 0 /100
PLATELET # BLD AUTO: 246 10E3/UL (ref 150–450)
POTASSIUM SERPL-SCNC: 3.9 MMOL/L (ref 3.4–5.3)
PROT SERPL-MCNC: 6.9 G/DL (ref 6.4–8.3)
RBC # BLD AUTO: 4.01 10E6/UL (ref 3.8–5.2)
SODIUM SERPL-SCNC: 141 MMOL/L (ref 135–145)
WBC # BLD AUTO: 7 10E3/UL (ref 4–11)

## 2024-08-02 PROCEDURE — 93000 ELECTROCARDIOGRAM COMPLETE: CPT | Performed by: INTERNAL MEDICINE

## 2024-08-02 PROCEDURE — 36415 COLL VENOUS BLD VENIPUNCTURE: CPT | Performed by: PATHOLOGY

## 2024-08-02 PROCEDURE — 85025 COMPLETE CBC W/AUTO DIFF WBC: CPT | Performed by: PATHOLOGY

## 2024-08-02 PROCEDURE — 80053 COMPREHEN METABOLIC PANEL: CPT | Performed by: PATHOLOGY

## 2024-08-02 PROCEDURE — 99215 OFFICE O/P EST HI 40 MIN: CPT | Mod: 25 | Performed by: INTERNAL MEDICINE

## 2024-08-02 NOTE — PROGRESS NOTES
Alba is a 54 year old, presenting for the following health issues:  Follow Up (Pre-op L shoulder surgery; 8/22/2024, [facility]; [phone]; CHELSEA Suárez M.D.)      8/2/2024     7:41 AM   Additional Questions   Roomed by Louise SUERO     History of Present Illness       Hyperlipidemia:  She presents for follow up of hyperlipidemia.   She is taking medication to lower cholesterol. She is not having myalgia or other side effects to statin medications.    Reason for visit:  Update medical issues, Pre-op for surgery 08-22    She eats 2-3 servings of fruits and vegetables daily.She consumes 0 sweetened beverage(s) daily.She exercises with enough effort to increase her heart rate 20 to 29 minutes per day.  She exercises with enough effort to increase her heart rate 3 or less days per week.   She is taking medications regularly.         8/2/2024   Surgical Information   What procedure is being done? L shoulder surgery   Facility or Hospital where procedure/surgery will be performed: [facility]   Who is doing the procedure / surgery? WOLFGANG Suárez MD   Date of surgery / procedure: 8/22/2024   Time of surgery / procedure: TBD   Where do you plan to recover after surgery? at home with family      Fax number for surgical facility: [phone]          8/2/2024   Pre-Op Questionnaire   Have you ever had a heart attack or stroke? No   Have you ever had surgery on your heart or blood vessels, such as a stent placement, a coronary artery bypass, or surgery on an artery in your head, neck, heart, or legs? No   Do you have chest pain with activity? No   Do you have a history of heart failure? No   Do you currently have a cold, bronchitis or symptoms of other infection? No   Do you have a cough, shortness of breath, or wheezing? No   Do you or anyone in your family have previous history of blood clots? No   Do you or does anyone in your family have a serious bleeding problem such as prolonged bleeding  following surgeries or cuts? No   Have you ever had problems with anemia or been told to take iron pills? No   Have you had any abnormal blood loss such as black, tarry or bloody stools, or abnormal vaginal bleeding? No   Have you ever had a blood transfusion? No   Are you willing to have a blood transfusion if it is medically needed before, during, or after your surgery? Yes   Have you or any of your relatives ever had problems with anesthesia? (!) YES    Do you have sleep apnea, excessive snoring or daytime drowsiness? (!) YES   Do you have a CPAP machine? (!) NO    Do you have any artifical heart valves or other implanted medical devices like a pacemaker, defibrillator, or continuous glucose monitor? No   Do you have artificial joints? (!) YES   Are you allergic to latex? No

## 2024-08-04 LAB
ATRIAL RATE - MUSE: 75 BPM
DIASTOLIC BLOOD PRESSURE - MUSE: NORMAL MMHG
INTERPRETATION ECG - MUSE: NORMAL
P AXIS - MUSE: 61 DEGREES
PR INTERVAL - MUSE: 158 MS
QRS DURATION - MUSE: 88 MS
QT - MUSE: 404 MS
QTC - MUSE: 451 MS
R AXIS - MUSE: 2 DEGREES
SYSTOLIC BLOOD PRESSURE - MUSE: NORMAL MMHG
T AXIS - MUSE: 46 DEGREES
VENTRICULAR RATE- MUSE: 75 BPM

## 2024-08-12 ENCOUNTER — INFUSION THERAPY VISIT (OUTPATIENT)
Dept: INFUSION THERAPY | Facility: CLINIC | Age: 54
End: 2024-08-12
Attending: ALLERGY & IMMUNOLOGY
Payer: COMMERCIAL

## 2024-08-12 VITALS
TEMPERATURE: 99 F | HEART RATE: 96 BPM | OXYGEN SATURATION: 99 % | RESPIRATION RATE: 16 BRPM | DIASTOLIC BLOOD PRESSURE: 77 MMHG | SYSTOLIC BLOOD PRESSURE: 114 MMHG

## 2024-08-12 DIAGNOSIS — J45.50 SEVERE PERSISTENT ASTHMA WITHOUT COMPLICATION (H): Primary | ICD-10-CM

## 2024-08-12 PROCEDURE — 96372 THER/PROPH/DIAG INJ SC/IM: CPT | Performed by: ALLERGY & IMMUNOLOGY

## 2024-08-12 PROCEDURE — 250N000011 HC RX IP 250 OP 636: Performed by: ALLERGY & IMMUNOLOGY

## 2024-08-12 RX ADMIN — OMALIZUMAB: 300 INJECTION, SOLUTION SUBCUTANEOUS at 14:29

## 2024-08-12 NOTE — PROGRESS NOTES
Infusion Nursing Note:  Alba Varela presents today for Xolair.    Patient seen by provider today: No   present during visit today: Not Applicable.    Note: Alba reports she is feeling well today.  She denies any new or concerning symptoms.    Intravenous Access:  No Intravenous access/labs at this visit.    Treatment Conditions:  Not Applicable.    Post Infusion Assessment:  Patient tolerated injection without incident.  Patient refused 30 minute observation.    Discharge Plan:   Discharge instructions reviewed with: Patient.  Patient and/or family verbalized understanding of discharge instructions and all questions answered.  AVS to patient via [employer].  Patient will return 8/26 for next appointment.   Patient discharged in stable condition accompanied by: self.  Departure Mode: Ambulatory.      Jake Marquis RN

## 2024-08-15 NOTE — TELEPHONE ENCOUNTER
FUTURE VISIT INFORMATION      FUTURE VISIT INFORMATION:  Date: 9/11/24  Time: 3 PM  Location: [facility] [place] [place] - ENT  REFERRAL INFORMATION:  Referring provider:  Bright Sotelo MD  Referring providers clinic:  [facility]  Reason for visit/diagnosis:  Salivary gland disease, ref'd by Bright Sotelo MD, pt made appt, [facility] [place] [place] lcoation     RECORDS REQUESTED FROM      Clinic name Comments Records Status Imaging Status   [facility] 8/2/24 OV notes/ referral- [facility] 6/27/21 CT head [facility] PACS   [facility] 7/24/24 note- Dr Sanderson  *additional scanned Scanned in    [facility]   2/21/24 BILATERAL PAROTID SIALOENDOSCOPY WITH KENALOG IRRIGATION with Claudia, Mame Rondon MD       [facility] 10/26/23 note- Puma Landeros M.D  *additional scanned Scanned in    [facility]  [facility] MRN: [id number] 11/13/23 MR neck  9/8/22 MR neck Scanned in [facility] 8/15/24  PACS           August 15, 2024 1:50 PM - send a request for images at [facility] to be pushed to [facility]  August 16, 2024 8:59 AM - Image(s) from [facility] received and resolved in PACS -[facility]

## 2024-08-22 ENCOUNTER — TRANSFERRED RECORDS (OUTPATIENT)
Dept: HEALTH INFORMATION MANAGEMENT | Facility: CLINIC | Age: 54
End: 2024-08-22
Payer: COMMERCIAL

## 2024-08-26 ENCOUNTER — INFUSION THERAPY VISIT (OUTPATIENT)
Dept: INFUSION THERAPY | Facility: CLINIC | Age: 54
End: 2024-08-26
Attending: ALLERGY & IMMUNOLOGY
Payer: COMMERCIAL

## 2024-08-26 ENCOUNTER — HOSPITAL ENCOUNTER (OUTPATIENT)
Dept: CARDIOLOGY | Facility: CLINIC | Age: 54
Discharge: HOME | End: 2024-08-26
Attending: INTERNAL MEDICINE | Admitting: INTERNAL MEDICINE
Payer: COMMERCIAL

## 2024-08-26 ENCOUNTER — TELEPHONE (OUTPATIENT)
Dept: INTERNAL MEDICINE | Facility: CLINIC | Age: 54
End: 2024-08-26

## 2024-08-26 VITALS
TEMPERATURE: 97.9 F | OXYGEN SATURATION: 96 % | RESPIRATION RATE: 16 BRPM | SYSTOLIC BLOOD PRESSURE: 114 MMHG | HEART RATE: 100 BPM | DIASTOLIC BLOOD PRESSURE: 76 MMHG

## 2024-08-26 DIAGNOSIS — R94.31 NONSPECIFIC ABNORMAL ELECTROCARDIOGRAM (ECG) (EKG): ICD-10-CM

## 2024-08-26 DIAGNOSIS — K11.9 SALIVARY GLAND DISEASE: ICD-10-CM

## 2024-08-26 DIAGNOSIS — J45.50 SEVERE PERSISTENT ASTHMA WITHOUT COMPLICATION (H): Primary | ICD-10-CM

## 2024-08-26 PROCEDURE — 96372 THER/PROPH/DIAG INJ SC/IM: CPT | Performed by: ALLERGY & IMMUNOLOGY

## 2024-08-26 PROCEDURE — 93306 TTE W/DOPPLER COMPLETE: CPT

## 2024-08-26 PROCEDURE — 93306 TTE W/DOPPLER COMPLETE: CPT | Mod: 26 | Performed by: INTERNAL MEDICINE

## 2024-08-26 PROCEDURE — 250N000011 HC RX IP 250 OP 636: Performed by: ALLERGY & IMMUNOLOGY

## 2024-08-26 RX ORDER — OXYCODONE HYDROCHLORIDE 5 MG/1
5 TABLET ORAL EVERY 6 HOURS PRN
COMMUNITY

## 2024-08-26 RX ADMIN — OMALIZUMAB: 300 INJECTION, SOLUTION SUBCUTANEOUS at 08:14

## 2024-08-26 NOTE — TELEPHONE ENCOUNTER
Pt has an upcoming procedure and Diana from the [facility] [place] dept was wondering if Pt can do her pre-op then on 8/29? Please advise.

## 2024-08-26 NOTE — PROGRESS NOTES
Infusion Nursing Note:  Alba Varela presents today for Xolair.    Patient seen by provider today: No   present during visit today: Not Applicable.    Note: Patient feeling well today, denies any new symptoms. Recovering from shoulder surgery.      Intravenous Access:  No Intravenous access/labs at this visit.    Treatment Conditions:  Not Applicable.      Post Infusion Assessment:  Patient tolerated injection without incident.  Patient declined 30 minute observation.       Discharge Plan:   Discharge instructions reviewed with: Patient.  Patient and/or family verbalized understanding of discharge instructions and all questions answered.  AVS to patient via [employer].  Patient will return 9/9 for next appointment.   Patient discharged in stable condition accompanied by: self.  Departure Mode: Ambulatory.      Viri Vidales RN

## 2024-08-26 NOTE — TELEPHONE ENCOUNTER
Pt states pre-op was completed on 8/2/2024 with PCP. Pt was told the visit on 8/2 would be sufficient to cover procedure on 9/3/2024

## 2024-08-31 ENCOUNTER — TRANSFERRED RECORDS (OUTPATIENT)
Dept: HEALTH INFORMATION MANAGEMENT | Facility: CLINIC | Age: 54
End: 2024-08-31
Payer: COMMERCIAL

## 2024-09-02 ENCOUNTER — ANESTHESIA EVENT (OUTPATIENT)
Dept: SURGERY | Facility: CLINIC | Age: 54
End: 2024-09-02
Payer: COMMERCIAL

## 2024-09-02 ASSESSMENT — LIFESTYLE VARIABLES: TOBACCO_USE: 1

## 2024-09-02 NOTE — ANESTHESIA PREPROCEDURE EVALUATION
Anesthesia Pre-Procedure Evaluation    Patient: Alba Varela   MRN: [id number] : 1970        Procedure : Procedure(s):  Right biopsy of lacrimal gland and right lacrimal gland resuspension          Past Medical History:   Diagnosis Date    Abdominal pain     Anxiety     Asthma     On Dupixent(Xolair) injections    C. difficile colitis     Chest discomfort     Colitis     Headache(784.0) 12/10/2014    High cholesterol     Hyperlipidemia     Liver disease     Being evalted for fatty liver disease. ABnormal LFT.    Migraines     PAC (premature atrial contraction)     PONV (postoperative nausea and vomiting)     PVC (premature ventricular contraction)     Sleep apnea     Doesn't use a CPAP    Syncope     Tobacco abuse     Tremor       Past Surgical History:   Procedure Laterality Date    ARTHROPLASTY HIP Right 11/3/2017    Procedure: ARTHROPLASTY HIP;  Right total hip arthroplasty    ;  Surgeon: Shahriar Gamble MD;  Location:  OR     SECTION      [facility]    ENDOSCOPIC ULTRASOUND, ESOPHAGOSCOPY, GASTROSCOPY, DUODENOSCOPY (EGD), COMBINED  13    ESOPHAGOSCOPY, GASTROSCOPY, DUODENOSCOPY (EGD), COMBINED  10/21/2013    Procedure: COMBINED ESOPHAGOSCOPY, GASTROSCOPY, DUODENOSCOPY (EGD), BIOPSY SINGLE OR MULTIPLE;;  Surgeon: Rito Gupta MD;  Location:  GI    FOOT SURGERY      Bilateral foot reconstruction.    FOOT SURGERY      [facility] UGI ENDOSCOPY W EUS  2013    Procedure: COMBINED ENDOSCOPIC ULTRASOUND, ESOPHAGOSCOPY, GASTROSCOPY, DUODENOSCOPY (EGD);  Surgeon: Emre Campbell MD;  Location:  GI    HYSTERECTOMY      HYSTERECTOMY      JOINT REPLACEMENT Right     hip    LAPAROSCOPIC HYSTERECTOMY SUPRACERVICAL, BILATERAL SALPINGO-OOPHORECTOMY, COMBINED  3/6/2013    Procedure: COMBINED LAPAROSCOPIC HYSTERECTOMY SUPRACERVICAL, SALPINGO-OOPHORECTOMY;  Laparoscopic Supracervical Hysterectomy, Right salpingo-oopherectomy;  Surgeon: Tanika Jones MD;  Location:  OR    NASAL SEPTUM SURGERY       UVULOPALATOPHARYNGOPLASTY      Plus Septoplasy.    ZZC REVISE TOTAL HIP REPLACEMENT Right 9/28/2018    Procedure: RIGHT REVISION OF TOTAL HIP ARTHROPLASTY, BOTH COMPONENTS;  Surgeon: Hiro Mayes MD;  Location: [facility];  Service: Orthopedics      Allergies   Allergen Reactions    Bromocriptine Rash    Codeine Sulfate Nausea and Vomiting    Scopolamine Visual Disturbance    Contrast Dye Hives and Rash     Patient is allergic to CT iodine contrast.      Imitrex [Sumatriptan] Rash    Naproxen Rash    Penicillins Rash     Per patient. Tolerated cefazolin in the past    Sulfa Antibiotics Rash      Social History     Tobacco Use    Smoking status: Every Day     Current packs/day: 0.50     Average packs/day: 0.5 packs/day for 20.0 years (10.0 ttl pk-yrs)     Types: Cigarettes    Smokeless tobacco: Never   Substance Use Topics    Alcohol use: Yes     Alcohol/week: 0.0 standard drinks of alcohol     Comment:  3 drinks weekly      Wt Readings from Last 1 Encounters:   08/02/24 80.2 kg (176 lb 12.8 oz)        Anesthesia Evaluation   Pt has had prior anesthetic. Type: General.    History of anesthetic complications  - PONV.      ROS/MED HX  ENT/Pulmonary:     (+) sleep apnea,               tobacco use,     Severe Persistent, asthma (Currently under good control)                  Neurologic:     (+)      migraines,                          Cardiovascular:     (+) Dyslipidemia - -   -  - -                                      METS/Exercise Tolerance: >4 METS    Hematologic:       Musculoskeletal:       GI/Hepatic:  - neg GI/hepatic ROS   (+)             liver disease,       Renal/Genitourinary:       Endo:       Psychiatric/Substance Use:     (+) psychiatric history anxiety       Infectious Disease:       Malignancy:       Other:            Physical Exam    Airway        Mallampati: II   TM distance: > 3 FB   Neck ROM: full   Mouth opening: > 3 cm    Respiratory Devices and Support         Dental       (+)  Minor Abnormalities - some fillings, tiny chips      Cardiovascular   cardiovascular exam normal       Rhythm and rate: regular and normal     Pulmonary   pulmonary exam normal        breath sounds clear to auscultation           OUTSIDE LABS:  CBC:   Lab Results   Component Value Date    WBC 7.0 08/02/2024    WBC 5.4 04/17/2024    HGB 12.3 08/02/2024    HGB 12.1 04/17/2024    HCT 37.4 08/02/2024    HCT 37.3 04/17/2024     08/02/2024     04/17/2024     BMP:   Lab Results   Component Value Date     08/02/2024     04/17/2024    POTASSIUM 3.9 08/02/2024    POTASSIUM 4.1 04/17/2024    CHLORIDE 106 08/02/2024    CHLORIDE 107 04/17/2024    CO2 25 08/02/2024    CO2 23 04/17/2024    BUN 12.8 08/02/2024    BUN 14.4 04/17/2024    CR 0.79 08/02/2024    CR 0.74 04/17/2024    GLC 89 08/02/2024    GLC 96 04/17/2024     COAGS:   Lab Results   Component Value Date    PTT 28 03/09/2017    INR 0.92 04/17/2024     POC:   Lab Results   Component Value Date     (H) 11/03/2017    HCG Negative 03/05/2013    HCGS Negative 09/06/2012     HEPATIC:   Lab Results   Component Value Date    ALBUMIN 4.6 08/02/2024    PROTTOTAL 6.9 08/02/2024    ALT 23 08/02/2024    AST 27 08/02/2024    ALKPHOS 68 08/02/2024    BILITOTAL 0.3 08/02/2024    ESTEFANIA 18 02/05/2024     OTHER:   Lab Results   Component Value Date    A1C 6.1 (H) 02/05/2024    DEWAYNE 9.2 08/02/2024    LIPASE 161 03/23/2018    AMYLASE 198 (H) 03/23/2018    TSH 2.11 02/05/2024    T4 0.93 05/13/2010    T3 72 05/13/2010    CRP 0.5 09/28/2018    SED 11 09/28/2018       Anesthesia Plan    ASA Status:  2    NPO Status:  NPO Appropriate    Anesthesia Type: General.     - Airway: LMA      Maintenance: Balanced.        Consents          - Extended Intubation/Ventilatory Support Discussed: No.      - Patient is DNR/DNI Status: No     Use of blood products discussed: No .     Postoperative Care       PONV prophylaxis: Dexamethasone or Solumedrol, Ondansetron (or other  5HT-3), Background Propofol Infusion     Comments:               Flower Joshi MD    I have reviewed the pertinent notes and labs in the chart from the past 30 days and (re)examined the patient.  Any updates or changes from those notes are reflected in this note.

## 2024-09-03 ENCOUNTER — ANESTHESIA (OUTPATIENT)
Dept: SURGERY | Facility: CLINIC | Age: 54
End: 2024-09-03
Payer: COMMERCIAL

## 2024-09-03 ENCOUNTER — HOSPITAL ENCOUNTER (OUTPATIENT)
Facility: CLINIC | Age: 54
Discharge: HOME | End: 2024-09-03
Attending: OPHTHALMOLOGY | Admitting: OPHTHALMOLOGY
Payer: COMMERCIAL

## 2024-09-03 VITALS
SYSTOLIC BLOOD PRESSURE: 112 MMHG | DIASTOLIC BLOOD PRESSURE: 67 MMHG | OXYGEN SATURATION: 94 % | HEIGHT: 63 IN | RESPIRATION RATE: 14 BRPM | TEMPERATURE: 96.9 F | HEART RATE: 83 BPM | WEIGHT: 167 LBS | BODY MASS INDEX: 29.59 KG/M2

## 2024-09-03 DIAGNOSIS — H04.161: Primary | ICD-10-CM

## 2024-09-03 PROCEDURE — 68510 BIOPSY OF TEAR GLAND: CPT | Performed by: STUDENT IN AN ORGANIZED HEALTH CARE EDUCATION/TRAINING PROGRAM

## 2024-09-03 PROCEDURE — 250N000009 HC RX 250: Performed by: NURSE ANESTHETIST, CERTIFIED REGISTERED

## 2024-09-03 PROCEDURE — 88305 TISSUE EXAM BY PATHOLOGIST: CPT | Mod: TC | Performed by: OPHTHALMOLOGY

## 2024-09-03 PROCEDURE — 710N000012 HC RECOVERY PHASE 2, PER MINUTE: Performed by: OPHTHALMOLOGY

## 2024-09-03 PROCEDURE — 250N000009 HC RX 250: Performed by: OPHTHALMOLOGY

## 2024-09-03 PROCEDURE — 88189 FLOWCYTOMETRY/READ 16 & >: CPT | Mod: GC | Performed by: STUDENT IN AN ORGANIZED HEALTH CARE EDUCATION/TRAINING PROGRAM

## 2024-09-03 PROCEDURE — 710N000009 HC RECOVERY PHASE 1, LEVEL 1, PER MIN: Performed by: OPHTHALMOLOGY

## 2024-09-03 PROCEDURE — 272N000001 HC OR GENERAL SUPPLY STERILE: Performed by: OPHTHALMOLOGY

## 2024-09-03 PROCEDURE — 88184 FLOWCYTOMETRY/ TC 1 MARKER: CPT | Performed by: OPHTHALMOLOGY

## 2024-09-03 PROCEDURE — 250N000025 HC SEVOFLURANE, PER MIN: Performed by: OPHTHALMOLOGY

## 2024-09-03 PROCEDURE — 68510 BIOPSY OF TEAR GLAND: CPT | Performed by: NURSE ANESTHETIST, CERTIFIED REGISTERED

## 2024-09-03 PROCEDURE — 88305 TISSUE EXAM BY PATHOLOGIST: CPT | Mod: 26 | Performed by: PATHOLOGY

## 2024-09-03 PROCEDURE — 88185 FLOWCYTOMETRY/TC ADD-ON: CPT | Performed by: OPHTHALMOLOGY

## 2024-09-03 PROCEDURE — 999N000141 HC STATISTIC PRE-PROCEDURE NURSING ASSESSMENT: Performed by: OPHTHALMOLOGY

## 2024-09-03 PROCEDURE — 370N000017 HC ANESTHESIA TECHNICAL FEE, PER MIN: Performed by: OPHTHALMOLOGY

## 2024-09-03 PROCEDURE — 250N000011 HC RX IP 250 OP 636: Performed by: NURSE ANESTHETIST, CERTIFIED REGISTERED

## 2024-09-03 PROCEDURE — 258N000003 HC RX IP 258 OP 636: Performed by: NURSE ANESTHETIST, CERTIFIED REGISTERED

## 2024-09-03 PROCEDURE — 360N000075 HC SURGERY LEVEL 2, PER MIN: Performed by: OPHTHALMOLOGY

## 2024-09-03 RX ORDER — ERYTHROMYCIN 5 MG/G
0.5 OINTMENT OPHTHALMIC 3 TIMES DAILY
Qty: 7 G | Refills: 1 | Status: SHIPPED | OUTPATIENT
Start: 2024-09-03

## 2024-09-03 RX ORDER — SODIUM CHLORIDE, SODIUM LACTATE, POTASSIUM CHLORIDE, CALCIUM CHLORIDE 600; 310; 30; 20 MG/100ML; MG/100ML; MG/100ML; MG/100ML
INJECTION, SOLUTION INTRAVENOUS CONTINUOUS
Status: DISCONTINUED | OUTPATIENT
Start: 2024-09-03 | End: 2024-09-03 | Stop reason: HOSPADM

## 2024-09-03 RX ORDER — FENTANYL CITRATE 50 UG/ML
50 INJECTION, SOLUTION INTRAMUSCULAR; INTRAVENOUS EVERY 5 MIN PRN
Status: DISCONTINUED | OUTPATIENT
Start: 2024-09-03 | End: 2024-09-03 | Stop reason: HOSPADM

## 2024-09-03 RX ORDER — HYDROMORPHONE HCL IN WATER/PF 6 MG/30 ML
0.4 PATIENT CONTROLLED ANALGESIA SYRINGE INTRAVENOUS EVERY 5 MIN PRN
Status: DISCONTINUED | OUTPATIENT
Start: 2024-09-03 | End: 2024-09-03 | Stop reason: HOSPADM

## 2024-09-03 RX ORDER — HYDROCODONE BITARTRATE AND ACETAMINOPHEN 5; 325 MG/1; MG/1
1 TABLET ORAL EVERY 6 HOURS PRN
Qty: 10 TABLET | Refills: 0 | Status: SHIPPED | OUTPATIENT
Start: 2024-09-03 | End: 2024-09-06

## 2024-09-03 RX ORDER — ONDANSETRON 4 MG/1
4 TABLET, ORALLY DISINTEGRATING ORAL EVERY 30 MIN PRN
Status: DISCONTINUED | OUTPATIENT
Start: 2024-09-03 | End: 2024-09-03 | Stop reason: HOSPADM

## 2024-09-03 RX ORDER — ONDANSETRON 2 MG/ML
INJECTION INTRAMUSCULAR; INTRAVENOUS PRN
Status: DISCONTINUED | OUTPATIENT
Start: 2024-09-03 | End: 2024-09-03

## 2024-09-03 RX ORDER — PROPOFOL 10 MG/ML
INJECTION, EMULSION INTRAVENOUS PRN
Status: DISCONTINUED | OUTPATIENT
Start: 2024-09-03 | End: 2024-09-03

## 2024-09-03 RX ORDER — HYDROMORPHONE HCL IN WATER/PF 6 MG/30 ML
0.2 PATIENT CONTROLLED ANALGESIA SYRINGE INTRAVENOUS EVERY 5 MIN PRN
Status: DISCONTINUED | OUTPATIENT
Start: 2024-09-03 | End: 2024-09-03 | Stop reason: HOSPADM

## 2024-09-03 RX ORDER — SODIUM CHLORIDE, SODIUM LACTATE, POTASSIUM CHLORIDE, CALCIUM CHLORIDE 600; 310; 30; 20 MG/100ML; MG/100ML; MG/100ML; MG/100ML
INJECTION, SOLUTION INTRAVENOUS CONTINUOUS PRN
Status: DISCONTINUED | OUTPATIENT
Start: 2024-09-03 | End: 2024-09-03

## 2024-09-03 RX ORDER — ERYTHROMYCIN 5 MG/G
OINTMENT OPHTHALMIC PRN
Status: DISCONTINUED | OUTPATIENT
Start: 2024-09-03 | End: 2024-09-03 | Stop reason: HOSPADM

## 2024-09-03 RX ORDER — PROPOFOL 10 MG/ML
INJECTION, EMULSION INTRAVENOUS CONTINUOUS PRN
Status: DISCONTINUED | OUTPATIENT
Start: 2024-09-03 | End: 2024-09-03

## 2024-09-03 RX ORDER — DEXAMETHASONE SODIUM PHOSPHATE 4 MG/ML
INJECTION, SOLUTION INTRA-ARTICULAR; INTRALESIONAL; INTRAMUSCULAR; INTRAVENOUS; SOFT TISSUE PRN
Status: DISCONTINUED | OUTPATIENT
Start: 2024-09-03 | End: 2024-09-03

## 2024-09-03 RX ORDER — FENTANYL CITRATE 50 UG/ML
INJECTION, SOLUTION INTRAMUSCULAR; INTRAVENOUS PRN
Status: DISCONTINUED | OUTPATIENT
Start: 2024-09-03 | End: 2024-09-03

## 2024-09-03 RX ORDER — LIDOCAINE HYDROCHLORIDE 20 MG/ML
INJECTION, SOLUTION INFILTRATION; PERINEURAL PRN
Status: DISCONTINUED | OUTPATIENT
Start: 2024-09-03 | End: 2024-09-03

## 2024-09-03 RX ORDER — NALOXONE HYDROCHLORIDE 0.4 MG/ML
0.1 INJECTION, SOLUTION INTRAMUSCULAR; INTRAVENOUS; SUBCUTANEOUS
Status: DISCONTINUED | OUTPATIENT
Start: 2024-09-03 | End: 2024-09-03 | Stop reason: HOSPADM

## 2024-09-03 RX ORDER — MAGNESIUM HYDROXIDE 1200 MG/15ML
LIQUID ORAL PRN
Status: DISCONTINUED | OUTPATIENT
Start: 2024-09-03 | End: 2024-09-03 | Stop reason: HOSPADM

## 2024-09-03 RX ORDER — DEXAMETHASONE SODIUM PHOSPHATE 4 MG/ML
4 INJECTION, SOLUTION INTRA-ARTICULAR; INTRALESIONAL; INTRAMUSCULAR; INTRAVENOUS; SOFT TISSUE
Status: DISCONTINUED | OUTPATIENT
Start: 2024-09-03 | End: 2024-09-03 | Stop reason: HOSPADM

## 2024-09-03 RX ORDER — FENTANYL CITRATE 50 UG/ML
25 INJECTION, SOLUTION INTRAMUSCULAR; INTRAVENOUS EVERY 5 MIN PRN
Status: DISCONTINUED | OUTPATIENT
Start: 2024-09-03 | End: 2024-09-03 | Stop reason: HOSPADM

## 2024-09-03 RX ORDER — ONDANSETRON 2 MG/ML
4 INJECTION INTRAMUSCULAR; INTRAVENOUS EVERY 30 MIN PRN
Status: DISCONTINUED | OUTPATIENT
Start: 2024-09-03 | End: 2024-09-03 | Stop reason: HOSPADM

## 2024-09-03 RX ADMIN — PHENYLEPHRINE HYDROCHLORIDE 100 MCG: 10 INJECTION INTRAVENOUS at 10:54

## 2024-09-03 RX ADMIN — PROPOFOL 20 MCG/KG/MIN: 10 INJECTION, EMULSION INTRAVENOUS at 10:29

## 2024-09-03 RX ADMIN — PHENYLEPHRINE HYDROCHLORIDE 100 MCG: 10 INJECTION INTRAVENOUS at 10:39

## 2024-09-03 RX ADMIN — MIDAZOLAM 2 MG: 1 INJECTION INTRAMUSCULAR; INTRAVENOUS at 10:14

## 2024-09-03 RX ADMIN — DEXAMETHASONE SODIUM PHOSPHATE 4 MG: 4 INJECTION, SOLUTION INTRA-ARTICULAR; INTRALESIONAL; INTRAMUSCULAR; INTRAVENOUS; SOFT TISSUE at 10:27

## 2024-09-03 RX ADMIN — ONDANSETRON 4 MG: 2 INJECTION INTRAMUSCULAR; INTRAVENOUS at 10:27

## 2024-09-03 RX ADMIN — PHENYLEPHRINE HYDROCHLORIDE 100 MCG: 10 INJECTION INTRAVENOUS at 10:27

## 2024-09-03 RX ADMIN — PROPOFOL 200 MG: 10 INJECTION, EMULSION INTRAVENOUS at 10:23

## 2024-09-03 RX ADMIN — SODIUM CHLORIDE, POTASSIUM CHLORIDE, SODIUM LACTATE AND CALCIUM CHLORIDE: 600; 310; 30; 20 INJECTION, SOLUTION INTRAVENOUS at 10:14

## 2024-09-03 RX ADMIN — FENTANYL CITRATE 50 MCG: 50 INJECTION INTRAMUSCULAR; INTRAVENOUS at 10:18

## 2024-09-03 RX ADMIN — PHENYLEPHRINE HYDROCHLORIDE 150 MCG: 10 INJECTION INTRAVENOUS at 10:33

## 2024-09-03 RX ADMIN — LIDOCAINE HYDROCHLORIDE 60 MG: 20 INJECTION, SOLUTION INFILTRATION; PERINEURAL at 10:23

## 2024-09-03 ASSESSMENT — ACTIVITIES OF DAILY LIVING (ADL)
ADLS_ACUITY_SCORE: 40
ADLS_ACUITY_SCORE: 38

## 2024-09-03 NOTE — DISCHARGE INSTRUCTIONS
Same Day Surgery Discharge Instructions for  Sedation and General Anesthesia     It's not unusual to feel dizzy, light-headed or faint for up to 24 hours after surgery or while taking pain medication.  If you have these symptoms: sit for a few minutes before standing and have someone assist you when you get up to walk or use the bathroom.    You should rest and relax for the next 24 hours. We recommend you make arrangements to have an adult stay with you for at least 24 hours after your discharge.  Avoid hazardous and strenuous activity.    DO NOT DRIVE any vehicle or operate mechanical equipment for 24 hours following the end of your surgery.  Even though you may feel normal, your reactions may be affected by the medication you have received.    Do not drink alcoholic beverages for 24 hours following surgery.     Slowly progress to your regular diet as you feel able. It's not unusual to feel nauseated and/or vomit after receiving anesthesia.  If you develop these symptoms, drink clear liquids (apple juice, ginger ale, broth, 7-up, etc. ) until you feel better.  If your nausea and vomiting persists for 24 hours, please notify your surgeon.      All narcotic pain medications, along with inactivity and anesthesia, can cause constipation. Drinking plenty of liquids and increasing fiber intake will help.    For any questions of a medical nature, call your surgeon.    Do not make important decisions for 24 hours.    If you had general anesthesia, you may have a sore throat for a couple of days related to the breathing tube used during surgery.  You may use Cepacol lozenges to help with this discomfort.  If it worsens or if you develop a fever, contact your surgeon.     If you feel your pain is not well managed with the pain medications prescribed by your surgeon, please contact your surgeon's office to let them know so they can address your concerns.         [facility]   Eyelid/Orbital Surgery Discharge  Instructions  Dr. Laurie Villela     ICE COMPRESSES  Immediately following surgery, you should begin to apply ice compresses.  Apply a cold gel pack or wrap a clean washcloth around a cup of crushed ice in a plastic bag (a bag of frozen peas also works well) and hold the cold compresses directly against the closed eyelid (s).Apply cold pack for a minimum of six times daily for no longer than 15 minutes at a time. Continue cold compresses every day until the bruising and swelling begin to subside.  This can vary for each patient, but three days may be common.    HOT COMPRESSES  After your swelling and bruising have begun to subside, hot compresses should be applied.  Take a clean washcloth and wring it out in hot water (as warm as you can tolerate comfortably).  Hold this warm compress against the closed eyelid(s) at least six times per day for 15 minutes.  This should be continued for about two weeks.    OINTMENT  You may be given some ointment when you leave the hospital.  Apply this ointment as directed per pharmacy label for 7 days.  Expect some blurring of vision from the ointment.     ACTIVITY  Avoid heavy lifting or vigorous exercise for one week after surgery.  You may resume regular activities as tolerated.  You may shower and wash your hair on the day after surgery; be careful to avoid soaking the wounds or getting shampoo in your eyes. While your eyes are still swollen, it is recommended you sleep on your back and elevate your head with 2-3 pillows.    MEDICATION  If the doctor has given you some medications to take after surgery, please take these according to the instructions on the bottle.  Pain medications may make you drowsy so do not drive, operate heavy machinery, or use alcohol while taking it.  When you feel that you do not need the prescription pain medication, you may substitute Extra Strength Tylenol for mild pain by also following the directions on the bottle.    If you were taking Aspirin  prior to your surgery, you may resume this medication tomorrow.    If you were on an anticoagulant, you may resume taking it with the next scheduled dose.      WHAT TO EXPECT  You should expect some slight oozing of blood from the incision site over the first two to three days after surgery.  Swelling and bruising will occur for one to two weeks or longer.  You may also experience itching and tearing during the first several weeks after surgery.  This is part of the normal healing process    QUESTIONS  Please feel free to contact the office, should you have any questions that are not answered above.  The phone number is [phone].  Please call immediately if you are unable to establish vision in the operative eye, you are experiencing heavy bleeding that will not stop with gentle pressure or you have any signs of an infection (greenish/yellow discharge or progressive redness).    [facility]  [address]  [place], [place] [address]  [phone]          *

## 2024-09-03 NOTE — ANESTHESIA PROCEDURE NOTES
Airway       Patient location during procedure: OR  Staff -        CRNA: Desi Barry APRN CRNA       Performed By: CRNA  Consent for Airway        Urgency: elective  Indications and Patient Condition       Indications for airway management: jose-procedural       Induction type:intravenous       Mask difficulty assessment: 0 - not attempted    Final Airway Details       Final airway type: supraglottic airway    Supraglottic Airway Details        Type: LMA       Brand: Ambu AuraGain       LMA size: 4    Post intubation assessment        Placement verified by: capnometry and equal breath sounds        Number of attempts at approach: 1       Secured with: tape       Ease of procedure: easy       Dentition: Intact and Unchanged

## 2024-09-03 NOTE — ANESTHESIA POSTPROCEDURE EVALUATION
Patient: Alba Varela    Procedure: Procedure(s):  Right biopsy of lacrimal gland and right lacrimal gland resuspension       Anesthesia Type:  General    Note:  Disposition: Outpatient   Postop Pain Control: Uneventful            Sign Out: Well controlled pain   PONV: No   Neuro/Psych: Uneventful            Sign Out: Acceptable/Baseline neuro status   Airway/Respiratory: Uneventful            Sign Out: Acceptable/Baseline resp. status   CV/Hemodynamics: Uneventful            Sign Out: Acceptable CV status; No obvious hypovolemia; No obvious fluid overload   Other NRE: NONE   DID A NON-ROUTINE EVENT OCCUR? No           Last vitals:  Vitals Value Taken Time   BP 97/66 09/03/24 1145   Temp 36.3  C (97.4  F) 09/03/24 1145   Pulse 94 09/03/24 1145   Resp 15 09/03/24 1145   SpO2 95 % 09/03/24 1145       Electronically Signed By: Flower Joshi MD  September 3, 2024  1:00 PM

## 2024-09-03 NOTE — ANESTHESIA CARE TRANSFER NOTE
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    Anesthesia Type:   General     Note:    Oropharynx: oropharynx clear of all foreign objects and spontaneously breathing  Level of Consciousness: drowsy  Oxygen Supplementation: room air    Independent Airway: airway patency satisfactory and stable  Dentition: dentition unchanged  Vital Signs Stable: post-procedure vital signs reviewed and stable  Report to RN Given: handoff report given  Patient transferred to: PACU    Handoff Report: Identifed the Patient, Identified the Reponsible Provider, Reviewed the pertinent medical history, Discussed the surgical course, Reviewed Intra-OP anesthesia mangement and issues during anesthesia, Set expectations for post-procedure period and Allowed opportunity for questions and acknowledgement of understanding      Vitals:  Vitals Value Taken Time   BP     Temp     Pulse 82 09/03/24 1114   Resp 14 09/03/24 1114   SpO2 95 % 09/03/24 1114   Vitals shown include unfiled device data.    Electronically Signed By: Liliana Perez  September 3, 2024  11:15 AM

## 2024-09-03 NOTE — OP NOTE
"Pre-operative Diagnosis:   1.Right subbrow fullness with subtle enlargement of lacrimal gland (history of enlarged salivary glands)      Post-operative Diagnosis:  1. Same as above      Procedure(s):   1. Right anterior orbitotomy with small skin pinch and biopsy of superotemporal orbit/lacrimal gland and re-suspension of lacrimal gland.      Surgeon: Laurie Villela MD      Anesthesia: General anesthesia care with local anesthetic      Blood loss: <5 cc      Specimens: 1) Right orbit/lacrimal gland sent fresh for flow cytology 2) Right orbit/lacrimal gland sent to permanent       Complications: None      Operative Procedure:  In the pre operative area, the planned procedure was again discussed with the patient as well as the risks/benefits/alternatives. The patient was brought to the operating room.  A \"time out\" was performed to ensure the correct surgical site was being operated on.  The lid crease (previous incision) was marked with a marking pen while the patient was awake.  General anesthesia was induced.    An incision was made along the upper eyelid crease marking and a small blepharoplasty marking with a 15 blade.  Kiki scissors were then used to excise a flap of skin.  The monopolar cautery was then used to dissect through the orbicularis muscle to the orbital septum.  The orbital septum was opened and the underlying preaponeurotic fat is identified.  The lacrimal gland was then identified lateral to the preaponeurotic fat with some fatty filtration and was posterior to the orbital rim. The subbrow fat pad, however, was more prominent. A biopsy was taken and specimen is sent to the pathologist for evaluation.  A double armed 5-0 Prolene suture was then used to engage the fat pad and lacrimal gland and  each end of the Prolene suture was then passed through the periosteum of the superior orbital rim.  The sutures were tied.  Inspection of the area showed that the tissues (\"fullness\") was repositioned " well.  The preaponeurotic fat was then placed in its normal anatomic position.  The incision was then closed with a running and interrupted suture with 6-0 prolene suture.  The patient returns in approximately one week for suture removal.  Antibiotic is placed over the incision three times per day.     Hemostasis was again confirmed. The patient was extubated and transferred to recovery in stable condition.         Laurie Villela MD

## 2024-09-04 LAB
PATH REPORT.COMMENTS IMP SPEC: NORMAL
PATH REPORT.FINAL DX SPEC: NORMAL
PATH REPORT.GROSS SPEC: NORMAL
PATH REPORT.MICROSCOPIC SPEC OTHER STN: NORMAL
PATH REPORT.RELEVANT HX SPEC: NORMAL
PHOTO IMAGE: NORMAL

## 2024-09-05 LAB
PATH REPORT.COMMENTS IMP SPEC: NORMAL
PATH REPORT.FINAL DX SPEC: NORMAL
PATH REPORT.MICROSCOPIC SPEC OTHER STN: NORMAL
PATH REPORT.RELEVANT HX SPEC: NORMAL

## 2024-09-09 ENCOUNTER — MYC MEDICAL ADVICE (OUTPATIENT)
Dept: INTERNAL MEDICINE | Facility: CLINIC | Age: 54
End: 2024-09-09

## 2024-09-09 ENCOUNTER — INFUSION THERAPY VISIT (OUTPATIENT)
Dept: INFUSION THERAPY | Facility: CLINIC | Age: 54
End: 2024-09-09
Attending: ALLERGY & IMMUNOLOGY
Payer: COMMERCIAL

## 2024-09-09 VITALS
OXYGEN SATURATION: 99 % | DIASTOLIC BLOOD PRESSURE: 79 MMHG | HEART RATE: 85 BPM | RESPIRATION RATE: 16 BRPM | SYSTOLIC BLOOD PRESSURE: 119 MMHG | TEMPERATURE: 96.8 F

## 2024-09-09 DIAGNOSIS — K11.9 SALIVARY GLAND DISEASE: Primary | ICD-10-CM

## 2024-09-09 DIAGNOSIS — J45.50 SEVERE PERSISTENT ASTHMA WITHOUT COMPLICATION (H): Primary | ICD-10-CM

## 2024-09-09 PROCEDURE — 96372 THER/PROPH/DIAG INJ SC/IM: CPT | Performed by: ALLERGY & IMMUNOLOGY

## 2024-09-09 PROCEDURE — 250N000011 HC RX IP 250 OP 636: Mod: JZ | Performed by: ALLERGY & IMMUNOLOGY

## 2024-09-09 RX ADMIN — OMALIZUMAB: 300 INJECTION, SOLUTION SUBCUTANEOUS at 09:49

## 2024-09-09 ASSESSMENT — PAIN SCALES - GENERAL: PAINLEVEL: MODERATE PAIN (4)

## 2024-09-09 NOTE — PROGRESS NOTES
Infusion Nursing Note:  Alba Varela presents today for Xolair.    Patient seen by provider today: No   present during visit today: Not Applicable.    Note: N/A.      Intravenous Access:  No Intravenous access/labs at this visit.    Treatment Conditions:  Not Applicable.      Post Infusion Assessment:  Patient tolerated injection without incident.  Patient declined observed period.       Discharge Plan:   Discharge instructions reviewed with: Patient.  Patient and/or family verbalized understanding of discharge instructions and all questions answered.  AVS to patient via [employer].  Patient will return 9/23 for next appointment.   Patient discharged in stable condition accompanied by: self.  Departure Mode: Ambulatory.      Viri Vidales RN

## 2024-09-11 ENCOUNTER — PRE VISIT (OUTPATIENT)
Dept: OTOLARYNGOLOGY | Facility: CLINIC | Age: 54
End: 2024-09-11

## 2024-09-11 ENCOUNTER — VIRTUAL VISIT (OUTPATIENT)
Dept: ENDOCRINOLOGY | Facility: CLINIC | Age: 54
End: 2024-09-11
Payer: COMMERCIAL

## 2024-09-11 VITALS — HEIGHT: 64 IN | BODY MASS INDEX: 28.85 KG/M2 | WEIGHT: 169 LBS

## 2024-09-11 DIAGNOSIS — E66.811 CLASS 1 OBESITY WITH BODY MASS INDEX (BMI) OF 30.0 TO 30.9 IN ADULT, UNSPECIFIED OBESITY TYPE, UNSPECIFIED WHETHER SERIOUS COMORBIDITY PRESENT: ICD-10-CM

## 2024-09-11 DIAGNOSIS — E78.2 MIXED HYPERLIPIDEMIA: ICD-10-CM

## 2024-09-11 DIAGNOSIS — K76.0 HEPATIC STEATOSIS: ICD-10-CM

## 2024-09-11 DIAGNOSIS — R73.03 PREDIABETES: Primary | ICD-10-CM

## 2024-09-11 PROCEDURE — 99212 OFFICE O/P EST SF 10 MIN: CPT | Mod: 95

## 2024-09-11 PROCEDURE — G2211 COMPLEX E/M VISIT ADD ON: HCPCS | Mod: 95

## 2024-09-11 ASSESSMENT — PAIN SCALES - GENERAL: PAINLEVEL: MODERATE PAIN (5)
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
## 2024-09-11 NOTE — PROGRESS NOTES
Virtual Visit Details    Type of service:  Video Visit   Video Start Time:  12:32pm  Video End Time:12:50 PM    Originating Location (pt. Location): Home    Distant Location (provider location):  Off-site  Platform used for Video Visit: Cliff

## 2024-09-11 NOTE — NURSING NOTE
Current patient location: home    Is the patient currently in the state of [place]? YES    Visit mode:VIDEO    If the visit is dropped, the patient can be reconnected by: VIDEO VISIT: Text to cell phone:   Telephone Information:   Mobile [phone]       Will anyone else be joining the visit? NO  (If patient encounters technical issues they should call [phone]    How would you like to obtain your AVS? MyChart    Are changes needed to the allergy or medication list? Pt stated no changes to allergies and Pt stated no med changes    Are refills needed on medications prescribed by this physician? NO    Rooming Documentation:  Questionnaire(s) completed    Reason for visit: RECHECK    Wt other than 24 hrs:  two weeks ago  Pain more than one location:    Magdalena FULLERF

## 2024-09-11 NOTE — PROGRESS NOTES
"Video-Visit Details    Type of service:  Video Visit    Video Start Time: 9:02 AM    Video End Time: 9:17 AM     Originating Location (pt. Location): Home    Distant Location (provider location):  Offsite (providers home) [facility] [place]     Platform used for Video Visit: [employer]    Return Weight Management Nutrition Consultation    Alba Varela is a 54 year old female presents today for return weight management nutrition consultation.  Patient referred by JHONATAN Linder on May 20, 2024.    Patient with Co-morbidities of obesity includin/20/2024    10:40 AM   --   I have the following health issues associated with obesity Pre-Diabetes    High Cholesterol    Sleep Apnea    Fatty Liver    Asthma    Stress Incontinence   I have the following symptoms associated with obesity Depression    Fatigue    Hip Pain     Anthropometrics:  Initial consult weight: 196 lb on 24   Estimated body mass index is 28.84 kg/m  as calculated from the following:    Height as of 24: 1.626 m (5' 4\").    Weight as of this encounter: 76.2 kg (168 lb).  Current Weight: 168 lb per patient report      Medications for Weight Loss:  Wegovy 1.7 mg     NUTRITION HISTORY  Food allergies: NKFA  Food intolerances: None  Vitamin/Mineral Supplements: None   Previous methods of diet modification for weight loss: watching calories/portion control   RD before: None     Doesn't drink milk.     Typically eats 1 big meal a day (dinner), doesn't generally snack, will drink coffee (with sweetened powdered creamer) and iced tea (unsweetened) throughout the day. Craves bread, mashed potatoes . Is able to get full. Can stay full until next meal. Does sometimes struggle with portion control when it's a food she's craving. Does not experience food noise. Does experience emotional eating (when feeling really depressed she wants to surround herself with friends which often means more eating, such as at a " restaurant for lunch). Does not experience a loss of control around eating.     Eats out/ gets take out 2x a week. Drinks coffee with powdered creamer, sparkling water, unsweetened iced tea. Will have 1-2 diet cokes a day. ETOH 1-2 times a month, 3-4 beers in a sitting, feels that ETOH is not too important to her quality of life.      Goals discussed with Padmaja:  Eating 3 meals a day or getting protein shakes in at breakfast and lunch. Popular brands are Premier protein, fairlife protein   Cutting out all added sugar in beverages (finding sugar free alternative to coffee mate, some patients like using protein shakes in their coffee)  Working towards 6 hours of sleep minimum nightly (7 is ideal)  No more than 2 drinks in a sitting when out with friends     Diet recall:   Skips breakfast and lunch. Eats dinner   Hydration: Zero sugar Gingerale, coffee with splash half and half, unsweetened iced tea, water.     July 2024: Protein shake or protein bar in the morning. More mindful of portion sizes and the types of foods she is eating. Dinner - varies, the other night had a caesar salad with chicken. Drinking a lot more water. Getting in at least 60 oz. No bowel changes or negative side effects.     Discussed trying to incorporate at least 1 more eating episode midday so she can get in adequate amounts of protein desired.     September 2024: Recently had shoulder surgery and eye surgery.     Reports she is surprised with all the weight she has lost so far, she doesn't notice her clothes fitting any looser and people aren't commenting. Trying to not let that discourage her. Feels Wegovy is working though - cravings aren't as strong. In regards to her nutritional intake she has reduced her red meat consumption. Leaning more towards chicken and fish. Trying to get better with water intake, also uses sparkling water which she really enjoys.  Protein shake or protein bar for breakfast, Has a snack midday like handful of  parvin, dinner is a sensible meal with a good source of protein.         5/20/2024    10:40 AM   Diet Recall Review with Patient   If you do eat supper, what types of food do you typically eat? Protein, veg,bread,   How many glasses of juice do you drink in a typical day? 0   How many of glasses of milk do you drink in a typical day? 0   How many 8oz glasses of sugar containing drinks such as Corbin-Aid/sweet tea do you drink in a day? 0   How many cans/bottles of sugar pop/soda/tea/sports drinks do you drink in a day? 0   How many cans/bottles of diet pop/soda/tea or sports drink do you drink in a day? 2   How often do you have a drink of alcohol? 2-4 Times a Month   If you do drink, how many drinks might you have in a day? 3-4           5/20/2024    10:40 AM   Eating Habits   Generally, my meals include foods like these bread, pasta, rice, potatoes, corn, crackers, sweet dessert, pop, or juice A Few Times a Week   Generally, my meals include foods like these fried meats, brats, burgers, french fries, pizza, cheese, chips, or ice cream Once a Week   Eat fast food (like McDonalds, Burger Michael, Taco Bell) Less Than Weekly   Eat at a buffet or sit-down restaurant Less Than Weekly   Eat most of my meals in front of the TV or computer Almost Everyday   Often skip meals, eat at random times, have no regular eating times Everyday   Rarely sit down for a meal but snack or graze throughout Less Than Weekly   Eat extra snacks between meals Never   Eat most of my food at the end of the day Almost Everyday   Eat in the middle of the night or wake up at night to eat Never   Eat extra snacks to prevent or correct low blood sugar A Few Times a Week   Eat to prevent acid reflux or stomach pain Never   Worry about not having enough food to eat Never   I eat when I am depressed Less Than Weekly   I eat when I am stressed Less Than Weekly   I eat when I am bored Less Than Weekly   I eat when I am anxious Once a Week   I eat when I am  happy or as a reward Never   I feel hungry all the time even if I just have eaten Never   Feeling full is important to me Never   I finish all the food on my plate even if I am already full Almost Everyday   I can't resist eating delicious food or walk past the good food/smell Less Than Weekly   I eat/snack without noticing that I am eating Never   I eat when I am preparing the meal Never   I eat more than usual when I see others eating Never   I have trouble not eating sweets, ice cream, cookies, or chips if they are around the house Never   I think about food all day Never   What foods, if any, do you crave? None           5/20/2024    10:40 AM   Amount of Food   I feel out of control when eating Never   I eat a large amount of food, like a loaf of bread, a box of cookies, a pint/quart of ice cream, all at once Never   I eat a large amount of food even when I am not hungry Never   I eat rapidly Never   I eat alone because I feel embarrassed and do not want others to see how much I have eaten Never   I eat until I am uncomfortably full Never   I feel bad, disgusted, or guilty after I overeat Never     Physical Activity:  Works in a [employer] so is on her feet all day. In the past enjoyed swimming, playing tennis, skiing. Has had to stop these things due to hip pain. Has 8 cats, will take them on a walk. Likes wall yoga.            5/20/2024    10:40 AM   Activity/Exercise History   How much of a typical 12 hour day do you spend sitting? Less Than Half the Day   How much of a typical 12 hour day do you spend lying down? Less Than Half the Day   How much of a typical day do you spend walking/standing? Half the Day   How many hours (not including work) do you spend on the TV/Video Games/Computer/Tablet/Phone? 2-3 Hours   How many times a week are you active for the purpose of exercise? Once a Week   What keeps you from being more active? Pain    Too tired   How many total minutes do you spend doing some activity for  the purpose of exercising when you exercise? More Than 30 Minutes     Progress Towards Previous Goals - continues   1) Have a good source of protein 3x per day. Aim for a minimum 60 grams of protein daily.   2) Increase fiber containing foods in diet by having more fruits, vegetables and whole grains.   3) Aim for 64 oz of water daily.     Nutrition Prescription  Recommended energy/nutrient modification.    Nutrition Diagnosis  Overweight r/t long history of positive energy balance aeb BMI >25 - improving    Nutrition Intervention  Reviewed current dietary habits and pts history   Answered pt questions  Coordination of care   Nutrition education   AVS and handouts via [employer]    Expected Engagement: good    Nutrition Goals  1) Have a good source of protein 3x per day. Aim for a minimum 60 grams of protein daily.   2) Increase fiber containing foods in diet by having more fruits, vegetables and whole grains.   3) Aim for 64 oz of water daily.     Follow-Up: November 14    Time spent with patient: 15 minutes.  Aicha Flores RD, LD
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
## 2024-09-11 NOTE — PATIENT INSTRUCTIONS
"Thank you for allowing us the privilege of caring for you. We hope we provided you with the excellent service you deserve.   Please let us know if there is anything else we can do for you so that we can be sure you are completely satisfied with your care experience.    To ensure the quality of our services you may be receiving a patient satisfaction survey from an independent patient satisfaction monitoring company.    The greatest compliment you can give is a \"Likely to Recommend\"    Your visit was with Padmaja Ferreira PA-C today.    Instructions per today's visit:     Nasir Varela, it was great to visit with you today.  Here is a review of our visit.  If our clinic scheduler is not able to reach you please call [phone] to schedule your next appointments.    Plan  Continue wegovy 1.7mg for time being, please contact us over XDx if wanting to increase to 2.4mg   Goals we discussed today:   Increase regular exercise as able   Continue to explore protein rich snacks- resource sent over XDx   Labs ordered today: Pth, vitamin d, cmp, lipids, A1c ordered today   Follow up with Padmaja in 3 months   Dietician appointment scheduled tomorrow  Keep up the excellent work!       Information about Video Visits with [facility]: video visit information  _________________________________________________________________________________________________________________________________________________________  If you are asked by your clinic team to have your blood pressure checked:  [facility] do offer several locations for blood pressure checks. Please follow the below link to schedule an appointment. Scheduling an appointment at the pharmacy for a blood pressure check is now preferred.    Appointment Plus (appointment-plus.M-DAQ)  _________________________________________________________________________________________________________________________________________________________  Important contact " and scheduling information:  Please call our contact center at [phone] to schedule your next appointments.  To find a lab location near you, please call [phone].  For any nursing questions or concerns call Yesenia Singer LPN at [phone] or Alba Eubanks RN at [phone]  Please call during clinic hours Monday through Friday 8:00a - 4:00p if you have questions or you can contact us via ClubLocalhart at anytime and we will reply during clinic hours.    Lab results will be communicated through My Chart or letter (if My Chart not used). Please call the clinic if you have not received communication after 1 week or if you have any questions.?  Clinic Fax: [phone]    _________________________________________________________________________________________________________________________________________________________  Meal Replacement Products:    Here is the link to our new e-store where you can purchase our meal replacement products    [facility] E-Store  [email or web address]    The one week starter kit is a great way to sample a variety of products and see what works for you.    If you want more information about the product go to: [email or web address]    If you are an employee or [facility] or [facility] please contact your care team for a 10% estore discount    Free Shipping for orders over $75     Benefits of meal replacements products:    Portion and calorie control  Improved nutrition  Structured eating  Simplified food choices  Avoid contact with trigger foods  _________________________________________________________________________________________________________________________________________________________  Interested in working with a health ?  Health coaches work with you to improve your overall health and wellbeing.  They look at the whole person, and may involve discussion of different areas of life, including, but not limited  to the four pillars of health (sleep, exercise, nutrition, and stress management). Discuss with your care team if you would like to start working a health .  Health Coaching-3 Pack: Schedule by calling [phone]    $99 for three health coaching visits    Visits may be done in person or via phone    Coaching is a partnership between the  and the client; Coaches do not prescribe or diagnose    Coaching helps inspire the client to reach his/her personal goals   _________________________________________________________________________________________________________________________________________________________  24 Week Healthy Lifestyle Plan:    Our mission in the 24-week Healthy Lifestyle Plan is to provide you with individualized care by giving you the tools, education and support you need to lose weight and maintain a healthy lifestyle. In your 24-week journey, you ll be supported by a dedicated weight loss team that includes registered dietitians, medical weight management providers, health coaches, and nurses -- all with special expertise in weight loss -- to help you every step of the way.     Monthly meetings with your registered dietician or medical weight management provider help to review your progress, update your care plan, and make any adjustments needed to ensure success. Between these visits, weekly and bi-weekly health  visits will help you focus on the four pillars of weight loss -- stress, sleep, nutrition, and exercise -- and how you can best adapt each to achieve sustainable weight loss results.    In addition, you will be given exclusive access to online wellbeing classes through [employer].  Your initial visit will be with a medical weight management provider who will help to understand your weight loss goals and ensure this program is the right fit for you. Please let our team know if you are interested in the 24 week plan by sending a message to your care team or calling  [phone] to schedule.  _________________________________________________________________________________________________________________________________________________________  __________  [facility] Get Moving Program  Our team of physical therapists is trained to help you understand and take control of your condition. They will perform a thorough evaluation to determine your ability for activity and develop a customized plan to fit your goals and physical ability.  Scheduling: Unsure if the Get Moving program is right for you? Discuss the program with your medical provider or diabetes educator. You can also call us at [phone] to ask questions or schedule an appointment.   [facility] Get Moving Program  ____________________________________________________________________________________________________________________________________________________________________________   [facility] Diabetes Prevention Program (DPP)  If you have prediabetes and Medicare please contact us via DevelopIntelligencehart to learn more about the Diabetes Prevention Program (DPP)  Program Details:   [facility] offers the year-long Diabetes Prevention Program (DPP). The program helps you to make lifestyle changes that prevent or delay type 2 diabetes by supporting healthy eating, increased physical activity, stress reduction and use of coping skills.   On average, previous [facility] DPP cohorts have lost and maintained at least 5% of their starting weight throughout the program and averaged more than 150 minutes of physical activity per week.  Participants meet weekly for one-hour group sessions over sixteen weeks, every other week for the next 8 weeks, and monthly for the last six months.   A year-long maintenance program is also available for participants who complete the first year.   Location & Cost:   During the COVID-19 Public Health Emergency, the program is offered virtually. When in-person classes can  resume, they will be held at [facility].  For people with Medicare, the program is covered in full. A self-pay option will also be available for those with non-Medicare insurance plans.   ______________________________________________________________________________________________________________________________________________________________________________________________________________________________    To work with a Behavioral Health Psychologist:    Call to schedule:    Kannan Toth - [phone]  Lety Parnell - [phone]  Lucía Menezes - [phone]  Chioma Enriquez - [phone]   Candi Espinoza PhD (cannot accept Medicare) [phone]        Thank you,   [facility] Comprehensive Weight Management Team

## 2024-09-12 ENCOUNTER — VIRTUAL VISIT (OUTPATIENT)
Dept: ENDOCRINOLOGY | Facility: CLINIC | Age: 54
End: 2024-09-12
Payer: COMMERCIAL

## 2024-09-12 VITALS — WEIGHT: 168 LBS | BODY MASS INDEX: 28.84 KG/M2

## 2024-09-12 DIAGNOSIS — E66.3 OVERWEIGHT: ICD-10-CM

## 2024-09-12 DIAGNOSIS — Z71.3 NUTRITIONAL COUNSELING: Primary | ICD-10-CM

## 2024-09-12 PROCEDURE — 99207 PR NO CHARGE LOS: CPT | Mod: 95

## 2024-09-12 PROCEDURE — 97803 MED NUTRITION INDIV SUBSEQ: CPT | Mod: 95

## 2024-09-12 NOTE — NURSING NOTE
Current patient location:  [place]    Is the patient currently in the state of [place]? YES    Visit mode:VIDEO    If the visit is dropped, the patient can be reconnected by: VIDEO VISIT: Text to cell phone:   Telephone Information:   Mobile [phone]       Will anyone else be joining the visit? NO  (If patient encounters technical issues they should call [phone] :[phone])    How would you like to obtain your AVS? MyChart    Are changes needed to the allergy or medication list? No    Are refills needed on medications prescribed by this physician? NO    Rooming Documentation:  Questionnaire(s) completed      Reason for visit: Video Visit and RECHECK    Devika VALENTIN

## 2024-09-12 NOTE — LETTER
"2024       RE: Alba Varela  [id number]  [facility] [address]     Dear Colleague,    Thank you for referring your patient, Alba Varela, to the [facility] [place] at [facility]. Please see a copy of my visit note below.    Video-Visit Details    Type of service:  Video Visit    Video Start Time: 9:02 AM    Video End Time: 9:17 AM     Originating Location (pt. Location): Home    Distant Location (provider location):  Offsite (providers home) [facility] [place]     Platform used for Video Visit: Jimubox    Return Weight Management Nutrition Consultation    Alba Varela is a 54 year old female presents today for return weight management nutrition consultation.  Patient referred by JHONATAN Linder on May 20, 2024.    Patient with Co-morbidities of obesity includin/20/2024    10:40 AM   --   I have the following health issues associated with obesity Pre-Diabetes    High Cholesterol    Sleep Apnea    Fatty Liver    Asthma    Stress Incontinence   I have the following symptoms associated with obesity Depression    Fatigue    Hip Pain     Anthropometrics:  Initial consult weight: 196 lb on 24   Estimated body mass index is 28.84 kg/m  as calculated from the following:    Height as of 24: 1.626 m (5' 4\").    Weight as of this encounter: 76.2 kg (168 lb).  Current Weight: 168 lb per patient report      Medications for Weight Loss:  Wegovy 1.7 mg     NUTRITION HISTORY  Food allergies: NKFA  Food intolerances: None  Vitamin/Mineral Supplements: None   Previous methods of diet modification for weight loss: watching calories/portion control   RD before: None     Doesn't drink milk.     Typically eats 1 big meal a day (dinner), doesn't generally snack, will drink coffee (with sweetened powdered creamer) and iced tea (unsweetened) throughout the day. Craves bread, mashed potatoes . " Is able to get full. Can stay full until next meal. Does sometimes struggle with portion control when it's a food she's craving. Does not experience food noise. Does experience emotional eating (when feeling really depressed she wants to surround herself with friends which often means more eating, such as at a restaurant for lunch). Does not experience a loss of control around eating.     Eats out/ gets take out 2x a week. Drinks coffee with powdered creamer, sparkling water, unsweetened iced tea. Will have 1-2 diet cokes a day. ETOH 1-2 times a month, 3-4 beers in a sitting, feels that ETOH is not too important to her quality of life.      Goals discussed with Padmaja:  Eating 3 meals a day or getting protein shakes in at breakfast and lunch. Popular brands are Premier protein, fairlife protein   Cutting out all added sugar in beverages (finding sugar free alternative to coffee mate, some patients like using protein shakes in their coffee)  Working towards 6 hours of sleep minimum nightly (7 is ideal)  No more than 2 drinks in a sitting when out with friends     Diet recall:   Skips breakfast and lunch. Eats dinner   Hydration: Zero sugar Gingerale, coffee with splash half and half, unsweetened iced tea, water.     July 2024: Protein shake or protein bar in the morning. More mindful of portion sizes and the types of foods she is eating. Dinner - varies, the other night had a caesar salad with chicken. Drinking a lot more water. Getting in at least 60 oz. No bowel changes or negative side effects.     Discussed trying to incorporate at least 1 more eating episode midday so she can get in adequate amounts of protein desired.     September 2024: Recently had shoulder surgery and eye surgery.     Reports she is surprised with all the weight she has lost so far, she doesn't notice her clothes fitting any looser and people aren't commenting. Trying to not let that discourage her. Feels Wegovy is working though - cravings  aren't as strong. In regards to her nutritional intake she has reduced her red meat consumption. Leaning more towards chicken and fish. Trying to get better with water intake, also uses sparkling water which she really enjoys.  Protein shake or protein bar for breakfast, Has a snack midday like handful of cashews, dinner is a sensible meal with a good source of protein.         5/20/2024    10:40 AM   Diet Recall Review with Patient   If you do eat supper, what types of food do you typically eat? Protein, veg,bread,   How many glasses of juice do you drink in a typical day? 0   How many of glasses of milk do you drink in a typical day? 0   How many 8oz glasses of sugar containing drinks such as Corbin-Aid/sweet tea do you drink in a day? 0   How many cans/bottles of sugar pop/soda/tea/sports drinks do you drink in a day? 0   How many cans/bottles of diet pop/soda/tea or sports drink do you drink in a day? 2   How often do you have a drink of alcohol? 2-4 Times a Month   If you do drink, how many drinks might you have in a day? 3-4           5/20/2024    10:40 AM   Eating Habits   Generally, my meals include foods like these bread, pasta, rice, potatoes, corn, crackers, sweet dessert, pop, or juice A Few Times a Week   Generally, my meals include foods like these fried meats, brats, burgers, french fries, pizza, cheese, chips, or ice cream Once a Week   Eat fast food (like McDonalds, Burger Michael, Taco Bell) Less Than Weekly   Eat at a buffet or sit-down restaurant Less Than Weekly   Eat most of my meals in front of the TV or computer Almost Everyday   Often skip meals, eat at random times, have no regular eating times Everyday   Rarely sit down for a meal but snack or graze throughout Less Than Weekly   Eat extra snacks between meals Never   Eat most of my food at the end of the day Almost Everyday   Eat in the middle of the night or wake up at night to eat Never   Eat extra snacks to prevent or correct low blood sugar  A Few Times a Week   Eat to prevent acid reflux or stomach pain Never   Worry about not having enough food to eat Never   I eat when I am depressed Less Than Weekly   I eat when I am stressed Less Than Weekly   I eat when I am bored Less Than Weekly   I eat when I am anxious Once a Week   I eat when I am happy or as a reward Never   I feel hungry all the time even if I just have eaten Never   Feeling full is important to me Never   I finish all the food on my plate even if I am already full Almost Everyday   I can't resist eating delicious food or walk past the good food/smell Less Than Weekly   I eat/snack without noticing that I am eating Never   I eat when I am preparing the meal Never   I eat more than usual when I see others eating Never   I have trouble not eating sweets, ice cream, cookies, or chips if they are around the house Never   I think about food all day Never   What foods, if any, do you crave? None           5/20/2024    10:40 AM   Amount of Food   I feel out of control when eating Never   I eat a large amount of food, like a loaf of bread, a box of cookies, a pint/quart of ice cream, all at once Never   I eat a large amount of food even when I am not hungry Never   I eat rapidly Never   I eat alone because I feel embarrassed and do not want others to see how much I have eaten Never   I eat until I am uncomfortably full Never   I feel bad, disgusted, or guilty after I overeat Never     Physical Activity:  Works in a [employer] so is on her feet all day. In the past enjoyed swimming, playing tennis, skiing. Has had to stop these things due to hip pain. Has 8 cats, will take them on a walk. Likes wall yoga.            5/20/2024    10:40 AM   Activity/Exercise History   How much of a typical 12 hour day do you spend sitting? Less Than Half the Day   How much of a typical 12 hour day do you spend lying down? Less Than Half the Day   How much of a typical day do you spend walking/standing? Half the Day    How many hours (not including work) do you spend on the TV/Video Games/Computer/Tablet/Phone? 2-3 Hours   How many times a week are you active for the purpose of exercise? Once a Week   What keeps you from being more active? Pain    Too tired   How many total minutes do you spend doing some activity for the purpose of exercising when you exercise? More Than 30 Minutes     Progress Towards Previous Goals - continues   1) Have a good source of protein 3x per day. Aim for a minimum 60 grams of protein daily.   2) Increase fiber containing foods in diet by having more fruits, vegetables and whole grains.   3) Aim for 64 oz of water daily.     Nutrition Prescription  Recommended energy/nutrient modification.    Nutrition Diagnosis  Overweight r/t long history of positive energy balance aeb BMI >25 - improving    Nutrition Intervention  Reviewed current dietary habits and pts history   Answered pt questions  Coordination of care   Nutrition education   AVS and handouts via [employer]    Expected Engagement: good    Nutrition Goals  1) Have a good source of protein 3x per day. Aim for a minimum 60 grams of protein daily.   2) Increase fiber containing foods in diet by having more fruits, vegetables and whole grains.   3) Aim for 64 oz of water daily.     Follow-Up: November 14    Time spent with patient: 15 minutes.  Aicha Flores RD, LD      Again, thank you for allowing me to participate in the care of your patient.      Sincerely,    Aicha Flores RD

## 2024-09-12 NOTE — PATIENT INSTRUCTIONS
Nasir Willis,     Follow-up with RD on November 14.     Thank you,    Aicha Flores, BETO, LD  If you would like to schedule or reschedule an appointment with the RD, please call [phone]    Nutrition Goals  1) Have a good source of protein 3x per day. Aim for a minimum 60 grams of protein daily.   2) Increase fiber containing foods in diet by having more fruits, vegetables and whole grains.   3) Aim for 64 oz of water daily.     COMPREHENSIVE WEIGHT MANAGEMENT PROGRAM  VIRTUAL SUPPORT GROUPS    At [facility], our Comprehensive Weight Management program offers on-line support groups for patients who are working on weight loss and considering, preparing for, or have had weight loss surgery.     There is no cost for this opportunity.  You are invited to attend the?Virtual Support Groups?provided by any of the following locations:    [facility] via Microsoft Teams with Elyse Fuentes RN  2.   [place] via [employer] with Huber Delgadillo, PhD, LP  3.   [place] via [employer] with Emily Wilson RN  4.   [facility] via a Zoom Meeting with ROSITA Riggins    The following Support Group information can also be found on our website:  [email or web address]      [facility] Weight Loss Surgery Support Group  The support group is a patient-lead forum that meets monthly to share experiences, encouragement and education. It is open to those who have had weight loss surgery, are scheduled for surgery, or are considering surgery.   WHEN: This group meets on the 3rd Wednesday of each month from 5:00PM - 6:00PM virtually using Microsoft Teams.   FACILITATOR: Led by Elyse Muñoz RD, LD, RN, the program's Clinical Coordinator.   TO REGISTER: Please contact the clinic via [employer] or call the nurse line directly at [phone] to inform our staff that you would like an invite sent to you and to let us know the email  "you would like the invite sent to. Prior to the meeting, a link with directions on how to join the meeting will be sent to you.    2024 Meetings   January 17  February 21  March 20  April 17  May 15  Moon 19      [facility] Bariatric Care Support Group?  This is open to all pre- and post- operative bariatric surgery patients as well as their support system.   WHEN: This group meets the 3rd Tuesday of each month from 6:30 PM - 8:00 PM virtually using Microsoft Teams.   FACILITATOR: Led by Huber Delgadillo, Ph.D who is a Licensed Psychologist with the [facility] Comprehensive Weight Management Program.   TO REGISTER: Please send an email to Huber Delgadillo, Ph.D., LP at?[email or web address]?if you would like an invitation to the group. Prior to the meeting, a link with directions on how to join the meeting will be sent to you.    2024 Meetings January 16: \"Medication Management and Bariatric Surgery\", Heaven Hiu, PharmD, Pharmacy Resident at [facility]  February 20: \"A Bariatric Surgery Patient's Perspective\", AGUILAR Ballard, [facility], Behavioral Health Clinician at [facility]  March 19  April 16  May 21  Moon 18: \"Nutritional Labeling\", Dietitian speaker to be announced.  November 19: \"Holiday Eating\", Dietitian speaker to be announced.    [facility] Post-Operative Bariatric Surgery Support Group  This is a support group for [facility] bariatric patients (and those external to [facility]) who have had bariatric surgery and are at least 3 months post-surgery.  WHEN: This support group meets the 4th Wednesday of the month from 11:00 AM - 12:00 PM virtually using Microsoft Teams.   FACILITATOR: Led by Certified Bariatric Nurse, Emily Wilson RN.   TO REGISTER: Please send an email to  at [email or web address] if you would like an " "invitation to the group.  Prior to the meeting, a link with directions on how to join the meeting will be sent to you.    2024 Meetings  January 24  February 28  March 27  April 24  May 22  Moon 26    [facility] Healthy Lifestyle Group?  This is a 60 minute virtual coaching group for those who want to lead a healthier lifestyle. Come together to set goals and overcome barriers in a supportive group environment. We will address the four pillars of health: nutrition, exercise, sleep and emotional well-being.  This group is highly recommended for those who are participating in the 24 week Healthy Lifestyle Plan and our Health Coaching sessions.  WHEN: This group meets the 1st Friday of the month, 12:30 PM - 1:30 PM online, via a zoom meeting.    FACILITATOR: Led by National Board Certified Health and , Emily Callahan [facility].   TO REGISTER: Please call the Call Center at [phone] to register. You will get an appointment to attend in [facility]. Fifteen minutes prior to the meeting, complete the e-check in and you will get the link to join the meeting.    There is no charge to attend this group and space is limited.     2024 Meetings  January 5: \"New Years Vision: Manifest your Best 2024!\" (guided imagery,  journaling and discussion)  February 2: \"Let's Talk\"  March 1: \"10 Percent Happier\" by Fish Joseph (Book Bites - a guided discussion on the nuggets of wisdom from favorite wellness books, no need to read the book but highly encouraged)  April 5: \"Let's Talk\"  May 3: \"Essentialism: The Disciplined Pursuit of Less\" by Gary Zamarripa (Book Bites discussion)  June 7: \"Let's Talk\"  July 5: NO MEETING, off for the 4th of July Holiday  August 2: \"The Blue Zones, Secrets for Living a Longer Life\" by Fish Meade (Book Bites discussion)        "

## 2024-09-23 ENCOUNTER — INFUSION THERAPY VISIT (OUTPATIENT)
Dept: INFUSION THERAPY | Facility: CLINIC | Age: 54
End: 2024-09-23
Attending: ALLERGY & IMMUNOLOGY
Payer: COMMERCIAL

## 2024-09-23 VITALS
HEART RATE: 82 BPM | RESPIRATION RATE: 16 BRPM | DIASTOLIC BLOOD PRESSURE: 66 MMHG | TEMPERATURE: 97.5 F | OXYGEN SATURATION: 98 % | SYSTOLIC BLOOD PRESSURE: 96 MMHG

## 2024-09-23 DIAGNOSIS — J45.50 SEVERE PERSISTENT ASTHMA WITHOUT COMPLICATION (H): Primary | ICD-10-CM

## 2024-09-23 LAB
ALBUMIN SERPL BCG-MCNC: 4.3 G/DL (ref 3.5–5.2)
ALP SERPL-CCNC: 73 U/L (ref 40–150)
ALT SERPL W P-5'-P-CCNC: 26 U/L (ref 0–50)
ANION GAP SERPL CALCULATED.3IONS-SCNC: 13 MMOL/L (ref 7–15)
AST SERPL W P-5'-P-CCNC: 24 U/L (ref 0–45)
BILIRUB SERPL-MCNC: 0.2 MG/DL
BUN SERPL-MCNC: 14.5 MG/DL (ref 6–20)
CALCIUM SERPL-MCNC: 8.8 MG/DL (ref 8.8–10.4)
CHLORIDE SERPL-SCNC: 107 MMOL/L (ref 98–107)
CHOLEST SERPL-MCNC: 167 MG/DL
CREAT SERPL-MCNC: 0.82 MG/DL (ref 0.51–0.95)
EGFRCR SERPLBLD CKD-EPI 2021: 85 ML/MIN/1.73M2
EST. AVERAGE GLUCOSE BLD GHB EST-MCNC: 108 MG/DL
FASTING STATUS PATIENT QL REPORTED: YES
GLUCOSE SERPL-MCNC: 111 MG/DL (ref 70–99)
HBA1C MFR BLD: 5.4 %
HCO3 SERPL-SCNC: 22 MMOL/L (ref 22–29)
HDLC SERPL-MCNC: 54 MG/DL
LDLC SERPL CALC-MCNC: 88 MG/DL
NONHDLC SERPL-MCNC: 113 MG/DL
POTASSIUM SERPL-SCNC: 3.8 MMOL/L (ref 3.4–5.3)
PROT SERPL-MCNC: 6.5 G/DL (ref 6.4–8.3)
PTH-INTACT SERPL-MCNC: 54 PG/ML (ref 15–65)
SODIUM SERPL-SCNC: 142 MMOL/L (ref 135–145)
TRIGL SERPL-MCNC: 123 MG/DL
VIT D+METAB SERPL-MCNC: 44 NG/ML (ref 20–50)

## 2024-09-23 PROCEDURE — 80061 LIPID PANEL: CPT

## 2024-09-23 PROCEDURE — 84450 TRANSFERASE (AST) (SGOT): CPT

## 2024-09-23 PROCEDURE — 83970 ASSAY OF PARATHORMONE: CPT

## 2024-09-23 PROCEDURE — 83036 HEMOGLOBIN GLYCOSYLATED A1C: CPT

## 2024-09-23 PROCEDURE — 84295 ASSAY OF SERUM SODIUM: CPT

## 2024-09-23 PROCEDURE — 99000 SPECIMEN HANDLING OFFICE-LAB: CPT | Performed by: PATHOLOGY

## 2024-09-23 PROCEDURE — 96372 THER/PROPH/DIAG INJ SC/IM: CPT | Performed by: ALLERGY & IMMUNOLOGY

## 2024-09-23 PROCEDURE — 82306 VITAMIN D 25 HYDROXY: CPT

## 2024-09-23 PROCEDURE — 250N000011 HC RX IP 250 OP 636: Mod: JZ | Performed by: ALLERGY & IMMUNOLOGY

## 2024-09-23 RX ADMIN — OMALIZUMAB: 300 INJECTION, SOLUTION SUBCUTANEOUS at 08:17

## 2024-09-23 NOTE — PROGRESS NOTES
Infusion Nursing Note:  Alba Varela presents today for Xolair.    Patient seen by provider today: No   present during visit today: Not Applicable.    Note: N/A.    Intravenous Access:  Lab draw site right AC, Needle type butterfly, Gauge 23.  Labs drawn without difficulty.    Treatment Conditions:  Not Applicable.    Post Infusion Assessment:  Patient tolerated injection without incident.  Site patent and intact, free from redness, edema or discomfort.     Discharge Plan:   Discharge instructions reviewed with: Patient.  Patient and/or family verbalized understanding of discharge instructions and all questions answered.  AVS to patient via [employer].  Patient will return 10/7 for next appointment.   Patient discharged in stable condition accompanied by: self.  Departure Mode: Ambulatory.    Mac Montero RN

## 2024-09-26 ENCOUNTER — TRANSFERRED RECORDS (OUTPATIENT)
Dept: HEALTH INFORMATION MANAGEMENT | Facility: CLINIC | Age: 54
End: 2024-09-26
Payer: COMMERCIAL

## 2024-10-07 ENCOUNTER — INFUSION THERAPY VISIT (OUTPATIENT)
Dept: INFUSION THERAPY | Facility: CLINIC | Age: 54
End: 2024-10-07
Attending: ALLERGY & IMMUNOLOGY
Payer: COMMERCIAL

## 2024-10-07 VITALS
SYSTOLIC BLOOD PRESSURE: 109 MMHG | TEMPERATURE: 97.9 F | DIASTOLIC BLOOD PRESSURE: 79 MMHG | HEART RATE: 99 BPM | OXYGEN SATURATION: 98 %

## 2024-10-07 DIAGNOSIS — J45.50 SEVERE PERSISTENT ASTHMA WITHOUT COMPLICATION (H): Primary | ICD-10-CM

## 2024-10-07 PROCEDURE — 96372 THER/PROPH/DIAG INJ SC/IM: CPT | Performed by: ALLERGY & IMMUNOLOGY

## 2024-10-07 PROCEDURE — 250N000011 HC RX IP 250 OP 636: Mod: JZ | Performed by: ALLERGY & IMMUNOLOGY

## 2024-10-07 RX ADMIN — OMALIZUMAB: 300 INJECTION, SOLUTION SUBCUTANEOUS at 08:15

## 2024-10-07 NOTE — PROGRESS NOTES
Infusion Nursing Note:  Alba Varela presents today for Xolair.    Patient seen by provider today: No   present during visit today: Not Applicable.    Note: Pt's orders expiring on 11/1/24 - Pt aware and will contact Dr Sanderson for further orders      Intravenous Access:  No Intravenous access/labs at this visit.    Treatment Conditions:  Not Applicable.      Post Infusion Assessment:  Patient tolerated injection without incident.  Patient observed for 30 minutes post Xolair per protocol.  Site patent and intact, free from redness, edema or discomfort.       Discharge Plan:   AVS to patient via MYCHART.  Patient will return in two weeks for next Xolair injection; pt to schedule this appt at her earliest convenience   Patient discharged in stable condition accompanied by: self.  Departure Mode: Ambulatory.      Mame Condon RN

## 2024-10-18 DIAGNOSIS — J45.50 SEVERE PERSISTENT ASTHMA WITHOUT COMPLICATION (H): Primary | ICD-10-CM

## 2024-10-21 ENCOUNTER — INFUSION THERAPY VISIT (OUTPATIENT)
Dept: INFUSION THERAPY | Facility: CLINIC | Age: 54
End: 2024-10-21
Attending: ALLERGY & IMMUNOLOGY
Payer: COMMERCIAL

## 2024-10-21 VITALS
DIASTOLIC BLOOD PRESSURE: 75 MMHG | OXYGEN SATURATION: 98 % | RESPIRATION RATE: 16 BRPM | HEART RATE: 67 BPM | TEMPERATURE: 98.6 F | SYSTOLIC BLOOD PRESSURE: 107 MMHG

## 2024-10-21 DIAGNOSIS — J45.50 SEVERE PERSISTENT ASTHMA WITHOUT COMPLICATION (H): Primary | ICD-10-CM

## 2024-10-21 PROCEDURE — 96372 THER/PROPH/DIAG INJ SC/IM: CPT | Performed by: ALLERGY & IMMUNOLOGY

## 2024-10-21 PROCEDURE — 250N000011 HC RX IP 250 OP 636: Mod: JZ | Performed by: ALLERGY & IMMUNOLOGY

## 2024-10-21 RX ADMIN — OMALIZUMAB: 150 INJECTION, SOLUTION SUBCUTANEOUS at 12:51

## 2024-10-21 NOTE — PROGRESS NOTES
Infusion Nursing Note:  Alba Varela presents today for Xolair.    Patient seen by provider today: No   present during visit today: Not Applicable.    Note: 9/26 was most recent left shoulder surgery.      Intravenous Access:  No Intravenous access/labs at this visit.    Treatment Conditions:  Not Applicable.      Post Infusion Assessment:  Patient tolerated injection without incident.   Patient stayed for 10 minutes of observation period, declined to stay the full 30 minutes      Discharge Plan:   Discharge instructions reviewed with: Patient.  Patient and/or family verbalized understanding of discharge instructions and all questions answered.  AVS to patient via [employer].  Patient will return 11/4 for next appointment.   Patient discharged in stable condition accompanied by: self.  Departure Mode: Ambulatory.      Joyce Mackenzie RN

## 2024-10-30 RX ORDER — METHYLPREDNISOLONE SODIUM SUCCINATE 40 MG/ML
40 INJECTION INTRAMUSCULAR; INTRAVENOUS
Status: CANCELLED
Start: 2024-10-30

## 2024-10-30 RX ORDER — DIPHENHYDRAMINE HYDROCHLORIDE 50 MG/ML
25 INJECTION INTRAMUSCULAR; INTRAVENOUS
Status: CANCELLED
Start: 2024-10-30

## 2024-10-30 RX ORDER — ALBUTEROL SULFATE 0.83 MG/ML
2.5 SOLUTION RESPIRATORY (INHALATION)
Status: CANCELLED | OUTPATIENT
Start: 2024-10-30

## 2024-10-30 RX ORDER — DIPHENHYDRAMINE HYDROCHLORIDE 50 MG/ML
50 INJECTION INTRAMUSCULAR; INTRAVENOUS
Status: CANCELLED
Start: 2024-10-30

## 2024-10-30 RX ORDER — EPINEPHRINE 1 MG/ML
0.3 INJECTION, SOLUTION INTRAMUSCULAR; SUBCUTANEOUS EVERY 5 MIN PRN
Status: CANCELLED | OUTPATIENT
Start: 2024-10-30

## 2024-10-30 RX ORDER — MEPERIDINE HYDROCHLORIDE 25 MG/ML
25 INJECTION INTRAMUSCULAR; INTRAVENOUS; SUBCUTANEOUS
Status: CANCELLED | OUTPATIENT
Start: 2024-10-30

## 2024-10-30 RX ORDER — ALBUTEROL SULFATE 90 UG/1
1-2 INHALANT RESPIRATORY (INHALATION)
Status: CANCELLED
Start: 2024-10-30

## 2024-11-04 ENCOUNTER — INFUSION THERAPY VISIT (OUTPATIENT)
Dept: INFUSION THERAPY | Facility: CLINIC | Age: 54
End: 2024-11-04
Attending: ALLERGY & IMMUNOLOGY
Payer: COMMERCIAL

## 2024-11-04 VITALS
TEMPERATURE: 98.5 F | OXYGEN SATURATION: 96 % | RESPIRATION RATE: 16 BRPM | DIASTOLIC BLOOD PRESSURE: 74 MMHG | SYSTOLIC BLOOD PRESSURE: 109 MMHG | HEART RATE: 100 BPM

## 2024-11-04 DIAGNOSIS — J45.50 SEVERE PERSISTENT ASTHMA WITHOUT COMPLICATION (H): Primary | ICD-10-CM

## 2024-11-04 PROCEDURE — 250N000011 HC RX IP 250 OP 636: Mod: JZ | Performed by: ALLERGY & IMMUNOLOGY

## 2024-11-04 PROCEDURE — 96372 THER/PROPH/DIAG INJ SC/IM: CPT

## 2024-11-04 PROCEDURE — 96372 THER/PROPH/DIAG INJ SC/IM: CPT | Performed by: ALLERGY & IMMUNOLOGY

## 2024-11-04 RX ORDER — DIPHENHYDRAMINE HYDROCHLORIDE 50 MG/ML
50 INJECTION INTRAMUSCULAR; INTRAVENOUS
Start: 2024-11-18

## 2024-11-04 RX ORDER — EPINEPHRINE 1 MG/ML
0.3 INJECTION, SOLUTION INTRAMUSCULAR; SUBCUTANEOUS EVERY 5 MIN PRN
OUTPATIENT
Start: 2024-11-18

## 2024-11-04 RX ORDER — ALBUTEROL SULFATE 0.83 MG/ML
2.5 SOLUTION RESPIRATORY (INHALATION)
OUTPATIENT
Start: 2024-11-18

## 2024-11-04 RX ORDER — MEPERIDINE HYDROCHLORIDE 25 MG/ML
25 INJECTION INTRAMUSCULAR; INTRAVENOUS; SUBCUTANEOUS
OUTPATIENT
Start: 2024-11-18

## 2024-11-04 RX ORDER — DIPHENHYDRAMINE HYDROCHLORIDE 50 MG/ML
25 INJECTION INTRAMUSCULAR; INTRAVENOUS
Start: 2024-11-18

## 2024-11-04 RX ORDER — ALBUTEROL SULFATE 90 UG/1
1-2 INHALANT RESPIRATORY (INHALATION)
Start: 2024-11-18

## 2024-11-04 RX ORDER — METHYLPREDNISOLONE SODIUM SUCCINATE 40 MG/ML
40 INJECTION INTRAMUSCULAR; INTRAVENOUS
Start: 2024-11-18

## 2024-11-04 RX ADMIN — OMALIZUMAB: 150 INJECTION, SOLUTION SUBCUTANEOUS at 13:37

## 2024-11-04 NOTE — PROGRESS NOTES
Infusion Nursing Note:  Alba Varela presents today for Xolair.    Patient seen by provider today: No   present during visit today: Not Applicable.    Note: N/A.    Intravenous Access:  No Intravenous access/labs at this visit.    Treatment Conditions:  Not Applicable.    Post Infusion Assessment:  Patient tolerated injection without incident.  Patient declined 30 minute observation.     Discharge Plan:   Discharge instructions reviewed with: Patient.  Patient and/or family verbalized understanding of discharge instructions and all questions answered.  AVS to patient via [employer].  Patient will return 11/18 for next appointment.   Patient discharged in stable condition accompanied by: self.  Departure Mode: Ambulatory.      Jake Marquis RN

## 2024-11-06 ASSESSMENT — PATIENT HEALTH QUESTIONNAIRE - PHQ9: SUM OF ALL RESPONSES TO PHQ QUESTIONS 1-9: 13

## 2024-11-10 ENCOUNTER — HEALTH MAINTENANCE LETTER (OUTPATIENT)
Age: 54
End: 2024-11-10

## 2024-11-13 NOTE — PROGRESS NOTES
"Video-Visit Details    Type of service:  Video Visit    Video Start Time: 9:03 AM   Video End Time: 9:22 AM     Originating Location (pt. Location): Home    Distant Location (provider location):  Offsite (providers home) [facility] [place]     Platform used for Video Visit: [employer]    Return Weight Management Nutrition Consultation    Alba Varela is a 54 year old female presents today for return weight management nutrition consultation.  Patient referred by JHONATAN Linder on May 20, 2024.    Patient with Co-morbidities of obesity includin/20/2024    10:40 AM   --   I have the following health issues associated with obesity Pre-Diabetes    High Cholesterol    Sleep Apnea    Fatty Liver    Asthma    Stress Incontinence   I have the following symptoms associated with obesity Depression    Fatigue    Hip Pain     Anthropometrics:  Initial consult weight: 196 lb on 24   Estimated body mass index is 27.46 kg/m  as calculated from the following:    Height as of this encounter: 1.626 m (5' 4\").    Weight as of this encounter: 72.6 kg (160 lb).  Current Weight: 160 lb per patient report      Medications for Weight Loss:  Wegovy 1.7 mg     NUTRITION HISTORY  Food allergies: NKFA  Food intolerances: None  Vitamin/Mineral Supplements: None   Previous methods of diet modification for weight loss: watching calories/portion control   RD before: None     Doesn't drink milk.     Typically eats 1 big meal a day (dinner), doesn't generally snack, will drink coffee (with sweetened powdered creamer) and iced tea (unsweetened) throughout the day. Craves bread, mashed potatoes . Is able to get full. Can stay full until next meal. Does sometimes struggle with portion control when it's a food she's craving. Does not experience food noise. Does experience emotional eating (when feeling really depressed she wants to surround herself with friends which often means more eating, such as at a " restaurant for lunch). Does not experience a loss of control around eating.     Eats out/ gets take out 2x a week. Drinks coffee with powdered creamer, sparkling water, unsweetened iced tea. Will have 1-2 diet cokes a day. ETOH 1-2 times a month, 3-4 beers in a sitting, feels that ETOH is not too important to her quality of life.      Goals discussed with Padmaja:  Eating 3 meals a day or getting protein shakes in at breakfast and lunch. Popular brands are Premier protein, fairlife protein   Cutting out all added sugar in beverages (finding sugar free alternative to coffee mate, some patients like using protein shakes in their coffee)  Working towards 6 hours of sleep minimum nightly (7 is ideal)  No more than 2 drinks in a sitting when out with friends     Diet recall:   Skips breakfast and lunch. Eats dinner   Hydration: Zero sugar Gingerale, coffee with splash half and half, unsweetened iced tea, water.     July 2024: Protein shake or protein bar in the morning. More mindful of portion sizes and the types of foods she is eating. Dinner - varies, the other night had a caesar salad with chicken. Drinking a lot more water. Getting in at least 60 oz. No bowel changes or negative side effects.     Discussed trying to incorporate at least 1 more eating episode midday so she can get in adequate amounts of protein desired.     September 2024: Recently had shoulder surgery and eye surgery.     Reports she is surprised with all the weight she has lost so far, she doesn't notice her clothes fitting any looser and people aren't commenting. Trying to not let that discourage her. Feels Wegovy is working though - cravings aren't as strong. In regards to her nutritional intake she has reduced her red meat consumption. Leaning more towards chicken and fish. Trying to get better with water intake, also uses sparkling water which she really enjoys.  Protein shake or protein bar for breakfast, Has a snack midday like handful of  parvin, dinner is a sensible meal with a good source of protein.     November 2024: Still struggling with her water intake. Has 1 diet coke daily. Turkey rolled around piece of string cheese - for an afternoon snack.  Having cravings at night around 9-10 PM. Has been having a fruit at this time. Protein bar in the morning, turkey and cheese roll up midday, smaller portions for dinner. Trying to limit her red meat. Has a protein + vegetable or starch. 40 oz of water daily.     Progress Towards Previous Goals   1) Have a good source of protein 3x per day. Aim for a minimum 60 grams of protein daily. -met   2) Increase fiber containing foods in diet by having more fruits, vegetables and whole grains. Met   3) Aim for 64 oz of water daily. Not met, continues         5/20/2024    10:40 AM   Diet Recall Review with Patient   If you do eat supper, what types of food do you typically eat? Protein, veg,bread,   How many glasses of juice do you drink in a typical day? 0   How many of glasses of milk do you drink in a typical day? 0   How many 8oz glasses of sugar containing drinks such as Corbin-Aid/sweet tea do you drink in a day? 0   How many cans/bottles of sugar pop/soda/tea/sports drinks do you drink in a day? 0   How many cans/bottles of diet pop/soda/tea or sports drink do you drink in a day? 2   How often do you have a drink of alcohol? 2-4 Times a Month   If you do drink, how many drinks might you have in a day? 3-4           5/20/2024    10:40 AM   Eating Habits   Generally, my meals include foods like these bread, pasta, rice, potatoes, corn, crackers, sweet dessert, pop, or juice A Few Times a Week   Generally, my meals include foods like these fried meats, brats, burgers, french fries, pizza, cheese, chips, or ice cream Once a Week   Eat fast food (like McDonalds, Burger Michael, Taco Bell) Less Than Weekly   Eat at a buffet or sit-down restaurant Less Than Weekly   Eat most of my meals in front of the TV or computer  Almost Everyday   Often skip meals, eat at random times, have no regular eating times Everyday   Rarely sit down for a meal but snack or graze throughout Less Than Weekly   Eat extra snacks between meals Never   Eat most of my food at the end of the day Almost Everyday   Eat in the middle of the night or wake up at night to eat Never   Eat extra snacks to prevent or correct low blood sugar A Few Times a Week   Eat to prevent acid reflux or stomach pain Never   Worry about not having enough food to eat Never   I eat when I am depressed Less Than Weekly   I eat when I am stressed Less Than Weekly   I eat when I am bored Less Than Weekly   I eat when I am anxious Once a Week   I eat when I am happy or as a reward Never   I feel hungry all the time even if I just have eaten Never   Feeling full is important to me Never   I finish all the food on my plate even if I am already full Almost Everyday   I can't resist eating delicious food or walk past the good food/smell Less Than Weekly   I eat/snack without noticing that I am eating Never   I eat when I am preparing the meal Never   I eat more than usual when I see others eating Never   I have trouble not eating sweets, ice cream, cookies, or chips if they are around the house Never   I think about food all day Never   What foods, if any, do you crave? 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 In the past enjoyed swimming, playing tennis, skiing. Has had  to stop these things due to hip pain. Has 8 cats, will take them on a walk. Likes wall yoga.            5/20/2024    10:40 AM   Activity/Exercise History   How much of a typical 12 hour day do you spend sitting? Less Than Half the Day   How much of a typical 12 hour day do you spend lying down? Less Than Half the Day   How much of a typical day do you spend walking/standing? Half the Day   How many hours (not including work) do you spend on the TV/Video Games/Computer/Tablet/Phone? 2-3 Hours   How many times a week are you active for the purpose of exercise? Once a Week   What keeps you from being more active? Pain    Too tired   How many total minutes do you spend doing some activity for the purpose of exercising when you exercise? More Than 30 Minutes     Nutrition Prescription  Recommended energy/nutrient modification.    Nutrition Diagnosis  Overweight r/t long history of positive energy balance aeb BMI >25 - improving    Nutrition Intervention  Reviewed current dietary habits and pts history   Answered pt questions  Coordination of care   Nutrition education   AVS and handouts via [employer]    Expected Engagement: good    Nutrition Goals  1) Aim for closer to 80-90 grams of protein daily.   2) Aim for 64 oz of water daily.     Follow-Up: January 14     Time spent with patient: 19 minutes.  Aicha Flores RD, LD

## 2024-11-14 ENCOUNTER — VIRTUAL VISIT (OUTPATIENT)
Dept: ENDOCRINOLOGY | Facility: CLINIC | Age: 54
End: 2024-11-14
Payer: COMMERCIAL

## 2024-11-14 VITALS — BODY MASS INDEX: 27.31 KG/M2 | HEIGHT: 64 IN | WEIGHT: 160 LBS

## 2024-11-14 DIAGNOSIS — E78.2 MIXED HYPERLIPIDEMIA: ICD-10-CM

## 2024-11-14 DIAGNOSIS — Z71.3 NUTRITIONAL COUNSELING: Primary | ICD-10-CM

## 2024-11-14 DIAGNOSIS — K76.0 HEPATIC STEATOSIS: ICD-10-CM

## 2024-11-14 DIAGNOSIS — R73.03 PREDIABETES: Primary | ICD-10-CM

## 2024-11-14 DIAGNOSIS — E66.811 CLASS 1 OBESITY WITH BODY MASS INDEX (BMI) OF 30.0 TO 30.9 IN ADULT, UNSPECIFIED OBESITY TYPE, UNSPECIFIED WHETHER SERIOUS COMORBIDITY PRESENT: ICD-10-CM

## 2024-11-14 DIAGNOSIS — E66.3 OVERWEIGHT: ICD-10-CM

## 2024-11-14 ASSESSMENT — PAIN SCALES - GENERAL: PAINLEVEL_OUTOF10: NO PAIN (0)

## 2024-11-14 NOTE — PATIENT INSTRUCTIONS
Nasir Willis,     Follow-up with BETO on January 14.     Thank you,    Aicha Flores, BETO, LD  If you would like to schedule or reschedule an appointment with the RD, please call [phone]    Nutrition Goals  1) Aim for closer to 80-90 grams of protein daily.   2) Aim for 64 oz of water daily.     COMPREHENSIVE WEIGHT MANAGEMENT PROGRAM  VIRTUAL SUPPORT GROUPS    At [facility], our Comprehensive Weight Management program offers on-line support groups for patients who are working on weight loss and considering, preparing for, or have had weight loss surgery.     There is no cost for this opportunity.  You are invited to attend the?Virtual Support Groups?provided by any of the following locations:    [facility] via Microsoft Teams with Elyse Fuentes RD, RN  2.   [place] via [employer] with Huber Delgadillo, PhD, LP  3.   [place] via [employer] with Emily Wilson RN  4.   [facility] via a Zoom Meeting with ROSITA Riggins    The following Support Group information can also be found on our website:  [email or web address]      [facility]   WEIGHT LOSS SURGERY SUPPORT GROUP  The support group is a patient-lead forum that meets monthly to share experiences, encouragement and education. It is open to those who have had weight loss surgery, are scheduled for surgery, or are considering surgery.   WHEN: 3rd Wednesday of each month from 5:00PM - 6:00PM using Microsoft Teams.   FACILITATOR: Led by Elyse Muñoz RD, MARIAN, RN, the program's Clinical Coordinator.   TO REGISTER: Please contact the clinic via [employer] or call the nurse line directly at [phone] to inform our staff that you would like an invite sent to you and to let us know the email you would like the invite sent to. Prior to the meeting, a link with directions on how to join the meeting will be sent to you.    2024 Meetings September 18:  "Maryan Garcia, PhD, Outpatient Clinical Therapist, \"Pro Tips for Habit Change\".  October 16:  Let's Talk  This will be a time of group support.??   November 20:  Let's Talk  about How to Navigate the Upcoming Holiday Season.  December 18:  Let's Talk  and Celebrate the past year.       [facility] BARIATRIC CARE SUPPORT GROUP  This is open to all pre- and post- operative bariatric surgery patients as well as their support system.   WHEN: 3rd Tuesday of each month from 6:30 PM - 8:00 PM using Microsoft Teams.   FACILITATOR: Led by Huber Delgadillo, Ph.D who is a Licensed Psychologist with the [facility] Comprehensive Weight Management Program.   TO REGISTER: Please send an email to Huber Delgadillo, Ph.D.,  at?[email or web address]?if you would like an invitation to the group. Prior to the meeting, a link with directions on how to join the meeting will be sent to you.    2024 Meetings  September 17: IN PERSON MEETING. [facility], [address] [place] [address], [id number], 1st floor Conference Room.  October 15: Date changed to OCTOBER 8th (2nd Tuesday).   November 19: \"Holiday Eating\", Candi Cramer RD, LD.  December 17: \"Hospital Stay and Compliance\", Emily Wilson RN, CBN.      [facility] POST-OPERATIVE BARIATRIC SURGERY SUPPORT GROUP  This is a support group for [facility] bariatric patients (and those external to [facility]) who have had bariatric surgery and are at least 3 months post-surgery.  WHEN: 4th Thursday of the month from 11:00 AM - 12:00 PM using Microsoft Teams.   FACILITATOR: Led by Certified Bariatric Nurse, Emily Wilson RN.   TO REGISTER: Please send an email to  at [email or web address] if you would like an invitation to the group.  Prior to the meeting, a link with directions on how to join the meeting will be sent to you.    2024 " Meetings  September 26 October 24 November 28: No meeting  December 26    [facility]   HEALTHY LIFESTYLE COACHING GROUP  This is a 60 minute virtual coaching group for those who want to lead a healthier lifestyle. Come together to set goals and overcome barriers in a supportive group environment. We will address the four pillars of health: nutrition, exercise, sleep, and emotional well-being. This group is highly recommended for those who are participating in the 24 week Healthy Lifestyle Plan and our Health Coaching sessions.   WHEN: 1st Friday of the month, 12:30 PM - 1:30 PM   using a Zoom meeting.     FACILITATOR: Led by National Board-Certified Health and , Emily Callahan [facility].  TO REGISTER: Please call the [place] at [phone] to register. You will get an appointment to attend in [employer]. Fifteen minutes prior to the meeting, complete the e-check in and you will get the link to join the meeting.  There is no charge to attend this group and space is limited.  Please register for each month you wish to attend    2024 Meetings  September 5 No meeting.  October 4 November 1 December 6

## 2024-11-14 NOTE — NURSING NOTE
Current patient location: home    Is the patient currently in the state of [place]? YES    Visit mode:VIDEO    If the visit is dropped, the patient can be reconnected by: VIDEO VISIT: Text to cell phone:   Telephone Information:   Mobile [phone]       Will anyone else be joining the visit? NO  (If patient encounters technical issues they should call [phone]    Are changes needed to the allergy or medication list? N/A    Are refills needed on medications prescribed by this physician? NO    Rooming Documentation:  Not applicable      Reason for visit: RECHECK    Wt other than 24 hrs:    Pain more than one location:  no  Magdalena VALENTIN

## 2024-11-14 NOTE — LETTER
"2024       RE: Alba Varela  [id number]  Pending sale to [facility] [id number]     Dear Colleague,    Thank you for referring your patient, Alba Varela, to the [facility] [place] at [facility]. Please see a copy of my visit note below.    Video-Visit Details    Type of service:  Video Visit    Video Start Time: 9:03 AM   Video End Time: 9:22 AM     Originating Location (pt. Location): Home    Distant Location (provider location):  Offsite (providers home) [facility] [place]     Platform used for Video Visit: Augmate    Return Weight Management Nutrition Consultation    Alba Varela is a 54 year old female presents today for return weight management nutrition consultation.  Patient referred by JHONATAN Linder on May 20, 2024.    Patient with Co-morbidities of obesity includin/20/2024    10:40 AM   --   I have the following health issues associated with obesity Pre-Diabetes    High Cholesterol    Sleep Apnea    Fatty Liver    Asthma    Stress Incontinence   I have the following symptoms associated with obesity Depression    Fatigue    Hip Pain     Anthropometrics:  Initial consult weight: 196 lb on 24   Estimated body mass index is 27.46 kg/m  as calculated from the following:    Height as of this encounter: 1.626 m (5' 4\").    Weight as of this encounter: 72.6 kg (160 lb).  Current Weight: 160 lb per patient report      Medications for Weight Loss:  Wegovy 1.7 mg     NUTRITION HISTORY  Food allergies: NKFA  Food intolerances: None  Vitamin/Mineral Supplements: None   Previous methods of diet modification for weight loss: watching calories/portion control   RD before: None     Doesn't drink milk.     Typically eats 1 big meal a day (dinner), doesn't generally snack, will drink coffee (with sweetened powdered creamer) and iced tea (unsweetened) throughout the day. Craves bread, mashed " potatoes . Is able to get full. Can stay full until next meal. Does sometimes struggle with portion control when it's a food she's craving. Does not experience food noise. Does experience emotional eating (when feeling really depressed she wants to surround herself with friends which often means more eating, such as at a restaurant for lunch). Does not experience a loss of control around eating.     Eats out/ gets take out 2x a week. Drinks coffee with powdered creamer, sparkling water, unsweetened iced tea. Will have 1-2 diet cokes a day. ETOH 1-2 times a month, 3-4 beers in a sitting, feels that ETOH is not too important to her quality of life.      Goals discussed with Padmaja:  Eating 3 meals a day or getting protein shakes in at breakfast and lunch. Popular brands are Premier protein, fairlife protein   Cutting out all added sugar in beverages (finding sugar free alternative to coffee mate, some patients like using protein shakes in their coffee)  Working towards 6 hours of sleep minimum nightly (7 is ideal)  No more than 2 drinks in a sitting when out with friends     Diet recall:   Skips breakfast and lunch. Eats dinner   Hydration: Zero sugar Gingerale, coffee with splash half and half, unsweetened iced tea, water.     July 2024: Protein shake or protein bar in the morning. More mindful of portion sizes and the types of foods she is eating. Dinner - varies, the other night had a caesar salad with chicken. Drinking a lot more water. Getting in at least 60 oz. No bowel changes or negative side effects.     Discussed trying to incorporate at least 1 more eating episode midday so she can get in adequate amounts of protein desired.     September 2024: Recently had shoulder surgery and eye surgery.     Reports she is surprised with all the weight she has lost so far, she doesn't notice her clothes fitting any looser and people aren't commenting. Trying to not let that discourage her. Feels Wegovy is working though  - cravings aren't as strong. In regards to her nutritional intake she has reduced her red meat consumption. Leaning more towards chicken and fish. Trying to get better with water intake, also uses sparkling water which she really enjoys.  Protein shake or protein bar for breakfast, Has a snack midday like handful of cashews, dinner is a sensible meal with a good source of protein.     November 2024: Still struggling with her water intake. Has 1 diet coke daily. Turkey rolled around piece of string cheese - for an afternoon snack.  Having cravings at night around 9-10 PM. Has been having a fruit at this time. Protein bar in the morning, turkey and cheese roll up midday, smaller portions for dinner. Trying to limit her red meat. Has a protein + vegetable or starch. 40 oz of water daily.     Progress Towards Previous Goals   1) Have a good source of protein 3x per day. Aim for a minimum 60 grams of protein daily. -met   2) Increase fiber containing foods in diet by having more fruits, vegetables and whole grains. Met   3) Aim for 64 oz of water daily. Not met, continues         5/20/2024    10:40 AM   Diet Recall Review with Patient   If you do eat supper, what types of food do you typically eat? Protein, veg,bread,   How many glasses of juice do you drink in a typical day? 0   How many of glasses of milk do you drink in a typical day? 0   How many 8oz glasses of sugar containing drinks such as Corbin-Aid/sweet tea do you drink in a day? 0   How many cans/bottles of sugar pop/soda/tea/sports drinks do you drink in a day? 0   How many cans/bottles of diet pop/soda/tea or sports drink do you drink in a day? 2   How often do you have a drink of alcohol? 2-4 Times a Month   If you do drink, how many drinks might you have in a day? 3-4           5/20/2024    10:40 AM   Eating Habits   Generally, my meals include foods like these bread, pasta, rice, potatoes, corn, crackers, sweet dessert, pop, or juice A Few Times a Week    Generally, my meals include foods like these fried meats, brats, burgers, french fries, pizza, cheese, chips, or ice cream Once a Week   Eat fast food (like McDonalds, Burger Michael, Taco Bell) Less Than Weekly   Eat at a buffet or sit-down restaurant Less Than Weekly   Eat most of my meals in front of the TV or computer Almost Everyday   Often skip meals, eat at random times, have no regular eating times Everyday   Rarely sit down for a meal but snack or graze throughout Less Than Weekly   Eat extra snacks between meals Never   Eat most of my food at the end of the day Almost Everyday   Eat in the middle of the night or wake up at night to eat Never   Eat extra snacks to prevent or correct low blood sugar A Few Times a Week   Eat to prevent acid reflux or stomach pain Never   Worry about not having enough food to eat Never   I eat when I am depressed Less Than Weekly   I eat when I am stressed Less Than Weekly   I eat when I am bored Less Than Weekly   I eat when I am anxious Once a Week   I eat when I am happy or as a reward Never   I feel hungry all the time even if I just have eaten Never   Feeling full is important to me Never   I finish all the food on my plate even if I am already full Almost Everyday   I can't resist eating delicious food or walk past the good food/smell Less Than Weekly   I eat/snack without noticing that I am eating Never   I eat when I am preparing the meal Never   I eat more than usual when I see others eating Never   I have trouble not eating sweets, ice cream, cookies, or chips if they are around the house Never   I think about food all day Never   What foods, if any, do you crave? 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 In the past enjoyed swimming, playing tennis, skiing. Has had to stop these things due to hip pain. Has 8 cats, will take them on a walk. Likes wall yoga.            5/20/2024    10:40 AM   Activity/Exercise History   How much of a typical 12 hour day do you spend sitting? Less Than Half the Day   How much of a typical 12 hour day do you spend lying down? Less Than Half the Day   How much of a typical day do you spend walking/standing? Half the Day   How many hours (not including work) do you spend on the TV/Video Games/Computer/Tablet/Phone? 2-3 Hours   How many times a week are you active for the purpose of exercise? Once a Week   What keeps you from being more active? Pain    Too tired   How many total minutes do you spend doing some activity for the purpose of exercising when you exercise? More Than 30 Minutes     Nutrition Prescription  Recommended energy/nutrient modification.    Nutrition Diagnosis  Overweight r/t long history of positive energy balance aeb BMI >25 - improving    Nutrition Intervention  Reviewed current dietary habits and pts history   Answered pt questions  Coordination of care   Nutrition education   AVS and handouts via [employer]    Expected Engagement: good    Nutrition Goals  1) Aim for closer to 80-90 grams of protein daily.   2) Aim for 64 oz of water daily.     Follow-Up: January 14     Time spent with patient: 19 minutes.  Aicha Flores RD, LD      Again, thank you for allowing me to participate in the care of your patient.      Sincerely,    Aicha Flores RD

## 2024-11-18 ENCOUNTER — INFUSION THERAPY VISIT (OUTPATIENT)
Dept: INFUSION THERAPY | Facility: CLINIC | Age: 54
End: 2024-11-18
Attending: ALLERGY & IMMUNOLOGY
Payer: COMMERCIAL

## 2024-11-18 VITALS
RESPIRATION RATE: 16 BRPM | HEART RATE: 67 BPM | TEMPERATURE: 97.1 F | DIASTOLIC BLOOD PRESSURE: 80 MMHG | SYSTOLIC BLOOD PRESSURE: 134 MMHG | OXYGEN SATURATION: 98 %

## 2024-11-18 DIAGNOSIS — J45.50 SEVERE PERSISTENT ASTHMA WITHOUT COMPLICATION (H): Primary | ICD-10-CM

## 2024-11-18 PROCEDURE — 250N000011 HC RX IP 250 OP 636: Mod: JZ | Performed by: ALLERGY & IMMUNOLOGY

## 2024-11-18 PROCEDURE — 96372 THER/PROPH/DIAG INJ SC/IM: CPT | Performed by: ALLERGY & IMMUNOLOGY

## 2024-11-18 RX ORDER — ALBUTEROL SULFATE 90 UG/1
1-2 INHALANT RESPIRATORY (INHALATION)
Start: 2024-12-02

## 2024-11-18 RX ORDER — MEPERIDINE HYDROCHLORIDE 25 MG/ML
25 INJECTION INTRAMUSCULAR; INTRAVENOUS; SUBCUTANEOUS
OUTPATIENT
Start: 2024-12-02

## 2024-11-18 RX ORDER — EPINEPHRINE 1 MG/ML
0.3 INJECTION, SOLUTION INTRAMUSCULAR; SUBCUTANEOUS EVERY 5 MIN PRN
OUTPATIENT
Start: 2024-12-02

## 2024-11-18 RX ORDER — DIPHENHYDRAMINE HYDROCHLORIDE 50 MG/ML
50 INJECTION INTRAMUSCULAR; INTRAVENOUS
Start: 2024-12-02

## 2024-11-18 RX ORDER — ALBUTEROL SULFATE 0.83 MG/ML
2.5 SOLUTION RESPIRATORY (INHALATION)
OUTPATIENT
Start: 2024-12-02

## 2024-11-18 RX ORDER — DIPHENHYDRAMINE HYDROCHLORIDE 50 MG/ML
25 INJECTION INTRAMUSCULAR; INTRAVENOUS
Start: 2024-12-02

## 2024-11-18 RX ORDER — METHYLPREDNISOLONE SODIUM SUCCINATE 40 MG/ML
40 INJECTION INTRAMUSCULAR; INTRAVENOUS
Start: 2024-12-02

## 2024-11-18 RX ADMIN — OMALIZUMAB: 300 INJECTION, SOLUTION SUBCUTANEOUS at 13:00

## 2024-11-18 NOTE — PROGRESS NOTES
Infusion Nursing Note:  Alba Varela presents today for Xolair injection.    Patient seen by provider today: No   present during visit today: Not Applicable.    Note: Patient states no changes or new symptoms.      Intravenous Access:  No Intravenous access/labs at this visit.    Treatment Conditions:  Not Applicable.      Post Infusion Assessment:  Patient tolerated injection without incident.  Patient observed for 15 minutes post Xolair. Patient refused to stay for full 30 min.       Discharge Plan:   [facility]  Patient will return 12/2/24 for next appointment.   Patient discharged in stable condition accompanied by: self.  Departure Mode: Ambulatory.      Viri Narayan RN

## 2024-11-22 ENCOUNTER — OFFICE VISIT (OUTPATIENT)
Dept: RHEUMATOLOGY | Facility: CLINIC | Age: 54
End: 2024-11-22
Payer: COMMERCIAL

## 2024-11-22 VITALS
WEIGHT: 162 LBS | BODY MASS INDEX: 27.81 KG/M2 | DIASTOLIC BLOOD PRESSURE: 81 MMHG | HEART RATE: 100 BPM | SYSTOLIC BLOOD PRESSURE: 118 MMHG | OXYGEN SATURATION: 98 %

## 2024-11-22 DIAGNOSIS — K11.9 SALIVARY GLAND DISEASE: ICD-10-CM

## 2024-11-22 ASSESSMENT — PAIN SCALES - GENERAL: PAINLEVEL_OUTOF10: NO PAIN (0)

## 2024-11-22 NOTE — NURSING NOTE
Chief Complaint   Patient presents with    New Patient     Salivary gland disease       Vitals:    11/22/24 1153   BP: 118/81   BP Location: Left arm   Patient Position: Sitting   Cuff Size: Adult Regular   Pulse: 100   SpO2: 98%   Weight: 73.5 kg (162 lb)       Body mass index is 27.81 kg/m .      Kel Crooks MA

## 2024-11-22 NOTE — PROGRESS NOTES
New patient referred by Dr. Bright Sotelo, for Sjogren's syndrome.    Alba is 54 years old and she has had trouble with parotid gland swelling for several years.  She was evaluated by ENT for sialoadenitis.  She was also evaluated by ophthalmology for lacrimal gland biopsy.    She admits to having dry eyes and dry mouth that has been getting worse slowly over time.    Family history is essentially nonexistent because she was adopted.  She has 1 child.    Review of systems  She denies any blood clots in the lungs or antibiotics, she had only 1 miscarriage.  No malar rash, no pleurisy.  No major skin rash, although she has some blotchy skin on her legs at times.      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  However the pathology report states that there was no lacrimal gland identified in the biopsy specimen.    Impression/plan  1.  She has sicca symptoms but negative SSA in the past.  I suggested we repeat ASHLEY, ARLEY, and ANCA.  If these are all negative we can consider a minor salivary gland biopsy from the lower lip.  2.  Alternative diagnoses, if Sjogren's cannot be confirmed, include IgG4 related disease and Mikulicz syndrome.  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  It would be helpful if we could get additional staining for IgG4 of the existing biopsy material from the parotid gland and or lacrimal gland.  3.  She received a diagnosis of CVID at the [facility] and she was on monthly IVIG infusions for a number of years.  However when moving to [place] she was found that her  immunoglobulins were normal and infusions were discontinued.  4.  Follow-up in 3 months.    Addendum:  Essentially all her blood tests came back normal, or very close to normal, which is very reassuring.

## 2024-12-02 ENCOUNTER — INFUSION THERAPY VISIT (OUTPATIENT)
Dept: INFUSION THERAPY | Facility: CLINIC | Age: 54
End: 2024-12-02
Attending: ALLERGY & IMMUNOLOGY
Payer: COMMERCIAL

## 2024-12-02 VITALS
HEART RATE: 80 BPM | SYSTOLIC BLOOD PRESSURE: 105 MMHG | TEMPERATURE: 97.2 F | DIASTOLIC BLOOD PRESSURE: 67 MMHG | OXYGEN SATURATION: 98 %

## 2024-12-02 DIAGNOSIS — J45.50 SEVERE PERSISTENT ASTHMA WITHOUT COMPLICATION (H): Primary | ICD-10-CM

## 2024-12-02 DIAGNOSIS — K11.9 SALIVARY GLAND DISEASE: ICD-10-CM

## 2024-12-02 LAB
ALBUMIN MFR UR ELPH: 9.2 MG/DL
ALBUMIN UR-MCNC: NEGATIVE MG/DL
APPEARANCE UR: CLEAR
BILIRUB UR QL STRIP: NEGATIVE
C3 SERPL-MCNC: 114 MG/DL (ref 81–157)
C4 SERPL-MCNC: 47 MG/DL (ref 13–39)
COLOR UR AUTO: YELLOW
CREAT UR-MCNC: 196.6 MG/DL
CRP SERPL-MCNC: 3.14 MG/L
ENA SM IGG SER IA-ACNC: <0.7 U/ML
ENA SM IGG SER IA-ACNC: NEGATIVE
ENA SS-A AB SER IA-ACNC: <0.5 U/ML
ENA SS-A AB SER IA-ACNC: NEGATIVE
ENA SS-B IGG SER IA-ACNC: <0.6 U/ML
ENA SS-B IGG SER IA-ACNC: NEGATIVE
ERYTHROCYTE [SEDIMENTATION RATE] IN BLOOD BY WESTERGREN METHOD: 10 MM/HR (ref 0–30)
GLUCOSE UR STRIP-MCNC: NEGATIVE MG/DL
HGB UR QL STRIP: NEGATIVE
IGA SERPL-MCNC: 151 MG/DL (ref 84–499)
IGG SERPL-MCNC: 607 MG/DL (ref 610–1616)
IGM SERPL-MCNC: 53 MG/DL (ref 35–242)
KETONES UR STRIP-MCNC: NEGATIVE MG/DL
LEUKOCYTE ESTERASE UR QL STRIP: NEGATIVE
MUCOUS THREADS #/AREA URNS LPF: PRESENT /LPF
NITRATE UR QL: NEGATIVE
PH UR STRIP: 5.5 [PH] (ref 5–7)
PROT/CREAT 24H UR: 0.05 MG/MG CR (ref 0–0.2)
RBC URINE: 1 /HPF
SP GR UR STRIP: 1.03 (ref 1–1.03)
TOTAL PROTEIN SERUM FOR ELP: 6.8 G/DL (ref 6.4–8.3)
U1 SNRNP IGG SER IA-ACNC: 1.6 U/ML
U1 SNRNP IGG SER IA-ACNC: NEGATIVE
UROBILINOGEN UR STRIP-MCNC: NORMAL MG/DL
WBC URINE: <1 /HPF

## 2024-12-02 PROCEDURE — 85652 RBC SED RATE AUTOMATED: CPT | Performed by: INTERNAL MEDICINE

## 2024-12-02 PROCEDURE — 86160 COMPLEMENT ANTIGEN: CPT | Performed by: INTERNAL MEDICINE

## 2024-12-02 PROCEDURE — 36415 COLL VENOUS BLD VENIPUNCTURE: CPT

## 2024-12-02 PROCEDURE — 84165 PROTEIN E-PHORESIS SERUM: CPT | Mod: TC | Performed by: STUDENT IN AN ORGANIZED HEALTH CARE EDUCATION/TRAINING PROGRAM

## 2024-12-02 PROCEDURE — 82787 IGG 1 2 3 OR 4 EACH: CPT | Performed by: INTERNAL MEDICINE

## 2024-12-02 PROCEDURE — 84156 ASSAY OF PROTEIN URINE: CPT | Performed by: INTERNAL MEDICINE

## 2024-12-02 PROCEDURE — 82785 ASSAY OF IGE: CPT | Performed by: INTERNAL MEDICINE

## 2024-12-02 PROCEDURE — 81001 URINALYSIS AUTO W/SCOPE: CPT | Performed by: INTERNAL MEDICINE

## 2024-12-02 PROCEDURE — 86038 ANTINUCLEAR ANTIBODIES: CPT | Performed by: INTERNAL MEDICINE

## 2024-12-02 PROCEDURE — 84165 PROTEIN E-PHORESIS SERUM: CPT | Mod: 26 | Performed by: STUDENT IN AN ORGANIZED HEALTH CARE EDUCATION/TRAINING PROGRAM

## 2024-12-02 PROCEDURE — 82784 ASSAY IGA/IGD/IGG/IGM EACH: CPT | Performed by: INTERNAL MEDICINE

## 2024-12-02 PROCEDURE — 96372 THER/PROPH/DIAG INJ SC/IM: CPT | Performed by: ALLERGY & IMMUNOLOGY

## 2024-12-02 PROCEDURE — 86235 NUCLEAR ANTIGEN ANTIBODY: CPT | Performed by: INTERNAL MEDICINE

## 2024-12-02 PROCEDURE — 86036 ANCA SCREEN EACH ANTIBODY: CPT | Performed by: INTERNAL MEDICINE

## 2024-12-02 PROCEDURE — 86140 C-REACTIVE PROTEIN: CPT | Performed by: INTERNAL MEDICINE

## 2024-12-02 PROCEDURE — 250N000011 HC RX IP 250 OP 636: Mod: JZ | Performed by: ALLERGY & IMMUNOLOGY

## 2024-12-02 PROCEDURE — 84155 ASSAY OF PROTEIN SERUM: CPT | Performed by: INTERNAL MEDICINE

## 2024-12-02 RX ORDER — MEPERIDINE HYDROCHLORIDE 25 MG/ML
25 INJECTION INTRAMUSCULAR; INTRAVENOUS; SUBCUTANEOUS
OUTPATIENT
Start: 2024-12-16

## 2024-12-02 RX ORDER — DIPHENHYDRAMINE HYDROCHLORIDE 50 MG/ML
50 INJECTION INTRAMUSCULAR; INTRAVENOUS
Start: 2024-12-16

## 2024-12-02 RX ORDER — ALBUTEROL SULFATE 0.83 MG/ML
2.5 SOLUTION RESPIRATORY (INHALATION)
OUTPATIENT
Start: 2024-12-16

## 2024-12-02 RX ORDER — DIPHENHYDRAMINE HYDROCHLORIDE 50 MG/ML
25 INJECTION INTRAMUSCULAR; INTRAVENOUS
Start: 2024-12-16

## 2024-12-02 RX ORDER — METHYLPREDNISOLONE SODIUM SUCCINATE 40 MG/ML
40 INJECTION INTRAMUSCULAR; INTRAVENOUS
Start: 2024-12-16

## 2024-12-02 RX ORDER — EPINEPHRINE 1 MG/ML
0.3 INJECTION, SOLUTION INTRAMUSCULAR; SUBCUTANEOUS EVERY 5 MIN PRN
OUTPATIENT
Start: 2024-12-16

## 2024-12-02 RX ORDER — ALBUTEROL SULFATE 90 UG/1
1-2 INHALANT RESPIRATORY (INHALATION)
Start: 2024-12-16

## 2024-12-02 RX ADMIN — OMALIZUMAB: 300 INJECTION, SOLUTION SUBCUTANEOUS at 08:57

## 2024-12-02 NOTE — PROGRESS NOTES
Infusion Nursing Note:  Alba Varela presents today for Xolair and labs.    Patient seen by provider today: No   present during visit today: Not Applicable.    Note: Pt requested rheumatology labs from Dr Kurtz to be drawn today.      Intravenous Access:  Lab draw site LAC, Needle type butterfly, Gauge 23.  Labs drawn without difficulty.    Treatment Conditions:  Not Applicable.      Post Infusion Assessment:  Patient tolerated injection without incident.  Patient observed for 30 minutes post Xolair per protocol.       [facility] Plan:   [facility]  Patient will return 12/16/24 for next Xolair injection   Patient discharged in stable condition accompanied by: self.  Departure Mode: Ambulatory.      Mame Condon RN

## 2024-12-03 LAB
ALBUMIN SERPL ELPH-MCNC: 4.6 G/DL (ref 3.7–5.1)
ALPHA1 GLOB SERPL ELPH-MCNC: 0.2 G/DL (ref 0.2–0.4)
ALPHA2 GLOB SERPL ELPH-MCNC: 0.6 G/DL (ref 0.5–0.9)
ANA SER QL IF: NEGATIVE
B-GLOBULIN SERPL ELPH-MCNC: 0.7 G/DL (ref 0.6–1)
GAMMA GLOB SERPL ELPH-MCNC: 0.6 G/DL (ref 0.7–1.6)
IGG SERPL-MCNC: 593 MG/DL (ref 610–1616)
IGG1 SER-MCNC: 358 MG/DL (ref 382–929)
IGG2 SER-MCNC: 177 MG/DL (ref 242–700)
IGG3 SER-MCNC: 19 MG/DL (ref 22–176)
IGG4 SER-MCNC: 15 MG/DL (ref 4–86)
LOCATION OF TASK: ABNORMAL
M PROTEIN SERPL ELPH-MCNC: 0 G/DL
PROT PATTERN SERPL ELPH-IMP: ABNORMAL
SUBCLASSES, PERCENT: 96 %

## 2024-12-04 LAB
ANCA AB PATTERN SER IF-IMP: NORMAL
C-ANCA TITR SER IF: NORMAL {TITER}

## 2024-12-05 LAB — IGE SERPL-ACNC: 246 KU/L (ref 0–114)

## 2024-12-16 ENCOUNTER — INFUSION THERAPY VISIT (OUTPATIENT)
Dept: INFUSION THERAPY | Facility: CLINIC | Age: 54
End: 2024-12-16
Attending: ALLERGY & IMMUNOLOGY
Payer: COMMERCIAL

## 2024-12-16 VITALS
RESPIRATION RATE: 16 BRPM | TEMPERATURE: 97.6 F | DIASTOLIC BLOOD PRESSURE: 84 MMHG | OXYGEN SATURATION: 97 % | SYSTOLIC BLOOD PRESSURE: 129 MMHG | HEART RATE: 84 BPM

## 2024-12-16 DIAGNOSIS — J45.50 SEVERE PERSISTENT ASTHMA WITHOUT COMPLICATION (H): Primary | ICD-10-CM

## 2024-12-16 PROCEDURE — 250N000011 HC RX IP 250 OP 636: Mod: JZ | Performed by: ALLERGY & IMMUNOLOGY

## 2024-12-16 PROCEDURE — 96372 THER/PROPH/DIAG INJ SC/IM: CPT | Performed by: ALLERGY & IMMUNOLOGY

## 2024-12-16 RX ORDER — DIPHENHYDRAMINE HYDROCHLORIDE 50 MG/ML
25 INJECTION INTRAMUSCULAR; INTRAVENOUS
Start: 2024-12-30

## 2024-12-16 RX ORDER — MEPERIDINE HYDROCHLORIDE 25 MG/ML
25 INJECTION INTRAMUSCULAR; INTRAVENOUS; SUBCUTANEOUS
OUTPATIENT
Start: 2024-12-30

## 2024-12-16 RX ORDER — ALBUTEROL SULFATE 90 UG/1
1-2 INHALANT RESPIRATORY (INHALATION)
Start: 2024-12-30

## 2024-12-16 RX ORDER — DIPHENHYDRAMINE HYDROCHLORIDE 50 MG/ML
50 INJECTION INTRAMUSCULAR; INTRAVENOUS
Start: 2024-12-30

## 2024-12-16 RX ORDER — ALBUTEROL SULFATE 0.83 MG/ML
2.5 SOLUTION RESPIRATORY (INHALATION)
OUTPATIENT
Start: 2024-12-30

## 2024-12-16 RX ORDER — METHYLPREDNISOLONE SODIUM SUCCINATE 40 MG/ML
40 INJECTION INTRAMUSCULAR; INTRAVENOUS
Start: 2024-12-30

## 2024-12-16 RX ORDER — EPINEPHRINE 1 MG/ML
0.3 INJECTION, SOLUTION INTRAMUSCULAR; SUBCUTANEOUS EVERY 5 MIN PRN
OUTPATIENT
Start: 2024-12-30

## 2024-12-16 RX ADMIN — OMALIZUMAB: 300 INJECTION, SOLUTION SUBCUTANEOUS at 09:55

## 2024-12-16 NOTE — PROGRESS NOTES
Infusion Nursing Note:  Alba Varela presents today for Xolair.    Patient seen by provider today: No   present during visit today: Not Applicable.    Note: N/A.      Intravenous Access:  No Intravenous access/labs at this visit.    Treatment Conditions:  Not Applicable.      Post Infusion Assessment:  Patient tolerated injection without incident.  Patient observed for 10 minutes post xolair, declines the full 30 min obs period.   Site patent and intact, free from redness, edema or discomfort.       Discharge Plan:   Discharge instructions reviewed with: Patient.  Patient and/or family verbalized understanding of discharge instructions and all questions answered.  AVS to patient via [employer].  Patient will return 12/30/24 for next appointment.   Patient discharged in stable condition accompanied by: self.  Departure Mode: Ambulatory.      Gracy Zhao RN

## 2024-12-25 DIAGNOSIS — E78.2 MIXED HYPERLIPIDEMIA: ICD-10-CM

## 2024-12-26 RX ORDER — ROSUVASTATIN CALCIUM 5 MG/1
5 TABLET, COATED ORAL DAILY
Qty: 90 TABLET | Refills: 1 | Status: SHIPPED | OUTPATIENT
Start: 2024-12-26

## 2024-12-26 NOTE — TELEPHONE ENCOUNTER
LDL Cholesterol Calculated   Date Value Ref Range Status   09/23/2024 88 <100 mg/dL Final   03/09/2017 138 (H) <100 mg/dL Final     Comment:     Above desirable:  100-129 mg/dl   Borderline High:  130-159 mg/dL   High:             160-189 mg/dL   Very high:       >189 mg/dl

## 2024-12-30 ENCOUNTER — INFUSION THERAPY VISIT (OUTPATIENT)
Dept: INFUSION THERAPY | Facility: CLINIC | Age: 54
End: 2024-12-30
Attending: ALLERGY & IMMUNOLOGY
Payer: COMMERCIAL

## 2024-12-30 VITALS — SYSTOLIC BLOOD PRESSURE: 139 MMHG | HEART RATE: 87 BPM | OXYGEN SATURATION: 99 % | DIASTOLIC BLOOD PRESSURE: 90 MMHG

## 2024-12-30 DIAGNOSIS — J45.50 SEVERE PERSISTENT ASTHMA WITHOUT COMPLICATION (H): Primary | ICD-10-CM

## 2024-12-30 PROCEDURE — 96372 THER/PROPH/DIAG INJ SC/IM: CPT | Performed by: ALLERGY & IMMUNOLOGY

## 2024-12-30 PROCEDURE — 250N000011 HC RX IP 250 OP 636: Mod: JZ | Performed by: ALLERGY & IMMUNOLOGY

## 2024-12-30 RX ORDER — DIPHENHYDRAMINE HYDROCHLORIDE 50 MG/ML
25 INJECTION INTRAMUSCULAR; INTRAVENOUS
Start: 2025-01-13

## 2024-12-30 RX ORDER — METHYLPREDNISOLONE SODIUM SUCCINATE 40 MG/ML
40 INJECTION INTRAMUSCULAR; INTRAVENOUS
Start: 2025-01-13

## 2024-12-30 RX ORDER — MEPERIDINE HYDROCHLORIDE 25 MG/ML
25 INJECTION INTRAMUSCULAR; INTRAVENOUS; SUBCUTANEOUS
OUTPATIENT
Start: 2025-01-13

## 2024-12-30 RX ORDER — DIPHENHYDRAMINE HYDROCHLORIDE 50 MG/ML
50 INJECTION INTRAMUSCULAR; INTRAVENOUS
Start: 2025-01-13

## 2024-12-30 RX ORDER — EPINEPHRINE 1 MG/ML
0.3 INJECTION, SOLUTION INTRAMUSCULAR; SUBCUTANEOUS EVERY 5 MIN PRN
OUTPATIENT
Start: 2025-01-13

## 2024-12-30 RX ORDER — ALBUTEROL SULFATE 90 UG/1
1-2 INHALANT RESPIRATORY (INHALATION)
Start: 2025-01-13

## 2024-12-30 RX ORDER — ALBUTEROL SULFATE 0.83 MG/ML
2.5 SOLUTION RESPIRATORY (INHALATION)
OUTPATIENT
Start: 2025-01-13

## 2024-12-30 RX ADMIN — OMALIZUMAB: 150 INJECTION, SOLUTION SUBCUTANEOUS at 15:18

## 2024-12-30 NOTE — PROGRESS NOTES
Infusion Nursing Note:  Alba Varela presents today for Xolair.    Patient seen by provider today: No   present during visit today: Not Applicable.    Note: N/A.      Intravenous Access:  No Intravenous access/labs at this visit.    Treatment Conditions:  Not Applicable.      Post Infusion Assessment:  Patient tolerated injection without incident.  Patient declined observation today  Site patent and intact, free from redness, edema or discomfort.       Dis[facility] Plan:   [facility]  Patient will return 1/13/25 for next Xolair injection   Patient discharged in stable condition accompanied by: self.  Departure Mode: Ambulatory.      Mame Condon RN

## 2025-01-09 ENCOUNTER — OFFICE VISIT (OUTPATIENT)
Dept: RHEUMATOLOGY | Facility: CLINIC | Age: 55
End: 2025-01-09
Payer: COMMERCIAL

## 2025-01-09 VITALS
BODY MASS INDEX: 26.43 KG/M2 | SYSTOLIC BLOOD PRESSURE: 94 MMHG | WEIGHT: 154 LBS | HEART RATE: 94 BPM | DIASTOLIC BLOOD PRESSURE: 65 MMHG

## 2025-01-09 DIAGNOSIS — M35.00 PRIMARY SJOGREN'S SYNDROME: Primary | ICD-10-CM

## 2025-01-09 ASSESSMENT — PAIN SCALES - GENERAL: PAINLEVEL_OUTOF10: NO PAIN (0)

## 2025-01-09 NOTE — NURSING NOTE
Chief Complaint   Patient presents with    RECHECK     Salivary gland disease       Vitals:    01/09/25 0922   BP: 94/65   BP Location: Left arm   Patient Position: Sitting   Cuff Size: Adult Regular   Pulse: 94   Weight: 69.9 kg (154 lb)       Body mass index is 26.43 kg/m .    Deena Pak, [facility]

## 2025-01-09 NOTE — PROGRESS NOTES
Follow-up visit for Sjogren syndrome.    Alba is here for second visit and she is doing well she continues to have sicca symptoms with dry eyes and dry mouth and she reports today that she is undergoing testing as a preop workup for planned cataract surgery.  It is unclear why she has developed significant cataracts at age 54 that requires intervention, as this seems earlier than for most patients.    Reviewed her blood work results that were drawn on the last visit and she has a negative ASHLEY and negative ARLEY and negative ANCA.  She also has had immunoglobulins checked there were essentially normal or close to normal values and does not require any intervention.  Specifically in the past he had IVIG infusions for hypogammaglobulinemia, but that is currently not present.    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  There is no skin rash.    Impression/plan  1.  She has sicca symptoms but we cannot make a formal diagnosis of Sjogren syndrome as she does not meet criteria due to the fact that she has a negative SSA.  2.  She does not have any evidence for IgG4 related disease or lupus or Mikulicz  syndrome.  3.  Early cataracts and she is planned for cataract surgery.  4.  I did not schedule follow-up visit but she is welcome to return as needed.    20 minutes spent on the date of the encounter doing chart review, history and exam, documentation and further activities per the note.

## 2025-01-13 ENCOUNTER — INFUSION THERAPY VISIT (OUTPATIENT)
Dept: INFUSION THERAPY | Facility: CLINIC | Age: 55
End: 2025-01-13
Attending: ALLERGY & IMMUNOLOGY
Payer: COMMERCIAL

## 2025-01-13 VITALS
RESPIRATION RATE: 16 BRPM | SYSTOLIC BLOOD PRESSURE: 133 MMHG | HEART RATE: 89 BPM | DIASTOLIC BLOOD PRESSURE: 85 MMHG | OXYGEN SATURATION: 98 % | TEMPERATURE: 96.7 F

## 2025-01-13 DIAGNOSIS — J45.50 SEVERE PERSISTENT ASTHMA WITHOUT COMPLICATION (H): Primary | ICD-10-CM

## 2025-01-13 PROCEDURE — 250N000011 HC RX IP 250 OP 636: Mod: JZ | Performed by: ALLERGY & IMMUNOLOGY

## 2025-01-13 PROCEDURE — 96372 THER/PROPH/DIAG INJ SC/IM: CPT | Performed by: ALLERGY & IMMUNOLOGY

## 2025-01-13 RX ORDER — MEPERIDINE HYDROCHLORIDE 25 MG/ML
25 INJECTION INTRAMUSCULAR; INTRAVENOUS; SUBCUTANEOUS
OUTPATIENT
Start: 2025-01-27

## 2025-01-13 RX ORDER — EPINEPHRINE 1 MG/ML
0.3 INJECTION, SOLUTION INTRAMUSCULAR; SUBCUTANEOUS EVERY 5 MIN PRN
OUTPATIENT
Start: 2025-01-27

## 2025-01-13 RX ORDER — DIPHENHYDRAMINE HYDROCHLORIDE 50 MG/ML
25 INJECTION INTRAMUSCULAR; INTRAVENOUS
Start: 2025-01-27

## 2025-01-13 RX ORDER — METHYLPREDNISOLONE SODIUM SUCCINATE 40 MG/ML
40 INJECTION INTRAMUSCULAR; INTRAVENOUS
Start: 2025-01-27

## 2025-01-13 RX ORDER — ALBUTEROL SULFATE 0.83 MG/ML
2.5 SOLUTION RESPIRATORY (INHALATION)
OUTPATIENT
Start: 2025-01-27

## 2025-01-13 RX ORDER — ALBUTEROL SULFATE 90 UG/1
1-2 INHALANT RESPIRATORY (INHALATION)
Start: 2025-01-27

## 2025-01-13 RX ORDER — DIPHENHYDRAMINE HYDROCHLORIDE 50 MG/ML
50 INJECTION INTRAMUSCULAR; INTRAVENOUS
Start: 2025-01-27

## 2025-01-13 RX ADMIN — OMALIZUMAB: 150 INJECTION, SOLUTION SUBCUTANEOUS at 09:07

## 2025-01-13 NOTE — PROGRESS NOTES
"Video-Visit Details    Type of service:  Video Visit    Video Start Time: 11:05 AM  Video End Time: 11:21 AM     Originating Location (pt. Location): Home    Distant Location (provider location):  Offsite (providers home) [facility] [place]     Platform used for Video Visit: [employer]    Return Weight Management Nutrition Consultation    Alba Varela is a 54 year old female presents today for return weight management nutrition consultation.  Patient referred by JHONATAN Linder on May 20, 2024.    Patient with Co-morbidities of obesity includin/20/2024    10:40 AM   --   I have the following health issues associated with obesity Pre-Diabetes    High Cholesterol    Sleep Apnea    Fatty Liver    Asthma    Stress Incontinence   I have the following symptoms associated with obesity Depression    Fatigue    Hip Pain     Anthropometrics:  Initial consult weight: 196 lb on 24   Estimated body mass index is 26.26 kg/m  as calculated from the following:    Height as of this encounter: 1.626 m (5' 4\").    Weight as of this encounter: 69.4 kg (153 lb).  Current Weight: 153 lb per patient report      Medications for Weight Loss:  Wegovy 2.4 mg - tolerating well and no side effects.      NUTRITION HISTORY  Food allergies: NKFA  Food intolerances: None  Vitamin/Mineral Supplements: None   Previous methods of diet modification for weight loss: watching calories/portion control   RD before: None     Doesn't drink milk.     Typically eats 1 big meal a day (dinner), doesn't generally snack, will drink coffee (with sweetened powdered creamer) and iced tea (unsweetened) throughout the day. Craves bread, mashed potatoes . Is able to get full. Can stay full until next meal. Does sometimes struggle with portion control when it's a food she's craving. Does not experience food noise. Does experience emotional eating (when feeling really depressed she wants to surround herself with friends " which often means more eating, such as at a restaurant for lunch). Does not experience a loss of control around eating.     Eats out/ gets take out 2x a week. Drinks coffee with powdered creamer, sparkling water, unsweetened iced tea. Will have 1-2 diet cokes a day. ETOH 1-2 times a month, 3-4 beers in a sitting, feels that ETOH is not too important to her quality of life.      Goals discussed with Padmaja:  Eating 3 meals a day or getting protein shakes in at breakfast and lunch. Popular brands are Premier protein, fairlife protein   Cutting out all added sugar in beverages (finding sugar free alternative to coffee mate, some patients like using protein shakes in their coffee)  Working towards 6 hours of sleep minimum nightly (7 is ideal)  No more than 2 drinks in a sitting when out with friends     Diet recall:   Skips breakfast and lunch. Eats dinner   Hydration: Zero sugar Gingerale, coffee with splash half and half, unsweetened iced tea, water.     July 2024: Protein shake or protein bar in the morning. More mindful of portion sizes and the types of foods she is eating. Dinner - varies, the other night had a caesar salad with chicken. Drinking a lot more water. Getting in at least 60 oz. No bowel changes or negative side effects.     Discussed trying to incorporate at least 1 more eating episode midday so she can get in adequate amounts of protein desired.     September 2024: Recently had shoulder surgery and eye surgery.     Reports she is surprised with all the weight she has lost so far, she doesn't notice her clothes fitting any looser and people aren't commenting. Trying to not let that discourage her. Feels Wegovy is working though - cravings aren't as strong. In regards to her nutritional intake she has reduced her red meat consumption. Leaning more towards chicken and fish. Trying to get better with water intake, also uses sparkling water which she really enjoys.  Protein shake or protein bar for  breakfast, Has a snack midday like handful of cashews, dinner is a sensible meal with a good source of protein.     November 2024: Still struggling with her water intake. Has 1 diet coke daily. Turkey rolled around piece of string cheese - for an afternoon snack.  Having cravings at night around 9-10 PM. Has been having a fruit at this time. Protein bar in the morning, turkey and cheese roll up midday, smaller portions for dinner. Trying to limit her red meat. Has a protein + vegetable or starch. 40 oz of water daily.     January 2025: Notices her clothes are fitting loser. Feels better and that she has more energy. Able to move around more.  Going [place]. 50 oz of water daily. Doing a good job with her protein intake. Piece of turkey wrapped around piece of cheese midday. Protein shake in the morning. Dinner is a protein with a vegetable + starch. May have a snack of leif with plain yogurt.     No issues with bowel movements. No constipation or diarrhea.     Physical activity - still healing from shoulder and wrist surgery. Aiming for 10 minutes of movement per day.     Progress Towards Previous Goals   1) Aim for closer to 80-90 grams of protein daily.  Met, continues   2) Aim for 64 oz of water daily. - not met        5/20/2024    10:40 AM   Diet Recall Review with Patient   If you do eat supper, what types of food do you typically eat? Protein, veg,bread,   How many glasses of juice do you drink in a typical day? 0   How many of glasses of milk do you drink in a typical day? 0   How many 8oz glasses of sugar containing drinks such as Corbin-Aid/sweet tea do you drink in a day? 0   How many cans/bottles of sugar pop/soda/tea/sports drinks do you drink in a day? 0   How many cans/bottles of diet pop/soda/tea or sports drink do you drink in a day? 2   How often do you have a drink of alcohol? 2-4 Times a Month   If you do drink, how many drinks might you have in a day? 3-4            5/20/2024    10:40 AM   Eating Habits   Generally, my meals include foods like these bread, pasta, rice, potatoes, corn, crackers, sweet dessert, pop, or juice A Few Times a Week   Generally, my meals include foods like these fried meats, brats, burgers, french fries, pizza, cheese, chips, or ice cream Once a Week   Eat fast food (like McDonalds, Burger Michael, Taco Bell) Less Than Weekly   Eat at a buffet or sit-down restaurant Less Than Weekly   Eat most of my meals in front of the TV or computer Almost Everyday   Often skip meals, eat at random times, have no regular eating times Everyday   Rarely sit down for a meal but snack or graze throughout Less Than Weekly   Eat extra snacks between meals Never   Eat most of my food at the end of the day Almost Everyday   Eat in the middle of the night or wake up at night to eat Never   Eat extra snacks to prevent or correct low blood sugar A Few Times a Week   Eat to prevent acid reflux or stomach pain Never   Worry about not having enough food to eat Never   I eat when I am depressed Less Than Weekly   I eat when I am stressed Less Than Weekly   I eat when I am bored Less Than Weekly   I eat when I am anxious Once a Week   I eat when I am happy or as a reward Never   I feel hungry all the time even if I just have eaten Never   Feeling full is important to me Never   I finish all the food on my plate even if I am already full Almost Everyday   I can't resist eating delicious food or walk past the good food/smell Less Than Weekly   I eat/snack without noticing that I am eating Never   I eat when I am preparing the meal Never   I eat more than usual when I see others eating Never   I have trouble not eating sweets, ice cream, cookies, or chips if they are around the house Never   I think about food all day Never   What foods, if any, do you crave? None           5/20/2024    10:40 AM   Amount of Food   I feel out of control when eating Never   I eat a large amount of food,  like a loaf of bread, a box of cookies, a pint/quart of ice cream, all at once Never   I eat a large amount of food even when I am not hungry Never   I eat rapidly Never   I eat alone because I feel embarrassed and do not want others to see how much I have eaten Never   I eat until I am uncomfortably full Never   I feel bad, disgusted, or guilty after I overeat Never     Physical Activity:  Works in a [employer] so is on her feet all day. In the past enjoyed swimming, playing tennis, skiing. Has had to stop these things due to hip pain. Has 8 cats, will take them on a walk. Likes wall yoga.            5/20/2024    10:40 AM   Activity/Exercise History   How much of a typical 12 hour day do you spend sitting? Less Than Half the Day   How much of a typical 12 hour day do you spend lying down? Less Than Half the Day   How much of a typical day do you spend walking/standing? Half the Day   How many hours (not including work) do you spend on the TV/Video Games/Computer/Tablet/Phone? 2-3 Hours   How many times a week are you active for the purpose of exercise? Once a Week   What keeps you from being more active? Pain    Too tired   How many total minutes do you spend doing some activity for the purpose of exercising when you exercise? More Than 30 Minutes     Nutrition Prescription  Recommended energy/nutrient modification.    Nutrition Diagnosis  Overweight r/t long history of positive energy balance aeb BMI >25 - improving    Nutrition Intervention  Reviewed current dietary habits and pts history   Answered pt questions  Coordination of care   Nutrition education   AVS and handouts via [employer]    Expected Engagement: good    Nutrition Goals  1) Aim for closer to 80-90 grams of protein daily.   2) Aim for 50 oz of water daily.     Follow-Up: April 11.     Time spent with patient: 16 minutes.  Aicha Flores RD, LD

## 2025-01-13 NOTE — PROGRESS NOTES
Infusion Nursing Note:  Alba Varela presents today for Xolair.    Patient seen by provider today: No   present during visit today: Not Applicable.    Note: N/A.    Intravenous Access:  No Intravenous access/labs at this visit.    Treatment Conditions:  Not Applicable.    Post Infusion Assessment:  Patient tolerated injection without incident.  Patient observed for 20 minutes post Xolair per protocol.  Site patent and intact, free from redness, edema or discomfort.     Discharge Plan:   Discharge instructions reviewed with: Patient.  Patient and/or family verbalized understanding of discharge instructions and all questions answered.  AVS to patient via [employer].  Patient will return 1/27/2025 for next appointment.   Patient discharged in stable condition accompanied by: self.  Departure Mode: Ambulatory.    Mac Montero RN

## 2025-01-14 ENCOUNTER — VIRTUAL VISIT (OUTPATIENT)
Dept: ENDOCRINOLOGY | Facility: CLINIC | Age: 55
End: 2025-01-14
Payer: COMMERCIAL

## 2025-01-14 VITALS — BODY MASS INDEX: 26.12 KG/M2 | HEIGHT: 64 IN | WEIGHT: 153 LBS

## 2025-01-14 DIAGNOSIS — Z71.3 NUTRITIONAL COUNSELING: Primary | ICD-10-CM

## 2025-01-14 DIAGNOSIS — E66.3 OVERWEIGHT: ICD-10-CM

## 2025-01-14 PROCEDURE — 99207 PR NO CHARGE LOS: CPT | Mod: 95

## 2025-01-14 PROCEDURE — 97803 MED NUTRITION INDIV SUBSEQ: CPT | Mod: 95

## 2025-01-14 ASSESSMENT — PAIN SCALES - GENERAL: PAINLEVEL_OUTOF10: NO PAIN (0)

## 2025-01-14 NOTE — PATIENT INSTRUCTIONS
Nasir Willis,     Follow-up with BETO on April 11.     Thank you,    Aicha Flores, BETO, LD  If you would like to schedule or reschedule an appointment with the RD, please call [phone]    Nutrition Goals  1) Aim for closer to 80-90 grams of protein daily.   2) Aim for 50 oz of water daily.     COMPREHENSIVE WEIGHT MANAGEMENT PROGRAM  VIRTUAL SUPPORT GROUPS    At [facility], our Comprehensive Weight Management program offers on-line support groups for patients who are working on weight loss and considering, preparing for, or have had weight loss surgery.     There is no cost for this opportunity.  You are invited to attend the?Virtual Support Groups?provided by any of the following locations:    [facility] via Microsoft Teams with Elyse Fuentes RD, RN  2.   [place] via [employer] with Huber Delgadillo, PhD, LP  3.   [place] via [employer] with Emily Wilson RN  4.   [facility] via a Zoom Meeting with ROSITA Riggins    The following Support Group information can also be found on our website:  [email or web address]      [facility]   WEIGHT LOSS SURGERY SUPPORT GROUP  The support group is a patient-lead forum that meets monthly to share experiences, encouragement and education. It is open to those who have had weight loss surgery, are scheduled for surgery, or are considering surgery.   WHEN: 3rd Wednesday of each month from 5:00PM - 6:00PM using Microsoft Teams.   FACILITATOR: Led by Elyse Muñoz RD, MARIAN, RN, the program's Clinical Coordinator.   TO REGISTER: Please contact the clinic via [employer] or call the nurse line directly at [phone] to inform our staff that you would like an invite sent to you and to let us know the email you would like the invite sent to. Prior to the meeting, a link with directions on how to join the meeting will be sent to you.    2025 Meetings, 3rd Wednesday, 5:00pm  - 6:00pm    January 15: Let's Talk  February19: Let's Talk  March 19: Speaker: Marcus Vazquez RD, LD  April 16: Let's Talk  May 21: Speaker: Chiquita Pitts RD, LD  June18: Let's Talk  July 16: Let's Talk  August 20: Let's Talk  September 17: No meeting.  October 15: Speaker: Joan Henry PsyD, LP  November 19: Let's Talk  December 17: Let's Talk    [facility] BARIATRIC CARE SUPPORT GROUP  This is open to all pre- and post- operative bariatric surgery patients as well as their support system.   WHEN: 3rd Tuesday of each month from 6:30 PM - 8:00 PM using Microsoft Teams.   FACILITATOR: Led by Huber Delgadillo, Ph.D who is a Licensed Psychologist with the [facility] Comprehensive Weight Management Program.   TO REGISTER: Please send an email to Huber Delgadillo, Ph.D., LP at?[email or web address]?if you would like an invitation to the group. Prior to the meeting, a link with directions on how to join the meeting will be sent to you.    2025 Meetings, 3rd Tuesday January 21st: Open Forum  February 18th: Medications and Bariatric Surgery  Speaker: [facility] Resident  March 18th: Open Forum  April 15th: Genetics of Obesity as well as Q&A, Speaker: Sheri Alba MD, [facility] Comprehensive Weight Management Program.  May 20th: Open Forum  June 17th: Nutritional Labeling, Speaker: HARITHA  July 15th: Open Forum  August 19th: Open Forum  September 16th: Open Forum  October 21st: Open Forum  November 18th: Holiday Eating, Speaker: HARITHA  December 16th: Open Forum    [facility] POST-OPERATIVE BARIATRIC SURGERY SUPPORT GROUP  This is a support group for [facility] bariatric patients (and those external to [facility]) who have had bariatric surgery and are at least 3 months post-surgery.  WHEN: 4th Thursday of the month from 11:00 AM - 12:00 PM using Microsoft Teams.    FACILITATOR: Led by Certified Bariatric Nurse, Emily Wilson, RN, CBN.   TO REGISTER: Please send an email to  at [email or web address] if you would like an invitation to the group.  Prior to the meeting, a link with directions on how to join the meeting will be sent to you.    2025 Meetings, 4th Thursday, 11:00am - 12:pm  January 23  February 27  March 27  April 24  May 22  Moon 26  July 24  August 28  September 25  October 23  November 27: Thanksgiving Day, No meeting.      December 25: [place] Day, No meeting.        [employer]   HEALTHY LIFESTYLE COACHING GROUP  This is a 60 minute virtual coaching group for those who want to lead a healthier lifestyle. Come together to set goals and overcome barriers in a supportive group environment. We will address the four pillars of health: nutrition, exercise, sleep, and emotional well-being.   WHEN: 1st Friday of the month, 12:30 PM - 1:30 PM   using a Zoom meeting.     FACILITATOR: Led by National Board-Certified Health and , Emily Callahan, [facility].  TO REGISTER: Please call the YAZUO at [phone] to register. You will get an appointment to attend in [place]. Fifteen minutes prior to the meeting, complete the e-check in and you will get the link to join the meeting.  There is no charge to attend this group and space is limited.  Please register for each month you wish to attend    2025 Meetings, 1st Friday, 12:30pm-1:30pm  January 3  February 7  March 7: No meeting.  April 4  May 2  Moon 6  July 4: Fourth of July Holiday, No meeting.    August 1  September 5  October 3  November 7  December 5

## 2025-01-14 NOTE — NURSING NOTE
Is the patient currently in the state of [place]? YES    Location: home    Visit mode:VIDEO    If the visit is dropped, the patient can be reconnected by: VIDEO VISIT: Text to cell phone:   Telephone Information:   Mobile [phone]    and VIDEO VISIT: Send to e-mail at: [email or web address]    Will anyone else be joining the visit? NO  (If patient encounters technical issues they should call [phone]    Are changes needed to the allergy or medication list? No    Are refills needed on medications prescribed by this physician? NO    Reason for visit: YAJAIRA Do, Virtual Visit Facilitator    QNR Status: NA

## 2025-01-14 NOTE — LETTER
"2025       RE: Alba Varela  [id number]  [facility] [id number]     Dear Colleague,    Thank you for referring your patient, Alba Varela, to the [facility] [place] at [facility]. Please see a copy of my visit note below.    Video-Visit Details    Type of service:  Video Visit    Video Start Time: 11:05 AM  Video End Time: 11:21 AM     Originating Location (pt. Location): Home    Distant Location (provider location):  Offsite (providers home) [facility] [place]     Platform used for Video Visit: [employer]    Return Weight Management Nutrition Consultation    Alba Varela is a 54 year old female presents today for return weight management nutrition consultation.  Patient referred by JHONATAN Linder on May 20, 2024.    Patient with Co-morbidities of obesity includin/20/2024    10:40 AM   --   I have the following health issues associated with obesity Pre-Diabetes    High Cholesterol    Sleep Apnea    Fatty Liver    Asthma    Stress Incontinence   I have the following symptoms associated with obesity Depression    Fatigue    Hip Pain     Anthropometrics:  Initial consult weight: 196 lb on 24   Estimated body mass index is 26.26 kg/m  as calculated from the following:    Height as of this encounter: 1.626 m (5' 4\").    Weight as of this encounter: 69.4 kg (153 lb).  Current Weight: 153 lb per patient report      Medications for Weight Loss:  Wegovy 2.4 mg - tolerating well and no side effects.      NUTRITION HISTORY  Food allergies: NKFA  Food intolerances: None  Vitamin/Mineral Supplements: None   Previous methods of diet modification for weight loss: watching calories/portion control   RD before: None     Doesn't drink milk.     Typically eats 1 big meal a day (dinner), doesn't generally snack, will drink coffee (with sweetened powdered creamer) and iced tea (unsweetened) " throughout the day. Craves bread, mashed potatoes . Is able to get full. Can stay full until next meal. Does sometimes struggle with portion control when it's a food she's craving. Does not experience food noise. Does experience emotional eating (when feeling really depressed she wants to surround herself with friends which often means more eating, such as at a restaurant for lunch). Does not experience a loss of control around eating.     Eats out/ gets take out 2x a week. Drinks coffee with powdered creamer, sparkling water, unsweetened iced tea. Will have 1-2 diet cokes a day. ETOH 1-2 times a month, 3-4 beers in a sitting, feels that ETOH is not too important to her quality of life.      Goals discussed with Padmaja:  Eating 3 meals a day or getting protein shakes in at breakfast and lunch. Popular brands are Premier protein, fairlife protein   Cutting out all added sugar in beverages (finding sugar free alternative to coffee mate, some patients like using protein shakes in their coffee)  Working towards 6 hours of sleep minimum nightly (7 is ideal)  No more than 2 drinks in a sitting when out with friends     Diet recall:   Skips breakfast and lunch. Eats dinner   Hydration: Zero sugar Gingerale, coffee with splash half and half, unsweetened iced tea, water.     July 2024: Protein shake or protein bar in the morning. More mindful of portion sizes and the types of foods she is eating. Dinner - varies, the other night had a caesar salad with chicken. Drinking a lot more water. Getting in at least 60 oz. No bowel changes or negative side effects.     Discussed trying to incorporate at least 1 more eating episode midday so she can get in adequate amounts of protein desired.     September 2024: Recently had shoulder surgery and eye surgery.     Reports she is surprised with all the weight she has lost so far, she doesn't notice her clothes fitting any looser and people aren't commenting. Trying to not let that  discourage her. Feels Wegovy is working though - cravings aren't as strong. In regards to her nutritional intake she has reduced her red meat consumption. Leaning more towards chicken and fish. Trying to get better with water intake, also uses sparkling water which she really enjoys.  Protein shake or protein bar for breakfast, Has a snack midday like handful of cashews, dinner is a sensible meal with a good source of protein.     November 2024: Still struggling with her water intake. Has 1 diet coke daily. Turkey rolled around piece of string cheese - for an afternoon snack.  Having cravings at night around 9-10 PM. Has been having a fruit at this time. Protein bar in the morning, turkey and cheese roll up midday, smaller portions for dinner. Trying to limit her red meat. Has a protein + vegetable or starch. 40 oz of water daily.     January 2025: Notices her clothes are fitting loser. Feels better and that she has more energy. Able to move around more.  Going on vacation soon to [place] [employer] [place]. 50 oz of water daily. Doing a good job with her protein intake. Piece of turkey wrapped around piece of cheese midday. Protein shake in the morning. Dinner is a protein with a vegetable + starch. May have a snack of leif with plain yogurt.     No issues with bowel movements. No constipation or diarrhea.     Physical activity - still healing from shoulder and wrist surgery. Aiming for 10 minutes of movement per day.     Progress Towards Previous Goals   1) Aim for closer to 80-90 grams of protein daily.  Met, continues   2) Aim for 64 oz of water daily. - not met        5/20/2024    10:40 AM   Diet Recall Review with Patient   If you do eat supper, what types of food do you typically eat? Protein, veg,bread,   How many glasses of juice do you drink in a typical day? 0   How many of glasses of milk do you drink in a typical day? 0   How many 8oz glasses of sugar containing drinks such as Corbin-Aid/sweet tea do you drink in  a day? 0   How many cans/bottles of sugar pop/soda/tea/sports drinks do you drink in a day? 0   How many cans/bottles of diet pop/soda/tea or sports drink do you drink in a day? 2   How often do you have a drink of alcohol? 2-4 Times a Month   If you do drink, how many drinks might you have in a day? 3-4           5/20/2024    10:40 AM   Eating Habits   Generally, my meals include foods like these bread, pasta, rice, potatoes, corn, crackers, sweet dessert, pop, or juice A Few Times a Week   Generally, my meals include foods like these fried meats, brats, burgers, french fries, pizza, cheese, chips, or ice cream Once a Week   Eat fast food (like McDonalds, Burger Michael, Taco Bell) Less Than Weekly   Eat at a buffet or sit-down restaurant Less Than Weekly   Eat most of my meals in front of the TV or computer Almost Everyday   Often skip meals, eat at random times, have no regular eating times Everyday   Rarely sit down for a meal but snack or graze throughout Less Than Weekly   Eat extra snacks between meals Never   Eat most of my food at the end of the day Almost Everyday   Eat in the middle of the night or wake up at night to eat Never   Eat extra snacks to prevent or correct low blood sugar A Few Times a Week   Eat to prevent acid reflux or stomach pain Never   Worry about not having enough food to eat Never   I eat when I am depressed Less Than Weekly   I eat when I am stressed Less Than Weekly   I eat when I am bored Less Than Weekly   I eat when I am anxious Once a Week   I eat when I am happy or as a reward Never   I feel hungry all the time even if I just have eaten Never   Feeling full is important to me Never   I finish all the food on my plate even if I am already full Almost Everyday   I can't resist eating delicious food or walk past the good food/smell Less Than Weekly   I eat/snack without noticing that I am eating Never   I eat when I am preparing the meal Never   I eat more than usual when I see  others eating Never   I have trouble not eating sweets, ice cream, cookies, or chips if they are around the house Never   I think about food all day Never   What foods, if any, do you crave? None           5/20/2024    10:40 AM   Amount of Food   I feel out of control when eating Never   I eat a large amount of food, like a loaf of bread, a box of cookies, a pint/quart of ice cream, all at once Never   I eat a large amount of food even when I am not hungry Never   I eat rapidly Never   I eat alone because I feel embarrassed and do not want others to see how much I have eaten Never   I eat until I am uncomfortably full Never   I feel bad, disgusted, or guilty after I overeat Never     Physical Activity:  Works in a [employer] so is on her feet all day. In the past enjoyed swimming, playing tennis, skiing. Has had to stop these things due to hip pain. Has 8 cats, will take them on a walk. Likes wall yoga.            5/20/2024    10:40 AM   Activity/Exercise History   How much of a typical 12 hour day do you spend sitting? Less Than Half the Day   How much of a typical 12 hour day do you spend lying down? Less Than Half the Day   How much of a typical day do you spend walking/standing? Half the Day   How many hours (not including work) do you spend on the TV/Video Games/Computer/Tablet/Phone? 2-3 Hours   How many times a week are you active for the purpose of exercise? Once a Week   What keeps you from being more active? Pain    Too tired   How many total minutes do you spend doing some activity for the purpose of exercising when you exercise? More Than 30 Minutes     Nutrition Prescription  Recommended energy/nutrient modification.    Nutrition Diagnosis  Overweight r/t long history of positive energy balance aeb BMI >25 - improving    Nutrition Intervention  Reviewed current dietary habits and pts history   Answered pt questions  Coordination of care   Nutrition education   AVS and handouts via [employer]    Expected  Engagement: good    Nutrition Goals  1) Aim for closer to 80-90 grams of protein daily.   2) Aim for 50 oz of water daily.     Follow-Up: April 11.     Time spent with patient: 16 minutes.  Aicha Flores RD, LD      Again, thank you for allowing me to participate in the care of your patient.      Sincerely,    Aicha Flores RD

## 2025-01-21 ENCOUNTER — TRANSFERRED RECORDS (OUTPATIENT)
Dept: HEALTH INFORMATION MANAGEMENT | Facility: CLINIC | Age: 55
End: 2025-01-21
Payer: COMMERCIAL

## 2025-02-05 ENCOUNTER — VIRTUAL VISIT (OUTPATIENT)
Dept: ENDOCRINOLOGY | Facility: CLINIC | Age: 55
End: 2025-02-05
Payer: COMMERCIAL

## 2025-02-05 VITALS — BODY MASS INDEX: 25.78 KG/M2 | WEIGHT: 151 LBS | HEIGHT: 64 IN

## 2025-02-05 DIAGNOSIS — R73.03 PREDIABETES: ICD-10-CM

## 2025-02-05 DIAGNOSIS — E66.811 CLASS 1 OBESITY WITH BODY MASS INDEX (BMI) OF 30.0 TO 30.9 IN ADULT, UNSPECIFIED OBESITY TYPE, UNSPECIFIED WHETHER SERIOUS COMORBIDITY PRESENT: Primary | ICD-10-CM

## 2025-02-05 DIAGNOSIS — K76.0 HEPATIC STEATOSIS: ICD-10-CM

## 2025-02-05 DIAGNOSIS — E78.2 MIXED HYPERLIPIDEMIA: ICD-10-CM

## 2025-02-05 ASSESSMENT — PAIN SCALES - GENERAL: PAINLEVEL_OUTOF10: NO PAIN (0)
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
## 2025-02-05 NOTE — PROGRESS NOTES
Virtual Visit Details    Type of service:  Video Visit   Video Start Time:  3:10pm  Video End Time: 3:27pm    Originating Location (pt. Location): Home    Distant Location (provider location):  Off-site  Platform used for Video Visit: [employer]

## 2025-02-05 NOTE — PATIENT INSTRUCTIONS
"Thank you for allowing us the privilege of caring for you. We hope we provided you with the excellent service you deserve.   Please let us know if there is anything else we can do for you so that we can be sure you are completely satisfied with your care experience.    To ensure the quality of our services you may be receiving a patient satisfaction survey from an independent patient satisfaction monitoring company.    The greatest compliment you can give is a \"Likely to Recommend\"    Your visit was with Padmaja Ferreira PA-C today.    Instructions per today's visit:     Nasir Varela, it was great to visit with you today.  Here is a review of our visit.  If our clinic scheduler is not able to reach you please call [phone] to schedule your next appointments.    Plan  Continue wegovy 2.4mg   Goals we discussed today:   Set phone alarm to remind yourself to have protein rich snack/meal in the middle of the day   Explore or other types of exercise to do at home- focus on finding something enjoyable   Labs ordered today: A1c and cmp   Follow up with Padmaja in 5 months   Dietician appointment scheduled 4/11/25  Keep up the excellent work!       Information about Video Visits with [employer]: video visit information  _________________________________________________________________________________________________________________________________________________________  If you are asked by your clinic team to have your blood pressure checked:  [facility] do offer several locations for blood pressure checks. Please follow the below link to schedule an appointment. Scheduling an appointment at the pharmacy for a blood pressure check is now preferred.    Appointment Plus (appointment-plus.[employer])  _________________________________________________________________________________________________________________________________________________________  Important contact and scheduling information:  Please call " our contact center at [phone] to schedule your next appointments.  To find a lab location near you, please call [phone].  For any nursing questions or concerns call Yesenia Singer LPN at [phone] or Alba Eubanks RN at [phone]  Please call during clinic hours Monday through Friday 8:00a - 4:00p if you have questions or you can contact us via [employer] at anytime and we will reply during clinic hours.    Lab results will be communicated through My Chart or letter (if My Chart not used). Please call the clinic if you have not received communication after 1 week or if you have any questions.?  Clinic Fax: [phone]    _Interested in working with a health ?  Health coaches work with you to improve your overall health and wellbeing.  They look at the whole person, and may involve discussion of different areas of life, including, but not limited to the four pillars of health (sleep, exercise, nutrition, and stress management). Discuss with your care team if you would like to start working a health .  Health Coaching-3 Pack: Schedule by calling [phone]    $99 for three health coaching visits    Visits may be done in person or via phone    Coaching is a partnership between the  and the client; Coaches do not prescribe or diagnose    Coaching helps inspire the client to reach his/her personal goals   _________________________________________________________________________________________________________________________________________________________  __________  [place] [facility] Get Moving Program  Our team of physical therapists is trained to help you understand and take control of your condition. They will perform a thorough evaluation to determine your ability for activity and develop a customized plan to fit your goals and physical ability.  Scheduling: Unsure if the Get Moving program is right for you? Discuss the program with your medical provider or diabetes  educator. You can also call us at [phone] to ask questions or schedule an appointment.   [facility] Get Moving Program  ____________________________________________________________________________________________________________________________________________________________________________        Thank stacie,   [facility] Comprehensive Weight Management Team
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
## 2025-02-05 NOTE — NURSING NOTE
Current patient location: home     Is the patient currently in the state of [place]? YES    Visit mode: VIDEO    If the visit is dropped, the patient can be reconnected by:VIDEO VISIT: Text to cell phone:   Telephone Information:   Mobile [phone]       Will anyone else be joining the visit? NO  (If patient encounters technical issues they should call [phone])    Are changes needed to the allergy or medication list? Pt stated no changes to allergies and Pt stated no med changes    Are refills needed on medications prescribed by this physician? YES    Rooming Documentation:  Questionnaire(s) completed      Reason for visit: RECHECK    Wt other than 24 hrs:  couple days ago  Pain more than one location:  no  Magdalena FULLERF

## 2025-02-10 ENCOUNTER — INFUSION THERAPY VISIT (OUTPATIENT)
Dept: INFUSION THERAPY | Facility: CLINIC | Age: 55
End: 2025-02-10
Attending: ALLERGY & IMMUNOLOGY
Payer: COMMERCIAL

## 2025-02-10 ENCOUNTER — TELEPHONE (OUTPATIENT)
Dept: ENDOCRINOLOGY | Facility: CLINIC | Age: 55
End: 2025-02-10

## 2025-02-10 VITALS
HEART RATE: 82 BPM | RESPIRATION RATE: 16 BRPM | TEMPERATURE: 97 F | SYSTOLIC BLOOD PRESSURE: 124 MMHG | DIASTOLIC BLOOD PRESSURE: 83 MMHG | OXYGEN SATURATION: 100 %

## 2025-02-10 DIAGNOSIS — J45.50 SEVERE PERSISTENT ASTHMA WITHOUT COMPLICATION (H): Primary | ICD-10-CM

## 2025-02-10 PROCEDURE — 96372 THER/PROPH/DIAG INJ SC/IM: CPT | Performed by: ALLERGY & IMMUNOLOGY

## 2025-02-10 PROCEDURE — 250N000011 HC RX IP 250 OP 636: Mod: JZ | Performed by: ALLERGY & IMMUNOLOGY

## 2025-02-10 RX ORDER — ALBUTEROL SULFATE 0.83 MG/ML
2.5 SOLUTION RESPIRATORY (INHALATION)
OUTPATIENT
Start: 2025-02-24

## 2025-02-10 RX ORDER — DIPHENHYDRAMINE HYDROCHLORIDE 50 MG/ML
50 INJECTION INTRAMUSCULAR; INTRAVENOUS
Start: 2025-02-24

## 2025-02-10 RX ORDER — DIPHENHYDRAMINE HYDROCHLORIDE 50 MG/ML
25 INJECTION INTRAMUSCULAR; INTRAVENOUS
Start: 2025-02-24

## 2025-02-10 RX ORDER — MEPERIDINE HYDROCHLORIDE 25 MG/ML
25 INJECTION INTRAMUSCULAR; INTRAVENOUS; SUBCUTANEOUS
OUTPATIENT
Start: 2025-02-24

## 2025-02-10 RX ORDER — EPINEPHRINE 1 MG/ML
0.3 INJECTION, SOLUTION INTRAMUSCULAR; SUBCUTANEOUS EVERY 5 MIN PRN
OUTPATIENT
Start: 2025-02-24

## 2025-02-10 RX ORDER — METHYLPREDNISOLONE SODIUM SUCCINATE 40 MG/ML
40 INJECTION INTRAMUSCULAR; INTRAVENOUS
Start: 2025-02-24

## 2025-02-10 RX ORDER — ALBUTEROL SULFATE 90 UG/1
1-2 INHALANT RESPIRATORY (INHALATION)
Start: 2025-02-24

## 2025-02-10 RX ADMIN — OMALIZUMAB: 300 INJECTION, SOLUTION SUBCUTANEOUS at 08:39

## 2025-02-10 NOTE — PROGRESS NOTES
Infusion Nursing Note:  Alba Varela presents today for Xolair.    Patient seen by provider today: No   present during visit today: Not Applicable.    Note: N/A.    Intravenous Access:  No Intravenous access/labs at this visit.    Treatment Conditions:  Not Applicable.    Post Infusion Assessment:  Patient tolerated injection without incident.  Patient observed for 20 minutes post Xolair inj per protocol.  Site patent and intact, free from redness, edema or discomfort.     Discharge Plan:   Discharge instructions reviewed with: Patient.  Patient and/or family verbalized understanding of discharge instructions and all questions answered.  AVS to patient via [employer].  Patient will return 2/24 for next appointment.   Patient discharged in stable condition accompanied by: self.  Departure Mode: Ambulatory.    Mac Montero RN

## 2025-02-10 NOTE — TELEPHONE ENCOUNTER
Left Voicemail (1st Attempt) and Sent Mychart (1st Attempt) for the patient to call back and schedule the following:    Appointment type: Return MW  Provider: Padmaja Ferreira  Return date: 5 mo (around July 2025)  Specialty phone number: [phone]  Additional appointment(s) needed: labs  Additonal Notes: NA

## 2025-02-12 ENCOUNTER — TRANSFERRED RECORDS (OUTPATIENT)
Dept: HEALTH INFORMATION MANAGEMENT | Facility: CLINIC | Age: 55
End: 2025-02-12
Payer: COMMERCIAL

## 2025-02-24 ENCOUNTER — INFUSION THERAPY VISIT (OUTPATIENT)
Dept: INFUSION THERAPY | Facility: CLINIC | Age: 55
End: 2025-02-24
Attending: SURGERY
Payer: COMMERCIAL

## 2025-02-24 VITALS
OXYGEN SATURATION: 100 % | DIASTOLIC BLOOD PRESSURE: 73 MMHG | SYSTOLIC BLOOD PRESSURE: 109 MMHG | HEART RATE: 91 BPM | RESPIRATION RATE: 16 BRPM | TEMPERATURE: 97.6 F

## 2025-02-24 DIAGNOSIS — E66.811 CLASS 1 OBESITY WITH BODY MASS INDEX (BMI) OF 30.0 TO 30.9 IN ADULT, UNSPECIFIED OBESITY TYPE, UNSPECIFIED WHETHER SERIOUS COMORBIDITY PRESENT: ICD-10-CM

## 2025-02-24 DIAGNOSIS — K11.9 SALIVARY GLAND DISEASE: ICD-10-CM

## 2025-02-24 DIAGNOSIS — R73.03 PREDIABETES: ICD-10-CM

## 2025-02-24 DIAGNOSIS — J45.50 SEVERE PERSISTENT ASTHMA WITHOUT COMPLICATION (H): Primary | ICD-10-CM

## 2025-02-24 LAB
CRP SERPL-MCNC: <3 MG/L
EST. AVERAGE GLUCOSE BLD GHB EST-MCNC: 100 MG/DL
HBA1C MFR BLD: 5.1 %

## 2025-02-24 PROCEDURE — 86140 C-REACTIVE PROTEIN: CPT | Performed by: INTERNAL MEDICINE

## 2025-02-24 PROCEDURE — 96372 THER/PROPH/DIAG INJ SC/IM: CPT | Performed by: ALLERGY & IMMUNOLOGY

## 2025-02-24 PROCEDURE — 83036 HEMOGLOBIN GLYCOSYLATED A1C: CPT

## 2025-02-24 PROCEDURE — 250N000011 HC RX IP 250 OP 636: Mod: JZ | Performed by: ALLERGY & IMMUNOLOGY

## 2025-02-24 PROCEDURE — 36415 COLL VENOUS BLD VENIPUNCTURE: CPT

## 2025-02-24 RX ORDER — DIPHENHYDRAMINE HYDROCHLORIDE 50 MG/ML
50 INJECTION INTRAMUSCULAR; INTRAVENOUS
Start: 2025-03-10

## 2025-02-24 RX ORDER — DIPHENHYDRAMINE HYDROCHLORIDE 50 MG/ML
25 INJECTION INTRAMUSCULAR; INTRAVENOUS
Start: 2025-03-10

## 2025-02-24 RX ORDER — ALBUTEROL SULFATE 90 UG/1
1-2 INHALANT RESPIRATORY (INHALATION)
Start: 2025-03-10

## 2025-02-24 RX ORDER — EPINEPHRINE 1 MG/ML
0.3 INJECTION, SOLUTION INTRAMUSCULAR; SUBCUTANEOUS EVERY 5 MIN PRN
OUTPATIENT
Start: 2025-03-10

## 2025-02-24 RX ORDER — MEPERIDINE HYDROCHLORIDE 25 MG/ML
25 INJECTION INTRAMUSCULAR; INTRAVENOUS; SUBCUTANEOUS
OUTPATIENT
Start: 2025-03-10

## 2025-02-24 RX ORDER — ALBUTEROL SULFATE 0.83 MG/ML
2.5 SOLUTION RESPIRATORY (INHALATION)
OUTPATIENT
Start: 2025-03-10

## 2025-02-24 RX ORDER — METHYLPREDNISOLONE SODIUM SUCCINATE 40 MG/ML
40 INJECTION INTRAMUSCULAR; INTRAVENOUS
Start: 2025-03-10

## 2025-02-24 RX ADMIN — OMALIZUMAB: 150 INJECTION, SOLUTION SUBCUTANEOUS at 08:14

## 2025-02-24 NOTE — PROGRESS NOTES
Infusion Nursing Note:  Alba Varela presents today for Xolair and labs.    Patient seen by provider today: No   present during visit today: Not Applicable.    Note: N/A.      Intravenous Access:  Lab draw site Right hand, Needle type Butterfly, Gauge 23.  Labs drawn without difficulty.    Treatment Conditions:  Not Applicable.      Post Infusion Assessment:  Patient tolerated injection without incident.  Patient observed for 20 minutes post Xolair  Site patent and intact, free from redness, edema or discomfort.       Discharge Plan:   Discharge instructions reviewed with: Patient.  Patient and/or family verbalized understanding of discharge instructions and all questions answered.  AVS to patient via [employer].  Patient will return 3/10 for next appointment.   Patient discharged in stable condition accompanied by: self.  Departure Mode: Ambulatory.      Joyce Mackenzie RN

## 2025-03-10 ENCOUNTER — INFUSION THERAPY VISIT (OUTPATIENT)
Dept: INFUSION THERAPY | Facility: CLINIC | Age: 55
End: 2025-03-10
Attending: SURGERY
Payer: COMMERCIAL

## 2025-03-10 VITALS
TEMPERATURE: 97 F | DIASTOLIC BLOOD PRESSURE: 73 MMHG | RESPIRATION RATE: 16 BRPM | SYSTOLIC BLOOD PRESSURE: 120 MMHG | HEART RATE: 85 BPM | OXYGEN SATURATION: 100 %

## 2025-03-10 DIAGNOSIS — J45.50 SEVERE PERSISTENT ASTHMA WITHOUT COMPLICATION (H): Primary | ICD-10-CM

## 2025-03-10 PROCEDURE — 250N000011 HC RX IP 250 OP 636: Mod: JZ | Performed by: ALLERGY & IMMUNOLOGY

## 2025-03-10 PROCEDURE — 96372 THER/PROPH/DIAG INJ SC/IM: CPT | Performed by: ALLERGY & IMMUNOLOGY

## 2025-03-10 RX ORDER — DIPHENHYDRAMINE HYDROCHLORIDE 50 MG/ML
50 INJECTION, SOLUTION INTRAMUSCULAR; INTRAVENOUS
Start: 2025-03-24

## 2025-03-10 RX ORDER — ALBUTEROL SULFATE 90 UG/1
1-2 INHALANT RESPIRATORY (INHALATION)
Start: 2025-03-24

## 2025-03-10 RX ORDER — ALBUTEROL SULFATE 0.83 MG/ML
2.5 SOLUTION RESPIRATORY (INHALATION)
OUTPATIENT
Start: 2025-03-24

## 2025-03-10 RX ORDER — MEPERIDINE HYDROCHLORIDE 25 MG/ML
25 INJECTION INTRAMUSCULAR; INTRAVENOUS; SUBCUTANEOUS
OUTPATIENT
Start: 2025-03-24

## 2025-03-10 RX ORDER — DIPHENHYDRAMINE HYDROCHLORIDE 50 MG/ML
25 INJECTION, SOLUTION INTRAMUSCULAR; INTRAVENOUS
Start: 2025-03-24

## 2025-03-10 RX ORDER — METHYLPREDNISOLONE SODIUM SUCCINATE 40 MG/ML
40 INJECTION INTRAMUSCULAR; INTRAVENOUS
Start: 2025-03-24

## 2025-03-10 RX ORDER — EPINEPHRINE 1 MG/ML
0.3 INJECTION, SOLUTION INTRAMUSCULAR; SUBCUTANEOUS EVERY 5 MIN PRN
OUTPATIENT
Start: 2025-03-24

## 2025-03-10 RX ADMIN — OMALIZUMAB: 150 INJECTION, SOLUTION SUBCUTANEOUS at 08:12

## 2025-03-10 NOTE — PROGRESS NOTES
Infusion Nursing Note:  Alba Varela presents today for Xolair .    Patient seen by provider today: No   present during visit today: Not Applicable.    Note: N/A.      Intravenous Access:  No Intravenous access/labs at this visit.    Treatment Conditions:  Not Applicable.      Post Infusion Assessment:  Patient tolerated injection without incident.  Patient observed for 15 minutes post Xolair.       Discharge Plan:   Discharge instructions reviewed with: Patient.  Patient and/or family verbalized understanding of discharge instructions and all questions answered.  AVS to patient via [employer].  Patient will return 3/24/25 for next appointment.   Patient discharged in stable condition accompanied by: self.  Departure Mode: Ambulatory.      Dannielle Cohen RN

## 2025-03-17 ENCOUNTER — TRANSFERRED RECORDS (OUTPATIENT)
Dept: HEALTH INFORMATION MANAGEMENT | Facility: CLINIC | Age: 55
End: 2025-03-17
Payer: COMMERCIAL

## 2025-03-24 ENCOUNTER — INFUSION THERAPY VISIT (OUTPATIENT)
Dept: INFUSION THERAPY | Facility: CLINIC | Age: 55
End: 2025-03-24
Attending: SURGERY
Payer: COMMERCIAL

## 2025-03-24 VITALS
HEART RATE: 94 BPM | OXYGEN SATURATION: 100 % | DIASTOLIC BLOOD PRESSURE: 73 MMHG | TEMPERATURE: 98.7 F | SYSTOLIC BLOOD PRESSURE: 104 MMHG | RESPIRATION RATE: 16 BRPM

## 2025-03-24 DIAGNOSIS — J45.50 SEVERE PERSISTENT ASTHMA WITHOUT COMPLICATION (H): Primary | ICD-10-CM

## 2025-03-24 PROCEDURE — 250N000011 HC RX IP 250 OP 636: Mod: JZ | Performed by: ALLERGY & IMMUNOLOGY

## 2025-03-24 PROCEDURE — 96372 THER/PROPH/DIAG INJ SC/IM: CPT | Performed by: ALLERGY & IMMUNOLOGY

## 2025-03-24 RX ORDER — METHYLPREDNISOLONE SODIUM SUCCINATE 40 MG/ML
40 INJECTION INTRAMUSCULAR; INTRAVENOUS
Start: 2025-04-07

## 2025-03-24 RX ORDER — DIPHENHYDRAMINE HYDROCHLORIDE 50 MG/ML
50 INJECTION, SOLUTION INTRAMUSCULAR; INTRAVENOUS
Start: 2025-04-07

## 2025-03-24 RX ORDER — DIPHENHYDRAMINE HYDROCHLORIDE 50 MG/ML
25 INJECTION, SOLUTION INTRAMUSCULAR; INTRAVENOUS
Start: 2025-04-07

## 2025-03-24 RX ORDER — MEPERIDINE HYDROCHLORIDE 25 MG/ML
25 INJECTION INTRAMUSCULAR; INTRAVENOUS; SUBCUTANEOUS
OUTPATIENT
Start: 2025-04-07

## 2025-03-24 RX ORDER — EPINEPHRINE 1 MG/ML
0.3 INJECTION, SOLUTION INTRAMUSCULAR; SUBCUTANEOUS EVERY 5 MIN PRN
OUTPATIENT
Start: 2025-04-07

## 2025-03-24 RX ORDER — ALBUTEROL SULFATE 90 UG/1
1-2 INHALANT RESPIRATORY (INHALATION)
Start: 2025-04-07

## 2025-03-24 RX ORDER — ALBUTEROL SULFATE 0.83 MG/ML
2.5 SOLUTION RESPIRATORY (INHALATION)
OUTPATIENT
Start: 2025-04-07

## 2025-03-24 RX ADMIN — OMALIZUMAB: 150 INJECTION, SOLUTION SUBCUTANEOUS at 08:37

## 2025-03-24 NOTE — PROGRESS NOTES
Infusion Nursing Note:  Alba Varela presents today for Xolair.    Patient seen by provider today: No   present during visit today: Not Applicable.    Note: N/A.      Intravenous Access:  No Intravenous access/labs at this visit.    Treatment Conditions:  Not Applicable.      Post Infusion Assessment:  Patient tolerated injection without incident.   Patent stayed for 15 minutes post Xolair injection      Discharge Plan:   Discharge instructions reviewed with: Patient.  Patient and/or family verbalized understanding of discharge instructions and all questions answered.  AVS to patient via [employer].  Patient will return 4/7 for next appointment.   Patient discharged in stable condition accompanied by: self.  Departure Mode: Ambulatory.      Joyce Mackenzie RN

## 2025-04-07 ENCOUNTER — INFUSION THERAPY VISIT (OUTPATIENT)
Dept: INFUSION THERAPY | Facility: CLINIC | Age: 55
End: 2025-04-07
Attending: ALLERGY & IMMUNOLOGY
Payer: COMMERCIAL

## 2025-04-07 VITALS
TEMPERATURE: 98 F | RESPIRATION RATE: 20 BRPM | OXYGEN SATURATION: 99 % | DIASTOLIC BLOOD PRESSURE: 74 MMHG | SYSTOLIC BLOOD PRESSURE: 106 MMHG | HEART RATE: 79 BPM

## 2025-04-07 DIAGNOSIS — J45.50 SEVERE PERSISTENT ASTHMA WITHOUT COMPLICATION (H): Primary | ICD-10-CM

## 2025-04-07 PROCEDURE — 250N000011 HC RX IP 250 OP 636: Mod: JZ | Performed by: ALLERGY & IMMUNOLOGY

## 2025-04-07 PROCEDURE — 96372 THER/PROPH/DIAG INJ SC/IM: CPT | Performed by: ALLERGY & IMMUNOLOGY

## 2025-04-07 RX ORDER — MEPERIDINE HYDROCHLORIDE 25 MG/ML
25 INJECTION INTRAMUSCULAR; INTRAVENOUS; SUBCUTANEOUS
OUTPATIENT
Start: 2025-04-21

## 2025-04-07 RX ORDER — DIPHENHYDRAMINE HYDROCHLORIDE 50 MG/ML
50 INJECTION, SOLUTION INTRAMUSCULAR; INTRAVENOUS
Start: 2025-04-21

## 2025-04-07 RX ORDER — METHYLPREDNISOLONE SODIUM SUCCINATE 40 MG/ML
40 INJECTION INTRAMUSCULAR; INTRAVENOUS
Start: 2025-04-21

## 2025-04-07 RX ORDER — ALBUTEROL SULFATE 90 UG/1
1-2 INHALANT RESPIRATORY (INHALATION)
Start: 2025-04-21

## 2025-04-07 RX ORDER — EPINEPHRINE 1 MG/ML
0.3 INJECTION, SOLUTION INTRAMUSCULAR; SUBCUTANEOUS EVERY 5 MIN PRN
OUTPATIENT
Start: 2025-04-21

## 2025-04-07 RX ORDER — DIPHENHYDRAMINE HYDROCHLORIDE 50 MG/ML
25 INJECTION, SOLUTION INTRAMUSCULAR; INTRAVENOUS
Start: 2025-04-21

## 2025-04-07 RX ORDER — ALBUTEROL SULFATE 0.83 MG/ML
2.5 SOLUTION RESPIRATORY (INHALATION)
OUTPATIENT
Start: 2025-04-21

## 2025-04-07 RX ADMIN — OMALIZUMAB: 150 INJECTION, SOLUTION SUBCUTANEOUS at 08:49

## 2025-04-07 NOTE — PROGRESS NOTES
Infusion Nursing Note:  Alba Varela presents today for Xolair injection.    Patient seen by provider today: No   present during visit today: Not Applicable.    Note: N/A.      Intravenous Access:  No Intravenous access/labs at this visit.    Treatment Conditions:  Not Applicable.      Post Infusion Assessment:  Patient tolerated injection without incident.  Patient observed for 25 minutes post Xolair per protocol.       Discharge Plan:   Discharge instructions reviewed with: Patient.  Patient and/or family verbalized understanding of discharge instructions and all questions answered.  AVS to patient via [employer].  Patient will return 4/21/25 for next appointment.   Patient discharged in stable condition accompanied by: self.  Departure Mode: Ambulatory.      Viri Narayan RN

## 2025-04-21 ENCOUNTER — INFUSION THERAPY VISIT (OUTPATIENT)
Dept: INFUSION THERAPY | Facility: CLINIC | Age: 55
End: 2025-04-21
Attending: ALLERGY & IMMUNOLOGY
Payer: COMMERCIAL

## 2025-04-21 VITALS
TEMPERATURE: 97.5 F | HEART RATE: 84 BPM | OXYGEN SATURATION: 98 % | DIASTOLIC BLOOD PRESSURE: 72 MMHG | SYSTOLIC BLOOD PRESSURE: 114 MMHG

## 2025-04-21 DIAGNOSIS — J45.50 SEVERE PERSISTENT ASTHMA WITHOUT COMPLICATION (H): Primary | ICD-10-CM

## 2025-04-21 PROCEDURE — 250N000011 HC RX IP 250 OP 636: Mod: JZ | Performed by: ALLERGY & IMMUNOLOGY

## 2025-04-21 PROCEDURE — 96372 THER/PROPH/DIAG INJ SC/IM: CPT | Performed by: ALLERGY & IMMUNOLOGY

## 2025-04-21 RX ORDER — MEPERIDINE HYDROCHLORIDE 25 MG/ML
25 INJECTION INTRAMUSCULAR; INTRAVENOUS; SUBCUTANEOUS
OUTPATIENT
Start: 2025-05-05

## 2025-04-21 RX ORDER — DIPHENHYDRAMINE HYDROCHLORIDE 50 MG/ML
25 INJECTION, SOLUTION INTRAMUSCULAR; INTRAVENOUS
Start: 2025-05-05

## 2025-04-21 RX ORDER — ALBUTEROL SULFATE 0.83 MG/ML
2.5 SOLUTION RESPIRATORY (INHALATION)
OUTPATIENT
Start: 2025-05-05

## 2025-04-21 RX ORDER — ALBUTEROL SULFATE 90 UG/1
1-2 INHALANT RESPIRATORY (INHALATION)
Start: 2025-05-05

## 2025-04-21 RX ORDER — METHYLPREDNISOLONE SODIUM SUCCINATE 40 MG/ML
40 INJECTION INTRAMUSCULAR; INTRAVENOUS
Start: 2025-05-05

## 2025-04-21 RX ORDER — EPINEPHRINE 1 MG/ML
0.3 INJECTION, SOLUTION INTRAMUSCULAR; SUBCUTANEOUS EVERY 5 MIN PRN
OUTPATIENT
Start: 2025-05-05

## 2025-04-21 RX ORDER — DIPHENHYDRAMINE HYDROCHLORIDE 50 MG/ML
50 INJECTION, SOLUTION INTRAMUSCULAR; INTRAVENOUS
Start: 2025-05-05

## 2025-04-21 RX ADMIN — OMALIZUMAB: 150 INJECTION, SOLUTION SUBCUTANEOUS at 08:41

## 2025-04-21 NOTE — PROGRESS NOTES
Infusion Nursing Note:  Alba Varela presents today for Q 2wk Xolair.    Patient seen by provider today: No   present during visit today: Not Applicable.    Note: N/A.      Intravenous Access:  No Intravenous access/labs at this visit.    Treatment Conditions:  Not Applicable.      Post Infusion Assessment:  Patient tolerated injection without incident.  Patient observed for 30 minutes post Xolair per protocol.  Site patent and intact, free from redness, edema or discomfort.       Discharge Plan:   AVS to patient via MYCHART.  Patient will return 5/5/25 for next Xolair injection   Patient discharged in stable condition accompanied by: self.  Departure Mode: Ambulatory.      Mame Condon RN

## 2025-05-05 ENCOUNTER — INFUSION THERAPY VISIT (OUTPATIENT)
Dept: INFUSION THERAPY | Facility: CLINIC | Age: 55
End: 2025-05-05
Attending: ALLERGY & IMMUNOLOGY
Payer: COMMERCIAL

## 2025-05-05 VITALS
DIASTOLIC BLOOD PRESSURE: 80 MMHG | SYSTOLIC BLOOD PRESSURE: 115 MMHG | TEMPERATURE: 97.9 F | RESPIRATION RATE: 16 BRPM | HEART RATE: 77 BPM | OXYGEN SATURATION: 100 %

## 2025-05-05 DIAGNOSIS — J45.50 SEVERE PERSISTENT ASTHMA WITHOUT COMPLICATION (H): Primary | ICD-10-CM

## 2025-05-05 PROCEDURE — 96372 THER/PROPH/DIAG INJ SC/IM: CPT | Performed by: ALLERGY & IMMUNOLOGY

## 2025-05-05 PROCEDURE — 250N000011 HC RX IP 250 OP 636: Mod: JZ | Performed by: ALLERGY & IMMUNOLOGY

## 2025-05-05 RX ORDER — METHYLPREDNISOLONE SODIUM SUCCINATE 40 MG/ML
40 INJECTION INTRAMUSCULAR; INTRAVENOUS
Start: 2025-05-19

## 2025-05-05 RX ORDER — DIPHENHYDRAMINE HYDROCHLORIDE 50 MG/ML
50 INJECTION, SOLUTION INTRAMUSCULAR; INTRAVENOUS
Start: 2025-05-19

## 2025-05-05 RX ORDER — EPINEPHRINE 1 MG/ML
0.3 INJECTION, SOLUTION INTRAMUSCULAR; SUBCUTANEOUS EVERY 5 MIN PRN
OUTPATIENT
Start: 2025-05-19

## 2025-05-05 RX ORDER — MEPERIDINE HYDROCHLORIDE 25 MG/ML
25 INJECTION INTRAMUSCULAR; INTRAVENOUS; SUBCUTANEOUS
OUTPATIENT
Start: 2025-05-19

## 2025-05-05 RX ORDER — DIPHENHYDRAMINE HYDROCHLORIDE 50 MG/ML
25 INJECTION, SOLUTION INTRAMUSCULAR; INTRAVENOUS
Start: 2025-05-19

## 2025-05-05 RX ORDER — ALBUTEROL SULFATE 90 UG/1
1-2 INHALANT RESPIRATORY (INHALATION)
Start: 2025-05-19

## 2025-05-05 RX ORDER — ALBUTEROL SULFATE 0.83 MG/ML
2.5 SOLUTION RESPIRATORY (INHALATION)
OUTPATIENT
Start: 2025-05-19

## 2025-05-05 RX ADMIN — OMALIZUMAB: 150 INJECTION, SOLUTION SUBCUTANEOUS at 08:42

## 2025-05-05 ASSESSMENT — PAIN SCALES - GENERAL: PAINLEVEL_OUTOF10: NO PAIN (0)

## 2025-05-05 NOTE — PROGRESS NOTES
Infusion Nursing Note:    Alba Varela presents today for Q 2wk Xolair.      Patient seen by provider today: No     present during visit today: Not Applicable.     Note: N/A.        Intravenous Access:  No Intravenous access/labs at this visit.     Treatment Conditions:  Not Applicable.        Post Infusion Assessment:  Patient tolerated injection without incident.  Patient observed for 30 minutes post Xolair per protocol.  Site patent and intact, free from redness, edema or discomfort.         Discharge Plan:   AVS to patient via MYCHART.  Patient will return 5/19/25 for next Xolair injection   Patient discharged in stable condition accompanied by: self.  Departure Mode: Ambulatory.    Diana Martin RN on 5/5/2025 at 8:35 AM

## 2025-05-13 ENCOUNTER — TELEPHONE (OUTPATIENT)
Dept: ENDOCRINOLOGY | Facility: CLINIC | Age: 55
End: 2025-05-13
Payer: COMMERCIAL

## 2025-05-13 NOTE — TELEPHONE ENCOUNTER
PA Initiation    Medication: WEGOVY 2.4 MG/0.75ML SC SOAJ  Insurance Company: [employer] - Phone [phone] Fax [phone]  Pharmacy Filling the Rx:    Filling Pharmacy Phone:    Filling Pharmacy Fax:    Start Date: 5/13/2025     Key [id number]

## 2025-05-13 NOTE — TELEPHONE ENCOUNTER
Prior Authorization Approval    Medication: WEGOVY 2.4 MG/0.75ML SC SOAJ  Authorization Effective Date: 5/13/2025  Authorization Expiration Date: 5/13/2026  Approved Dose/Quantity: 3ml per 28 days  Reference #: [id number]   Insurance Company: [employer] - Phone [phone] Fax [phone]  Expected CoPay: $    CoPay Card Available:      Financial Assistance Needed:   Which Pharmacy is filling the prescription:    Pharmacy Notified: no  Patient Notified: no

## 2025-05-19 ENCOUNTER — INFUSION THERAPY VISIT (OUTPATIENT)
Dept: INFUSION THERAPY | Facility: CLINIC | Age: 55
End: 2025-05-19
Attending: ALLERGY & IMMUNOLOGY
Payer: COMMERCIAL

## 2025-05-19 VITALS
RESPIRATION RATE: 16 BRPM | HEART RATE: 86 BPM | SYSTOLIC BLOOD PRESSURE: 108 MMHG | OXYGEN SATURATION: 99 % | DIASTOLIC BLOOD PRESSURE: 71 MMHG | TEMPERATURE: 98.5 F

## 2025-05-19 DIAGNOSIS — J45.50 SEVERE PERSISTENT ASTHMA WITHOUT COMPLICATION (H): Primary | ICD-10-CM

## 2025-05-19 PROCEDURE — 250N000011 HC RX IP 250 OP 636: Mod: JZ | Performed by: ALLERGY & IMMUNOLOGY

## 2025-05-19 PROCEDURE — 96372 THER/PROPH/DIAG INJ SC/IM: CPT | Performed by: ALLERGY & IMMUNOLOGY

## 2025-05-19 RX ORDER — EPINEPHRINE 1 MG/ML
0.3 INJECTION, SOLUTION INTRAMUSCULAR; SUBCUTANEOUS EVERY 5 MIN PRN
OUTPATIENT
Start: 2025-06-02

## 2025-05-19 RX ORDER — DIPHENHYDRAMINE HYDROCHLORIDE 50 MG/ML
25 INJECTION, SOLUTION INTRAMUSCULAR; INTRAVENOUS
Start: 2025-06-02

## 2025-05-19 RX ORDER — MEPERIDINE HYDROCHLORIDE 25 MG/ML
25 INJECTION INTRAMUSCULAR; INTRAVENOUS; SUBCUTANEOUS
OUTPATIENT
Start: 2025-06-02

## 2025-05-19 RX ORDER — ALBUTEROL SULFATE 90 UG/1
1-2 INHALANT RESPIRATORY (INHALATION)
Start: 2025-06-02

## 2025-05-19 RX ORDER — ALBUTEROL SULFATE 0.83 MG/ML
2.5 SOLUTION RESPIRATORY (INHALATION)
OUTPATIENT
Start: 2025-06-02

## 2025-05-19 RX ORDER — DIPHENHYDRAMINE HYDROCHLORIDE 50 MG/ML
50 INJECTION, SOLUTION INTRAMUSCULAR; INTRAVENOUS
Start: 2025-06-02

## 2025-05-19 RX ORDER — METHYLPREDNISOLONE SODIUM SUCCINATE 40 MG/ML
40 INJECTION INTRAMUSCULAR; INTRAVENOUS
Start: 2025-06-02

## 2025-05-19 RX ADMIN — OMALIZUMAB: 150 INJECTION, SOLUTION SUBCUTANEOUS at 08:46

## 2025-05-19 ASSESSMENT — PAIN SCALES - GENERAL: PAINLEVEL_OUTOF10: NO PAIN (0)

## 2025-05-19 NOTE — PROGRESS NOTES
"Video-Visit Details    Type of service:  Video Visit    Video Start Time: 1:33 PM   Video End Time: 1:47 PM     Originating Location (pt. Location): Home    Distant Location (provider location):  Offsite (providers home) [facility] [place]     Platform used for Video Visit: SHEEX    Return Weight Management Nutrition Consultation    Alba Varela is a 55 year old female presents today for return weight management nutrition consultation.  Patient referred by JHONATAN Linder on May 20, 2024.    Patient with Co-morbidities of obesity includin/20/2024    10:40 AM   --   I have the following health issues associated with obesity Pre-Diabetes    High Cholesterol    Sleep Apnea    Fatty Liver    Asthma    Stress Incontinence   I have the following symptoms associated with obesity Depression    Fatigue    Hip Pain     Anthropometrics:  Initial consult weight: 196 lb on 24   Estimated body mass index is 24.55 kg/m  as calculated from the following:    Height as of this encounter: 1.626 m (5' 4\").    Weight as of this encounter: 64.9 kg (143 lb).  Current Weight: 143 lb per patient report, -53 lb (27% of bodyweight)     Medications for Weight Loss:  Wegovy 2.4 mg - tolerating well and no side effects.      NUTRITION HISTORY  Food allergies: NKFA  Food intolerances: None  Vitamin/Mineral Supplements: None   Previous methods of diet modification for weight loss: watching calories/portion control   RD before: None     Doesn't drink milk.     Typically eats 1 big meal a day (dinner), doesn't generally snack, will drink coffee (with sweetened powdered creamer) and iced tea (unsweetened) throughout the day. Craves bread, mashed potatoes . Is able to get full. Can stay full until next meal. Does sometimes struggle with portion control when it's a food she's craving. Does not experience food noise. Does experience emotional eating (when feeling really depressed she wants to " surround herself with friends which often means more eating, such as at a restaurant for lunch). Does not experience a loss of control around eating.     Eats out/ gets take out 2x a week. Drinks coffee with powdered creamer, sparkling water, unsweetened iced tea. Will have 1-2 diet cokes a day. ETOH 1-2 times a month, 3-4 beers in a sitting, feels that ETOH is not too important to her quality of life.      Goals discussed with Padmaja:  Eating 3 meals a day or getting protein shakes in at breakfast and lunch. Popular brands are Premier protein, fairlife protein   Cutting out all added sugar in beverages (finding sugar free alternative to coffee mate, some patients like using protein shakes in their coffee)  Working towards 6 hours of sleep minimum nightly (7 is ideal)  No more than 2 drinks in a sitting when out with friends     Diet recall:   Skips breakfast and lunch. Eats dinner   Hydration: Zero sugar Gingerale, coffee with splash half and half, unsweetened iced tea, water.     July 2024: Protein shake or protein bar in the morning. More mindful of portion sizes and the types of foods she is eating. Dinner - varies, the other night had a caesar salad with chicken. Drinking a lot more water. Getting in at least 60 oz. No bowel changes or negative side effects.     Discussed trying to incorporate at least 1 more eating episode midday so she can get in adequate amounts of protein desired.     September 2024: Recently had shoulder surgery and eye surgery.     Reports she is surprised with all the weight she has lost so far, she doesn't notice her clothes fitting any looser and people aren't commenting. Trying to not let that discourage her. Feels Wegovy is working though - cravings aren't as strong. In regards to her nutritional intake she has reduced her red meat consumption. Leaning more towards chicken and fish. Trying to get better with water intake, also uses sparkling water which she really enjoys.  Protein  shake or protein bar for breakfast, Has a snack midday like handful of cashews, dinner is a sensible meal with a good source of protein.     November 2024: Still struggling with her water intake. Has 1 diet coke daily. Turkey rolled around piece of string cheese - for an afternoon snack.  Having cravings at night around 9-10 PM. Has been having a fruit at this time. Protein bar in the morning, turkey and cheese roll up midday, smaller portions for dinner. Trying to limit her red meat. Has a protein + vegetable or starch. 40 oz of water daily.     January 2025: Notices her clothes are fitting loser. Feels better and that she has more energy. Able to move around more.  Going on vacation soon to [place]. 50 oz of water daily. Doing a good job with her protein intake. Piece of turkey wrapped around piece of cheese midday. Protein shake in the morning. Dinner is a protein with a vegetable + starch. May have a snack of leif with plain yogurt.     No issues with bowel movements. No constipation or diarrhea.     Physical activity - still healing from shoulder and wrist surgery. Aiming for 10 minutes of movement per day.     May 2025: Protein shake in the morning, trying to snack in the middle of the day (may have an english muffin/peanut butter, instead of skipping eating all together, dinner portions have been smaller.     Continues to struggle with her water intake.     Physical activity - has some resistance bands, walking more often with cats.     Progress Towards Previous Goals   1) Aim for closer to 80-90 grams of protein daily. - not consistently met, continues   2) Aim for 50 oz of water daily. - not met, continues         5/20/2024    10:40 AM   Diet Recall Review with Patient   If you do eat supper, what types of food do you typically eat? Protein, veg,bread,   How many glasses of juice do you drink in a typical day? 0   How many of glasses of milk do you drink in a typical day? 0   How many 8oz glasses  of sugar containing drinks such as Corbin-Aid/sweet tea do you drink in a day? 0   How many cans/bottles of sugar pop/soda/tea/sports drinks do you drink in a day? 0   How many cans/bottles of diet pop/soda/tea or sports drink do you drink in a day? 2   How often do you have a drink of alcohol? 2-4 Times a Month   If you do drink, how many drinks might you have in a day? 3-4           5/20/2024    10:40 AM   Eating Habits   Generally, my meals include foods like these bread, pasta, rice, potatoes, corn, crackers, sweet dessert, pop, or juice A Few Times a Week   Generally, my meals include foods like these fried meats, brats, burgers, french fries, pizza, cheese, chips, or ice cream Once a Week   Eat fast food (like McDonalds, Burger Michael, Taco Bell) Less Than Weekly   Eat at a buffet or sit-down restaurant Less Than Weekly   Eat most of my meals in front of the TV or computer Almost Everyday   Often skip meals, eat at random times, have no regular eating times Everyday   Rarely sit down for a meal but snack or graze throughout Less Than Weekly   Eat extra snacks between meals Never   Eat most of my food at the end of the day Almost Everyday   Eat in the middle of the night or wake up at night to eat Never   Eat extra snacks to prevent or correct low blood sugar A Few Times a Week   Eat to prevent acid reflux or stomach pain Never   Worry about not having enough food to eat Never   I eat when I am depressed Less Than Weekly   I eat when I am stressed Less Than Weekly   I eat when I am bored Less Than Weekly   I eat when I am anxious Once a Week   I eat when I am happy or as a reward Never   I feel hungry all the time even if I just have eaten Never   Feeling full is important to me Never   I finish all the food on my plate even if I am already full Almost Everyday   I can't resist eating delicious food or walk past the good food/smell Less Than Weekly   I eat/snack without noticing that I am eating Never   I eat when  I am preparing the meal Never   I eat more than usual when I see others eating Never   I have trouble not eating sweets, ice cream, cookies, or chips if they are around the house Never   I think about food all day Never   What foods, if any, do you crave? None           5/20/2024    10:40 AM   Amount of Food   I feel out of control when eating Never   I eat a large amount of food, like a loaf of bread, a box of cookies, a pint/quart of ice cream, all at once Never   I eat a large amount of food even when I am not hungry Never   I eat rapidly Never   I eat alone because I feel embarrassed and do not want others to see how much I have eaten Never   I eat until I am uncomfortably full Never   I feel bad, disgusted, or guilty after I overeat Never     Physical Activity:  Works in a [employer] so is on her feet all day. In the past enjoyed swimming, playing tennis, skiing. Has had to stop these things due to hip pain. Has 8 cats, will take them on a walk. Likes wall yoga.            5/20/2024    10:40 AM   Activity/Exercise History   How much of a typical 12 hour day do you spend sitting? Less Than Half the Day   How much of a typical 12 hour day do you spend lying down? Less Than Half the Day   How much of a typical day do you spend walking/standing? Half the Day   How many hours (not including work) do you spend on the TV/Video Games/Computer/Tablet/Phone? 2-3 Hours   How many times a week are you active for the purpose of exercise? Once a Week   What keeps you from being more active? Pain    Too tired   How many total minutes do you spend doing some activity for the purpose of exercising when you exercise? More Than 30 Minutes     Nutrition Prescription  Recommended energy/nutrient modification.    Nutrition Diagnosis  None at this time.     Nutrition Intervention  Reviewed current dietary habits and pts history   Answered pt questions  Coordination of care   Nutrition education   AVS and handouts via  MyChart    Expected Engagement: good    Nutrition Goals  1) Aim for closer to 80-90 grams of protein daily.   2) Aim for 50 oz of water daily.     Follow-Up: as needed.     Time spent with patient: 14 minutes.  Aicha Flores RD, LD

## 2025-05-19 NOTE — PROGRESS NOTES
Infusion Nursing Note:     Alba Varela presents today for Q 2wk Xolair.       Patient seen by provider today: No      present during visit today: Not Applicable.     Note: Alba reports no changes in allergies, which is a little surprising this time of year and everything blooming.  States the Xolair is working really well.        Intravenous Access:  No Intravenous access/labs at this visit.     Treatment Conditions:  Not Applicable.        Post Infusion Assessment:  Patient tolerated injection without incident.  Patient observed for 30 minutes post Xolair per protocol.  Site patent and intact, free from redness, edema or discomfort.         Discharge Plan:   AVS to patient via [facility]  Patient will return 6/2/25 for next Xolair injection   Patient discharged in stable condition accompanied by: self.  Departure Mode: Ambulatory.          Diana Martin RN on 5/19/2025 at 9:14 AM

## 2025-05-21 ENCOUNTER — TRANSFERRED RECORDS (OUTPATIENT)
Dept: HEALTH INFORMATION MANAGEMENT | Facility: CLINIC | Age: 55
End: 2025-05-21
Payer: COMMERCIAL

## 2025-05-22 ENCOUNTER — VIRTUAL VISIT (OUTPATIENT)
Dept: ENDOCRINOLOGY | Facility: CLINIC | Age: 55
End: 2025-05-22
Payer: COMMERCIAL

## 2025-05-22 VITALS — HEIGHT: 64 IN | WEIGHT: 143 LBS | BODY MASS INDEX: 24.41 KG/M2

## 2025-05-22 DIAGNOSIS — Z86.39 HISTORY OF OBESITY: ICD-10-CM

## 2025-05-22 DIAGNOSIS — Z71.3 NUTRITIONAL COUNSELING: Primary | ICD-10-CM

## 2025-05-22 ASSESSMENT — PAIN SCALES - GENERAL: PAINLEVEL_OUTOF10: NO PAIN (0)

## 2025-05-22 NOTE — LETTER
"2025       RE: Alba Varela  [id number]  [place] [address]     Dear Colleague,    Thank you for referring your patient, Alba Varela, to the [facility] [place] [facility]. Please see a copy of my visit note below.    Video-Visit Details    Type of service:  Video Visit    Video Start Time: 1:33 PM   Video End Time: 1:47 PM     Originating Location (pt. Location): Home    Distant Location (provider location):  Offsite (providers home) [facility] [place]     Platform used for Video Visit: Porphyrio    Return Weight Management Nutrition Consultation    Alba Varela is a 55 year old female presents today for return weight management nutrition consultation.  Patient referred by JHONATAN Linder on May 20, 2024.    Patient with Co-morbidities of obesity includin/20/2024    10:40 AM   --   I have the following health issues associated with obesity Pre-Diabetes    High Cholesterol    Sleep Apnea    Fatty Liver    Asthma    Stress Incontinence   I have the following symptoms associated with obesity Depression    Fatigue    Hip Pain     Anthropometrics:  Initial consult weight: 196 lb on 24   Estimated body mass index is 24.55 kg/m  as calculated from the following:    Height as of this encounter: 1.626 m (5' 4\").    Weight as of this encounter: 64.9 kg (143 lb).  Current Weight: 143 lb per patient report, -53 lb (27% of bodyweight)     Medications for Weight Loss:  Wegovy 2.4 mg - tolerating well and no side effects.      NUTRITION HISTORY  Food allergies: NKFA  Food intolerances: None  Vitamin/Mineral Supplements: None   Previous methods of diet modification for weight loss: watching calories/portion control   RD before: None     Doesn't drink milk.     Typically eats 1 big meal a day (dinner), doesn't generally snack, will drink coffee (with sweetened powdered creamer) and " iced tea (unsweetened) throughout the day. Craves bread, mashed potatoes . Is able to get full. Can stay full until next meal. Does sometimes struggle with portion control when it's a food she's craving. Does not experience food noise. Does experience emotional eating (when feeling really depressed she wants to surround herself with friends which often means more eating, such as at a restaurant for lunch). Does not experience a loss of control around eating.     Eats out/ gets take out 2x a week. Drinks coffee with powdered creamer, sparkling water, unsweetened iced tea. Will have 1-2 diet cokes a day. ETOH 1-2 times a month, 3-4 beers in a sitting, feels that ETOH is not too important to her quality of life.      Goals discussed with Padmaja:  Eating 3 meals a day or getting protein shakes in at breakfast and lunch. Popular brands are Premier protein, fairlife protein   Cutting out all added sugar in beverages (finding sugar free alternative to coffee mate, some patients like using protein shakes in their coffee)  Working towards 6 hours of sleep minimum nightly (7 is ideal)  No more than 2 drinks in a sitting when out with friends     Diet recall:   Skips breakfast and lunch. Eats dinner   Hydration: Zero sugar Gingerale, coffee with splash half and half, unsweetened iced tea, water.     July 2024: Protein shake or protein bar in the morning. More mindful of portion sizes and the types of foods she is eating. Dinner - varies, the other night had a caesar salad with chicken. Drinking a lot more water. Getting in at least 60 oz. No bowel changes or negative side effects.     Discussed trying to incorporate at least 1 more eating episode midday so she can get in adequate amounts of protein desired.     September 2024: Recently had shoulder surgery and eye surgery.     Reports she is surprised with all the weight she has lost so far, she doesn't notice her clothes fitting any looser and people aren't commenting. Trying  to not let that discourage her. Feels Wegovy is working though - cravings aren't as strong. In regards to her nutritional intake she has reduced her red meat consumption. Leaning more towards chicken and fish. Trying to get better with water intake, also uses sparkling water which she really enjoys.  Protein shake or protein bar for breakfast, Has a snack midday like handful of cashews, dinner is a sensible meal with a good source of protein.     November 2024: Still struggling with her water intake. Has 1 diet coke daily. Turkey rolled around piece of string cheese - for an afternoon snack.  Having cravings at night around 9-10 PM. Has been having a fruit at this time. Protein bar in the morning, turkey and cheese roll up midday, smaller portions for dinner. Trying to limit her red meat. Has a protein + vegetable or starch. 40 oz of water daily.     January 2025: Notices her clothes are fitting loser. Feels better and that she has more energy. Able to move around more.  Going [place] vacation soon to [place]. 50 oz of water daily. Doing a good job with her protein intake. Piece of turkey wrapped around piece of cheese midday. Protein shake in the morning. Dinner is a protein with a vegetable + starch. May have a snack of leif with plain yogurt.     No issues with bowel movements. No constipation or diarrhea.     Physical activity - still healing from shoulder and wrist surgery. Aiming for 10 minutes of movement per day.     May 2025: Protein shake in the morning, trying to snack in the middle of the day (may have an english muffin/peanut butter, instead of skipping eating all together, dinner portions have been smaller.     Continues to struggle with her water intake.     Physical activity - has some resistance bands, walking more often with cats.     Progress Towards Previous Goals   1) Aim for closer to 80-90 grams of protein daily. - not consistently met, continues   2) Aim for 50 oz of water daily. - not  met, continues         5/20/2024    10:40 AM   Diet Recall Review with Patient   If you do eat supper, what types of food do you typically eat? Protein, veg,bread,   How many glasses of juice do you drink in a typical day? 0   How many of glasses of milk do you drink in a typical day? 0   How many 8oz glasses of sugar containing drinks such as Corbin-Aid/sweet tea do you drink in a day? 0   How many cans/bottles of sugar pop/soda/tea/sports drinks do you drink in a day? 0   How many cans/bottles of diet pop/soda/tea or sports drink do you drink in a day? 2   How often do you have a drink of alcohol? 2-4 Times a Month   If you do drink, how many drinks might you have in a day? 3-4           5/20/2024    10:40 AM   Eating Habits   Generally, my meals include foods like these bread, pasta, rice, potatoes, corn, crackers, sweet dessert, pop, or juice A Few Times a Week   Generally, my meals include foods like these fried meats, brats, burgers, french fries, pizza, cheese, chips, or ice cream Once a Week   Eat fast food (like McDonalds, Burger Michael, Taco Bell) Less Than Weekly   Eat at a buffet or sit-down restaurant Less Than Weekly   Eat most of my meals in front of the TV or computer Almost Everyday   Often skip meals, eat at random times, have no regular eating times Everyday   Rarely sit down for a meal but snack or graze throughout Less Than Weekly   Eat extra snacks between meals Never   Eat most of my food at the end of the day Almost Everyday   Eat in the middle of the night or wake up at night to eat Never   Eat extra snacks to prevent or correct low blood sugar A Few Times a Week   Eat to prevent acid reflux or stomach pain Never   Worry about not having enough food to eat Never   I eat when I am depressed Less Than Weekly   I eat when I am stressed Less Than Weekly   I eat when I am bored Less Than Weekly   I eat when I am anxious Once a Week   I eat when I am happy or as a reward Never   I feel hungry all the  time even if I just have eaten Never   Feeling full is important to me Never   I finish all the food on my plate even if I am already full Almost Everyday   I can't resist eating delicious food or walk past the good food/smell Less Than Weekly   I eat/snack without noticing that I am eating Never   I eat when I am preparing the meal Never   I eat more than usual when I see others eating Never   I have trouble not eating sweets, ice cream, cookies, or chips if they are around the house Never   I think about food all day Never   What foods, if any, do you crave? None           5/20/2024    10:40 AM   Amount of Food   I feel out of control when eating Never   I eat a large amount of food, like a loaf of bread, a box of cookies, a pint/quart of ice cream, all at once Never   I eat a large amount of food even when I am not hungry Never   I eat rapidly Never   I eat alone because I feel embarrassed and do not want others to see how much I have eaten Never   I eat until I am uncomfortably full Never   I feel bad, disgusted, or guilty after I overeat Never     Physical Activity:  Works in a [employer] so is on her feet all day. In the past enjoyed swimming, playing tennis, skiing. Has had to stop these things due to hip pain. Has 8 cats, will take them on a walk. Likes wall yoga.            5/20/2024    10:40 AM   Activity/Exercise History   How much of a typical 12 hour day do you spend sitting? Less Than Half the Day   How much of a typical 12 hour day do you spend lying down? Less Than Half the Day   How much of a typical day do you spend walking/standing? Half the Day   How many hours (not including work) do you spend on the TV/Video Games/Computer/Tablet/Phone? 2-3 Hours   How many times a week are you active for the purpose of exercise? Once a Week   What keeps you from being more active? Pain    Too tired   How many total minutes do you spend doing some activity for the purpose of exercising when you exercise? More  Than 30 Minutes     Nutrition Prescription  Recommended energy/nutrient modification.    Nutrition Diagnosis  None at this time.     Nutrition Intervention  Reviewed current dietary habits and pts history   Answered pt questions  Coordination of care   Nutrition education   AVS and handouts via [employer]    Expected Engagement: good    Nutrition Goals  1) Aim for closer to 80-90 grams of protein daily.   2) Aim for 50 oz of water daily.     Follow-Up: as needed.     Time spent with patient: 14 minutes.  Aicha Flores RD, LD      Again, thank you for allowing me to participate in the care of your patient.      Sincerely,    Aicha Flores RD

## 2025-05-22 NOTE — NURSING NOTE
Current patient location: [facility] [address]  [facility] [address]    Is the patient currently in the state of [place]? YES    Visit mode: VIDEO    If the visit is dropped, the patient can be reconnected by:VIDEO VISIT: Text to cell phone:   Telephone Information:   Mobile [phone]       Will anyone else be joining the visit? NO  (If patient encounters technical issues they should call [phone]    Are changes needed to the allergy or medication list? N/A    Are refills needed on medications prescribed by this physician? NO    Rooming Documentation:  Questionnaire(s) not pre-assigned    Reason for visit: RECHKODY VALENTIN

## 2025-05-22 NOTE — PATIENT INSTRUCTIONS
Nasir Willis,     Follow-up with RD as needed.     Thank you,    Aicha Flores, BETO, LD  If you would like to schedule or reschedule an appointment with the RD, please call [phone]    Nutrition Goals  1) Aim for closer to 80-90 grams of protein daily.   2) Aim for 50 oz of water daily.     COMPREHENSIVE WEIGHT MANAGEMENT PROGRAM  VIRTUAL SUPPORT GROUPS    At [facility], our Comprehensive Weight Management program offers on-line support groups for patients who are working on weight loss and considering, preparing for, or have had weight loss surgery.     There is no cost for this opportunity.  You are invited to attend the?Virtual Support Groups?provided by any of the following locations:    [facility] via Microsoft Teams with Elyse Fuentes RD, RN  2.   [place] via [place] with Huber Delgadillo, PhD, LP  3.   [place] via [place] with Emily Wilson RN  4.   [facility] via a Zoom Meeting with ROSITA Riggins    The following Support Group information can also be found on our website:  [email or web address]      [facility]   WEIGHT LOSS SURGERY SUPPORT GROUP  The support group is a patient-lead forum that meets monthly to share experiences, encouragement and education. It is open to those who have had weight loss surgery, are scheduled for surgery, or are considering surgery.   WHEN: 3rd Wednesday of each month from 5:00PM - 6:00PM using Microsoft Teams.   FACILITATOR: Led by Elyse Muñoz RD, MARIAN, RN, the program's Clinical Coordinator.   TO REGISTER: Please contact the clinic via [place] or call the nurse line directly at [phone] to inform our staff that you would like an invite sent to you and to let us know the email you would like the invite sent to. Prior to the meeting, a link with directions on how to join the meeting will be sent to you.    2025 Meetings, 3rd Wednesday, 5:00pm -  6:00pm    January 15: Let's Talk  February19: Let's Talk  March 19: Speaker: Marcus Vazquez RD, LD  April 16: Let's Talk  May 21: Speaker: Chiquita Pitts RD, LD  June18: Let's Talk  July 16: Let's Talk  August 20: Let's Talk  September 17: No meeting.  October 15: Speaker: Joan Henry PsyD, LP  November 19: Let's Talk  December 17: Let's Talk    [facility] BARIATRIC CARE SUPPORT GROUP  This is open to all pre- and post- operative bariatric surgery patients as well as their support system.   WHEN: 3rd Tuesday of each month from 6:30 PM - 8:00 PM using Microsoft Teams.   FACILITATOR: Led by Huber Delgadillo, Ph.D who is a Licensed Psychologist with the [facility] Comprehensive Weight Management Program.   TO REGISTER: Please send an email to Huber Delgadillo, Ph.D., LP [email or web address]?if you would like an invitation to the group. Prior to the meeting, a link with directions on how to join the meeting will be sent to you.    2025 Meetings, 3rd Tuesday January 21st: Open Forum  February 18th: Medications and Bariatric Surgery  Speaker: Piedad Cuevas [place]'s Pharmacy Resident  March 18th: Open Forum  April 15th: Genetics of Obesity as well as Q&A, Speaker: Sheri Alba MD, [facility] Comprehensive Weight Management Program.  May 20th: Open Forum  June 17th: Nutritional Labeling, Speaker: HARITHA  July 15th: Open Forum  August 19th: Open Forum  September 16th: Open Forum  October 21st: Open Forum  November 18th: Holiday Eating, Speaker: HARITHA  December 16th: Open Forum    [facility] POST-OPERATIVE BARIATRIC SURGERY SUPPORT GROUP  This is a support group for [facility] bariatric patients (and those external to [facility]) who have had bariatric surgery and are at least 3 months post-surgery.  WHEN: 4th Thursday of the month from 11:00 AM - 12:00 PM using Microsoft Teams.    FACILITATOR: Led by Certified Bariatric Nurse, Emily Wilson, RN, CBN.   TO REGISTER: Please send an email to  at [email or web address] if you would like an invitation to the group.  Prior to the meeting, a link with directions on how to join the meeting will be sent to you.    2025 Meetings, 4th Thursday, 11:00am - 12:pm  January 23  February 27  March 27  April 24  May 22  Moon 26  July 24  August 28  September 25  October 23  November 27: Thanksgiving Day, No meeting.      December 25: [place] Day, No meeting.        [facility]   HEALTHY LIFESTYLE COACHING GROUP  This is a 60 minute virtual coaching group for those who want to lead a healthier lifestyle. Come together to set goals and overcome barriers in a supportive group environment. We will address the four pillars of health: nutrition, exercise, sleep, and emotional well-being.   WHEN: 1st Friday of the month, 12:30 PM - 1:30 PM   using a Zoom meeting.     FACILITATOR: Led by National Board-Certified Health and , Emily Callahan, [facility].  TO REGISTER: Please call the [employer] at [phone] to register. You will get an appointment to attend in [place]. Fifteen minutes prior to the meeting, complete the e-check in and you will get the link to join the meeting.  There is no charge to attend this group and space is limited.  Please register for each month you wish to attend    2025 Meetings, 1st Friday, 12:30pm-1:30pm  January 3  February 7  March 7: No meeting.  April 4  May 2  Moon 6  July 4: Fourth of July Holiday, No meeting.    August 1  September 5  October 3  November 7  December 5

## 2025-06-02 ENCOUNTER — INFUSION THERAPY VISIT (OUTPATIENT)
Dept: INFUSION THERAPY | Facility: CLINIC | Age: 55
End: 2025-06-02
Attending: ALLERGY & IMMUNOLOGY
Payer: COMMERCIAL

## 2025-06-02 VITALS
DIASTOLIC BLOOD PRESSURE: 71 MMHG | TEMPERATURE: 98.3 F | RESPIRATION RATE: 16 BRPM | SYSTOLIC BLOOD PRESSURE: 94 MMHG | HEART RATE: 91 BPM | OXYGEN SATURATION: 98 %

## 2025-06-02 DIAGNOSIS — J45.50 SEVERE PERSISTENT ASTHMA WITHOUT COMPLICATION (H): ICD-10-CM

## 2025-06-02 RX ORDER — VALACYCLOVIR HYDROCHLORIDE 1 G/1
1 TABLET, FILM COATED ORAL 3 TIMES DAILY
COMMUNITY
Start: 2025-05-27 | End: 2025-06-03

## 2025-06-02 NOTE — PROGRESS NOTES
"Infusion Nursing Note:  Alba Varela presents today for Xolair (NOT GIVEN).    Patient seen by provider today: No   present during visit today: Not Applicable.    Note: Per chart review prior to patient's arrival, noted she was recently seen in urgent care on 5/27/25 for localized shingles rash.  Upon arrival, patient reports her symptoms are improved.  She stated she has 1 day remaining of her Valcyclovir.    Called [facility] to inquire if ok to proceed with Xolair, in light of recent Shingles.  Patient was unable to stay due to needing to attend another appointment at 9:15, so she left prior to hearing back from provider and stated she would call scheduling to reschedule appointment.    6/2/2025  9:06 AM  MARGOB Dr. Gordon/Ramya Giang RN/Jake Marquis RN:  OK to proceed with Xolair.  Patient's provider no longer works at [facility], so she needs to establish care with new provider there.    Intravenous Access:  No Intravenous access/labs at this visit.    Treatment Conditions:  Biological Infusion Checklist:  ~~~ NOTE: If the patient answers yes to any of the questions below, hold the infusion and contact ordering provider or on-call provider.    Have you recently had an elevated temperature, fever, chills, productive cough, coughing for 3 weeks or longer or hemoptysis, abnormal vital signs, night sweats, chest pain or have you noticed a decrease in your appetite, unexplained weight loss or fatigue? No  Do you have any open wounds or new incisions? No  Do you have any upcoming hospitalizations or surgeries? Does not include esophagogastroduodenoscopy, colonoscopy, endoscopic retrograde cholangiopancreatography (ERCP), endoscopic ultrasound (EUS), dental procedures or joint aspiration/steroid injections No  Do you currently have any signs of illness or infection or are you on any antibiotics? Yes, recent shingles on right lateral flank/lower back, \"welts\" are not open or draining, diagnosed at " urgent care on 5/27/25, on Valcyclovir.  Have you had any new, sudden or worsening abdominal pain? No  Have you or anyone in your household received a live vaccination in the past 4 weeks? Please note: No live vaccines while on biologic/chemotherapy until 6 months after the last treatment. Patient can receive the flu vaccine (shot only), pneumovax and the Covid vaccine. It is optimal for the patient to get these vaccines mid cycle, but they can be given at any time as long as it is not on the day of the infusion. No  Have you recently been diagnosed with any new nervous system diseases (ie. Multiple sclerosis, Guillain [place], seizures, neurological changes) or cancer diagnosis? Are you on any form of radiation or chemotherapy? No  Are you pregnant or breast feeding or do you have plans of pregnancy in the future? No  Have you been having any signs of worsening depression or suicidal ideations?  (benlysta only) N/A  Have there been any other new onset medical symptoms? No  Have you had any new blood clots? (IVIG only) N/A    Post Infusion Assessment:  Not Applicable.     Discharge Plan:   Discharge instructions reviewed with: Patient.  Patient and/or family verbalized understanding of discharge instructions and all questions answered.  AVS to patient via [employer].  Patient will call to reschedule visit.  Patient discharged in stable condition accompanied by: self.  Departure Mode: Ambulatory.      Jake Marquis RN

## 2025-06-03 ENCOUNTER — INFUSION THERAPY VISIT (OUTPATIENT)
Dept: INFUSION THERAPY | Facility: CLINIC | Age: 55
End: 2025-06-03
Attending: ALLERGY & IMMUNOLOGY
Payer: COMMERCIAL

## 2025-06-03 VITALS
RESPIRATION RATE: 16 BRPM | OXYGEN SATURATION: 100 % | DIASTOLIC BLOOD PRESSURE: 77 MMHG | SYSTOLIC BLOOD PRESSURE: 115 MMHG | TEMPERATURE: 98.2 F | HEART RATE: 92 BPM

## 2025-06-03 DIAGNOSIS — J45.50 SEVERE PERSISTENT ASTHMA WITHOUT COMPLICATION (H): Primary | ICD-10-CM

## 2025-06-03 PROCEDURE — 250N000011 HC RX IP 250 OP 636: Mod: JZ | Performed by: ALLERGY & IMMUNOLOGY

## 2025-06-03 PROCEDURE — 96372 THER/PROPH/DIAG INJ SC/IM: CPT | Performed by: ALLERGY & IMMUNOLOGY

## 2025-06-03 RX ORDER — EPINEPHRINE 1 MG/ML
0.3 INJECTION, SOLUTION INTRAMUSCULAR; SUBCUTANEOUS EVERY 5 MIN PRN
OUTPATIENT
Start: 2025-06-16

## 2025-06-03 RX ORDER — ALBUTEROL SULFATE 90 UG/1
1-2 INHALANT RESPIRATORY (INHALATION)
Start: 2025-06-16

## 2025-06-03 RX ORDER — ALBUTEROL SULFATE 0.83 MG/ML
2.5 SOLUTION RESPIRATORY (INHALATION)
OUTPATIENT
Start: 2025-06-16

## 2025-06-03 RX ORDER — DIPHENHYDRAMINE HYDROCHLORIDE 50 MG/ML
50 INJECTION, SOLUTION INTRAMUSCULAR; INTRAVENOUS
Start: 2025-06-16

## 2025-06-03 RX ORDER — DIPHENHYDRAMINE HYDROCHLORIDE 50 MG/ML
25 INJECTION, SOLUTION INTRAMUSCULAR; INTRAVENOUS
Start: 2025-06-16

## 2025-06-03 RX ORDER — METHYLPREDNISOLONE SODIUM SUCCINATE 40 MG/ML
40 INJECTION INTRAMUSCULAR; INTRAVENOUS
Start: 2025-06-16

## 2025-06-03 RX ORDER — MEPERIDINE HYDROCHLORIDE 25 MG/ML
25 INJECTION INTRAMUSCULAR; INTRAVENOUS; SUBCUTANEOUS
OUTPATIENT
Start: 2025-06-16

## 2025-06-03 RX ADMIN — OMALIZUMAB: 150 INJECTION, SOLUTION SUBCUTANEOUS at 09:06

## 2025-06-03 NOTE — PROGRESS NOTES
Infusion Nursing Note:  Alba Varela presents today for Xolair.    Patient seen by provider today: No   present during visit today: Not Applicable.    Note: Alba returns today for Xolair, yesterday was held d/t recent shingles dx. See infusion note, pt was OK'd for Xolair today.      Intravenous Access:  No Intravenous access/labs at this visit.    Treatment Conditions:  Not Applicable.      Post Infusion Assessment:  Patient tolerated injection without incident.  Patient observed for 30 minutes post Xolair per protocol.  Site patent and intact, free from redness, edema or discomfort.       Discharge Plan:   Discharge instructions reviewed with: Patient.  Patient and/or family verbalized understanding of discharge instructions and all questions answered.  AVS to patient via [employer].  Patient will return 6/16/25 for next appointment.   Patient discharged in stable condition accompanied by: self.  Departure Mode: Ambulatory.      Cherrie Bautista RN

## 2025-06-10 DIAGNOSIS — E78.2 MIXED HYPERLIPIDEMIA: ICD-10-CM

## 2025-06-10 RX ORDER — ROSUVASTATIN CALCIUM 5 MG/1
5 TABLET, COATED ORAL DAILY
Qty: 90 TABLET | Refills: 1 | Status: SHIPPED | OUTPATIENT
Start: 2025-06-10

## 2025-06-16 ENCOUNTER — INFUSION THERAPY VISIT (OUTPATIENT)
Dept: INFUSION THERAPY | Facility: CLINIC | Age: 55
End: 2025-06-16
Attending: ALLERGY & IMMUNOLOGY
Payer: COMMERCIAL

## 2025-06-16 VITALS
DIASTOLIC BLOOD PRESSURE: 79 MMHG | HEART RATE: 84 BPM | OXYGEN SATURATION: 98 % | TEMPERATURE: 98.5 F | SYSTOLIC BLOOD PRESSURE: 115 MMHG | RESPIRATION RATE: 16 BRPM

## 2025-06-16 DIAGNOSIS — J45.50 SEVERE PERSISTENT ASTHMA WITHOUT COMPLICATION (H): Primary | ICD-10-CM

## 2025-06-16 PROCEDURE — 250N000011 HC RX IP 250 OP 636: Mod: JZ | Performed by: ALLERGY & IMMUNOLOGY

## 2025-06-16 PROCEDURE — 96372 THER/PROPH/DIAG INJ SC/IM: CPT | Performed by: ALLERGY & IMMUNOLOGY

## 2025-06-16 RX ORDER — METHYLPREDNISOLONE SODIUM SUCCINATE 40 MG/ML
40 INJECTION INTRAMUSCULAR; INTRAVENOUS
Start: 2025-06-17

## 2025-06-16 RX ORDER — DIPHENHYDRAMINE HYDROCHLORIDE 50 MG/ML
25 INJECTION, SOLUTION INTRAMUSCULAR; INTRAVENOUS
Start: 2025-06-17

## 2025-06-16 RX ORDER — MEPERIDINE HYDROCHLORIDE 25 MG/ML
25 INJECTION INTRAMUSCULAR; INTRAVENOUS; SUBCUTANEOUS
OUTPATIENT
Start: 2025-06-17

## 2025-06-16 RX ORDER — ALBUTEROL SULFATE 90 UG/1
1-2 INHALANT RESPIRATORY (INHALATION)
Start: 2025-06-17

## 2025-06-16 RX ORDER — ALBUTEROL SULFATE 0.83 MG/ML
2.5 SOLUTION RESPIRATORY (INHALATION)
OUTPATIENT
Start: 2025-06-17

## 2025-06-16 RX ORDER — DIPHENHYDRAMINE HYDROCHLORIDE 50 MG/ML
50 INJECTION, SOLUTION INTRAMUSCULAR; INTRAVENOUS
Start: 2025-06-17

## 2025-06-16 RX ORDER — EPINEPHRINE 1 MG/ML
0.3 INJECTION, SOLUTION INTRAMUSCULAR; SUBCUTANEOUS EVERY 5 MIN PRN
OUTPATIENT
Start: 2025-06-17

## 2025-06-16 RX ADMIN — OMALIZUMAB: 300 INJECTION, SOLUTION SUBCUTANEOUS at 08:48

## 2025-06-16 NOTE — PROGRESS NOTES
Infusion Nursing Note:  Alba Varela presents today for Xolair.    Patient seen by provider today: No   present during visit today: Not Applicable.    Note: N/A.      Intravenous Access:  No Intravenous access/labs at this visit.    Treatment Conditions:  Not Applicable.      Post Infusion Assessment:  Patient tolerated injection without incident.  Patient observed for 30 minutes post Xolair per protocol.  Site patent and intact, free from redness, edema or discomfort.       Discharge Plan:   Discharge instructions reviewed with: Patient.  Patient and/or family verbalized understanding of discharge instructions and all questions answered.  AVS to patient via [employer].  Patient will return 6/30 for next appointment.   Patient discharged in stable condition accompanied by: self.  Departure Mode: Ambulatory.      Carlos Marshall RN

## 2025-06-30 ENCOUNTER — INFUSION THERAPY VISIT (OUTPATIENT)
Dept: INFUSION THERAPY | Facility: CLINIC | Age: 55
End: 2025-06-30
Attending: ALLERGY & IMMUNOLOGY
Payer: COMMERCIAL

## 2025-06-30 VITALS
RESPIRATION RATE: 16 BRPM | OXYGEN SATURATION: 99 % | DIASTOLIC BLOOD PRESSURE: 63 MMHG | SYSTOLIC BLOOD PRESSURE: 102 MMHG | HEART RATE: 91 BPM | TEMPERATURE: 97.3 F

## 2025-06-30 DIAGNOSIS — J45.50 SEVERE PERSISTENT ASTHMA WITHOUT COMPLICATION (H): Primary | ICD-10-CM

## 2025-06-30 PROCEDURE — 250N000011 HC RX IP 250 OP 636: Mod: JZ | Performed by: ALLERGY & IMMUNOLOGY

## 2025-06-30 PROCEDURE — 96372 THER/PROPH/DIAG INJ SC/IM: CPT | Performed by: ALLERGY & IMMUNOLOGY

## 2025-06-30 RX ORDER — MEPERIDINE HYDROCHLORIDE 25 MG/ML
25 INJECTION INTRAMUSCULAR; INTRAVENOUS; SUBCUTANEOUS
OUTPATIENT
Start: 2025-07-01

## 2025-06-30 RX ORDER — DIPHENHYDRAMINE HYDROCHLORIDE 50 MG/ML
25 INJECTION, SOLUTION INTRAMUSCULAR; INTRAVENOUS
Start: 2025-07-01

## 2025-06-30 RX ORDER — DIPHENHYDRAMINE HYDROCHLORIDE 50 MG/ML
50 INJECTION, SOLUTION INTRAMUSCULAR; INTRAVENOUS
Start: 2025-07-01

## 2025-06-30 RX ORDER — METHYLPREDNISOLONE SODIUM SUCCINATE 40 MG/ML
40 INJECTION INTRAMUSCULAR; INTRAVENOUS
Start: 2025-07-01

## 2025-06-30 RX ORDER — ALBUTEROL SULFATE 90 UG/1
1-2 INHALANT RESPIRATORY (INHALATION)
Start: 2025-07-01

## 2025-06-30 RX ORDER — EPINEPHRINE 1 MG/ML
0.3 INJECTION, SOLUTION INTRAMUSCULAR; SUBCUTANEOUS EVERY 5 MIN PRN
OUTPATIENT
Start: 2025-07-01

## 2025-06-30 RX ORDER — ALBUTEROL SULFATE 0.83 MG/ML
2.5 SOLUTION RESPIRATORY (INHALATION)
OUTPATIENT
Start: 2025-07-01

## 2025-06-30 RX ADMIN — OMALIZUMAB: 150 INJECTION, SOLUTION SUBCUTANEOUS at 14:42

## 2025-06-30 NOTE — PROGRESS NOTES
Infusion Nursing Note:  Alba Varela presents today for Xolair.    Patient seen by provider today: No   present during visit today: Not Applicable.    Note: N/A.      Intravenous Access:  No Intravenous access/labs at this visit.    Treatment Conditions:  Not Applicable.      Post Infusion Assessment:  Patient tolerated injection without incident.  Patient observed for 20 minutes post Xolair.       Discharge Plan:   Discharge instructions reviewed with: Patient.  Patient and/or family verbalized understanding of discharge instructions and all questions answered.  AVS to patient via [employer].  Patient will return 7/14/25 for next appointment.   Patient discharged in stable condition accompanied by: self.  Departure Mode: Ambulatory.      Dannielle Cohen RN

## 2025-07-14 ENCOUNTER — INFUSION THERAPY VISIT (OUTPATIENT)
Dept: INFUSION THERAPY | Facility: CLINIC | Age: 55
End: 2025-07-14
Attending: ALLERGY & IMMUNOLOGY
Payer: COMMERCIAL

## 2025-07-14 VITALS
SYSTOLIC BLOOD PRESSURE: 113 MMHG | HEART RATE: 96 BPM | DIASTOLIC BLOOD PRESSURE: 72 MMHG | TEMPERATURE: 97 F | OXYGEN SATURATION: 99 %

## 2025-07-14 DIAGNOSIS — J45.50 SEVERE PERSISTENT ASTHMA WITHOUT COMPLICATION (H): Primary | ICD-10-CM

## 2025-07-14 PROCEDURE — 96372 THER/PROPH/DIAG INJ SC/IM: CPT | Performed by: ALLERGY & IMMUNOLOGY

## 2025-07-14 PROCEDURE — 250N000011 HC RX IP 250 OP 636: Mod: JZ | Performed by: ALLERGY & IMMUNOLOGY

## 2025-07-14 RX ORDER — ALBUTEROL SULFATE 90 UG/1
1-2 INHALANT RESPIRATORY (INHALATION)
Start: 2025-07-15

## 2025-07-14 RX ORDER — DIPHENHYDRAMINE HYDROCHLORIDE 50 MG/ML
50 INJECTION, SOLUTION INTRAMUSCULAR; INTRAVENOUS
Start: 2025-07-15

## 2025-07-14 RX ORDER — DIPHENHYDRAMINE HYDROCHLORIDE 50 MG/ML
25 INJECTION, SOLUTION INTRAMUSCULAR; INTRAVENOUS
Start: 2025-07-15

## 2025-07-14 RX ORDER — EPINEPHRINE 1 MG/ML
0.3 INJECTION, SOLUTION INTRAMUSCULAR; SUBCUTANEOUS EVERY 5 MIN PRN
OUTPATIENT
Start: 2025-07-15

## 2025-07-14 RX ORDER — MEPERIDINE HYDROCHLORIDE 25 MG/ML
25 INJECTION INTRAMUSCULAR; INTRAVENOUS; SUBCUTANEOUS
OUTPATIENT
Start: 2025-07-15

## 2025-07-14 RX ORDER — METHYLPREDNISOLONE SODIUM SUCCINATE 40 MG/ML
40 INJECTION INTRAMUSCULAR; INTRAVENOUS
Start: 2025-07-15

## 2025-07-14 RX ORDER — ALBUTEROL SULFATE 0.83 MG/ML
2.5 SOLUTION RESPIRATORY (INHALATION)
OUTPATIENT
Start: 2025-07-15

## 2025-07-14 RX ADMIN — OMALIZUMAB: 150 INJECTION, SOLUTION SUBCUTANEOUS at 08:40

## 2025-07-14 NOTE — PROGRESS NOTES
Infusion Nursing Note:  Alba Varela presents today for Xolair.    Patient seen by provider today: No   present during visit today: Not Applicable.    Note: Alba reports that her asthma has been well controlled, even with the smokiness in the air over the past week.      Intravenous Access:  No Intravenous access/labs at this visit.    Treatment Conditions:  Not Applicable.      Post Infusion Assessment:  Patient tolerated injection without incident.  Patient observed for 15 minutes post Xolair  Site patent and intact, free from redness, edema or discomfort.       Discharge Plan:   [facility]  Patient will return 7/14/25 for next Xolair injection   Patient discharged in stable condition accompanied by: self.  Departure Mode: Ambulatory.      Mame Condon RN

## 2025-07-28 ENCOUNTER — INFUSION THERAPY VISIT (OUTPATIENT)
Dept: INFUSION THERAPY | Facility: CLINIC | Age: 55
End: 2025-07-28
Attending: ALLERGY & IMMUNOLOGY
Payer: COMMERCIAL

## 2025-07-28 VITALS
DIASTOLIC BLOOD PRESSURE: 72 MMHG | TEMPERATURE: 96.8 F | RESPIRATION RATE: 16 BRPM | HEART RATE: 90 BPM | OXYGEN SATURATION: 100 % | SYSTOLIC BLOOD PRESSURE: 102 MMHG

## 2025-07-28 DIAGNOSIS — J45.50 SEVERE PERSISTENT ASTHMA WITHOUT COMPLICATION (H): Primary | ICD-10-CM

## 2025-07-28 PROCEDURE — 250N000011 HC RX IP 250 OP 636: Mod: JZ | Performed by: ALLERGY & IMMUNOLOGY

## 2025-07-28 PROCEDURE — 96372 THER/PROPH/DIAG INJ SC/IM: CPT | Performed by: ALLERGY & IMMUNOLOGY

## 2025-07-28 RX ORDER — METHYLPREDNISOLONE SODIUM SUCCINATE 40 MG/ML
40 INJECTION INTRAMUSCULAR; INTRAVENOUS
Start: 2025-07-29

## 2025-07-28 RX ORDER — DIPHENHYDRAMINE HYDROCHLORIDE 50 MG/ML
50 INJECTION, SOLUTION INTRAMUSCULAR; INTRAVENOUS
Start: 2025-07-29

## 2025-07-28 RX ORDER — MEPERIDINE HYDROCHLORIDE 25 MG/ML
25 INJECTION INTRAMUSCULAR; INTRAVENOUS; SUBCUTANEOUS
OUTPATIENT
Start: 2025-07-29

## 2025-07-28 RX ORDER — ALBUTEROL SULFATE 90 UG/1
1-2 INHALANT RESPIRATORY (INHALATION)
Start: 2025-07-29

## 2025-07-28 RX ORDER — DIPHENHYDRAMINE HYDROCHLORIDE 50 MG/ML
25 INJECTION, SOLUTION INTRAMUSCULAR; INTRAVENOUS
Start: 2025-07-29

## 2025-07-28 RX ORDER — EPINEPHRINE 1 MG/ML
0.3 INJECTION, SOLUTION INTRAMUSCULAR; SUBCUTANEOUS EVERY 5 MIN PRN
OUTPATIENT
Start: 2025-07-29

## 2025-07-28 RX ORDER — ALBUTEROL SULFATE 0.83 MG/ML
2.5 SOLUTION RESPIRATORY (INHALATION)
OUTPATIENT
Start: 2025-07-29

## 2025-07-28 RX ADMIN — OMALIZUMAB: 150 INJECTION, SOLUTION SUBCUTANEOUS at 08:52

## 2025-07-28 NOTE — PROGRESS NOTES
Infusion Nursing Note:  Alba Varela presents today for Xolair.    Patient seen by provider today: No   present during visit today: Not Applicable.    Note: N/A.      Intravenous Access:  No Intravenous access/labs at this visit.    Treatment Conditions:  Not Applicable.      Post Infusion Assessment:  Patient tolerated injection without incident.  Patient observed for 15 minutes post xolair per protocol.       Discharge Plan:   AVS to patient via [employer]CH[employer].  Patient will return 8/11/25 for next appointment.   Patient discharged in stable condition accompanied by: self.  Departure Mode: Ambulatory.      Sangita Edwards RN

## 2025-08-01 DIAGNOSIS — E78.2 MIXED HYPERLIPIDEMIA: ICD-10-CM

## 2025-08-01 DIAGNOSIS — K76.0 HEPATIC STEATOSIS: ICD-10-CM

## 2025-08-01 DIAGNOSIS — R73.03 PREDIABETES: ICD-10-CM

## 2025-08-01 DIAGNOSIS — E66.811 CLASS 1 OBESITY WITH BODY MASS INDEX (BMI) OF 30.0 TO 30.9 IN ADULT, UNSPECIFIED OBESITY TYPE, UNSPECIFIED WHETHER SERIOUS COMORBIDITY PRESENT: ICD-10-CM

## 2025-08-04 DIAGNOSIS — E66.811 CLASS 1 OBESITY WITH BODY MASS INDEX (BMI) OF 30.0 TO 30.9 IN ADULT, UNSPECIFIED OBESITY TYPE, UNSPECIFIED WHETHER SERIOUS COMORBIDITY PRESENT: ICD-10-CM

## 2025-08-04 DIAGNOSIS — E78.2 MIXED HYPERLIPIDEMIA: ICD-10-CM

## 2025-08-04 DIAGNOSIS — K76.0 HEPATIC STEATOSIS: ICD-10-CM

## 2025-08-04 DIAGNOSIS — R73.03 PREDIABETES: ICD-10-CM

## 2025-08-11 ENCOUNTER — INFUSION THERAPY VISIT (OUTPATIENT)
Dept: INFUSION THERAPY | Facility: CLINIC | Age: 55
End: 2025-08-11
Attending: ALLERGY & IMMUNOLOGY
Payer: COMMERCIAL

## 2025-08-11 VITALS
RESPIRATION RATE: 16 BRPM | HEART RATE: 82 BPM | TEMPERATURE: 97 F | OXYGEN SATURATION: 100 % | SYSTOLIC BLOOD PRESSURE: 115 MMHG | DIASTOLIC BLOOD PRESSURE: 77 MMHG

## 2025-08-11 DIAGNOSIS — J45.50 SEVERE PERSISTENT ASTHMA WITHOUT COMPLICATION (H): Primary | ICD-10-CM

## 2025-08-11 PROCEDURE — 96372 THER/PROPH/DIAG INJ SC/IM: CPT | Performed by: ALLERGY & IMMUNOLOGY

## 2025-08-11 PROCEDURE — 250N000011 HC RX IP 250 OP 636: Mod: JZ | Performed by: ALLERGY & IMMUNOLOGY

## 2025-08-11 RX ORDER — METHYLPREDNISOLONE SODIUM SUCCINATE 40 MG/ML
40 INJECTION INTRAMUSCULAR; INTRAVENOUS
Start: 2025-08-12

## 2025-08-11 RX ORDER — DIPHENHYDRAMINE HYDROCHLORIDE 50 MG/ML
50 INJECTION, SOLUTION INTRAMUSCULAR; INTRAVENOUS
Start: 2025-08-12

## 2025-08-11 RX ORDER — ALBUTEROL SULFATE 0.83 MG/ML
2.5 SOLUTION RESPIRATORY (INHALATION)
OUTPATIENT
Start: 2025-08-12

## 2025-08-11 RX ORDER — MEPERIDINE HYDROCHLORIDE 25 MG/ML
25 INJECTION INTRAMUSCULAR; INTRAVENOUS; SUBCUTANEOUS
OUTPATIENT
Start: 2025-08-12

## 2025-08-11 RX ORDER — DIPHENHYDRAMINE HYDROCHLORIDE 50 MG/ML
25 INJECTION, SOLUTION INTRAMUSCULAR; INTRAVENOUS
Start: 2025-08-12

## 2025-08-11 RX ORDER — ALBUTEROL SULFATE 90 UG/1
1-2 INHALANT RESPIRATORY (INHALATION)
Start: 2025-08-12

## 2025-08-11 RX ORDER — EPINEPHRINE 1 MG/ML
0.3 INJECTION, SOLUTION INTRAMUSCULAR; SUBCUTANEOUS EVERY 5 MIN PRN
OUTPATIENT
Start: 2025-08-12

## 2025-08-11 RX ADMIN — OMALIZUMAB: 150 INJECTION, SOLUTION SUBCUTANEOUS at 08:52

## 2025-08-25 ENCOUNTER — INFUSION THERAPY VISIT (OUTPATIENT)
Dept: INFUSION THERAPY | Facility: CLINIC | Age: 55
End: 2025-08-25
Attending: INTERNAL MEDICINE
Payer: COMMERCIAL

## 2025-08-25 VITALS
HEART RATE: 89 BPM | OXYGEN SATURATION: 99 % | TEMPERATURE: 97 F | DIASTOLIC BLOOD PRESSURE: 62 MMHG | SYSTOLIC BLOOD PRESSURE: 93 MMHG | RESPIRATION RATE: 16 BRPM

## 2025-08-25 DIAGNOSIS — J45.50 SEVERE PERSISTENT ASTHMA WITHOUT COMPLICATION (H): Primary | ICD-10-CM

## 2025-08-25 PROCEDURE — 96372 THER/PROPH/DIAG INJ SC/IM: CPT | Performed by: ALLERGY & IMMUNOLOGY

## 2025-08-25 PROCEDURE — 250N000011 HC RX IP 250 OP 636: Mod: JZ | Performed by: ALLERGY & IMMUNOLOGY

## 2025-08-25 RX ORDER — ALBUTEROL SULFATE 0.83 MG/ML
2.5 SOLUTION RESPIRATORY (INHALATION)
OUTPATIENT
Start: 2025-08-26

## 2025-08-25 RX ORDER — DIPHENHYDRAMINE HYDROCHLORIDE 50 MG/ML
25 INJECTION, SOLUTION INTRAMUSCULAR; INTRAVENOUS
Start: 2025-08-26

## 2025-08-25 RX ORDER — MEPERIDINE HYDROCHLORIDE 25 MG/ML
25 INJECTION INTRAMUSCULAR; INTRAVENOUS; SUBCUTANEOUS
OUTPATIENT
Start: 2025-08-26

## 2025-08-25 RX ORDER — EPINEPHRINE 1 MG/ML
0.3 INJECTION, SOLUTION INTRAMUSCULAR; SUBCUTANEOUS EVERY 5 MIN PRN
OUTPATIENT
Start: 2025-08-26

## 2025-08-25 RX ORDER — DIPHENHYDRAMINE HYDROCHLORIDE 50 MG/ML
50 INJECTION, SOLUTION INTRAMUSCULAR; INTRAVENOUS
Start: 2025-08-26

## 2025-08-25 RX ORDER — METHYLPREDNISOLONE SODIUM SUCCINATE 40 MG/ML
40 INJECTION INTRAMUSCULAR; INTRAVENOUS
Start: 2025-08-26

## 2025-08-25 RX ORDER — ALBUTEROL SULFATE 90 UG/1
1-2 INHALANT RESPIRATORY (INHALATION)
Start: 2025-08-26

## 2025-08-25 RX ADMIN — OMALIZUMAB: 150 INJECTION, SOLUTION SUBCUTANEOUS at 08:55

## (undated) DEVICE — LINEN DRAPE 54X72" 5467

## (undated) DEVICE — IMM PILLOW ABDUCT HIP MED M60-025-M

## (undated) DEVICE — SOL NACL 0.9% IRRIG 1000ML BOTTLE 2F7124

## (undated) DEVICE — EYE PREP BETADINE 5% SOLUTION 30ML 0065-0411-30

## (undated) DEVICE — DRSG AQUACEL AG 3.5X9.75" HYDROFIBER 412011

## (undated) DEVICE — SYR 20ML LL W/O NDL 302830

## (undated) DEVICE — SU SILK 4-0 G-3DA 18" 783G

## (undated) DEVICE — PACK OCULOPLATIC SEN15OCFSD

## (undated) DEVICE — NDL SPINAL 18GA 3.5" 405184

## (undated) DEVICE — GLOVE PROTEXIS BLUE W/NEU-THERA 8.0  2D73EB80

## (undated) DEVICE — GLOVE PROTEXIS POWDER FREE 8.0 ORTHOPEDIC 2D73ET80

## (undated) DEVICE — SUTURE VICRYL+ 2-0 CT-2 27" UND VCP269H

## (undated) DEVICE — DRAPE CONVERTORS U-DRAPE 60X72" 8476

## (undated) DEVICE — GLOVE PROTEXIS POWDER FREE 7.5 ORTHOPEDIC 2D73ET75

## (undated) DEVICE — DRSG STERI STRIP 1/2X4" R1547

## (undated) DEVICE — SUCTION MANIFOLD NEPTUNE 2 SYS 4 PORT 0702-020-000

## (undated) DEVICE — DEVICE RETRIEVER HEWSON 71111579

## (undated) DEVICE — SOL WATER IRRIG 1000ML BOTTLE 2F7114

## (undated) DEVICE — SU PROLENE 5-0 C-1DA 36" 8720H

## (undated) DEVICE — SU VICRYL 0 CT-1 36" J346H

## (undated) DEVICE — SU MONOCRYL 4-0 PS-2 18" UND Y496G

## (undated) DEVICE — GLOVE BIOGEL PI MICRO SZ 6.5 48565

## (undated) DEVICE — BLADE SAW SAGITTAL STRK 25X90X1.27MM HD SYS 6 6125-127-090

## (undated) DEVICE — SYR 50ML LL W/O NDL 309653

## (undated) DEVICE — LINEN FULL SHEET 5511

## (undated) DEVICE — DECANTER BAG 2002S

## (undated) DEVICE — SET HANDPIECE INTERPULSE W/COAXIAL FAN SPRAY TIP 0210118000

## (undated) DEVICE — SU VICRYL+ 1 MO-4 18" DYED VCP702D

## (undated) DEVICE — LINEN ORTHO PACK 5446

## (undated) DEVICE — BAG CLEAR TRASH 1.3M 39X33" P4040C

## (undated) DEVICE — LINEN TOWEL PACK X5 5464

## (undated) DEVICE — ESU ELEC BLADE 6" COATED E1450-6

## (undated) DEVICE — SU FIBERWIRE 2 38"  AR-7200

## (undated) DEVICE — SU PROLENE 6-0 P-1 18" 8697G

## (undated) DEVICE — LINEN HALF SHEET 5512

## (undated) DEVICE — ESU NDL COLORADO MICRO 3CM STR N103A

## (undated) DEVICE — ESU CORD BIPOLAR 12' E0512

## (undated) DEVICE — PACK TOTAL HIP RIDGES LATEX PO15HIFSG

## (undated) DEVICE — ESU PENCIL SMOKE EVAC W/ROCKER SWITCH 0703-047-000

## (undated) DEVICE — TUBING SUCTION MEDI-VAC SOFT 3/16"X20' N520A

## (undated) RX ORDER — PHENYLEPHRINE HCL IN 0.9% NACL 1 MG/10 ML
SYRINGE (ML) INTRAVENOUS
Status: DISPENSED
Start: 2017-11-03

## (undated) RX ORDER — FENTANYL CITRATE 50 UG/ML
INJECTION, SOLUTION INTRAMUSCULAR; INTRAVENOUS
Status: DISPENSED
Start: 2024-09-03

## (undated) RX ORDER — NEOSTIGMINE METHYLSULFATE 1 MG/ML
VIAL (ML) INJECTION
Status: DISPENSED
Start: 2017-11-03

## (undated) RX ORDER — DEXAMETHASONE SODIUM PHOSPHATE 4 MG/ML
INJECTION, SOLUTION INTRA-ARTICULAR; INTRALESIONAL; INTRAMUSCULAR; INTRAVENOUS; SOFT TISSUE
Status: DISPENSED
Start: 2017-11-03

## (undated) RX ORDER — FENTANYL CITRATE 50 UG/ML
INJECTION, SOLUTION INTRAMUSCULAR; INTRAVENOUS
Status: DISPENSED
Start: 2017-11-03

## (undated) RX ORDER — PROPOFOL 10 MG/ML
INJECTION, EMULSION INTRAVENOUS
Status: DISPENSED
Start: 2017-11-03

## (undated) RX ORDER — OXYCODONE HCL 10 MG/1
TABLET, FILM COATED, EXTENDED RELEASE ORAL
Status: DISPENSED
Start: 2017-11-03

## (undated) RX ORDER — GLYCOPYRROLATE 0.2 MG/ML
INJECTION INTRAMUSCULAR; INTRAVENOUS
Status: DISPENSED
Start: 2017-11-03

## (undated) RX ORDER — CEFAZOLIN SODIUM 2 G/100ML
INJECTION, SOLUTION INTRAVENOUS
Status: DISPENSED
Start: 2017-11-03

## (undated) RX ORDER — LIDOCAINE HYDROCHLORIDE 10 MG/ML
INJECTION, SOLUTION EPIDURAL; INFILTRATION; INTRACAUDAL; PERINEURAL
Status: DISPENSED
Start: 2017-11-03

## (undated) RX ORDER — ACETAMINOPHEN 500 MG
TABLET ORAL
Status: DISPENSED
Start: 2017-11-03

## (undated) RX ORDER — SCOLOPAMINE TRANSDERMAL SYSTEM 1 MG/1
PATCH, EXTENDED RELEASE TRANSDERMAL
Status: DISPENSED
Start: 2017-11-03

## (undated) RX ORDER — ONDANSETRON 2 MG/ML
INJECTION INTRAMUSCULAR; INTRAVENOUS
Status: DISPENSED
Start: 2017-11-03